# Patient Record
Sex: FEMALE | Race: OTHER | HISPANIC OR LATINO
[De-identification: names, ages, dates, MRNs, and addresses within clinical notes are randomized per-mention and may not be internally consistent; named-entity substitution may affect disease eponyms.]

---

## 2017-05-25 ENCOUNTER — APPOINTMENT (OUTPATIENT)
Dept: ORTHOPEDIC SURGERY | Facility: CLINIC | Age: 72
End: 2017-05-25

## 2017-05-25 VITALS
BODY MASS INDEX: 21.77 KG/M2 | HEIGHT: 59 IN | DIASTOLIC BLOOD PRESSURE: 88 MMHG | HEART RATE: 76 BPM | SYSTOLIC BLOOD PRESSURE: 219 MMHG | WEIGHT: 108 LBS

## 2017-05-25 DIAGNOSIS — Z78.9 OTHER SPECIFIED HEALTH STATUS: ICD-10-CM

## 2017-05-25 DIAGNOSIS — M51.26 OTHER INTERVERTEBRAL DISC DISPLACEMENT, LUMBAR REGION: ICD-10-CM

## 2017-05-25 DIAGNOSIS — E78.00 PURE HYPERCHOLESTEROLEMIA, UNSPECIFIED: ICD-10-CM

## 2017-05-25 DIAGNOSIS — I25.2 OLD MYOCARDIAL INFARCTION: ICD-10-CM

## 2017-05-25 RX ORDER — LISINOPRIL 40 MG/1
40 TABLET ORAL
Refills: 0 | Status: ACTIVE | COMMUNITY

## 2017-06-01 ENCOUNTER — APPOINTMENT (OUTPATIENT)
Dept: MRI IMAGING | Facility: CLINIC | Age: 72
End: 2017-06-01

## 2017-06-09 ENCOUNTER — INPATIENT (INPATIENT)
Facility: HOSPITAL | Age: 72
LOS: 3 days | Discharge: HOME | End: 2017-06-13
Attending: INTERNAL MEDICINE | Admitting: INTERNAL MEDICINE
Payer: MEDICARE

## 2017-06-09 VITALS
TEMPERATURE: 98 F | SYSTOLIC BLOOD PRESSURE: 225 MMHG | HEART RATE: 83 BPM | HEIGHT: 59 IN | RESPIRATION RATE: 17 BRPM | DIASTOLIC BLOOD PRESSURE: 84 MMHG | OXYGEN SATURATION: 100 % | WEIGHT: 111.99 LBS

## 2017-06-09 DIAGNOSIS — Z98.51 TUBAL LIGATION STATUS: Chronic | ICD-10-CM

## 2017-06-09 DIAGNOSIS — Z95.5 PRESENCE OF CORONARY ANGIOPLASTY IMPLANT AND GRAFT: Chronic | ICD-10-CM

## 2017-06-09 DIAGNOSIS — Z98.89 OTHER SPECIFIED POSTPROCEDURAL STATES: Chronic | ICD-10-CM

## 2017-06-09 DIAGNOSIS — R07.9 CHEST PAIN, UNSPECIFIED: ICD-10-CM

## 2017-06-09 LAB
ALBUMIN SERPL ELPH-MCNC: 4.1 G/DL — SIGNIFICANT CHANGE UP (ref 3.3–5)
ALP SERPL-CCNC: 81 U/L — SIGNIFICANT CHANGE UP (ref 40–120)
ALT FLD-CCNC: 11 U/L — SIGNIFICANT CHANGE UP (ref 4–33)
APTT BLD: 32.1 SEC — SIGNIFICANT CHANGE UP (ref 27.5–37.4)
AST SERPL-CCNC: 15 U/L — SIGNIFICANT CHANGE UP (ref 4–32)
BASOPHILS # BLD AUTO: 0.02 K/UL — SIGNIFICANT CHANGE UP (ref 0–0.2)
BASOPHILS NFR BLD AUTO: 0.3 % — SIGNIFICANT CHANGE UP (ref 0–2)
BILIRUB SERPL-MCNC: 0.4 MG/DL — SIGNIFICANT CHANGE UP (ref 0.2–1.2)
BUN SERPL-MCNC: 15 MG/DL — SIGNIFICANT CHANGE UP (ref 7–23)
CALCIUM SERPL-MCNC: 9.7 MG/DL — SIGNIFICANT CHANGE UP (ref 8.4–10.5)
CHLORIDE SERPL-SCNC: 103 MMOL/L — SIGNIFICANT CHANGE UP (ref 98–107)
CK MB BLD-MCNC: 1 NG/ML — SIGNIFICANT CHANGE UP (ref 1–4.7)
CK MB BLD-MCNC: SIGNIFICANT CHANGE UP (ref 0–2.5)
CK SERPL-CCNC: 35 U/L — SIGNIFICANT CHANGE UP (ref 25–170)
CO2 SERPL-SCNC: 28 MMOL/L — SIGNIFICANT CHANGE UP (ref 22–31)
CREAT SERPL-MCNC: 0.96 MG/DL — SIGNIFICANT CHANGE UP (ref 0.5–1.3)
EOSINOPHIL # BLD AUTO: 0.14 K/UL — SIGNIFICANT CHANGE UP (ref 0–0.5)
EOSINOPHIL NFR BLD AUTO: 2 % — SIGNIFICANT CHANGE UP (ref 0–6)
GLUCOSE SERPL-MCNC: 204 MG/DL — HIGH (ref 70–99)
HCT VFR BLD CALC: 37.2 % — SIGNIFICANT CHANGE UP (ref 34.5–45)
HGB BLD-MCNC: 11.9 G/DL — SIGNIFICANT CHANGE UP (ref 11.5–15.5)
IMM GRANULOCYTES NFR BLD AUTO: 0.1 % — SIGNIFICANT CHANGE UP (ref 0–1.5)
INR BLD: 0.94 — SIGNIFICANT CHANGE UP (ref 0.88–1.17)
LYMPHOCYTES # BLD AUTO: 1.94 K/UL — SIGNIFICANT CHANGE UP (ref 1–3.3)
LYMPHOCYTES # BLD AUTO: 28.3 % — SIGNIFICANT CHANGE UP (ref 13–44)
MCHC RBC-ENTMCNC: 27.3 PG — SIGNIFICANT CHANGE UP (ref 27–34)
MCHC RBC-ENTMCNC: 32 % — SIGNIFICANT CHANGE UP (ref 32–36)
MCV RBC AUTO: 85.3 FL — SIGNIFICANT CHANGE UP (ref 80–100)
MONOCYTES # BLD AUTO: 0.41 K/UL — SIGNIFICANT CHANGE UP (ref 0–0.9)
MONOCYTES NFR BLD AUTO: 6 % — SIGNIFICANT CHANGE UP (ref 2–14)
NEUTROPHILS # BLD AUTO: 4.34 K/UL — SIGNIFICANT CHANGE UP (ref 1.8–7.4)
NEUTROPHILS NFR BLD AUTO: 63.3 % — SIGNIFICANT CHANGE UP (ref 43–77)
PLATELET # BLD AUTO: 254 K/UL — SIGNIFICANT CHANGE UP (ref 150–400)
PMV BLD: 9.4 FL — SIGNIFICANT CHANGE UP (ref 7–13)
POTASSIUM SERPL-MCNC: 4.3 MMOL/L — SIGNIFICANT CHANGE UP (ref 3.5–5.3)
POTASSIUM SERPL-SCNC: 4.3 MMOL/L — SIGNIFICANT CHANGE UP (ref 3.5–5.3)
PROT SERPL-MCNC: 7 G/DL — SIGNIFICANT CHANGE UP (ref 6–8.3)
PROTHROM AB SERPL-ACNC: 10.5 SEC — SIGNIFICANT CHANGE UP (ref 9.8–13.1)
RBC # BLD: 4.36 M/UL — SIGNIFICANT CHANGE UP (ref 3.8–5.2)
RBC # FLD: 13.4 % — SIGNIFICANT CHANGE UP (ref 10.3–14.5)
SODIUM SERPL-SCNC: 143 MMOL/L — SIGNIFICANT CHANGE UP (ref 135–145)
TROPONIN T SERPL-MCNC: < 0.06 NG/ML — SIGNIFICANT CHANGE UP (ref 0–0.06)
WBC # BLD: 6.86 K/UL — SIGNIFICANT CHANGE UP (ref 3.8–10.5)
WBC # FLD AUTO: 6.86 K/UL — SIGNIFICANT CHANGE UP (ref 3.8–10.5)

## 2017-06-09 PROCEDURE — 71010: CPT | Mod: 26

## 2017-06-09 RX ORDER — ASPIRIN/CALCIUM CARB/MAGNESIUM 324 MG
162 TABLET ORAL ONCE
Qty: 0 | Refills: 0 | Status: COMPLETED | OUTPATIENT
Start: 2017-06-09 | End: 2017-06-09

## 2017-06-09 RX ORDER — CARVEDILOL PHOSPHATE 80 MG/1
25 CAPSULE, EXTENDED RELEASE ORAL ONCE
Qty: 0 | Refills: 0 | Status: COMPLETED | OUTPATIENT
Start: 2017-06-09 | End: 2017-06-09

## 2017-06-09 RX ADMIN — Medication 162 MILLIGRAM(S): at 23:15

## 2017-06-09 RX ADMIN — CARVEDILOL PHOSPHATE 25 MILLIGRAM(S): 80 CAPSULE, EXTENDED RELEASE ORAL at 23:15

## 2017-06-09 NOTE — ED ADULT NURSE NOTE - CHIEF COMPLAINT QUOTE
Pt arrives to ED c/o chest pain described as tightness starting 20:00 on 6/8/17.    Pt last felt this way in 2012 when she had an MI.  EKG in triage.  Pt is mother of [employer] employee.  Pt hypertensive in triage.

## 2017-06-09 NOTE — ED ADULT NURSE NOTE - OBJECTIVE STATEMENT
pt received to Tr C pt comes to ED for CP. pt has past medical hx of MI in the past. pt BP is elevated otherwise VSS. 20 g placed in L AC labs were drawn and sent EDMD at bedside for eval pt is NSR on monitor. resp even and unlabored. awaiting further orders Will continue to monitor the pt

## 2017-06-09 NOTE — ED PROVIDER NOTE - PMH
Borderline diabetes mellitus  Controlled with diet  CAD (coronary artery disease)    Dyslipidemia    H/O heart artery stent    History of MI (myocardial infarction)    HTN (hypertension), benign    Legally blind in left eye, as defined in [place]  20/400 left eye  Migraines  Developed at 10 years of age  Last episode 2 years ago  Vertigo

## 2017-06-09 NOTE — ED PROVIDER NOTE - ATTENDING CONTRIBUTION TO CARE
71F p/w chest pain intermittent L parasternal, rad to neck and L arm, x 1 day, a/w nausea and diaphoresis.  No leg swelling or pain.  Similar to previous episode which was ACS.  VS:  unremarkable except hypertensive    GEN - NAD; well appearing; A+O x3   HEAD - NC/AT     ENT - PEERL, EOMI, mucous membranes  moist , no discharge      NECK: Neck supple, non-tender without lymphadenopathy, no masses, no JVD  PULM - CTA b/l,  symmetric breath sounds  COR -  normal heart sounds    ABD - , ND, NT, soft, no guarding, no rebound, no masses    BACK - no CVA tenderness, nontender spine     EXTREMS - no edema, no deformity, warm and well perfused    SKIN - no rash or bruising      NEUROLOGIC - alert, CN 2-12 intact, sensation nl, motor 5/5 RUE/LUE/RLE/LLE.      IMP:  71F p/w chest pain with radiation and associated sx concerning for ACS especially in setting of previous ACS and stents with similar presentation.  Rx ASA, check labs incl CE, rx add'l dose of home coreg given very hypertensive.  Admit.

## 2017-06-09 NOTE — ED PROVIDER NOTE - OBJECTIVE STATEMENT
70 yo woman w/ h/o htn, hld, cad (s/p 3 stents 2012) p/w chest pain. Complaining of intermittent pain in mid chest, rad to neck/L arm, starting yesterday, w/o clear aggravating/alleviating factors, similar to when she had an mi in the past. Has associated nausea and diaphoresis. Did not take aspirin pta. Denies fever, cough, le pain/swelling, h/o thrombosis, travel/immobilization.  Card: Abundio

## 2017-06-09 NOTE — ED PROVIDER NOTE - PROGRESS NOTE DETAILS
Ayan Nur MD PGY3: Labs, imaging reviewed. Received aspirin in ED. Case d/w PARK Cedillo (on call for [facility]), accepted to service. Telemetry PA signout @ [phone].

## 2017-06-09 NOTE — ED PROVIDER NOTE - PSH
History of coronary artery stent placement  2 years ago  Same admission as past myocardial infarction  History of tonsillectomy    History of tubal ligation  35 years ago, no complications as per patient.  S/P LASIK surgery  Right eye, no complications as per patient.

## 2017-06-09 NOTE — ED PROVIDER NOTE - MEDICAL DECISION MAKING DETAILS
70 yo woman w/ chest pain. Concerning for acs given history. Will check labs, cxr, ekg. Will require admission.

## 2017-06-09 NOTE — ED ADULT TRIAGE NOTE - CHIEF COMPLAINT QUOTE
Pt arrives to ED c/o chest pain described as tightness starting 20:00 on 6/8/17.    Pt last felt this way in 2012 when she had an MI.  EKG in triage.  Pt is mother of [employer] employee.  Pt hypertensive in triage.

## 2017-06-10 DIAGNOSIS — I10 ESSENTIAL (PRIMARY) HYPERTENSION: ICD-10-CM

## 2017-06-10 DIAGNOSIS — R73.03 PREDIABETES: ICD-10-CM

## 2017-06-10 DIAGNOSIS — I25.10 ATHEROSCLEROTIC HEART DISEASE OF NATIVE CORONARY ARTERY WITHOUT ANGINA PECTORIS: ICD-10-CM

## 2017-06-10 DIAGNOSIS — Z95.810 PRESENCE OF AUTOMATIC (IMPLANTABLE) CARDIAC DEFIBRILLATOR: Chronic | ICD-10-CM

## 2017-06-10 DIAGNOSIS — Z29.9 ENCOUNTER FOR PROPHYLACTIC MEASURES, UNSPECIFIED: ICD-10-CM

## 2017-06-10 DIAGNOSIS — E78.5 HYPERLIPIDEMIA, UNSPECIFIED: ICD-10-CM

## 2017-06-10 LAB
BUN SERPL-MCNC: 13 MG/DL — SIGNIFICANT CHANGE UP (ref 7–23)
CALCIUM SERPL-MCNC: 9.3 MG/DL — SIGNIFICANT CHANGE UP (ref 8.4–10.5)
CHLORIDE SERPL-SCNC: 103 MMOL/L — SIGNIFICANT CHANGE UP (ref 98–107)
CHOLEST SERPL-MCNC: 226 MG/DL — HIGH (ref 120–199)
CK SERPL-CCNC: 29 U/L — SIGNIFICANT CHANGE UP (ref 25–170)
CO2 SERPL-SCNC: 27 MMOL/L — SIGNIFICANT CHANGE UP (ref 22–31)
CREAT SERPL-MCNC: 0.83 MG/DL — SIGNIFICANT CHANGE UP (ref 0.5–1.3)
GLUCOSE SERPL-MCNC: 121 MG/DL — HIGH (ref 70–99)
HBA1C BLD-MCNC: 6.3 % — HIGH (ref 4–5.6)
HCT VFR BLD CALC: 36.3 % — SIGNIFICANT CHANGE UP (ref 34.5–45)
HDLC SERPL-MCNC: 36 MG/DL — LOW (ref 45–65)
HGB BLD-MCNC: 11.7 G/DL — SIGNIFICANT CHANGE UP (ref 11.5–15.5)
LIPID PNL WITH DIRECT LDL SERPL: 175 MG/DL — SIGNIFICANT CHANGE UP
MAGNESIUM SERPL-MCNC: 1.8 MG/DL — SIGNIFICANT CHANGE UP (ref 1.6–2.6)
MCHC RBC-ENTMCNC: 26.8 PG — LOW (ref 27–34)
MCHC RBC-ENTMCNC: 32.2 % — SIGNIFICANT CHANGE UP (ref 32–36)
MCV RBC AUTO: 83.3 FL — SIGNIFICANT CHANGE UP (ref 80–100)
PHOSPHATE SERPL-MCNC: 3 MG/DL — SIGNIFICANT CHANGE UP (ref 2.5–4.5)
PLATELET # BLD AUTO: 235 K/UL — SIGNIFICANT CHANGE UP (ref 150–400)
PMV BLD: 9.1 FL — SIGNIFICANT CHANGE UP (ref 7–13)
POTASSIUM SERPL-MCNC: 4.2 MMOL/L — SIGNIFICANT CHANGE UP (ref 3.5–5.3)
POTASSIUM SERPL-SCNC: 4.2 MMOL/L — SIGNIFICANT CHANGE UP (ref 3.5–5.3)
RBC # BLD: 4.36 M/UL — SIGNIFICANT CHANGE UP (ref 3.8–5.2)
RBC # FLD: 13.4 % — SIGNIFICANT CHANGE UP (ref 10.3–14.5)
SODIUM SERPL-SCNC: 143 MMOL/L — SIGNIFICANT CHANGE UP (ref 135–145)
TRIGL SERPL-MCNC: 169 MG/DL — HIGH (ref 10–149)
TROPONIN T SERPL-MCNC: < 0.06 NG/ML — SIGNIFICANT CHANGE UP (ref 0–0.06)
TSH SERPL-MCNC: 0.91 UIU/ML — SIGNIFICANT CHANGE UP (ref 0.27–4.2)
WBC # BLD: 6.15 K/UL — SIGNIFICANT CHANGE UP (ref 3.8–10.5)
WBC # FLD AUTO: 6.15 K/UL — SIGNIFICANT CHANGE UP (ref 3.8–10.5)

## 2017-06-10 RX ORDER — INSULIN LISPRO 100/ML
VIAL (ML) SUBCUTANEOUS
Qty: 0 | Refills: 0 | Status: DISCONTINUED | OUTPATIENT
Start: 2017-06-10 | End: 2017-06-13

## 2017-06-10 RX ORDER — SODIUM CHLORIDE 9 MG/ML
1000 INJECTION, SOLUTION INTRAVENOUS
Qty: 0 | Refills: 0 | Status: DISCONTINUED | OUTPATIENT
Start: 2017-06-10 | End: 2017-06-13

## 2017-06-10 RX ORDER — GLUCAGON INJECTION, SOLUTION 0.5 MG/.1ML
1 INJECTION, SOLUTION SUBCUTANEOUS ONCE
Qty: 0 | Refills: 0 | Status: DISCONTINUED | OUTPATIENT
Start: 2017-06-10 | End: 2017-06-13

## 2017-06-10 RX ORDER — LISINOPRIL 2.5 MG/1
40 TABLET ORAL DAILY
Qty: 0 | Refills: 0 | Status: DISCONTINUED | OUTPATIENT
Start: 2017-06-10 | End: 2017-06-13

## 2017-06-10 RX ORDER — DILTIAZEM HCL 120 MG
120 CAPSULE, EXT RELEASE 24 HR ORAL DAILY
Qty: 0 | Refills: 0 | Status: DISCONTINUED | OUTPATIENT
Start: 2017-06-10 | End: 2017-06-10

## 2017-06-10 RX ORDER — DEXTROSE 50 % IN WATER 50 %
1 SYRINGE (ML) INTRAVENOUS ONCE
Qty: 0 | Refills: 0 | Status: DISCONTINUED | OUTPATIENT
Start: 2017-06-10 | End: 2017-06-13

## 2017-06-10 RX ORDER — CARVEDILOL PHOSPHATE 80 MG/1
25 CAPSULE, EXTENDED RELEASE ORAL EVERY 12 HOURS
Qty: 0 | Refills: 0 | Status: DISCONTINUED | OUTPATIENT
Start: 2017-06-10 | End: 2017-06-13

## 2017-06-10 RX ORDER — GABAPENTIN 400 MG/1
100 CAPSULE ORAL THREE TIMES A DAY
Qty: 0 | Refills: 0 | Status: DISCONTINUED | OUTPATIENT
Start: 2017-06-10 | End: 2017-06-13

## 2017-06-10 RX ORDER — DEXTROSE 50 % IN WATER 50 %
25 SYRINGE (ML) INTRAVENOUS ONCE
Qty: 0 | Refills: 0 | Status: DISCONTINUED | OUTPATIENT
Start: 2017-06-10 | End: 2017-06-13

## 2017-06-10 RX ORDER — DEXTROSE 50 % IN WATER 50 %
12.5 SYRINGE (ML) INTRAVENOUS ONCE
Qty: 0 | Refills: 0 | Status: DISCONTINUED | OUTPATIENT
Start: 2017-06-10 | End: 2017-06-13

## 2017-06-10 RX ORDER — HEPARIN SODIUM 5000 [USP'U]/ML
5000 INJECTION INTRAVENOUS; SUBCUTANEOUS EVERY 12 HOURS
Qty: 0 | Refills: 0 | Status: DISCONTINUED | OUTPATIENT
Start: 2017-06-10 | End: 2017-06-13

## 2017-06-10 RX ORDER — INSULIN LISPRO 100/ML
VIAL (ML) SUBCUTANEOUS AT BEDTIME
Qty: 0 | Refills: 0 | Status: DISCONTINUED | OUTPATIENT
Start: 2017-06-10 | End: 2017-06-13

## 2017-06-10 RX ORDER — ATORVASTATIN CALCIUM 80 MG/1
10 TABLET, FILM COATED ORAL AT BEDTIME
Qty: 0 | Refills: 0 | Status: DISCONTINUED | OUTPATIENT
Start: 2017-06-10 | End: 2017-06-13

## 2017-06-10 RX ADMIN — LISINOPRIL 40 MILLIGRAM(S): 2.5 TABLET ORAL at 06:20

## 2017-06-10 RX ADMIN — CARVEDILOL PHOSPHATE 25 MILLIGRAM(S): 80 CAPSULE, EXTENDED RELEASE ORAL at 06:20

## 2017-06-10 RX ADMIN — GABAPENTIN 100 MILLIGRAM(S): 400 CAPSULE ORAL at 06:20

## 2017-06-10 RX ADMIN — HEPARIN SODIUM 5000 UNIT(S): 5000 INJECTION INTRAVENOUS; SUBCUTANEOUS at 06:20

## 2017-06-10 RX ADMIN — HEPARIN SODIUM 5000 UNIT(S): 5000 INJECTION INTRAVENOUS; SUBCUTANEOUS at 18:09

## 2017-06-10 RX ADMIN — Medication 120 MILLIGRAM(S): at 06:20

## 2017-06-10 RX ADMIN — CARVEDILOL PHOSPHATE 25 MILLIGRAM(S): 80 CAPSULE, EXTENDED RELEASE ORAL at 18:09

## 2017-06-10 RX ADMIN — GABAPENTIN 100 MILLIGRAM(S): 400 CAPSULE ORAL at 21:20

## 2017-06-10 RX ADMIN — Medication: at 13:02

## 2017-06-10 RX ADMIN — ATORVASTATIN CALCIUM 10 MILLIGRAM(S): 80 TABLET, FILM COATED ORAL at 21:20

## 2017-06-10 RX ADMIN — GABAPENTIN 100 MILLIGRAM(S): 400 CAPSULE ORAL at 13:02

## 2017-06-10 NOTE — H&P ADULT - ATTENDING COMMENTS
CARDIOLOGY ATTENDING    Patient seen and examined. Agree with above. 71 year old woman with hypertension, hypercholesterolemia, CAD, who underwent PTCA with stent at [facility] in 2012 and subsequent nuclear stress test in the same hospital in January 2016 which revealed no evidence of ischemia, and who also has a [employer] loop recorder in place given recurrent syncopal episodes. Since the loop recorder has been implanted, the patient has not passed out and has had no documented arrhythmias. She is now admitted for chest pain. EKG unremarkabe and troponin negative x 2.     PMHX: Hypertension, hypercholesterolemia, and CAD with three stents implanted in 2012, recurrent syncope  PSHX: PCI 2012, ILR 2016  Allergies: The patient could not tolerate Crestor in the past because of muscle pain.  Medications: Plavix 75 mg daily, pravastatin 10 mg QHS, lisinopril 40 mg daily, amlodipine 2.5 mg daily, and chlorthalidone 25 mg daily.    Plan  -get NST  -medicine consult Dr Jolly for hyperglycemia  -restart all home meds

## 2017-06-10 NOTE — H&P ADULT - NSHPLABSRESULTS_GEN_ALL_CORE
11.9   6.86  )-----------( 254      ( 09 Jun 2017 21:20 )             37.2   06-09    143  |  103  |  15  ----------------------------<  204<H>  4.3   |  28  |  0.96    Ca    9.7      09 Jun 2017 21:20    TPro  7.0  /  Alb  4.1  /  TBili  0.4  /  DBili  x   /  AST  15  /  ALT  11  /  AlkPhos  81  06-09  CARDIAC MARKERS ( 10 Freddy 2017 04:00 )  x     / < 0.06 ng/mL / 29 u/L / x     / x      CARDIAC MARKERS ( 09 Jun 2017 21:20 )  x     / < 0.06 ng/mL / 35 u/L / 1.00 ng/mL / x        EKG shows NSR @ 85 bpm QTc 451 ms

## 2017-06-10 NOTE — H&P ADULT - PROBLEM SELECTOR PLAN 1
Admit to telemetry.   Trend CE. Consider ischemic evaluation. Consider ICD interrogation.   check cbc,tsh,lipid, hemoglobin a1c, bmp with mag and phos.   f/u MD note

## 2017-06-10 NOTE — H&P ADULT - NSHPREVIEWOFSYSTEMS_GEN_ALL_CORE
Constitutional: No fever, fatigue or weight loss. + diaphoresis   Skin: No rash.  Eyes: No recent vision problems or eye pain.  ENT: No congestion, ear pain, or sore throat.  Endocrine: No thyroid problems.  Cardiovascular: + chest pain. No palpation.  Respiratory: No cough, shortness of breath, congestion, or wheezing.  Gastrointestinal: + Nausea. No abdominal pain,  vomiting, or diarrhea.  Genitourinary: No dysuria.  Musculoskeletal: No joint swelling.  Neurologic: No headache.

## 2017-06-10 NOTE — H&P ADULT - NSHPPHYSICALEXAM_GEN_ALL_CORE
GENERAL APPEARANCE: Well developed, well nourished, alert and cooperative, and appears to be in no acute distress.  HEAD: normocephalic.  EYES: PERRL, EOMI.   EARS: External auditory canals clear, hearing grossly intact.  NECK: Neck supple, non-tender without lymphadenopathy, masses or thyromegaly.  CARDIAC: Normal S1 and S2. No S3, S4 or murmurs. Rhythm is regular.   LUNGS: Clear to auscultation and percussion without rales, rhonchi, wheezing or diminished breath sounds.  ABDOMEN: Positive bowel sounds. Soft, nondistended, nontender. No guarding or rebound. No masses.  EXTREMITIES: No significant deformity or joint abnormality. No edema. Peripheral pulses intact.   NEUROLOGICAL: Strength and sensation symmetric and intact throughout.   SKIN: Skin normal color, texture and turgor with no lesions or eruptions.  PSYCHIATRIC: The mental examination revealed the patient was oriented to person, place, and time. The patient was able to demonstrate good judgement and reason, without hallucinations, abnormal affect or abnormal behaviors during the examination. Patient is not suicidal.

## 2017-06-10 NOTE — H&P ADULT - ASSESSMENT
70 y/o F with h/o HTN, HLD, CAD s/p 3 stents, legally blind in the left eye, vertigo s/p ICD presents to the ED for chest pain. Admit to telemetry to r/o ACS.

## 2017-06-10 NOTE — PATIENT PROFILE ADULT. - VISION (WITH CORRECTIVE LENSES IF THE PATIENT USUALLY WEARS THEM):
Partially impaired: cannot see medication labels or newsprint, but can see obstacles in path, and the surrounding layout; can count fingers at arm's length/left eye legally blind

## 2017-06-10 NOTE — H&P ADULT - HISTORY OF PRESENT ILLNESS
70 y/o F with h/o HTN, HLD, CAD s/p 3 stents, legally blind in the left eye, vertigo s/p ICD presents to the ED for chest pain. Pt states he has chest pain intermittently in the midsternal region radiating to the left arm. Pt reports the chest pain started yesterday and nothing makes it worse or better. Pt states it is also associated with nausea and diaphoresis. Pt denies LOC, syncope, fever, chills, cough, N/V/D/C, numbness, tingling, recent travel/infection, dysuria, urinary/bowel incontinence or any other complaints at this time. 72 y/o F with h/o HTN, HLD, CAD s/p 3 stents, legally blind in the left eye, vertigo s/p ILR presents to the ED for chest pain. Pt states he has chest pain intermittently in the midsternal region radiating to the left arm. Pt reports the chest pain started yesterday and nothing makes it worse or better. Pt states it is also associated with nausea and diaphoresis. Pt denies LOC, syncope, fever, chills, cough, N/V/D/C, numbness, tingling, recent travel/infection, dysuria, urinary/bowel incontinence or any other complaints at this time.

## 2017-06-10 NOTE — H&P ADULT - PROBLEM SELECTOR PLAN 3
Routine blood pressure check.  Continue with current medications.   Low salt,low cholesterol, DASH diet

## 2017-06-10 NOTE — H&P ADULT - FAMILY HISTORY
Child  Still living? Yes, Estimated age: Age Unknown  Family history of MI (myocardial infarction), Age at diagnosis: Age Unknown  Family history of hypertension, Age at diagnosis: Age Unknown  Family history of hypercholesterolemia, Age at diagnosis: Age Unknown     Sibling  Still living? No  Family history of hypertension, Age at diagnosis: Age Unknown  Family history of hypercholesterolemia, Age at diagnosis: Age Unknown  Family history of brain tumor, Age at diagnosis: Age Unknown

## 2017-06-10 NOTE — H&P ADULT - PSH
History of coronary artery stent placement  2 years ago  Same admission as past myocardial infarction  History of tonsillectomy    History of tubal ligation  35 years ago, no complications as per patient.  S/P ICD (internal cardiac defibrillator) procedure    S/P LASIK surgery  Right eye, no complications as per patient.

## 2017-06-11 DIAGNOSIS — R55 SYNCOPE AND COLLAPSE: ICD-10-CM

## 2017-06-11 DIAGNOSIS — H54.8 LEGAL BLINDNESS, AS DEFINED IN USA: ICD-10-CM

## 2017-06-11 LAB
BUN SERPL-MCNC: 18 MG/DL — SIGNIFICANT CHANGE UP (ref 7–23)
CALCIUM SERPL-MCNC: 9 MG/DL — SIGNIFICANT CHANGE UP (ref 8.4–10.5)
CHLORIDE SERPL-SCNC: 101 MMOL/L — SIGNIFICANT CHANGE UP (ref 98–107)
CO2 SERPL-SCNC: 23 MMOL/L — SIGNIFICANT CHANGE UP (ref 22–31)
CREAT SERPL-MCNC: 0.86 MG/DL — SIGNIFICANT CHANGE UP (ref 0.5–1.3)
GLUCOSE SERPL-MCNC: 140 MG/DL — HIGH (ref 70–99)
HCT VFR BLD CALC: 38.7 % — SIGNIFICANT CHANGE UP (ref 34.5–45)
HGB BLD-MCNC: 12 G/DL — SIGNIFICANT CHANGE UP (ref 11.5–15.5)
MCHC RBC-ENTMCNC: 26.4 PG — LOW (ref 27–34)
MCHC RBC-ENTMCNC: 31 % — LOW (ref 32–36)
MCV RBC AUTO: 85.1 FL — SIGNIFICANT CHANGE UP (ref 80–100)
PLATELET # BLD AUTO: 214 K/UL — SIGNIFICANT CHANGE UP (ref 150–400)
PMV BLD: 9.1 FL — SIGNIFICANT CHANGE UP (ref 7–13)
POTASSIUM SERPL-MCNC: 3.8 MMOL/L — SIGNIFICANT CHANGE UP (ref 3.5–5.3)
POTASSIUM SERPL-SCNC: 3.8 MMOL/L — SIGNIFICANT CHANGE UP (ref 3.5–5.3)
RBC # BLD: 4.55 M/UL — SIGNIFICANT CHANGE UP (ref 3.8–5.2)
RBC # FLD: 13.5 % — SIGNIFICANT CHANGE UP (ref 10.3–14.5)
SODIUM SERPL-SCNC: 139 MMOL/L — SIGNIFICANT CHANGE UP (ref 135–145)
WBC # BLD: 6.85 K/UL — SIGNIFICANT CHANGE UP (ref 3.8–10.5)
WBC # FLD AUTO: 6.85 K/UL — SIGNIFICANT CHANGE UP (ref 3.8–10.5)

## 2017-06-11 PROCEDURE — 93010 ELECTROCARDIOGRAM REPORT: CPT

## 2017-06-11 PROCEDURE — 70450 CT HEAD/BRAIN W/O DYE: CPT | Mod: 26

## 2017-06-11 RX ORDER — SENNA PLUS 8.6 MG/1
2 TABLET ORAL AT BEDTIME
Qty: 0 | Refills: 0 | Status: DISCONTINUED | OUTPATIENT
Start: 2017-06-11 | End: 2017-06-13

## 2017-06-11 RX ORDER — DOCUSATE SODIUM 100 MG
100 CAPSULE ORAL
Qty: 0 | Refills: 0 | Status: DISCONTINUED | OUTPATIENT
Start: 2017-06-11 | End: 2017-06-13

## 2017-06-11 RX ADMIN — GABAPENTIN 100 MILLIGRAM(S): 400 CAPSULE ORAL at 05:38

## 2017-06-11 RX ADMIN — Medication 100 MILLIGRAM(S): at 17:56

## 2017-06-11 RX ADMIN — SENNA PLUS 2 TABLET(S): 8.6 TABLET ORAL at 22:17

## 2017-06-11 RX ADMIN — HEPARIN SODIUM 5000 UNIT(S): 5000 INJECTION INTRAVENOUS; SUBCUTANEOUS at 17:57

## 2017-06-11 RX ADMIN — HEPARIN SODIUM 5000 UNIT(S): 5000 INJECTION INTRAVENOUS; SUBCUTANEOUS at 05:37

## 2017-06-11 RX ADMIN — CARVEDILOL PHOSPHATE 25 MILLIGRAM(S): 80 CAPSULE, EXTENDED RELEASE ORAL at 17:56

## 2017-06-11 RX ADMIN — CARVEDILOL PHOSPHATE 25 MILLIGRAM(S): 80 CAPSULE, EXTENDED RELEASE ORAL at 05:37

## 2017-06-11 RX ADMIN — GABAPENTIN 100 MILLIGRAM(S): 400 CAPSULE ORAL at 12:53

## 2017-06-11 RX ADMIN — GABAPENTIN 100 MILLIGRAM(S): 400 CAPSULE ORAL at 22:17

## 2017-06-11 RX ADMIN — LISINOPRIL 40 MILLIGRAM(S): 2.5 TABLET ORAL at 05:38

## 2017-06-11 NOTE — CHART NOTE - NSCHARTNOTEFT_GEN_A_CORE
Called by RN pt fell and hit her head. Pt in bed, she states she was on the toilet and while bearing down she suddenly felt dizzy and fell off the toilet and hit her head. She does not have any pain, moving all extremities, only complains of nausea. Pt seen and examined. VSS /70, HR 58-60, no events on tele, O2 sat 98% on RA, .  Pt has small area of redness/petechiae noted on forehead, no focal Neuro deficits noted on exam. Will obtain labs, EKG, stat CT head, and will continue to monitor closely. MD notified.

## 2017-06-11 NOTE — PROVIDER CONTACT NOTE (FALL NOTIFICATION) - ASSESSMENT
Patient alert and responsive, diaphoretic, and c/o dizziness Patient alert and responsive, diaphoretic, and c/o dizziness.   Late entry: See flowsheet for vital signs.

## 2017-06-11 NOTE — PROVIDER CONTACT NOTE (FALL NOTIFICATION) - BACKGROUND
Admitting diagnosis of chest pain. H/o borderline diabetes, migraines, legally blind in Left eye, vertigo, MI, stent placement, CAD, HTN, dyslipidemia

## 2017-06-11 NOTE — PROVIDER CONTACT NOTE (FALL NOTIFICATION) - SITUATION
PA made aware that patient fell while in bathroom and is currently in bed, with abrasion to forehead. Patient is alert and diaphoretic. PA made aware that patient fell while in bathroom and is currently in bed, with abrasion to forehead. Patient is alert and diaphoretic.   Late entry: VSS. Patient states "I fell in the bathroom." PA made aware that unwitnessed fall occurred. Patient sustained abrasion to forehead. Patient is alert and diaphoretic. Late entry: patient states "I fell in the bathroom and hit my head"

## 2017-06-11 NOTE — CONSULT NOTE ADULT - SUBJECTIVE AND OBJECTIVE BOX
Patient is a 71y old  Female who presents with a chief complaint of chest pain and passing out.       HPI:  70 y/o F with h/o HTN, HLD, CAD s/p 3 stents, legally blind in the left eye, vertigo s/p ILR presents to the ED for chest pain. Pt states he has chest pain intermittently in the midsternal region radiating to the left arm. Pt reports the chest pain started yesterday and nothing makes it worse or better. Pt states it is also associated with nausea and diaphoresis. Pt denies LOC,  fever, chills, cough, N/V/D/C, numbness, tingling, recent travel/infection, dysuria, urinary/bowel incontinence or any other complaints at this time. Had episode of syncope today.       PAST MEDICAL & SURGICAL HISTORY:  Legally blind in left eye, as defined in [place]: 20/400 left eye  Borderline diabetes mellitus: Controlled with diet  Migraines: Developed at 10 years of age  Last episode 2 years ago  Vertigo  H/O heart artery stent  History of MI (myocardial infarction)  CAD (coronary artery disease)  HTN (hypertension), benign  Dyslipidemia  S/P ICD (internal cardiac defibrillator) procedure  History of coronary artery stent placement: 2 years ago  Same admission as past myocardial infarction  S/P LASIK surgery: Right eye, no complications as per patient.  History of tubal ligation: 35 years ago, no complications as per patient.  History of tonsillectomy  No Past Surgical History      Social History:    FAMILY HISTORY:  Family history of brain tumor (Sibling)  Family history of hypercholesterolemia (Child, Sibling)  Family history of hypertension (Child, Sibling): Daughters, Sisters  Family history of MI (myocardial infarction) (Child): Father ( at 75)  Mother ( at 87)  Son (  at 35)      Allergies    iodine (Anaphylaxis)  No Known Drug Allergies    Intolerances        REVIEW OF SYSTEMS:    CONSTITUTIONAL: No fever, weight loss, or fatigue  EYES: legally blind  RESPIRATORY: No cough, wheezing, chills or hemoptysis; No shortness of breath  CARDIOVASCULAR: No chest pain, palpitations, dizziness, or leg swelling  GASTROINTESTINAL: No abdominal or epigastric pain. No nausea, vomiting, or hematemesis; No diarrhea or constipation. No melena or hematochezia.  GENITOURINARY: No dysuria, frequency, hematuria, or incontinence  NEUROLOGICAL: No headaches, memory loss, loss of strength, numbness, or tremors,syncope   SKIN: No itching, burning, rashes, or lesions   ENDOCRINE: No heat or cold intolerance; No hair loss  MUSCULOSKELETAL: No joint pain or swelling; No muscle, back, or extremity pain  PSYCHIATRIC: No depression, anxiety, mood swings, or difficulty sleeping      MEDICATIONS  (STANDING):  heparin  Injectable 5000Unit(s) SubCutaneous every 12 hours  lisinopril 40milliGRAM(s) Oral daily  carvedilol 25milliGRAM(s) Oral every 12 hours  atorvastatin 10milliGRAM(s) Oral at bedtime  gabapentin 100milliGRAM(s) Oral three times a day  insulin lispro (HumaLOG) corrective regimen sliding scale  SubCutaneous three times a day before meals  insulin lispro (HumaLOG) corrective regimen sliding scale  SubCutaneous at bedtime  dextrose 5%. 1000milliLiter(s) IV Continuous <Continuous>  dextrose 50% Injectable 12.5Gram(s) IV Push once  dextrose 50% Injectable 25Gram(s) IV Push once  dextrose 50% Injectable 25Gram(s) IV Push once    MEDICATIONS  (PRN):  dextrose Gel 1Dose(s) Oral once PRN Blood Glucose LESS THAN 70 milliGRAM(s)/deciliter  glucagon  Injectable 1milliGRAM(s) IntraMuscular once PRN Glucose LESS THAN 70 milligrams/deciliter      Vital Signs Last 24 Hrs  T(C): 37, Max: 37.4 (06-10 @ 21:13)  T(F): 98.6, Max: 99.4 (06-10 @ 21:13)  HR: 66 (64 - 78)  BP: 112/63 (111/59 - 156/67)  BP(mean): --  RR: 16 (16 - 16)  SpO2: 99% (97% - 100%)    PHYSICAL EXAM:    GENERAL: NAD, well-groomed, well-developed  HEAD:  Atraumatic, Normocephalicclear  ENMT: No tonsillar erythema, exudates, or enlargement; Moist mucous membranes, Good dentition, No lesions  NECK: Supple, No JVD, Normal thyroid  NERVOUS SYSTEM:  Alert & Oriented X3, Good concentration; Motor Strength 5/5 B/L upper and lower extremities; DTRs 2+ intact and symmetric  CHEST/LUNG: Clear to percussion bilaterally; No rales, rhonchi, wheezing, or rubs  HEART: Regular rate and rhythm; No murmurs, rubs, or gallops  ABDOMEN: Soft, Nontender, Nondistended; Bowel sounds present  EXTREMITIES:  2+ Peripheral Pulses, No clubbing, cyanosis, or edema  LYMPH: No lymphadenopathy noted  SKIN: No rashes or lesions    LABS:                        12.0   6.85  )-----------( 214      ( 2017 05:55 )             38.7     06-11    139  |  101  |  18  ----------------------------<  140<H>  3.8   |  23  |  0.86    Ca    9.0      2017 05:55  Phos  3.0     06-10  Mg     1.8     06-10    TPro  7.0  /  Alb  4.1  /  TBili  0.4  /  DBili  x   /  AST  15  /  ALT  11  /  AlkPhos  81  06-09    PT/INR - ( 2017 21:20 )   PT: 10.5 SEC;   INR: 0.94          PTT - ( 2017 21:20 )  PTT:32.1 SEC        RADIOLOGY & ADDITIONAL STUDIES:

## 2017-06-11 NOTE — PROGRESS NOTE ADULT - SUBJECTIVE AND OBJECTIVE BOX
Subjective: pt seen and examined , no complaints  	  MEDICATIONS:  MEDICATIONS  (STANDING):  heparin  Injectable 5000Unit(s) SubCutaneous every 12 hours  lisinopril 40milliGRAM(s) Oral daily  carvedilol 25milliGRAM(s) Oral every 12 hours  atorvastatin 10milliGRAM(s) Oral at bedtime  gabapentin 100milliGRAM(s) Oral three times a day  insulin lispro (HumaLOG) corrective regimen sliding scale  SubCutaneous three times a day before meals  insulin lispro (HumaLOG) corrective regimen sliding scale  SubCutaneous at bedtime  dextrose 5%. 1000milliLiter(s) IV Continuous <Continuous>  dextrose 50% Injectable 12.5Gram(s) IV Push once  dextrose 50% Injectable 25Gram(s) IV Push once  dextrose 50% Injectable 25Gram(s) IV Push once      PHYSICAL EXAM:  T(C): 37.1, Max: 37.4 (06-10 @ 21:13)  HR: 78 (73 - 89)  BP: 145/69 (145/69 - 182/78)  RR: 16 (16 - 18)  SpO2: 97% (97% - 100%)  Wt(kg): --  I&O's Summary        Appearance: Normal	  HEENT:   Normal oral mucosa, PERRL, EOMI	  Lymphatic: No lymphadenopathy , no edema  Cardiovascular: Normal S1 S2, No JVD, No murmurs , Peripheral pulses palpable 2+ bilaterally  Respiratory: Lungs clear to auscultation, normal effort 	  Gastrointestinal:  Soft, Non-tender, + BS	  Skin: No rashes, No ecchymoses, No cyanosis, warm to touch  Musculoskeletal: Normal range of motion, normal strength  Psychiatry:  Mood & affect appropriate    TELEMETRY: 	  NSR  ECG:  	  RADIOLOGY:   DIAGNOSTIC TESTING:  [x ] Echocardiogram:   CONCLUSIONS:  1. Increased relative wall thickness with normal left  ventricular mass index, consistent with concentric left  ventricular remodeling.  2. Normal left ventricular systolic function. No segmental  wall motion abnormalities.  3. Normal right ventricular size and function.  ------------------------------------------------------------------------  Confirmed on  1/8/2016 - 13:28:14 by Harinder Hammer,  [ ]  Catheterization:  [ ] Stress Test:       IMPRESSIONS:Normal Study  * Myocardial Perfusion SPECT results are normal at 91 % of  MPHR.  * Normal myocardial perfusion scan,with no evidence of  infarction or inducible ischemia.  * Post-stress gated wall motionanalysis was performed  (LVEF = 52 %;LVEDV = 64 ml. using ET contours), revealing  normal LV function.  * NOTE: given overall findings, the ECG tracings are  likely falsely positive  ------------------------------------------------------------------------  Confirmed on  1/11/2016 - 11:27:09 by Sascha Alexander,  OTHER: 	    LABS:	 	    CARDIAC MARKERS:  CARDIAC MARKERS ( 10 Freddy 2017 04:00 )  x     / < 0.06 ng/mL / 29 u/L / x     / x      CARDIAC MARKERS ( 09 Jun 2017 21:20 )  x     / < 0.06 ng/mL / 35 u/L / 1.00 ng/mL / x                                11.7   6.15  )-----------( 235      ( 10 Freddy 2017 07:45 )             36.3     06-10    143  |  103  |  13  ----------------------------<  121<H>  4.2   |  27  |  0.83    Ca    9.3      10 Freddy 2017 07:45  Phos  3.0     06-10  Mg     1.8     06-10    TPro  7.0  /  Alb  4.1  /  TBili  0.4  /  DBili  x   /  AST  15  /  ALT  11  /  AlkPhos  81  06-09    proBNP:   Lipid Profile:   HgA1c: Hemoglobin A1C, Whole Blood: 6.3 % (06-10 @ 07:45)    TSH: Thyroid Stimulating Hormone, Serum: 0.91 uIU/mL (06-10 @ 07:45)      ASSESSMENT/PLAN: 	71y Female  PmHx of HTN, HLD, Borderline DM, MI, CAD s/p 3 stents, presents with chest pain. Admit to telemetry to r/o ACS.      medicine consult apprec ( Dr Jolly)  cardiac enzymes - x 3   cont coreg, statin, ACE  Glycemic control   repeat NST,   cont tele   cont GI / DVT prophylaxis.  cont keep K>4, mag >2.0   no s/s chf  D/W Dr Perez

## 2017-06-12 ENCOUNTER — TRANSCRIPTION ENCOUNTER (OUTPATIENT)
Age: 72
End: 2017-06-12

## 2017-06-12 LAB
BASOPHILS # BLD AUTO: 0.01 K/UL — SIGNIFICANT CHANGE UP (ref 0–0.2)
BASOPHILS NFR BLD AUTO: 0.2 % — SIGNIFICANT CHANGE UP (ref 0–2)
BUN SERPL-MCNC: 15 MG/DL — SIGNIFICANT CHANGE UP (ref 7–23)
CALCIUM SERPL-MCNC: 9.2 MG/DL — SIGNIFICANT CHANGE UP (ref 8.4–10.5)
CHLORIDE SERPL-SCNC: 105 MMOL/L — SIGNIFICANT CHANGE UP (ref 98–107)
CO2 SERPL-SCNC: 24 MMOL/L — SIGNIFICANT CHANGE UP (ref 22–31)
CREAT SERPL-MCNC: 0.86 MG/DL — SIGNIFICANT CHANGE UP (ref 0.5–1.3)
EOSINOPHIL # BLD AUTO: 0.2 K/UL — SIGNIFICANT CHANGE UP (ref 0–0.5)
EOSINOPHIL NFR BLD AUTO: 3.2 % — SIGNIFICANT CHANGE UP (ref 0–6)
GLUCOSE SERPL-MCNC: 112 MG/DL — HIGH (ref 70–99)
HCT VFR BLD CALC: 36.8 % — SIGNIFICANT CHANGE UP (ref 34.5–45)
HGB BLD-MCNC: 11.4 G/DL — LOW (ref 11.5–15.5)
IMM GRANULOCYTES NFR BLD AUTO: 0.2 % — SIGNIFICANT CHANGE UP (ref 0–1.5)
LYMPHOCYTES # BLD AUTO: 2.93 K/UL — SIGNIFICANT CHANGE UP (ref 1–3.3)
LYMPHOCYTES # BLD AUTO: 46.3 % — HIGH (ref 13–44)
MAGNESIUM SERPL-MCNC: 1.9 MG/DL — SIGNIFICANT CHANGE UP (ref 1.6–2.6)
MCHC RBC-ENTMCNC: 26.3 PG — LOW (ref 27–34)
MCHC RBC-ENTMCNC: 31 % — LOW (ref 32–36)
MCV RBC AUTO: 84.8 FL — SIGNIFICANT CHANGE UP (ref 80–100)
MONOCYTES # BLD AUTO: 0.52 K/UL — SIGNIFICANT CHANGE UP (ref 0–0.9)
MONOCYTES NFR BLD AUTO: 8.2 % — SIGNIFICANT CHANGE UP (ref 2–14)
NEUTROPHILS # BLD AUTO: 2.66 K/UL — SIGNIFICANT CHANGE UP (ref 1.8–7.4)
NEUTROPHILS NFR BLD AUTO: 41.9 % — LOW (ref 43–77)
PLATELET # BLD AUTO: 210 K/UL — SIGNIFICANT CHANGE UP (ref 150–400)
PMV BLD: 9.1 FL — SIGNIFICANT CHANGE UP (ref 7–13)
POTASSIUM SERPL-MCNC: 4 MMOL/L — SIGNIFICANT CHANGE UP (ref 3.5–5.3)
POTASSIUM SERPL-SCNC: 4 MMOL/L — SIGNIFICANT CHANGE UP (ref 3.5–5.3)
RBC # BLD: 4.34 M/UL — SIGNIFICANT CHANGE UP (ref 3.8–5.2)
RBC # FLD: 13.5 % — SIGNIFICANT CHANGE UP (ref 10.3–14.5)
SODIUM SERPL-SCNC: 143 MMOL/L — SIGNIFICANT CHANGE UP (ref 135–145)
WBC # BLD: 6.33 K/UL — SIGNIFICANT CHANGE UP (ref 3.8–10.5)
WBC # FLD AUTO: 6.33 K/UL — SIGNIFICANT CHANGE UP (ref 3.8–10.5)

## 2017-06-12 PROCEDURE — 78452 HT MUSCLE IMAGE SPECT MULT: CPT | Mod: 26

## 2017-06-12 PROCEDURE — 93018 CV STRESS TEST I&R ONLY: CPT | Mod: GC

## 2017-06-12 PROCEDURE — 93016 CV STRESS TEST SUPVJ ONLY: CPT | Mod: GC

## 2017-06-12 PROCEDURE — 93291 INTERROG DEV EVAL SCRMS IP: CPT | Mod: 26

## 2017-06-12 RX ORDER — MAGNESIUM SULFATE 500 MG/ML
1 VIAL (ML) INJECTION ONCE
Qty: 0 | Refills: 0 | Status: COMPLETED | OUTPATIENT
Start: 2017-06-12 | End: 2017-06-12

## 2017-06-12 RX ADMIN — LISINOPRIL 40 MILLIGRAM(S): 2.5 TABLET ORAL at 05:46

## 2017-06-12 RX ADMIN — GABAPENTIN 100 MILLIGRAM(S): 400 CAPSULE ORAL at 13:53

## 2017-06-12 RX ADMIN — Medication 100 MILLIGRAM(S): at 05:46

## 2017-06-12 RX ADMIN — HEPARIN SODIUM 5000 UNIT(S): 5000 INJECTION INTRAVENOUS; SUBCUTANEOUS at 05:45

## 2017-06-12 RX ADMIN — CARVEDILOL PHOSPHATE 25 MILLIGRAM(S): 80 CAPSULE, EXTENDED RELEASE ORAL at 17:48

## 2017-06-12 RX ADMIN — GABAPENTIN 100 MILLIGRAM(S): 400 CAPSULE ORAL at 22:45

## 2017-06-12 RX ADMIN — Medication 100 GRAM(S): at 07:59

## 2017-06-12 RX ADMIN — CARVEDILOL PHOSPHATE 25 MILLIGRAM(S): 80 CAPSULE, EXTENDED RELEASE ORAL at 05:46

## 2017-06-12 RX ADMIN — HEPARIN SODIUM 5000 UNIT(S): 5000 INJECTION INTRAVENOUS; SUBCUTANEOUS at 17:47

## 2017-06-12 RX ADMIN — GABAPENTIN 100 MILLIGRAM(S): 400 CAPSULE ORAL at 05:45

## 2017-06-12 NOTE — DISCHARGE NOTE ADULT - MEDICATION SUMMARY - MEDICATIONS TO STOP TAKING
I will STOP taking the medications listed below when I get home from the hospital:    dilTIAZem 120 mg/24 hours oral tablet, extended release  -- 1 tab(s) by mouth once a day I will STOP taking the medications listed below when I get home from the hospital:  None

## 2017-06-12 NOTE — DISCHARGE NOTE ADULT - CARE PLAN
Principal Discharge DX:	Chest pain  Goal:	resolved.  NST is negative  Instructions for follow-up, activity and diet:	Follow up with Dr. Del Castillo as scheduled  Secondary Diagnosis:	Dyslipidemia  Instructions for follow-up, activity and diet:	low chol diet  continue current meds  Secondary Diagnosis:	HTN (hypertension), benign  Instructions for follow-up, activity and diet:	low salt diet  continue current meds  Secondary Diagnosis:	Borderline diabetes mellitus  Instructions for follow-up, activity and diet:	low carb diet  Secondary Diagnosis:	CAD (coronary artery disease)  Instructions for follow-up, activity and diet:	continue current meds  Secondary Diagnosis:	Vasovagal syncope  Instructions for follow-up, activity and diet:	fu with Neuro Dr. Goldberg as outpt for Video EEg and MRI brain to r/o seizures

## 2017-06-12 NOTE — DISCHARGE NOTE ADULT - ADDITIONAL INSTRUCTIONS
floridalma with Neuro Dr. Goldberg as outpt for Video EEg and MRI brain to r/o seizures  - f/u with Dr. Del Castillo upon dc Monday 6/26 at 245PM

## 2017-06-12 NOTE — DISCHARGE NOTE ADULT - CARE PROVIDER_API CALL
Dev Weaver (DO), Neurology  [address] [place] [address]  Phone: [phone]  Fax: [phone]    Adilia Del Castillo), Cardiovascular Disease; Internal Medicine  [address]  [place] [address]  Phone: [phone]  Fax: [phone]

## 2017-06-12 NOTE — DISCHARGE NOTE ADULT - MEDICATION SUMMARY - MEDICATIONS TO TAKE
I will START or STAY ON the medications listed below when I get home from the hospital:    lisinopril 40 mg oral tablet  -- 1 tab(s) by mouth once a day  -- Indication: For HTN (hypertension), benign    gabapentin 100 mg oral tablet  -- 1 tab(s) by mouth 3 times a day  -- Indication: For Neuropathy    pravastatin 20 mg oral tablet  -- 1 tab(s) by mouth once a day  -- Indication: For Dyslipidemia    carvedilol 25 mg oral tablet  -- 1 tab(s) by mouth every 12 hours  -- Indication: For HTN (hypertension), benign

## 2017-06-12 NOTE — CHART NOTE - NSCHARTNOTEFT_GEN_A_CORE
ELECTROPHYSIOLOGY  Device Interrogation Performed       6/12/17                           Date/Time:9:47 am         :   [id number]                      Model:        Reveal                             Mode:                             Rate:               Atrial Lead:  P wave amplitude:                          mv          Impedence:                   Ohms      Threshold:                V@             ms          Ventricular Lead(s):  RV Lead: R wave amplitude:                          mv          Impedence:                   Ohms      Threshold:                V@             ms   LV Lead:  R wave amplitude:                          mv          Impedence:                   Ohms      Threshold:                V@             ms         Battery Status:                     Good                SHERON                     EOL          Underlying Rhythm:     Sinus rhythm 80's       Events/Observation:    No atrial or ventricular arrhythmias detected.      Impression/Plan:  Normal ILR function.

## 2017-06-12 NOTE — PROGRESS NOTE ADULT - ATTENDING COMMENTS
Agree with above.  F/u NST.  Further workup pending above.
CARDIOLOGY ATTENDING    Patient seen and examined. Agree with above. 71 year old woman with hypertension, hypercholesterolemia, CAD, who underwent PTCA with stent at [facility] in 2012 and subsequent nuclear stress test in the same hospital in January 2016 which revealed no evidence of ischemia, and who also has a Medtronic loop recorder in place given recurrent syncopal episodes. Since the loop recorder has been implanted, the patient has not passed out and has had no documented arrhythmias. She is now admitted for chest pain. EKG unremarkabe and troponin negative x 2.   She fell this morning. No events on tele, head CT normal.    Plan  -get NST  -medicine consult Dr Jolly for hyperglycemia  -restart all home meds.   -d/c home if NST normal

## 2017-06-12 NOTE — DISCHARGE NOTE ADULT - PATIENT PORTAL LINK FT
“You can access the [employer] Patient Portal, offered by [facility], by registering with the following website: [email or web address]”

## 2017-06-12 NOTE — DISCHARGE NOTE ADULT - SECONDARY DIAGNOSIS.
Dyslipidemia HTN (hypertension), benign Borderline diabetes mellitus CAD (coronary artery disease) Vasovagal syncope

## 2017-06-12 NOTE — DISCHARGE NOTE ADULT - HOSPITAL COURSE
70 y/o Female, with a PmHx of HTN, HLD, Borderline DM, MI, CAD s/p 3 stents, presents with chest pain. Admit to telemetry to r/o ACS.    EKG: NSR @ 85 bpm QTc 451 ms   CE x 2 neg  CXR - clear lungs       6/11 - pt fell hit head likely vaso vagal while having a BM, CT Head: neg  6/10 Cards: -get NST, -medicine consult Dr Jolly for hyperglycemia, -restart all home meds.   06/11 Dr. Jolly Medicine Echo and NST pending. Will check Orthostasis. Borderline diabetes mellitus.  Recommendation: Sugars in good range so no meds needed on Dc  6/12- Nuclear stress test - normal 72 y/o Female, with a PmHx of HTN, HLD, Borderline DM, MI, CAD s/p 3 stents, presents with chest pain. Admit to telemetry to r/o ACS.    EKG: NSR @ 85 bpm QTc 451 ms   CE x 2 neg  CXR - clear lungs       6/11 - pt fell hit head likely vaso vagal while having a BM, CT Head: neg  6/10 Cards: -get NST, -medicine consult Dr Jolly for hyperglycemia, -restart all home meds.   06/11 Dr. Jolly Medicine Echo and NST pending. Will check Orthostasis. Borderline diabetes mellitus.  Recommendation: Sugars in good range so no meds needed on Dc  6/12- Nuclear stress test - normal, orthostatics negative  6/13 Neuro cs Dr. Weaver: pt with multiple syncopal episodes over past two years, no focal neuro defecits.Plan1. If to be dc'd, may fu with me as outpt for Video EEg and MRI brain to r/o seizures, albeit less likely given her description of events  6/13 As per EP loop interrogation showd no events  6/13 Renal Art US: 1.  No evidence of hemodynamically significant stenoses in  the proximal right renal artery.  2.  The right renal vein appears patent, without evidence  of thrombosis.  3. The left kidney not well visualized.

## 2017-06-12 NOTE — PROGRESS NOTE ADULT - SUBJECTIVE AND OBJECTIVE BOX
Subjective: Patient with no anginal chest pain or shortness of breath  No palpitations  No dizziness or lightheadedness or syncope  	  ACTIVE MEDICATIONS:  MEDICATIONS  (STANDING):  heparin  Injectable 5000Unit(s) SubCutaneous every 12 hours  lisinopril 40milliGRAM(s) Oral daily  carvedilol 25milliGRAM(s) Oral every 12 hours  atorvastatin 10milliGRAM(s) Oral at bedtime  gabapentin 100milliGRAM(s) Oral three times a day  insulin lispro (HumaLOG) corrective regimen sliding scale  SubCutaneous three times a day before meals  insulin lispro (HumaLOG) corrective regimen sliding scale  SubCutaneous at bedtime  dextrose 5%. 1000milliLiter(s) IV Continuous <Continuous>  dextrose 50% Injectable 12.5Gram(s) IV Push once  dextrose 50% Injectable 25Gram(s) IV Push once  dextrose 50% Injectable 25Gram(s) IV Push once  docusate sodium 100milliGRAM(s) Oral two times a day  senna 2Tablet(s) Oral at bedtime    MEDICATIONS  (PRN):  dextrose Gel 1Dose(s) Oral once PRN Blood Glucose LESS THAN 70 milliGRAM(s)/deciliter  glucagon  Injectable 1milliGRAM(s) IntraMuscular once PRN Glucose LESS THAN 70 milligrams/deciliter      LABS:                        11.4   6.33  )-----------( 210      ( 12 Jun 2017 05:30 )             36.8     Hemoglobin: 11.4 g/dL (06-12 @ 05:30)  Hemoglobin: 12.0 g/dL (06-11 @ 05:55)  Hemoglobin: 11.7 g/dL (06-10 @ 07:45)  Hemoglobin: 11.9 g/dL (06-09 @ 21:20)    06-12    143  |  105  |  15  ----------------------------<  112<H>  4.0   |  24  |  0.86    Ca    9.2      12 Jun 2017 05:30  Mg     1.9     06-12      Creatinine Trend: 0.86<--, 0.86<--, 0.83<--, 0.96<--     CARDIAC MARKERS ( 10 Freddy 2017 04:00 )  x     / < 0.06 ng/mL / 29 u/L / x     / x      CARDIAC MARKERS ( 09 Jun 2017 21:20 )  x     / < 0.06 ng/mL / 35 u/L / 1.00 ng/mL / x            PHYSICAL EXAM  ICU Vital Signs Last 24 Hrs  T(C): 36.9, Max: 37.3 (06-11 @ 22:15)  T(F): 98.5, Max: 99.1 (06-11 @ 22:15)  HR: 75 (70 - 84)  BP: 161/85 (110/60 - 164/78)  BP(mean): --  ABP: --  ABP(mean): --  RR: 16 (16 - 18)  SpO2: 99% (97% - 99%)    I&O's Summary        Appearance: Normal	  HEENT:   Normal oral mucosa, PERRL, EOMI	  Lymphatic: No lymphadenopathy , no edema  Cardiovascular: Normal S1 S2, No JVD, No murmurs , Peripheral pulses palpable 2+ bilaterally  Respiratory: Lungs clear to auscultation, normal effort 	  Gastrointestinal:  Soft, Non-tender, + BS	  Skin: No rashes, No ecchymoses, No cyanosis, warm to touch  Musculoskeletal: Normal range of motion, normal strength  Psychiatry:  Mood & affect appropriate    TELEMETRY: 	  NSR  ECG:  	  RADIOLOGY:   DIAGNOSTIC TESTING:  [x ] Echocardiogram:   CONCLUSIONS:  1. Increased relative wall thickness with normal left  ventricular mass index, consistent with concentric left  ventricular remodeling.  2. Normal left ventricular systolic function. No segmental  wall motion abnormalities.  3. Normal right ventricular size and function.  ------------------------------------------------------------------------  Confirmed on  1/8/2016 - 13:28:14 by Harinder Hammer,  [ ]  Catheterization:  [ ] Stress Test:       IMPRESSIONS:Normal Study  * Myocardial Perfusion SPECT results are normal at 91 % of  MPHR.  * Normal myocardial perfusion scan,with no evidence of  infarction or inducible ischemia.  * Post-stress gated wall motionanalysis was performed  (LVEF = 52 %;LVEDV = 64 ml. using ET contours), revealing  normal LV function.  * NOTE: given overall findings, the ECG tracings are  likely falsely positive  ------------------------------------------------------------------------  Confirmed on  1/11/2016 - 11:27:09 by Sascha Alexander,  OTHER: 	    LABS:	 	    CARDIAC MARKERS:  CARDIAC MARKERS ( 10 Freddy 2017 04:00 )  x     / < 0.06 ng/mL / 29 u/L / x     / x      CARDIAC MARKERS ( 09 Jun 2017 21:20 )  x     / < 0.06 ng/mL / 35 u/L / 1.00 ng/mL / x                                11.7   6.15  )-----------( 235      ( 10 Freddy 2017 07:45 )             36.3     06-10    143  |  103  |  13  ----------------------------<  121<H>  4.2   |  27  |  0.83    Ca    9.3      10 Freddy 2017 07:45  Phos  3.0     06-10  Mg     1.8     06-10    TPro  7.0  /  Alb  4.1  /  TBili  0.4  /  DBili  x   /  AST  15  /  ALT  11  /  AlkPhos  81  06-09    proBNP:   Lipid Profile:   HgA1c: Hemoglobin A1C, Whole Blood: 6.3 % (06-10 @ 07:45)    TSH: Thyroid Stimulating Hormone, Serum: 0.91 uIU/mL (06-10 @ 07:45)      ASSESSMENT/PLAN: 	71 year old woman with hypertension, hypercholesterolemia, CAD, who underwent PTCA with stent at [facility] in 2012 with patent stents on repeat cath 2013 with historically preserved LV function on TTE in 2016 and subsequent nuclear stress test in the same hospital in January 2016 which revealed no evidence of ischemia, and who also has a Medtronic loop recorder in place given recurrent syncopal episodes with no reported events now admitted for chest pain with unremarkable EKG, ruled out for ACS, with no tele events with an unwitnessed fall yesterday evening after urinating while in the hospital with negative CT head and normal TSH:     - ILR interrogation called by me (pending)  - follow up NST  - check orthostatics   -  medicine consult appreciated (hyperglycemia)  - cont coreg, statin, ACE  - The patient is not in decompensated CHF  - f/u with Dr. Del Castillo upon dc Monday 6/26 at 245PM      Tayla Jay [facility]  O:  [phone]  P: [phone] Subjective: Patient with no anginal chest pain or shortness of breath  No palpitations  No dizziness or lightheadedness or syncope  	  ACTIVE MEDICATIONS:  MEDICATIONS  (STANDING):  heparin  Injectable 5000Unit(s) SubCutaneous every 12 hours  lisinopril 40milliGRAM(s) Oral daily  carvedilol 25milliGRAM(s) Oral every 12 hours  atorvastatin 10milliGRAM(s) Oral at bedtime  gabapentin 100milliGRAM(s) Oral three times a day  insulin lispro (HumaLOG) corrective regimen sliding scale  SubCutaneous three times a day before meals  insulin lispro (HumaLOG) corrective regimen sliding scale  SubCutaneous at bedtime  dextrose 5%. 1000milliLiter(s) IV Continuous <Continuous>  dextrose 50% Injectable 12.5Gram(s) IV Push once  dextrose 50% Injectable 25Gram(s) IV Push once  dextrose 50% Injectable 25Gram(s) IV Push once  docusate sodium 100milliGRAM(s) Oral two times a day  senna 2Tablet(s) Oral at bedtime    MEDICATIONS  (PRN):  dextrose Gel 1Dose(s) Oral once PRN Blood Glucose LESS THAN 70 milliGRAM(s)/deciliter  glucagon  Injectable 1milliGRAM(s) IntraMuscular once PRN Glucose LESS THAN 70 milligrams/deciliter      LABS:                        11.4   6.33  )-----------( 210      ( 12 Jun 2017 05:30 )             36.8     Hemoglobin: 11.4 g/dL (06-12 @ 05:30)  Hemoglobin: 12.0 g/dL (06-11 @ 05:55)  Hemoglobin: 11.7 g/dL (06-10 @ 07:45)  Hemoglobin: 11.9 g/dL (06-09 @ 21:20)    06-12    143  |  105  |  15  ----------------------------<  112<H>  4.0   |  24  |  0.86    Ca    9.2      12 Jun 2017 05:30  Mg     1.9     06-12      Creatinine Trend: 0.86<--, 0.86<--, 0.83<--, 0.96<--     CARDIAC MARKERS ( 10 Freddy 2017 04:00 )  x     / < 0.06 ng/mL / 29 u/L / x     / x      CARDIAC MARKERS ( 09 Jun 2017 21:20 )  x     / < 0.06 ng/mL / 35 u/L / 1.00 ng/mL / x            PHYSICAL EXAM  ICU Vital Signs Last 24 Hrs  T(C): 36.9, Max: 37.3 (06-11 @ 22:15)  T(F): 98.5, Max: 99.1 (06-11 @ 22:15)  HR: 75 (70 - 84)  BP: 161/85 (110/60 - 164/78)  BP(mean): --  ABP: --  ABP(mean): --  RR: 16 (16 - 18)  SpO2: 99% (97% - 99%)    I&O's Summary        Appearance: Normal	  HEENT:   Normal oral mucosa, PERRL, EOMI	  Lymphatic: No lymphadenopathy , no edema  Cardiovascular: Normal S1 S2, No JVD, No murmurs , Peripheral pulses palpable 2+ bilaterally  Respiratory: Lungs clear to auscultation, normal effort 	  Gastrointestinal:  Soft, Non-tender, + BS	  Skin: No rashes, No ecchymoses, No cyanosis, warm to touch  Musculoskeletal: Normal range of motion, normal strength  Psychiatry:  Mood & affect appropriate    TELEMETRY: 	  NSR  ECG:  	  RADIOLOGY:   DIAGNOSTIC TESTING:  [x ] Echocardiogram:   CONCLUSIONS:  1. Increased relative wall thickness with normal left  ventricular mass index, consistent with concentric left  ventricular remodeling.  2. Normal left ventricular systolic function. No segmental  wall motion abnormalities.  3. Normal right ventricular size and function.  ------------------------------------------------------------------------  Confirmed on  1/8/2016 - 13:28:14 by Harinder Hammer,  [ ]  Catheterization:  [ ] Stress Test:       IMPRESSIONS:Normal Study  * Myocardial Perfusion SPECT results are normal at 91 % of  MPHR.  * Normal myocardial perfusion scan,with no evidence of  infarction or inducible ischemia.  * Post-stress gated wall motionanalysis was performed  (LVEF = 52 %;LVEDV = 64 ml. using ET contours), revealing  normal LV function.  * NOTE: given overall findings, the ECG tracings are  likely falsely positive  ------------------------------------------------------------------------  Confirmed on  1/11/2016 - 11:27:09 by Sascha Alexander,  OTHER: 	    LABS:	 	    CARDIAC MARKERS:  CARDIAC MARKERS ( 10 Freddy 2017 04:00 )  x     / < 0.06 ng/mL / 29 u/L / x     / x      CARDIAC MARKERS ( 09 Jun 2017 21:20 )  x     / < 0.06 ng/mL / 35 u/L / 1.00 ng/mL / x                                11.7   6.15  )-----------( 235      ( 10 Freddy 2017 07:45 )             36.3     06-10    143  |  103  |  13  ----------------------------<  121<H>  4.2   |  27  |  0.83    Ca    9.3      10 Freddy 2017 07:45  Phos  3.0     06-10  Mg     1.8     06-10    TPro  7.0  /  Alb  4.1  /  TBili  0.4  /  DBili  x   /  AST  15  /  ALT  11  /  AlkPhos  81  06-09    proBNP:   Lipid Profile:   HgA1c: Hemoglobin A1C, Whole Blood: 6.3 % (06-10 @ 07:45)    TSH: Thyroid Stimulating Hormone, Serum: 0.91 uIU/mL (06-10 @ 07:45)      ASSESSMENT/PLAN: 	71 year old woman with hypertension, hypercholesterolemia, CAD, who underwent PTCA with stent at [facility] in 2012 with patent stents on repeat cath 2013 with historically preserved LV function on TTE in 2016 and subsequent nuclear stress test in the same hospital in January 2016 which revealed no evidence of ischemia, and who also has a Medtronic loop recorder in place given recurrent syncopal episodes with no reported events now admitted for chest pain with unremarkable EKG, ruled out for ACS, with no tele events with an unwitnessed fall yesterday evening after urinating while in the hospital with negative CT head and normal TSH:     - ILR interrogation called by me (pending)  - follow up NST  - check orthostatics   -  medicine consult appreciated (hyperglycemia)  - cont coreg, statin, ACE  - The patient is not in decompensated CHF  - f/u with Dr. Del Castillo upon dc Monday 6/26 at 245PM    addendum   - patient's nuc stress is negative but given recurrent syncope will call neuro (Dr Dowd)  - orthostatics negative  - renal artery sono duplex to r/o RIK Jay [facility]  O:  [phone]  P: [phone]

## 2017-06-12 NOTE — PROGRESS NOTE ADULT - SUBJECTIVE AND OBJECTIVE BOX
INTERVAL HPI/OVERNIGHT EVENTS: Feel fine. daughter who works here in ED in room.   Vital Signs Last 24 Hrs  T(C): 36.7, Max: 37.3 (06-11 @ 22:15)  T(F): 98.1, Max: 99.1 (06-11 @ 22:15)  HR: 85 (75 - 85)  BP: 152/84 (152/84 - 171/86)  BP(mean): --  RR: 17 (16 - 17)  SpO2: 98% (97% - 99%)  I&O's Summary    MEDICATIONS  (STANDING):  heparin  Injectable 5000Unit(s) SubCutaneous every 12 hours  lisinopril 40milliGRAM(s) Oral daily  carvedilol 25milliGRAM(s) Oral every 12 hours  atorvastatin 10milliGRAM(s) Oral at bedtime  gabapentin 100milliGRAM(s) Oral three times a day  insulin lispro (HumaLOG) corrective regimen sliding scale  SubCutaneous three times a day before meals  insulin lispro (HumaLOG) corrective regimen sliding scale  SubCutaneous at bedtime  dextrose 5%. 1000milliLiter(s) IV Continuous <Continuous>  dextrose 50% Injectable 12.5Gram(s) IV Push once  dextrose 50% Injectable 25Gram(s) IV Push once  dextrose 50% Injectable 25Gram(s) IV Push once  docusate sodium 100milliGRAM(s) Oral two times a day  senna 2Tablet(s) Oral at bedtime    MEDICATIONS  (PRN):  dextrose Gel 1Dose(s) Oral once PRN Blood Glucose LESS THAN 70 milliGRAM(s)/deciliter  glucagon  Injectable 1milliGRAM(s) IntraMuscular once PRN Glucose LESS THAN 70 milligrams/deciliter    LABS:                        11.4   6.33  )-----------( 210      ( 12 Jun 2017 05:30 )             36.8     06-12    143  |  105  |  15  ----------------------------<  112<H>  4.0   |  24  |  0.86    Ca    9.2      12 Jun 2017 05:30  Mg     1.9     06-12          CAPILLARY BLOOD GLUCOSE  135 (12 Jun 2017 11:23)  156 (11 Jun 2017 22:22)  170 (11 Jun 2017 16:52)          REVIEW OF SYSTEMS:  CONSTITUTIONAL: No fever, weight loss, or fatigue  EYES: No eye pain, visual disturbances, or discharge  ENMT:  No difficulty hearing, tinnitus, vertigo; No sinus or throat pain  NECK: No pain or stiffness  BREASTS: No pain, masses, or nipple discharge  RESPIRATORY: No cough, wheezing, chills or hemoptysis; No shortness of breath  CARDIOVASCULAR: No chest pain, palpitations, dizziness, or leg swelling  GASTROINTESTINAL: No abdominal or epigastric pain. No nausea, vomiting, or hematemesis; No diarrhea or constipation. No melena or hematochezia.  GENITOURINARY: No dysuria, frequency, hematuria, or incontinence  NEUROLOGICAL: No headaches, memory loss, loss of strength, numbness, or tremors  SKIN: No itching, burning, rashes, or lesions   LYMPH NODES: No enlarged glands  ENDOCRINE: No heat or cold intolerance; No hair loss  MUSCULOSKELETAL: No joint pain or swelling; No muscle, back, or extremity pain  PSYCHIATRIC: No depression, anxiety, mood swings, or difficulty sleeping  HEME/LYMPH: No easy bruising, or bleeding gums  ALLERY AND IMMUNOLOGIC: No hives or eczema    RADIOLOGY & ADDITIONAL TESTS:    Imaging Personally Reviewed:  [ ] YES  [ ] NO    Consultant(s) Notes Reviewed:  [x ] YES  [ ] NO    PHYSICAL EXAM:  GENERAL: NAD, well-groomed, well-developed  HEAD:  Atraumatic, Normocephalic  EYES: EOMI, PERRLA, conjunctiva and sclera clear  ENMT: No tonsillar erythema, exudates, or enlargement; Moist mucous membranes, Good dentition, No lesions  NECK: Supple, No JVD, Normal thyroid  NERVOUS SYSTEM:  Alert & Oriented X3, Good concentration; Motor Strength 5/5 B/L upper and lower extremities; DTRs 2+ intact and symmetric  CHEST/LUNG: Clear to percussion bilaterally; No rales, rhonchi, wheezing, or rubs  HEART: Regular rate and rhythm; No murmurs, rubs, or gallops  ABDOMEN: Soft, Nontender, Nondistended; Bowel sounds present  EXTREMITIES:  2+ Peripheral Pulses, No clubbing, cyanosis, or edema  LYMPH: No lymphadenopathy noted  SKIN: No rashes or lesions    Care Discussed with Consultants/Other Providers [ x] YES  [ ] NO

## 2017-06-12 NOTE — DISCHARGE NOTE ADULT - PLAN OF CARE
resolved.  NST is negative Follow up with Dr. Del Castillo as scheduled low chol diet  continue current meds low salt diet  continue current meds low carb diet continue current meds fu with Neuro Dr. Goldberg as outpt for Video EEg and MRI brain to r/o seizures

## 2017-06-13 VITALS — DIASTOLIC BLOOD PRESSURE: 78 MMHG | SYSTOLIC BLOOD PRESSURE: 152 MMHG | HEART RATE: 80 BPM

## 2017-06-13 LAB
BUN SERPL-MCNC: 14 MG/DL — SIGNIFICANT CHANGE UP (ref 7–23)
CALCIUM SERPL-MCNC: 9.3 MG/DL — SIGNIFICANT CHANGE UP (ref 8.4–10.5)
CHLORIDE SERPL-SCNC: 106 MMOL/L — SIGNIFICANT CHANGE UP (ref 98–107)
CO2 SERPL-SCNC: 23 MMOL/L — SIGNIFICANT CHANGE UP (ref 22–31)
CREAT SERPL-MCNC: 0.85 MG/DL — SIGNIFICANT CHANGE UP (ref 0.5–1.3)
GLUCOSE SERPL-MCNC: 113 MG/DL — HIGH (ref 70–99)
HCT VFR BLD CALC: 36.9 % — SIGNIFICANT CHANGE UP (ref 34.5–45)
HGB BLD-MCNC: 11.8 G/DL — SIGNIFICANT CHANGE UP (ref 11.5–15.5)
MCHC RBC-ENTMCNC: 27.1 PG — SIGNIFICANT CHANGE UP (ref 27–34)
MCHC RBC-ENTMCNC: 32 % — SIGNIFICANT CHANGE UP (ref 32–36)
MCV RBC AUTO: 84.8 FL — SIGNIFICANT CHANGE UP (ref 80–100)
PLATELET # BLD AUTO: 218 K/UL — SIGNIFICANT CHANGE UP (ref 150–400)
PMV BLD: 9.7 FL — SIGNIFICANT CHANGE UP (ref 7–13)
POTASSIUM SERPL-MCNC: 4.2 MMOL/L — SIGNIFICANT CHANGE UP (ref 3.5–5.3)
POTASSIUM SERPL-SCNC: 4.2 MMOL/L — SIGNIFICANT CHANGE UP (ref 3.5–5.3)
RBC # BLD: 4.35 M/UL — SIGNIFICANT CHANGE UP (ref 3.8–5.2)
RBC # FLD: 13.5 % — SIGNIFICANT CHANGE UP (ref 10.3–14.5)
SODIUM SERPL-SCNC: 144 MMOL/L — SIGNIFICANT CHANGE UP (ref 135–145)
WBC # BLD: 6.69 K/UL — SIGNIFICANT CHANGE UP (ref 3.8–10.5)
WBC # FLD AUTO: 6.69 K/UL — SIGNIFICANT CHANGE UP (ref 3.8–10.5)

## 2017-06-13 PROCEDURE — 93975 VASCULAR STUDY: CPT | Mod: 26

## 2017-06-13 RX ORDER — DILTIAZEM HCL 120 MG
1 CAPSULE, EXT RELEASE 24 HR ORAL
Qty: 0 | Refills: 0 | COMMUNITY

## 2017-06-13 RX ADMIN — CARVEDILOL PHOSPHATE 25 MILLIGRAM(S): 80 CAPSULE, EXTENDED RELEASE ORAL at 05:58

## 2017-06-13 RX ADMIN — GABAPENTIN 100 MILLIGRAM(S): 400 CAPSULE ORAL at 05:59

## 2017-06-13 RX ADMIN — LISINOPRIL 40 MILLIGRAM(S): 2.5 TABLET ORAL at 05:59

## 2017-06-13 RX ADMIN — HEPARIN SODIUM 5000 UNIT(S): 5000 INJECTION INTRAVENOUS; SUBCUTANEOUS at 05:59

## 2017-06-13 RX ADMIN — Medication 100 MILLIGRAM(S): at 05:59

## 2017-06-13 NOTE — PROGRESS NOTE ADULT - SUBJECTIVE AND OBJECTIVE BOX
consult to be dictated, pt with multiple syncopal episodes over past two years, no focal neuro defecits.  Plan  1. If to be dc'd, may fu with me as outpt for Video EEg and MRI brain to r/o seizures, albeit less likely given her description of events

## 2017-06-13 NOTE — PROGRESS NOTE ADULT - SUBJECTIVE AND OBJECTIVE BOX
Subjective: No chest pain or sob.   	  MEDICATIONS:  MEDICATIONS  (STANDING):  heparin  Injectable 5000Unit(s) SubCutaneous every 12 hours  lisinopril 40milliGRAM(s) Oral daily  carvedilol 25milliGRAM(s) Oral every 12 hours  atorvastatin 10milliGRAM(s) Oral at bedtime  gabapentin 100milliGRAM(s) Oral three times a day  insulin lispro (HumaLOG) corrective regimen sliding scale  SubCutaneous three times a day before meals  insulin lispro (HumaLOG) corrective regimen sliding scale  SubCutaneous at bedtime  dextrose 5%. 1000milliLiter(s) IV Continuous <Continuous>  dextrose 50% Injectable 12.5Gram(s) IV Push once  dextrose 50% Injectable 25Gram(s) IV Push once  dextrose 50% Injectable 25Gram(s) IV Push once  docusate sodium 100milliGRAM(s) Oral two times a day  senna 2Tablet(s) Oral at bedtime      LABS:	 	    CARDIAC MARKERS:                                11.8   6.69  )-----------( 218      ( 13 Jun 2017 05:50 )             36.9     06-13    144  |  106  |  14  ----------------------------<  113<H>  4.2   |  23  |  0.85    Ca    9.3      13 Jun 2017 05:50  Mg     1.9     06-12          PHYSICAL EXAM:  T(C): 36.8, Max: 36.9 (06-12 @ 22:41)  HR: 70 (66 - 87)  BP: 159/74 (152/84 - 171/86)  RR: 17 (16 - 18)  SpO2: 99% (98% - 100%)  Wt(kg): --  I&O's Summary        Appearance: Normal	  HEENT:   Normal oral mucosa, PERRL, EOMI	  Lymphatic: No lymphadenopathy , no edema  Cardiovascular: Normal S1 S2, No JVD, No murmurs , Peripheral pulses palpable 2+ bilaterally  Respiratory: Lungs clear to auscultation, normal effort 	  Gastrointestinal:  Soft, Non-tender, + BS	  Skin: No rashes, No ecchymoses, No cyanosis, warm to touch  Musculoskeletal: Normal range of motion, normal strength  Psychiatry:  Mood & affect appropriate    TELEMETRY: SR	          ASSESSMENT/PLAN: 	70 yo F with HTN, HLD, CAD s/p prior PCI admitted with chest pain.   -NST with no ischemia  -Appreciate Neuro eval - further neuro workup can be done as outpatient  -Check ILR interrogation  -DC home if above negative

## 2017-07-24 ENCOUNTER — APPOINTMENT (OUTPATIENT)
Dept: VASCULAR SURGERY | Facility: CLINIC | Age: 72
End: 2017-07-24

## 2017-09-22 ENCOUNTER — APPOINTMENT (OUTPATIENT)
Dept: ORTHOPEDIC SURGERY | Facility: CLINIC | Age: 72
End: 2017-09-22
Payer: MEDICARE

## 2017-09-22 VITALS
BODY MASS INDEX: 21.77 KG/M2 | DIASTOLIC BLOOD PRESSURE: 81 MMHG | SYSTOLIC BLOOD PRESSURE: 167 MMHG | WEIGHT: 108 LBS | HEART RATE: 69 BPM | HEIGHT: 59 IN

## 2017-09-22 DIAGNOSIS — Z87.898 PERSONAL HISTORY OF OTHER SPECIFIED CONDITIONS: ICD-10-CM

## 2017-09-22 DIAGNOSIS — M54.5 LOW BACK PAIN: ICD-10-CM

## 2017-09-22 PROCEDURE — 99214 OFFICE O/P EST MOD 30 MIN: CPT

## 2017-09-22 RX ORDER — MECLIZINE HYDROCHLORIDE 25 MG/1
TABLET ORAL
Refills: 0 | Status: ACTIVE | COMMUNITY

## 2017-09-22 RX ORDER — AMLODIPINE BESYLATE 5 MG/1
5 TABLET ORAL
Refills: 0 | Status: ACTIVE | COMMUNITY

## 2017-09-22 RX ORDER — METOPROLOL TARTRATE 50 MG/1
50 TABLET, FILM COATED ORAL
Refills: 0 | Status: ACTIVE | COMMUNITY

## 2017-09-22 RX ORDER — PRAVASTATIN SODIUM 20 MG/1
20 TABLET ORAL
Refills: 0 | Status: ACTIVE | COMMUNITY

## 2017-09-22 RX ORDER — METHYLPREDNISOLONE 4 MG/1
4 TABLET ORAL
Qty: 1 | Refills: 1 | Status: ACTIVE | COMMUNITY
Start: 2017-09-22 | End: 1900-01-01

## 2017-09-30 ENCOUNTER — MOBILE ON CALL (OUTPATIENT)
Age: 72
End: 2017-09-30

## 2017-12-04 ENCOUNTER — APPOINTMENT (OUTPATIENT)
Dept: ORTHOPEDIC SURGERY | Facility: CLINIC | Age: 72
End: 2017-12-04
Payer: MEDICARE

## 2017-12-04 VITALS — DIASTOLIC BLOOD PRESSURE: 78 MMHG | HEART RATE: 70 BPM | SYSTOLIC BLOOD PRESSURE: 140 MMHG

## 2017-12-04 DIAGNOSIS — M54.16 RADICULOPATHY, LUMBAR REGION: ICD-10-CM

## 2017-12-04 PROCEDURE — 99214 OFFICE O/P EST MOD 30 MIN: CPT

## 2017-12-11 ENCOUNTER — FORM ENCOUNTER (OUTPATIENT)
Age: 72
End: 2017-12-11

## 2017-12-12 ENCOUNTER — OUTPATIENT (OUTPATIENT)
Dept: OUTPATIENT SERVICES | Facility: HOSPITAL | Age: 72
LOS: 1 days | End: 2017-12-12
Payer: MEDICARE

## 2017-12-12 ENCOUNTER — APPOINTMENT (OUTPATIENT)
Dept: CT IMAGING | Facility: IMAGING CENTER | Age: 72
End: 2017-12-12
Payer: MEDICARE

## 2017-12-12 DIAGNOSIS — Z98.89 OTHER SPECIFIED POSTPROCEDURAL STATES: Chronic | ICD-10-CM

## 2017-12-12 DIAGNOSIS — Z98.51 TUBAL LIGATION STATUS: Chronic | ICD-10-CM

## 2017-12-12 DIAGNOSIS — Z00.8 ENCOUNTER FOR OTHER GENERAL EXAMINATION: ICD-10-CM

## 2017-12-12 DIAGNOSIS — Z95.5 PRESENCE OF CORONARY ANGIOPLASTY IMPLANT AND GRAFT: Chronic | ICD-10-CM

## 2017-12-12 DIAGNOSIS — Z95.810 PRESENCE OF AUTOMATIC (IMPLANTABLE) CARDIAC DEFIBRILLATOR: Chronic | ICD-10-CM

## 2017-12-12 PROCEDURE — 72131 CT LUMBAR SPINE W/O DYE: CPT

## 2017-12-12 PROCEDURE — 72131 CT LUMBAR SPINE W/O DYE: CPT | Mod: 26

## 2018-01-22 ENCOUNTER — APPOINTMENT (OUTPATIENT)
Dept: ORTHOPEDIC SURGERY | Facility: CLINIC | Age: 73
End: 2018-01-22
Payer: MEDICARE

## 2018-01-22 VITALS — DIASTOLIC BLOOD PRESSURE: 78 MMHG | HEIGHT: 59 IN | SYSTOLIC BLOOD PRESSURE: 161 MMHG | HEART RATE: 78 BPM

## 2018-01-22 DIAGNOSIS — Z60.2 PROBLEMS RELATED TO LIVING ALONE: ICD-10-CM

## 2018-01-22 DIAGNOSIS — M48.07 SPINAL STENOSIS, LUMBOSACRAL REGION: ICD-10-CM

## 2018-01-22 DIAGNOSIS — Z78.9 OTHER SPECIFIED HEALTH STATUS: ICD-10-CM

## 2018-01-22 PROCEDURE — 99214 OFFICE O/P EST MOD 30 MIN: CPT

## 2018-01-22 SDOH — SOCIAL STABILITY - SOCIAL INSECURITY: PROBLEMS RELATED TO LIVING ALONE: Z60.2

## 2018-01-24 ENCOUNTER — OUTPATIENT (OUTPATIENT)
Dept: OUTPATIENT SERVICES | Facility: HOSPITAL | Age: 73
LOS: 1 days | End: 2018-01-24
Payer: MEDICARE

## 2018-01-24 ENCOUNTER — APPOINTMENT (OUTPATIENT)
Dept: RADIOLOGY | Facility: CLINIC | Age: 73
End: 2018-01-24

## 2018-01-24 DIAGNOSIS — Z98.89 OTHER SPECIFIED POSTPROCEDURAL STATES: Chronic | ICD-10-CM

## 2018-01-24 DIAGNOSIS — Z98.51 TUBAL LIGATION STATUS: Chronic | ICD-10-CM

## 2018-01-24 DIAGNOSIS — Z95.5 PRESENCE OF CORONARY ANGIOPLASTY IMPLANT AND GRAFT: Chronic | ICD-10-CM

## 2018-01-24 DIAGNOSIS — Z00.8 ENCOUNTER FOR OTHER GENERAL EXAMINATION: ICD-10-CM

## 2018-01-24 DIAGNOSIS — Z95.810 PRESENCE OF AUTOMATIC (IMPLANTABLE) CARDIAC DEFIBRILLATOR: Chronic | ICD-10-CM

## 2018-01-24 PROCEDURE — 62323 NJX INTERLAMINAR LMBR/SAC: CPT

## 2018-03-11 NOTE — PATIENT PROFILE ADULT. - NS SC CAGE ALCOHOL EYE OPENER
Patient complains of back pain, blurred vision that started today , dizziness x 1 week, and  Headache on and off for a few days. no

## 2018-12-12 ENCOUNTER — EMERGENCY (EMERGENCY)
Facility: HOSPITAL | Age: 73
LOS: 0 days | Discharge: HOME | End: 2018-12-12
Attending: EMERGENCY MEDICINE
Payer: MEDICARE

## 2018-12-12 VITALS
OXYGEN SATURATION: 100 % | RESPIRATION RATE: 16 BRPM | TEMPERATURE: 98 F | HEART RATE: 69 BPM | DIASTOLIC BLOOD PRESSURE: 70 MMHG | SYSTOLIC BLOOD PRESSURE: 164 MMHG

## 2018-12-12 VITALS
SYSTOLIC BLOOD PRESSURE: 162 MMHG | OXYGEN SATURATION: 100 % | TEMPERATURE: 98 F | HEART RATE: 66 BPM | RESPIRATION RATE: 19 BRPM | WEIGHT: 100.09 LBS | DIASTOLIC BLOOD PRESSURE: 77 MMHG

## 2018-12-12 DIAGNOSIS — Z98.51 TUBAL LIGATION STATUS: Chronic | ICD-10-CM

## 2018-12-12 DIAGNOSIS — Z95.5 PRESENCE OF CORONARY ANGIOPLASTY IMPLANT AND GRAFT: Chronic | ICD-10-CM

## 2018-12-12 DIAGNOSIS — Z98.89 OTHER SPECIFIED POSTPROCEDURAL STATES: Chronic | ICD-10-CM

## 2018-12-12 DIAGNOSIS — Z95.810 PRESENCE OF AUTOMATIC (IMPLANTABLE) CARDIAC DEFIBRILLATOR: Chronic | ICD-10-CM

## 2018-12-12 LAB
ALBUMIN SERPL ELPH-MCNC: 3.6 G/DL — SIGNIFICANT CHANGE UP (ref 3.3–5)
ALP SERPL-CCNC: 80 U/L — SIGNIFICANT CHANGE UP (ref 40–120)
ALT FLD-CCNC: 23 U/L — SIGNIFICANT CHANGE UP (ref 12–78)
ANION GAP SERPL CALC-SCNC: 8 MMOL/L — SIGNIFICANT CHANGE UP (ref 5–17)
APTT BLD: 29.1 SEC — SIGNIFICANT CHANGE UP (ref 28.5–37)
AST SERPL-CCNC: 27 U/L — SIGNIFICANT CHANGE UP (ref 15–37)
BILIRUB SERPL-MCNC: 0.7 MG/DL — SIGNIFICANT CHANGE UP (ref 0.2–1.2)
BUN SERPL-MCNC: 14 MG/DL — SIGNIFICANT CHANGE UP (ref 7–23)
CALCIUM SERPL-MCNC: 8.8 MG/DL — SIGNIFICANT CHANGE UP (ref 8.5–10.1)
CHLORIDE SERPL-SCNC: 106 MMOL/L — SIGNIFICANT CHANGE UP (ref 96–108)
CK MB CFR SERPL CALC: <1 NG/ML — SIGNIFICANT CHANGE UP (ref 0.5–3.6)
CO2 SERPL-SCNC: 29 MMOL/L — SIGNIFICANT CHANGE UP (ref 22–31)
CREAT SERPL-MCNC: 0.87 MG/DL — SIGNIFICANT CHANGE UP (ref 0.5–1.3)
GLUCOSE BLDC GLUCOMTR-MCNC: 137 MG/DL — HIGH (ref 70–99)
GLUCOSE SERPL-MCNC: 133 MG/DL — HIGH (ref 70–99)
HCT VFR BLD CALC: 38.6 % — SIGNIFICANT CHANGE UP (ref 34.5–45)
HGB BLD-MCNC: 12.3 G/DL — SIGNIFICANT CHANGE UP (ref 11.5–15.5)
INR BLD: 1.02 RATIO — SIGNIFICANT CHANGE UP (ref 0.88–1.16)
MCHC RBC-ENTMCNC: 26.7 PG — LOW (ref 27–34)
MCHC RBC-ENTMCNC: 31.9 GM/DL — LOW (ref 32–36)
MCV RBC AUTO: 83.7 FL — SIGNIFICANT CHANGE UP (ref 80–100)
NRBC # BLD: 0 /100 WBCS — SIGNIFICANT CHANGE UP (ref 0–0)
PLATELET # BLD AUTO: 291 K/UL — SIGNIFICANT CHANGE UP (ref 150–400)
POTASSIUM SERPL-MCNC: 4.7 MMOL/L — SIGNIFICANT CHANGE UP (ref 3.5–5.3)
POTASSIUM SERPL-SCNC: 4.7 MMOL/L — SIGNIFICANT CHANGE UP (ref 3.5–5.3)
PROT SERPL-MCNC: 7.2 GM/DL — SIGNIFICANT CHANGE UP (ref 6–8.3)
PROTHROM AB SERPL-ACNC: 11.4 SEC — SIGNIFICANT CHANGE UP (ref 10–12.9)
RBC # BLD: 4.61 M/UL — SIGNIFICANT CHANGE UP (ref 3.8–5.2)
RBC # FLD: 13.2 % — SIGNIFICANT CHANGE UP (ref 10.3–14.5)
SODIUM SERPL-SCNC: 143 MMOL/L — SIGNIFICANT CHANGE UP (ref 135–145)
TROPONIN I SERPL-MCNC: <.015 NG/ML — SIGNIFICANT CHANGE UP (ref 0.01–0.04)
WBC # BLD: 12.67 K/UL — HIGH (ref 3.8–10.5)
WBC # FLD AUTO: 12.67 K/UL — HIGH (ref 3.8–10.5)

## 2018-12-12 PROCEDURE — 76377 3D RENDER W/INTRP POSTPROCES: CPT | Mod: 26

## 2018-12-12 PROCEDURE — 72125 CT NECK SPINE W/O DYE: CPT | Mod: 26

## 2018-12-12 PROCEDURE — 70450 CT HEAD/BRAIN W/O DYE: CPT | Mod: 26

## 2018-12-12 PROCEDURE — 93010 ELECTROCARDIOGRAM REPORT: CPT

## 2018-12-12 PROCEDURE — 99284 EMERGENCY DEPT VISIT MOD MDM: CPT

## 2018-12-12 RX ORDER — MECLIZINE HCL 12.5 MG
25 TABLET ORAL ONCE
Qty: 0 | Refills: 0 | Status: COMPLETED | OUTPATIENT
Start: 2018-12-12 | End: 2018-12-12

## 2018-12-12 RX ORDER — FAMOTIDINE 10 MG/ML
20 INJECTION INTRAVENOUS ONCE
Qty: 0 | Refills: 0 | Status: COMPLETED | OUTPATIENT
Start: 2018-12-12 | End: 2018-12-12

## 2018-12-12 RX ORDER — METOCLOPRAMIDE HCL 10 MG
10 TABLET ORAL ONCE
Qty: 0 | Refills: 0 | Status: COMPLETED | OUTPATIENT
Start: 2018-12-12 | End: 2018-12-12

## 2018-12-12 RX ADMIN — Medication 25 MILLIGRAM(S): at 13:57

## 2018-12-12 RX ADMIN — Medication 10 MILLIGRAM(S): at 13:57

## 2018-12-12 RX ADMIN — FAMOTIDINE 20 MILLIGRAM(S): 10 INJECTION INTRAVENOUS at 13:57

## 2018-12-12 NOTE — ED PROVIDER NOTE - OBJECTIVE STATEMENT
74 yo F with syncopal event at 11 AM.  Pt. has no complaints currently.  As per family and patient, they says this happened multiple times.  She's been evaluated by neuro and cardiology in the past, nothing significantly wrong with her.  Pt. collapsed to the floor, not sure if there was head trauma.  Pt. was down very briefly before starting to wake up and respond.  Pt. was cold and sweaty after, given candies to raise her blood sugar immediately.  Pt. complaints of vertigo at this time.  Otherwise, she feels well.   ROS: negative for fever, cough, headache, chest pain, shortness of breath, abd pain, nausea, vomiting, diarrhea, rash, paresthesia, and weakness--all other systems reviewed are negative.   PMH: HTN, HLD, frequent syncope; Meds: ASA, amlodipine, lisinopril, pravastatin, metoprolol; SH: Denies smoking/drinking/drug use

## 2018-12-12 NOTE — ED ADULT NURSE NOTE - NSIMPLEMENTINTERV_GEN_ALL_ED
Implemented All Fall Risk Interventions:  [place] to call system. Call bell, personal items and telephone within reach. Instruct patient to call for assistance. Room bathroom lighting operational. Non-slip footwear when patient is off stretcher. Physically safe environment: no spills, clutter or unnecessary equipment. Stretcher in lowest position, wheels locked, appropriate side rails in place. Provide visual cue, wrist band, yellow gown, etc. Monitor gait and stability. Monitor for mental status changes and reorient to person, place, and time. Review medications for side effects contributing to fall risk. Reinforce activity limits and safety measures with patient and family.

## 2018-12-12 NOTE — ED PROVIDER NOTE - PROGRESS NOTE DETAILS
Huey notified    Patient will come to [facility] office to : prescription.   Patient was advised of location and hours: Yes.   Patient was advised to bring photo identification: Yes.   Patient elects another party to  item: yes, name: Huey, Son.   Results reported to patient--grossly benign, labs and CT WNL, no acute pathology or injury  Pt. reports feeling better after meds, no events on monitor during stay  pt. agrees to f/u with primary care outpt. as well as private cardio  pt. understands to return to ED if symptoms worsen; will d/c

## 2018-12-12 NOTE — ED PROVIDER NOTE - PHYSICAL EXAMINATION
Vitals: HTN at 162/77, otherwise WNL  Gen: AAOx3, NAD, sitting comfortably in stretcher, calm, cooperative, non-toxic, responsive to questions   Head: ncat, perrla, eomi b/l  Neck: supple, no lymphadenopathy, no midline deviation  Heart: rrr, no m/r/g  Lungs: CTA b/l, no rales/ronchi/wheezes  Abd: soft, nontender, non-distended, no rebound or guarding  Ext: no clubbing/cyanosis/edema  Neuro: sensation and muscle strength intact b/l, steady gait, CN2-12 intact b/l, no focal weakness

## 2018-12-12 NOTE — ED ADULT NURSE NOTE - NS ED NURSE DC INFO COMPLEXITY
Verbalized Understanding/Returned Demonstration/Patient asked questions/Complex: Multiple Rx/Tx. Pt has difficulty understanding. Requires additional help

## 2018-12-12 NOTE — ED ADULT TRIAGE NOTE - CHIEF COMPLAINT QUOTE
patient BIBA , patient had an episode of syncope today , unknown if hit head , patient c/o of dizziness and nausea , denied chest pain denied headache denied difficulty breathing , patent c/o of L leg pain

## 2018-12-12 NOTE — ED ADULT NURSE NOTE - OBJECTIVE STATEMENT
PT is a 73YOF who is here for a possible syncope episode. Pt states she was in [employer], shopping, when her sciatica started to act up. Pt states that she was walking when she collapsed and she believes she "passed out". Pt did not

## 2018-12-12 NOTE — ED PROVIDER NOTE - MEDICAL DECISION MAKING DETAILS
74 yo F with syncopal event, + hx prior events, r/o head trauma, electrolyte abnormalities, ACS  -basic labs, coags, trop, ckmb, ct brain/cervical, ekg, monitor in ER, pepcid/reglan/meclizine now for symptoms  -f/u results, reeval

## 2018-12-13 DIAGNOSIS — I10 ESSENTIAL (PRIMARY) HYPERTENSION: ICD-10-CM

## 2018-12-13 DIAGNOSIS — R55 SYNCOPE AND COLLAPSE: ICD-10-CM

## 2019-09-15 ENCOUNTER — INPATIENT (INPATIENT)
Facility: HOSPITAL | Age: 74
LOS: 2 days | Discharge: HOME | End: 2019-09-18
Attending: HOSPITALIST | Admitting: HOSPITALIST
Payer: MEDICARE

## 2019-09-15 VITALS
OXYGEN SATURATION: 99 % | HEART RATE: 51 BPM | TEMPERATURE: 97 F | SYSTOLIC BLOOD PRESSURE: 87 MMHG | RESPIRATION RATE: 18 BRPM | DIASTOLIC BLOOD PRESSURE: 47 MMHG

## 2019-09-15 DIAGNOSIS — Z95.5 PRESENCE OF CORONARY ANGIOPLASTY IMPLANT AND GRAFT: Chronic | ICD-10-CM

## 2019-09-15 DIAGNOSIS — Z98.51 TUBAL LIGATION STATUS: Chronic | ICD-10-CM

## 2019-09-15 DIAGNOSIS — Z98.89 OTHER SPECIFIED POSTPROCEDURAL STATES: Chronic | ICD-10-CM

## 2019-09-15 DIAGNOSIS — Z95.810 PRESENCE OF AUTOMATIC (IMPLANTABLE) CARDIAC DEFIBRILLATOR: Chronic | ICD-10-CM

## 2019-09-15 NOTE — ED ADULT TRIAGE NOTE - CHIEF COMPLAINT QUOTE
Pt presents from home via EMS with c/o dizziness, nausea and vomiting since ~2300. Unable to obtain oral temperature in triage. Pt presents from home via EMS with c/o dizziness, nausea and vomiting since ~2300.

## 2019-09-16 DIAGNOSIS — R55 SYNCOPE AND COLLAPSE: ICD-10-CM

## 2019-09-16 DIAGNOSIS — Z29.9 ENCOUNTER FOR PROPHYLACTIC MEASURES, UNSPECIFIED: ICD-10-CM

## 2019-09-16 DIAGNOSIS — I10 ESSENTIAL (PRIMARY) HYPERTENSION: ICD-10-CM

## 2019-09-16 DIAGNOSIS — E78.5 HYPERLIPIDEMIA, UNSPECIFIED: ICD-10-CM

## 2019-09-16 DIAGNOSIS — R73.03 PREDIABETES: ICD-10-CM

## 2019-09-16 DIAGNOSIS — N17.9 ACUTE KIDNEY FAILURE, UNSPECIFIED: ICD-10-CM

## 2019-09-16 LAB
ALBUMIN SERPL ELPH-MCNC: 4.2 G/DL — SIGNIFICANT CHANGE UP (ref 3.3–5)
ALP SERPL-CCNC: 83 U/L — SIGNIFICANT CHANGE UP (ref 40–120)
ALT FLD-CCNC: 11 U/L — SIGNIFICANT CHANGE UP (ref 4–33)
ANION GAP SERPL CALC-SCNC: 14 MMO/L — SIGNIFICANT CHANGE UP (ref 7–14)
APTT BLD: 26.7 SEC — LOW (ref 27.5–36.3)
AST SERPL-CCNC: 16 U/L — SIGNIFICANT CHANGE UP (ref 4–32)
BASE EXCESS BLDV CALC-SCNC: 4 MMOL/L — SIGNIFICANT CHANGE UP
BASOPHILS # BLD AUTO: 0.04 K/UL — SIGNIFICANT CHANGE UP (ref 0–0.2)
BASOPHILS NFR BLD AUTO: 0.3 % — SIGNIFICANT CHANGE UP (ref 0–2)
BILIRUB SERPL-MCNC: 0.6 MG/DL — SIGNIFICANT CHANGE UP (ref 0.2–1.2)
BLOOD GAS VENOUS - CREATININE: 1.36 MG/DL — HIGH (ref 0.5–1.3)
BLOOD GAS VENOUS - FIO2: 21 — SIGNIFICANT CHANGE UP
BUN SERPL-MCNC: 23 MG/DL — SIGNIFICANT CHANGE UP (ref 7–23)
CALCIUM SERPL-MCNC: 9.8 MG/DL — SIGNIFICANT CHANGE UP (ref 8.4–10.5)
CHLORIDE BLDV-SCNC: 102 MMOL/L — SIGNIFICANT CHANGE UP (ref 96–108)
CHLORIDE SERPL-SCNC: 99 MMOL/L — SIGNIFICANT CHANGE UP (ref 98–107)
CO2 SERPL-SCNC: 24 MMOL/L — SIGNIFICANT CHANGE UP (ref 22–31)
CREAT SERPL-MCNC: 1.44 MG/DL — HIGH (ref 0.5–1.3)
EOSINOPHIL # BLD AUTO: 0.09 K/UL — SIGNIFICANT CHANGE UP (ref 0–0.5)
EOSINOPHIL NFR BLD AUTO: 0.8 % — SIGNIFICANT CHANGE UP (ref 0–6)
GAS PNL BLDV: 135 MMOL/L — LOW (ref 136–146)
GLUCOSE BLDV-MCNC: 217 MG/DL — HIGH (ref 70–99)
GLUCOSE SERPL-MCNC: 226 MG/DL — HIGH (ref 70–99)
HCO3 BLDV-SCNC: 26 MMOL/L — SIGNIFICANT CHANGE UP (ref 20–27)
HCT VFR BLD CALC: 41 % — SIGNIFICANT CHANGE UP (ref 34.5–45)
HCT VFR BLDV CALC: 38.8 % — SIGNIFICANT CHANGE UP (ref 34.5–45)
HGB BLD-MCNC: 12.7 G/DL — SIGNIFICANT CHANGE UP (ref 11.5–15.5)
HGB BLDV-MCNC: 12.6 G/DL — SIGNIFICANT CHANGE UP (ref 11.5–15.5)
IMM GRANULOCYTES NFR BLD AUTO: 0.4 % — SIGNIFICANT CHANGE UP (ref 0–1.5)
INR BLD: 0.93 — SIGNIFICANT CHANGE UP (ref 0.88–1.17)
LACTATE BLDV-MCNC: 3 MMOL/L — HIGH (ref 0.5–2)
LIDOCAIN IGE QN: 25.3 U/L — SIGNIFICANT CHANGE UP (ref 7–60)
LYMPHOCYTES # BLD AUTO: 3.73 K/UL — HIGH (ref 1–3.3)
LYMPHOCYTES # BLD AUTO: 32 % — SIGNIFICANT CHANGE UP (ref 13–44)
MCHC RBC-ENTMCNC: 26.4 PG — LOW (ref 27–34)
MCHC RBC-ENTMCNC: 31 % — LOW (ref 32–36)
MCV RBC AUTO: 85.2 FL — SIGNIFICANT CHANGE UP (ref 80–100)
MONOCYTES # BLD AUTO: 0.66 K/UL — SIGNIFICANT CHANGE UP (ref 0–0.9)
MONOCYTES NFR BLD AUTO: 5.7 % — SIGNIFICANT CHANGE UP (ref 2–14)
NEUTROPHILS # BLD AUTO: 7.1 K/UL — SIGNIFICANT CHANGE UP (ref 1.8–7.4)
NEUTROPHILS NFR BLD AUTO: 60.8 % — SIGNIFICANT CHANGE UP (ref 43–77)
NRBC # FLD: 0 K/UL — SIGNIFICANT CHANGE UP (ref 0–0)
PCO2 BLDV: 57 MMHG — HIGH (ref 41–51)
PH BLDV: 7.34 PH — SIGNIFICANT CHANGE UP (ref 7.32–7.43)
PLATELET # BLD AUTO: 237 K/UL — SIGNIFICANT CHANGE UP (ref 150–400)
PMV BLD: 9.6 FL — SIGNIFICANT CHANGE UP (ref 7–13)
PO2 BLDV: < 24 MMHG — LOW (ref 35–40)
POTASSIUM BLDV-SCNC: 3.8 MMOL/L — SIGNIFICANT CHANGE UP (ref 3.4–4.5)
POTASSIUM SERPL-MCNC: 4.5 MMOL/L — SIGNIFICANT CHANGE UP (ref 3.5–5.3)
POTASSIUM SERPL-SCNC: 4.5 MMOL/L — SIGNIFICANT CHANGE UP (ref 3.5–5.3)
PROT SERPL-MCNC: 6.9 G/DL — SIGNIFICANT CHANGE UP (ref 6–8.3)
PROTHROM AB SERPL-ACNC: 10.6 SEC — SIGNIFICANT CHANGE UP (ref 9.8–13.1)
RBC # BLD: 4.81 M/UL — SIGNIFICANT CHANGE UP (ref 3.8–5.2)
RBC # FLD: 12.5 % — SIGNIFICANT CHANGE UP (ref 10.3–14.5)
SAO2 % BLDV: 27.5 % — LOW (ref 60–85)
SODIUM SERPL-SCNC: 137 MMOL/L — SIGNIFICANT CHANGE UP (ref 135–145)
TROPONIN T, HIGH SENSITIVITY: 7 NG/L — SIGNIFICANT CHANGE UP (ref ?–14)
TROPONIN T, HIGH SENSITIVITY: 9 NG/L — SIGNIFICANT CHANGE UP (ref ?–14)
WBC # BLD: 11.67 K/UL — HIGH (ref 3.8–10.5)
WBC # FLD AUTO: 11.67 K/UL — HIGH (ref 3.8–10.5)

## 2019-09-16 PROCEDURE — 72125 CT NECK SPINE W/O DYE: CPT | Mod: 26

## 2019-09-16 PROCEDURE — 70450 CT HEAD/BRAIN W/O DYE: CPT | Mod: 26

## 2019-09-16 PROCEDURE — 93291 INTERROG DEV EVAL SCRMS IP: CPT | Mod: 26

## 2019-09-16 PROCEDURE — 71045 X-RAY EXAM CHEST 1 VIEW: CPT | Mod: 26

## 2019-09-16 PROCEDURE — 99223 1ST HOSP IP/OBS HIGH 75: CPT

## 2019-09-16 RX ORDER — ENOXAPARIN SODIUM 100 MG/ML
30 INJECTION SUBCUTANEOUS DAILY
Refills: 0 | Status: DISCONTINUED | OUTPATIENT
Start: 2019-09-16 | End: 2019-09-18

## 2019-09-16 RX ORDER — INFLUENZA VIRUS VACCINE 15; 15; 15; 15 UG/.5ML; UG/.5ML; UG/.5ML; UG/.5ML
0.5 SUSPENSION INTRAMUSCULAR ONCE
Refills: 0 | Status: DISCONTINUED | OUTPATIENT
Start: 2019-09-16 | End: 2019-09-18

## 2019-09-16 RX ORDER — TRIAMTERENE/HYDROCHLOROTHIAZID 75 MG-50MG
1 TABLET ORAL DAILY
Refills: 0 | Status: DISCONTINUED | OUTPATIENT
Start: 2019-09-16 | End: 2019-09-18

## 2019-09-16 RX ORDER — ENOXAPARIN SODIUM 100 MG/ML
40 INJECTION SUBCUTANEOUS DAILY
Refills: 0 | Status: DISCONTINUED | OUTPATIENT
Start: 2019-09-16 | End: 2019-09-16

## 2019-09-16 RX ORDER — ASPIRIN/CALCIUM CARB/MAGNESIUM 324 MG
243 TABLET ORAL ONCE
Refills: 0 | Status: COMPLETED | OUTPATIENT
Start: 2019-09-16 | End: 2019-09-16

## 2019-09-16 RX ORDER — LABETALOL HCL 100 MG
100 TABLET ORAL
Refills: 0 | Status: DISCONTINUED | OUTPATIENT
Start: 2019-09-16 | End: 2019-09-17

## 2019-09-16 RX ORDER — ONDANSETRON 8 MG/1
4 TABLET, FILM COATED ORAL ONCE
Refills: 0 | Status: COMPLETED | OUTPATIENT
Start: 2019-09-16 | End: 2019-09-16

## 2019-09-16 RX ORDER — LABETALOL HCL 100 MG
200 TABLET ORAL
Refills: 0 | Status: DISCONTINUED | OUTPATIENT
Start: 2019-09-16 | End: 2019-09-16

## 2019-09-16 RX ORDER — GABAPENTIN 400 MG/1
1 CAPSULE ORAL
Qty: 0 | Refills: 0 | DISCHARGE

## 2019-09-16 RX ORDER — ATORVASTATIN CALCIUM 80 MG/1
40 TABLET, FILM COATED ORAL AT BEDTIME
Refills: 0 | Status: DISCONTINUED | OUTPATIENT
Start: 2019-09-16 | End: 2019-09-18

## 2019-09-16 RX ORDER — SODIUM CHLORIDE 9 MG/ML
1000 INJECTION INTRAMUSCULAR; INTRAVENOUS; SUBCUTANEOUS ONCE
Refills: 0 | Status: COMPLETED | OUTPATIENT
Start: 2019-09-16 | End: 2019-09-16

## 2019-09-16 RX ADMIN — SODIUM CHLORIDE 1000 MILLILITER(S): 9 INJECTION INTRAMUSCULAR; INTRAVENOUS; SUBCUTANEOUS at 01:35

## 2019-09-16 RX ADMIN — SODIUM CHLORIDE 1000 MILLILITER(S): 9 INJECTION INTRAMUSCULAR; INTRAVENOUS; SUBCUTANEOUS at 02:36

## 2019-09-16 RX ADMIN — ONDANSETRON 4 MILLIGRAM(S): 8 TABLET, FILM COATED ORAL at 01:35

## 2019-09-16 RX ADMIN — Medication 100 MILLIGRAM(S): at 19:22

## 2019-09-16 RX ADMIN — Medication 1 TABLET(S): at 21:54

## 2019-09-16 RX ADMIN — ENOXAPARIN SODIUM 30 MILLIGRAM(S): 100 INJECTION SUBCUTANEOUS at 19:21

## 2019-09-16 RX ADMIN — ONDANSETRON 4 MILLIGRAM(S): 8 TABLET, FILM COATED ORAL at 02:36

## 2019-09-16 RX ADMIN — Medication 243 MILLIGRAM(S): at 02:22

## 2019-09-16 NOTE — H&P ADULT - PROBLEM SELECTOR PLAN 5
Lovenox 40mg SQ Q24h IMPROVE VTE Individual Risk Assessment          RISK                                                          Points  [  ] Previous VTE                                               3  [  ] Thrombophilia                                            2  [  ] Lower limb paresis/paralysis                    2    [  ] Active Cancer (in last 6 months)              2   [x  ] Immobilization > 24 hrs                             1  [  ] ICU/CCU stay > 24 hours                            1  [ x ] Age > 60                                                        1                                            Total Score ____2_____ Lipid panel

## 2019-09-16 NOTE — PROCEDURE NOTE - ADDITIONAL PROCEDURE DETAILS
Device: [employer] Reveal Linq [id number]  Implanting: Dr. Perez  Indication: Recurrent Syncope  Date of Implant: 06/2016  Parameter Changes: N/A  Last remote monitoring session ([employer]): 2017  Events/Observation:  "No events detected since 2017" per device.    Impression/Plan: Device has met EOS on June 24 2019.  Therefore, recordings of any arrhythmias after this date are unreliable per [employer].  Discussed findings with Dr. Montaño, Cardiology.

## 2019-09-16 NOTE — H&P ADULT - NEGATIVE GENERAL GENITOURINARY SYMPTOMS
no urine discoloration/normal urinary frequency/no nocturia/no dysuria/no urinary hesitancy/no flank pain L/no hematuria/no renal colic/no flank pain R/no bladder infections/no incontinence

## 2019-09-16 NOTE — ED PROVIDER NOTE - ATTENDING CONTRIBUTION TO CARE
History and physical above obtained and documented (or dictated) by me (attending physician).  Resident involved in case for assistance with disposition and may document involvement as necessary via progress note(s).   -Dr. Lindsey

## 2019-09-16 NOTE — CONSULT NOTE ADULT - SUBJECTIVE AND OBJECTIVE BOX
HISTORY OF PRESENT ILLNESS: HPI:    Pt is a 72 y/o female with PMH of HTN, HLD, CAD, MI in  with 3 stents (last in ), multiple syncopal episodes, loop recorder implanted 3 years ago, vertigo, borderline DM (no medications) who presents to [facility] ED complaining of syncope last night. Pt reports recently seeing new PCP (Dr Dotson) since her BP has been uncontrolled (200s/100s) who prescribed her new BP medication. Yesterday she took two BP medications and aspirin in the morning and the new prescribed BP medication at 10PM.  10-15 minutes after taking the pill her head became severely itchy with pins and needles and while ambulating to the bathroom she felt nauseous, dizzy, diaphoretic and then while sitting on toilet syncopized onto the floor with 1 minute of LOC. Pt son heard a thump and found her 1 minute later when she was waking back up. Denies AMS upon awakening, incontinence, tongue biting, slurred speech, arm weakness, facial droop. Upon awakening pt vomited dark, non-bloody fluid twice. Pt reports feeling feverish two days ago with chills and rhinorrhea for 2 weeks, but reports feeling well now. 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 Pt denies weakness, abnormal changes in weight, headaches, sinus pressure, ear pain, throat pain, SOB, dyspnea, difficulty breathing, cough, wheeze, chest pain, palpitations, orthopnea, PND, abdominal pain, diarrhea, dysuria, tremors, peripheral edema, calf tenderness, rashes. (16 Sep 2019 07:19)      PAST MEDICAL & SURGICAL HISTORY:  Sciatica  Legally blind in left eye, as defined in [place]: 20/400 left eye  Borderline diabetes mellitus: Controlled with diet  Migraines: Developed at 10 years of age  Last episode 2 years ago  Vertigo  H/O heart artery stent  History of MI (myocardial infarction)  CAD (coronary artery disease)  HTN (hypertension), benign  Dyslipidemia  S/P ICD (internal cardiac defibrillator) procedure  History of coronary artery stent placement: 3 stents (Last in )  Same admission as past myocardial infarction  S/P LASIK surgery: Right eye, no complications as per patient.  History of tubal ligation: 35 years ago, no complications as per patient.  History of tonsillectomy  No Past Surgical History      MEDICATIONS  (STANDING):  atorvastatin 40 milliGRAM(s) Oral at bedtime  enoxaparin Injectable 30 milliGRAM(s) SubCutaneous daily  labetalol 100 milliGRAM(s) Oral two times a day  triamterene 37.5 mG/hydrochlorothiazide 25 mG Tablet 1 Tablet(s) Oral daily      Allergies  iodine (Anaphylaxis)  No Known Drug Allergies      FAMILY HISTORY:  Family history of brain tumor (Sibling)  Family history of hypercholesterolemia (Child, Sibling)  Family history of hypertension (Child, Sibling): Daughters, Sisters  Family history of MI (myocardial infarction) (Child): Father ( at 75)  Mother ( at 87)  Son (  at 35)  Non-contributary for premature coronary disease or sudden cardiac death    SOCIAL HISTORY:    [ x] Non-smoker  [ ] Smoker  [ ] Alcohol      REVIEW OF SYSTEMS:  [ ]chest pain  [  ]shortness of breath  [  ]palpitations  [ x ]syncope  [ ]near syncope [ ]upper extremity weakness   [ ] lower extremity weakness  [  ]diplopia  [  ]altered mental status   [  ]fevers  [ ]chills [ ]nausea  [ ]vomitting  [  ]dysphagia    [ ]abdominal pain  [ ]melena  [ ]BRBPR    [  ]epistaxis  [  ]rash    [ ]lower extremity edema        [x ] All others negative	  [ ] Unable to obtain      LABS:	 	                        12.7   11.67 )-----------( 237      ( 16 Sep 2019 00:23 )             41.0     137  |  99  |  23  ----------------------------<  226<H>  4.5   |  24  |  1.44<H>    Ca    9.8      16 Sep 2019 00:23    TPro  6.9  /  Alb  4.2  /  TBili  0.6  /  DBili  x   /  AST  16  /  ALT  11  /  AlkPhos  83      Coags:  PT/INR - ( 16 Sep 2019 00:23 )   PT: 10.6 SEC;   INR: 0.93     PTT - ( 16 Sep 2019 00:23 )  PTT:26.7 SEC      PHYSICAL EXAM:  T(C): 36.7 (19 @ 12:00), Max: 37.2 (19 @ 08:07)  HR: 88 (19 @ 12:00) (51 - 94)  BP: 141/67 (19 @ 12:00) (87/47 - 158/56)  RR: 20 (19 @ 12:00) (16 - 20)  SpO2: 99% (19 @ 12:00) (99% - 100%)    Gen: Appears well in NAD  HEENT:  (-)icterus (-)pallor  CV: N S1 S2 1/6 VICTORIA (+)2 Pulses B/l  Resp:  Clear to ausculatation B/L, normal effort  GI: (+) BS Soft, NT, ND  Lymph:  (-)Edema, (-)obvious lymphadenopathy  Skin: Warm to touch, Normal turgor  Psych: Appropriate mood and affect    EKG: SR, narrow QRS, voltage criteria for LVH    ASSESSMENT/PLAN: 	73y Female with hypertension, hyperlipidemia, NSTEMI in , CAD status post PCI to the left circumflex and LAD at that time in  with patent stents on repeat cardiac catheterization in  with normal LV function, moderate diastolic dysfunction on transthoracic echo.  No valvular disease and EF of 63% in  with history of vertigo on Meclizine, s/p ILR in , no events on recent interrogations, admitted with recurrent syncope.    --ILR interrogated and was at EOS on 19  --will d/w attending regarding explant of ILR  --ACS ruled out, CTH negative  --check orthostatic vitals  --check carotid dopplers    will d/w Dr Perez

## 2019-09-16 NOTE — H&P ADULT - NSICDXPASTMEDICALHX_GEN_ALL_CORE_FT
PAST MEDICAL HISTORY:  Borderline diabetes mellitus Controlled with diet    CAD (coronary artery disease)     Dyslipidemia     H/O heart artery stent     History of MI (myocardial infarction)     HTN (hypertension), benign     Legally blind in left eye, as defined in [place] 20/400 left eye    Migraines Developed at 10 years of age  Last episode 2 years ago    Sciatica     Vertigo

## 2019-09-16 NOTE — H&P ADULT - MUSCULOSKELETAL
details… detailed exam no calf tenderness/normal/no joint erythema/no joint warmth/ROM intact/no joint swelling/normal strength

## 2019-09-16 NOTE — H&P ADULT - PROBLEM SELECTOR PLAN 2
Pt Hx of borderline diabetes (no medications) admitted with glucose of 226  Monitor FBS and electrolytes  HgbA1C, Diabetic diet elevate Cr from baseline.   possibly secondary to transient low BP  s/p IVF with improvement of hemodynamics  monitor Cr. avoid nephrotoxics  holding ACEi/ARB and diuretics.

## 2019-09-16 NOTE — ED PROVIDER NOTE - OBJECTIVE STATEMENT
Pertinent PMH/PSH/FHx/SHx and Review of Systems contained within:  74yo F w/ pmh of htn, hld, CAD s/p 3 cardiac stents, multiple syncopal episode, loop recorder implanted 3yrs ago, p Pertinent PMH/PSH/FHx/SHx and Review of Systems contained within:  74yo F w/ pmh of htn, hld, CAD s/p 3 cardiac stents, multiple syncopal episode, loop recorder implanted 3yrs ago, followed by Dr. Del Castillo of cardiology,  [facility] from home for syncope. Pt states she was previously on amlodipine/lisinopril/metoprolol, but is not longer on any other these meds and does not recall when they were dc'd. States she was started on labetalol by her PMD and just took her first dose ystdy, took 200mg tab at approx 10pm, developed "pins and needles" of b/l feet minutes later, followed by "itchiness" of her head. Shortly after, became nauseated, 2 episoded of nbnb emesis, went to bathroom to have a bowel movement, became lightheaded while sitting on toilet and syncopized, fell off toilet onto floor; son who was directly outside of room heard fall and rushed to pt's side and states pt was unconscious for less than a minute, no seizure like activity, no post-ictal confusion. No dark or bloody stools. Pt report feeling mild right trapezius pain after fall. Not on AC. Took 81mg asa ystdy morning. Denies cp, sob, palpitations at any moment. No fever/chills, No photophobia/eye pain/changes in vision, No ear pain/sore throat/dysphagia, No chest pain/palpitations, no SOB/cough/wheeze/stridor, No abdominal pain, no dysuria/frequency/discharge, No back pain, no rash.

## 2019-09-16 NOTE — H&P ADULT - NEGATIVE NEUROLOGICAL SYMPTOMS
no facial palsy/no hemiparesis/no paresthesias/no headache/no confusion/no weakness/no focal seizures/no loss of sensation/no difficulty walking/no transient paralysis/no generalized seizures/no tremors

## 2019-09-16 NOTE — H&P ADULT - NEGATIVE CARDIOVASCULAR SYMPTOMS
no orthopnea/no palpitations/no dyspnea on exertion/no peripheral edema/no claudication/no chest pain/no paroxysmal nocturnal dyspnea

## 2019-09-16 NOTE — H&P ADULT - PROBLEM SELECTOR PLAN 6
IMPROVE VTE Individual Risk Assessment          RISK                                                          Points  [  ] Previous VTE                                               3  [  ] Thrombophilia                                            2  [  ] Lower limb paresis/paralysis                    2    [  ] Active Cancer (in last 6 months)              2   [x  ] Immobilization > 24 hrs                             1  [  ] ICU/CCU stay > 24 hours                            1  [ x ] Age > 60                                                        1                                            Total Score ____2_____

## 2019-09-16 NOTE — H&P ADULT - NEGATIVE ENMT SYMPTOMS
no throat pain/no ear pain/no tinnitus/no hearing difficulty/no sinus symptoms/no nasal obstruction/no dysphagia/no nasal discharge/no nose bleeds/no nasal congestion/no post-nasal discharge/no gum bleeding/no dry mouth

## 2019-09-16 NOTE — H&P ADULT - PROBLEM SELECTOR PLAN 3
Continue home medications  Monitor BP and adjust mediations as necessary  DASH diet Pt Hx of borderline diabetes (no medications) admitted with glucose of 226  Monitor FBS and electrolytes  HgbA1C, Diabetic diet

## 2019-09-16 NOTE — H&P ADULT - NSHPLABSRESULTS_GEN_ALL_CORE
12.7   11.67 )-----------( 237      ( 16 Sep 2019 00:23 )             41.0   09-16    137  |  99  |  23  ----------------------------<  226<H>  4.5   |  24  |  1.44<H>    Ca    9.8      16 Sep 2019 00:23    TPro  6.9  /  Alb  4.2  /  TBili  0.6  /  DBili  x   /  AST  16  /  ALT  11  /  AlkPhos  83  09-16    Chest Xray (Portable): Clear lungs    EKG: NSR @ 70bpm. Possible ST depressions in II, III, AVF. LVH. Prolonged QT

## 2019-09-16 NOTE — H&P ADULT - GASTROINTESTINAL DETAILS
no guarding/no organomegaly/no distention/no rigidity/nontender/soft/no rebound tenderness/no masses palpable/bowel sounds normal/no bruit/normal

## 2019-09-16 NOTE — H&P ADULT - NEGATIVE OPHTHALMOLOGIC SYMPTOMS
no lacrimation R/no diplopia/no blurred vision L/no scleral injection L/no discharge L/no irritation L/no scleral injection R/no lacrimation L/no photophobia/no discharge R/no pain L/no pain R/no irritation R/no loss of vision R/no blurred vision R

## 2019-09-16 NOTE — ED ADULT NURSE REASSESSMENT NOTE - NS ED NURSE REASSESS COMMENT FT1
Report received from break coverage LAURIE Aponte. Pt. received A&Ox3, with 20 gauge IV in left ac, positive blood return, flushes without difficulty. On cardiac monitor. Pt. continues to c/o nausea, states had some relief from initial dose of zofran. MD Lindsey made aware. Medication given as per order. Will continue to monitor.

## 2019-09-16 NOTE — H&P ADULT - NEGATIVE MUSCULOSKELETAL SYMPTOMS
no leg pain R/no myalgia/no stiffness/no arm pain L/no arm pain R/no arthralgia/no arthritis/no joint swelling/no muscle cramps/no leg pain L/no muscle weakness/no neck pain

## 2019-09-16 NOTE — H&P ADULT - ASSESSMENT
Pt is a 74 y/o female with PMH of HTN, HLD, CAD, MI in 2012 with 3 stents (last in 2012), multiple syncopal episodes, loop recorder implanted 3 years ago, vertigo, borderline DM (no medications) who presents to [facility] ED requiring admission for workup of syncope.

## 2019-09-16 NOTE — H&P ADULT - ATTENDING COMMENTS
Patient was seen and examined personally by me. I have discussed the plan and reviewed the Resident's note and agree with the above physical exam findings including assessment and plan except as indicated below. Labs and imagining reviewed.

## 2019-09-16 NOTE — H&P ADULT - NEGATIVE PSYCHIATRIC SYMPTOMS
no depression/no anxiety/no suicidal ideation/no insomnia/no paranoia/no mood swings/no visual hallucinations/no memory loss/no agitation/no auditory hallucinations/no hyperactivity

## 2019-09-16 NOTE — ED ADULT NURSE NOTE - OBJECTIVE STATEMENT
Received pt in spot 18. AA0X3. Pt states around 11pm she began having severe head itching and Received pt in spot 18. AA0X3. Pt states around 11pm she began having severe head itching and then developed dizziness, weakness, epigastric pain and 2 episodes of vomiting. Pt also had syncopal episode while on toilet. Pt states at 10pm she took her first dose ever of labetalol and shortly after developed these symptoms. Pt arrives hypotensive to triage, normotensive in room 18. NSR on cardiac monitor. 20G placed to left AC, labs sent. Family at beside. Will continue to closely monitor.

## 2019-09-16 NOTE — H&P ADULT - NSICDXFAMILYHX_GEN_ALL_CORE_FT
FAMILY HISTORY:  Sibling  Still living? No  Family history of brain tumor, Age at diagnosis: Age Unknown  Family history of hypercholesterolemia, Age at diagnosis: Age Unknown  Family history of hypertension, Age at diagnosis: Age Unknown    Child  Still living? Yes, Estimated age: Age Unknown  Family history of hypercholesterolemia, Age at diagnosis: Age Unknown  Family history of hypertension, Age at diagnosis: Age Unknown  Family history of MI (myocardial infarction), Age at diagnosis: Age Unknown

## 2019-09-16 NOTE — ED ADULT NURSE REASSESSMENT NOTE - NS ED NURSE REASSESS COMMENT FT1
MD Gaona at bedside when pt became nauseous. Pt brought to seated position in bed when she became bradycardic to 40s and had syncopal episode lasting a few moments. NS bolus initiated. Repeat ekg performed. Will continue to closely monitor.

## 2019-09-16 NOTE — ED PROVIDER NOTE - PHYSICAL EXAMINATION
Gen: Alert, NAD  Head: NC, AT,  EOMI, normal lids/conjunctiva  ENT:  normal hearing, patent oropharynx without erythema/exudate  Neck: +supple, no tenderness/meningismus/JVD, +Trachea midline  Chest: no chest wall tenderness, equal chest rise  Pulm: Bilateral BS, normal resp effort, no wheeze/stridor/retractions  CV: RRR, no M/R/G, +dist pulses  Abd: +BS, soft, NT/ND  Rectal: deferred  Mskel: no edema/erythema/cyanosis  Skin: no rash  Neuro: AAOx3, no sensory/motor deficits, CN 2-12 intact

## 2019-09-16 NOTE — H&P ADULT - PROBLEM SELECTOR PLAN 1
Episode of fainting while sitting on toilet preceded by aura with LOC for 1 minute  Monitor implantable loop recorder  Tele monitor, CT head, TTE, possible stress test  Orthostatic vitals, serial EKGs, Troponins Admit to tele  Serial EKG and CE to r/o ACS.  Differential includes medication induced, cardiogenic, or vasovagal syncope  Started to have symptoms 10-15 minutes after medication then syncopized while patient was bearing down attempting to have BM.   Monitor implantable loop recorder  CT head and C-Spine ordered by ED  TTE  Check Orthostatic

## 2019-09-16 NOTE — H&P ADULT - NEGATIVE SKIN SYMPTOMS
no dryness/no pitted nails/no itching/no rash/no change in size/color of mole/no brittle nails/no tumor/no hair loss

## 2019-09-16 NOTE — H&P ADULT - NEGATIVE GASTROINTESTINAL SYMPTOMS
no diarrhea/no constipation/no change in bowel habits/no hematochezia/no melena/no flatulence/no abdominal pain

## 2019-09-16 NOTE — ED PROVIDER NOTE - CLINICAL SUMMARY MEDICAL DECISION MAKING FREE TEXT BOX
74yo F w/ pmh as above, here for syncopal episode. During my interview, pt c/o nausea, therefore sat her up in bed to facilitate vomiting, pt's heart rate decreased to 40bpm and pt syncopized. I immediately laid pt flat on stretcher and raised legs above her head, she regained consciousness in less than 10 seconds and HR back up to 70s. Repeated ECG, NSR rate of 70. Of note, 2 ecgs performed here and both w/ mild 0.5 mm ST depressions in leads II,III,aVF and V6. Pt had nuclear stress test performed on Jun of 2017 which showed similar changes. Gave fluids, zofran, aspirin. Suspect syncope 2/2 to transient bradycardia/hypotension from new med but given extensive cardiac hx, cannot r/o ACS or malignant dysrhythmia. Labs, cxr, tele admission.

## 2019-09-16 NOTE — ED ADULT NURSE NOTE - NSIMPLEMENTINTERV_GEN_ALL_ED
Implemented All Fall with Harm Risk Interventions:  [place] to call system. Call bell, personal items and telephone within reach. Instruct patient to call for assistance. Room bathroom lighting operational. Non-slip footwear when patient is off stretcher. Physically safe environment: no spills, clutter or unnecessary equipment. Stretcher in lowest position, wheels locked, appropriate side rails in place. Provide visual cue, wrist band, yellow gown, etc. Monitor gait and stability. Monitor for mental status changes and reorient to person, place, and time. Review medications for side effects contributing to fall risk. Reinforce activity limits and safety measures with patient and family. Provide visual clues: red socks.

## 2019-09-16 NOTE — H&P ADULT - LYMPHATIC
supraclavicular R/posterior cervical L/anterior cervical R/anterior cervical L/supraclavicular L/posterior cervical R

## 2019-09-16 NOTE — CONSULT NOTE ADULT - SUBJECTIVE AND OBJECTIVE BOX
Requesting Physician : Dr. Del Castillo    Reason for Consultation: Syncope, CAD    HISTORY OF PRESENT ILLNESS:  72 yo F with history of recurrent syncope s/p ILR implantation in 2016 by Dr. Perez, HTN, HLD, CAD s/p prior PCI (last stent in ) who is being seen for syncope.  The patient presented to the ED after one syncopal episode.  The patient reports taking a new blood pressure pill (labetolol) when all of a sudden she developed dizziness and diaphoresis.  She went to the bathroom and all of a sudden had an episode of syncope.  No chest pain or anginal symptoms.  No palpitations prior to the event.  The patient was admitted for further workup.  ILR interrogation demonstrated EOL (official report pending).  Cardiology consulted to further evaluate.           PAST MEDICAL & SURGICAL HISTORY:  Sciatica  Legally blind in left eye, as defined in [place]: 20/400 left eye  Borderline diabetes mellitus: Controlled with diet  Migraines: Developed at 10 years of age  Last episode 2 years ago  Vertigo  H/O heart artery stent  History of MI (myocardial infarction)  CAD (coronary artery disease)  HTN (hypertension), benign  Dyslipidemia  S/P ICD (internal cardiac defibrillator) procedure  History of coronary artery stent placement: 3 stents (Last in )  Same admission as past myocardial infarction  S/P LASIK surgery: Right eye, no complications as per patient.  History of tubal ligation: 35 years ago, no complications as per patient.  History of tonsillectomy  No Past Surgical History          MEDICATIONS:  MEDICATIONS  (STANDING):  atorvastatin 40 milliGRAM(s) Oral at bedtime  enoxaparin Injectable 30 milliGRAM(s) SubCutaneous daily  labetalol 100 milliGRAM(s) Oral two times a day  triamterene 37.5 mG/hydrochlorothiazide 25 mG Tablet 1 Tablet(s) Oral daily      Allergies    iodine (Anaphylaxis)  No Known Drug Allergies    Intolerances        FAMILY HISTORY:  Family history of brain tumor (Sibling)  Family history of hypercholesterolemia (Child, Sibling)  Family history of hypertension (Child, Sibling): Daughters, Sisters  Family history of MI (myocardial infarction) (Child): Father ( at 75)  Mother ( at 87)  Son (  at 35)    Non-contributary for premature coronary disease or sudden cardiac death    SOCIAL HISTORY:    [x ] Non-smoker  [ ] Smoker  [ ] Alcohol      REVIEW OF SYSTEMS:  [ ]chest pain  [  ]shortness of breath  [  ]palpitations  [ x ]syncope  [ ]near syncope [ ]upper extremity weakness   [ ] lower extremity weakness  [  ]diplopia  [  ]altered mental status   [  ]fevers  [ ]chills [ ]nausea  [ ]vomitting  [  ]dysphagia    [ ]abdominal pain  [ ]melena  [ ]BRBPR    [  ]epistaxis  [  ]rash    [ ]lower extremity edema        [x ] All others negative	  [ ] Unable to obtain    PHYSICAL EXAM:  T(C): 36.7 (19 @ 12:00), Max: 37.2 (19 @ 08:07)  HR: 88 (19 @ 12:00) (51 - 94)  BP: 141/67 (19 @ 12:00) (87/47 - 158/56)  RR: 20 (19 @ 12:00) (16 - 20)  SpO2: 99% (19 @ 12:00) (99% - 100%)  Wt(kg): --  I&O's Summary        HEENT:   Normal oral mucosa, PERRL, EOMI	  Lymphatic: No lymphadenopathy , no edema  Cardiovascular: Normal S1 S2, No JVD, No murmurs , Peripheral pulses palpable 2+ bilaterally  Respiratory: Lungs clear to auscultation, normal effort 	  Gastrointestinal:  Soft, Non-tender, + BS	  Skin: No rashes, No ecchymoses, No cyanosis, warm to touch  Musculoskeletal: Normal range of motion, normal strength  Psychiatry:  Mood & affect appropriate      TELEMETRY: 	    ECG: SR, narrow qrs  	  RADIOLOGY:  OTHER:     DIAGNOSTIC TESTING:  [ ] Echocardiogram: < from: Transthoracic Echocardiogram (16 @ 16:23) >  1. Increased relative wall thickness with normal left  ventricular mass index, consistent with concentric left  ventricular remodeling.  2. Normal left ventricular systolic function. No segmental  wall motion abnormalities.  3. Normal right ventricular size and function.    < end of copied text >    [ ]  Catheterization:  [ ] Stress Test: < from: Nuclear Stress Test-Pharmacologic (17 @ 10:18) >  IMPRESSIONS:Normal Study  * Myocardial Perfusion SPECT results are normal.  * Normal myocardial perfusion scan,with no evidence of  infarction or inducible ischemia.  * Post-stress gated wall motion analysis was performed  (LVEF = 65 %;LVEDV = 43 ml. using [place] [employer] contours),  revealing normal LV function. RV function appeared normal.  *** Compared with Nuclear/Stress test of 2016, no  significant changes noted.    < end of copied text >      	  	  LABS:	 	    CARDIAC MARKERS:                              12.7   11.67 )-----------( 237      ( 16 Sep 2019 00:23 )             41.0         137  |  99  |  23  ----------------------------<  226<H>  4.5   |  24  |  1.44<H>    Ca    9.8      16 Sep 2019 00:23    TPro  6.9  /  Alb  4.2  /  TBili  0.6  /  DBili  x   /  AST  16  /  ALT  11  /  AlkPhos  83      proBNP:   Lipid Profile:   HgA1c:   TSH:     ASSESSMENT/PLAN:  72 yo F with history of recurrent syncope s/p ILR implantation in  by Dr. Perez, HTN, HLD, CAD s/p prior PCI (last stent in ) who is being seen for syncope.      -pt. with no anginal symptoms currently and no clinical heart failure  -would monitor on tele to rule out an arrythmogenic cause of syncope  -ILR interrogation showed EOL - EP consult with Dr. Perez recommended  -Check TTE  -check orthostatics  -rule out MI with cardiac enzymes  -further cardiac workup pending above    Adebayo Montaño MD

## 2019-09-16 NOTE — ED PROVIDER NOTE - PROGRESS NOTE DETAILS
Reassessed pt at bedside, denies nausea or dizziness, resting comfortably in bed. Discussed lab results and plans for admission for further monitoring to telemetry. Patient agreeable.   Aixa Solomon D.O. (PGY-1) Spoke with admitting doctor, Dr. Cifuentes requests CT head noncon prior to admission to [facility] medicine. Will order imaging and f/u results.   Aixa Solomon D.O. (PGY-1) Spoke with patient's cardiologist Dr. Del Castillo' PA. Dr. Del Castillo will follow patient on floor.   Aixa Solomon D.O. (PGY-1)

## 2019-09-16 NOTE — H&P ADULT - HISTORY OF PRESENT ILLNESS
Pt is a 74 y/o female with PMH of HTN, HLD, CAD, MI in 2012 with 3 stents (last in 2012), multiple syncopal episodes, loop recorder implanted 3 years ago, vertigo, borderline DM (no medications) who presents to [facility] ED complaining of syncope last night. Pt reports recently seeing new PCP (Dr Dotson) since her BP has been uncontrolled (200s/100s) who prescribed her new BP medication. Yesterday she took two BP medications and aspirin in the morning and the new prescribed BP medication at 10PM. Shortly after, her head became severely itchy with pins and needles and while ambulating to the bathroom she felt nauseous, dizzy, diaphoretic and then while sitting on toilet syncopized onto the floor with 1 minute of LOC. Pt son heard a thump and found her 1 minute later when she was waking back up. Denies AMS upon awakening, incontinence, tongue biting, slurred speech, arm weakness, facial droop. Upon awakening pt vomited dark, non-bloody fluid twice. Pt reports feeling feverish two days ago with chills and rhinorrhea for 2 weeks, but reports feeling well now. 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 Pt denies weakness, abnormal changes in weight, headaches, sinus pressure, ear pain, throat pain, SOB, dyspnea, difficulty breathing, cough, wheeze, chest pain, palpitations, orthopnea, PND, abdominal pain, diarrhea, dysuria, tremors, peripheral edema, calf tenderness, rashes. Pt is a 74 y/o female with PMH of HTN, HLD, CAD, MI in 2012 with 3 stents (last in 2012), multiple syncopal episodes, loop recorder implanted 3 years ago, vertigo, borderline DM (no medications) who presents to [facility] ED complaining of syncope last night. Pt reports recently seeing new PCP (Dr Dotson) since her BP has been uncontrolled (200s/100s) who prescribed her new BP medication. Yesterday she took two BP medications and aspirin in the morning and the new prescribed BP medication at 10PM.  10-15 minutes after taking the pill her head became severely itchy with pins and needles and while ambulating to the bathroom she felt nauseous, dizzy, diaphoretic and then while sitting on toilet syncopized onto the floor with 1 minute of LOC. Pt son heard a thump and found her 1 minute later when she was waking back up. Denies AMS upon awakening, incontinence, tongue biting, slurred speech, arm weakness, facial droop. Upon awakening pt vomited dark, non-bloody fluid twice. Pt reports feeling feverish two days ago with chills and rhinorrhea for 2 weeks, but reports feeling well now. 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 Pt denies weakness, abnormal changes in weight, headaches, sinus pressure, ear pain, throat pain, SOB, dyspnea, difficulty breathing, cough, wheeze, chest pain, palpitations, orthopnea, PND, abdominal pain, diarrhea, dysuria, tremors, peripheral edema, calf tenderness, rashes.

## 2019-09-16 NOTE — H&P ADULT - RS GEN PE MLT RESP DETAILS PC
no subcutaneous emphysema/no rhonchi/no wheezes/respirations non-labored/no intercostal retractions/no rales/good air movement/breath sounds equal/clear to auscultation bilaterally/normal/airway patent/no chest wall tenderness

## 2019-09-16 NOTE — H&P ADULT - NSICDXPASTSURGICALHX_GEN_ALL_CORE_FT
PAST SURGICAL HISTORY:  History of coronary artery stent placement 3 stents (Last in 2012)  Same admission as past myocardial infarction    History of tonsillectomy     History of tubal ligation 35 years ago, no complications as per patient.    No Past Surgical History     S/P ICD (internal cardiac defibrillator) procedure     S/P LASIK surgery Right eye, no complications as per patient.

## 2019-09-16 NOTE — H&P ADULT - NEGATIVE GENERAL SYMPTOMS
no weight loss/no weight gain/no polyphagia/no malaise/no anorexia/no polyuria/no fatigue/no polydipsia

## 2019-09-16 NOTE — H&P ADULT - NEUROLOGICAL DETAILS
deep reflexes intact/cranial nerves intact/sensation intact/responds to verbal commands/no spontaneous movement/superficial reflexes intact/responds to pain/normal strength/alert and oriented x 3

## 2019-09-17 DIAGNOSIS — I25.10 ATHEROSCLEROTIC HEART DISEASE OF NATIVE CORONARY ARTERY WITHOUT ANGINA PECTORIS: ICD-10-CM

## 2019-09-17 LAB
ANION GAP SERPL CALC-SCNC: 12 MMO/L — SIGNIFICANT CHANGE UP (ref 7–14)
BUN SERPL-MCNC: 20 MG/DL — SIGNIFICANT CHANGE UP (ref 7–23)
CALCIUM SERPL-MCNC: 9.5 MG/DL — SIGNIFICANT CHANGE UP (ref 8.4–10.5)
CHLORIDE SERPL-SCNC: 102 MMOL/L — SIGNIFICANT CHANGE UP (ref 98–107)
CHOLEST SERPL-MCNC: 167 MG/DL — SIGNIFICANT CHANGE UP (ref 120–199)
CO2 SERPL-SCNC: 25 MMOL/L — SIGNIFICANT CHANGE UP (ref 22–31)
CREAT SERPL-MCNC: 1.37 MG/DL — HIGH (ref 0.5–1.3)
GLUCOSE SERPL-MCNC: 108 MG/DL — HIGH (ref 70–99)
HBA1C BLD-MCNC: 6.3 % — HIGH (ref 4–5.6)
HCT VFR BLD CALC: 36.9 % — SIGNIFICANT CHANGE UP (ref 34.5–45)
HCV AB S/CO SERPL IA: 0.09 S/CO — SIGNIFICANT CHANGE UP (ref 0–0.99)
HCV AB SERPL-IMP: SIGNIFICANT CHANGE UP
HDLC SERPL-MCNC: 30 MG/DL — LOW (ref 45–65)
HGB BLD-MCNC: 11.7 G/DL — SIGNIFICANT CHANGE UP (ref 11.5–15.5)
LIPID PNL WITH DIRECT LDL SERPL: 108 MG/DL — SIGNIFICANT CHANGE UP
MAGNESIUM SERPL-MCNC: 1.9 MG/DL — SIGNIFICANT CHANGE UP (ref 1.6–2.6)
MCHC RBC-ENTMCNC: 26.5 PG — LOW (ref 27–34)
MCHC RBC-ENTMCNC: 31.7 % — LOW (ref 32–36)
MCV RBC AUTO: 83.7 FL — SIGNIFICANT CHANGE UP (ref 80–100)
NRBC # FLD: 0 K/UL — SIGNIFICANT CHANGE UP (ref 0–0)
PLATELET # BLD AUTO: 240 K/UL — SIGNIFICANT CHANGE UP (ref 150–400)
PMV BLD: 9.5 FL — SIGNIFICANT CHANGE UP (ref 7–13)
POTASSIUM SERPL-MCNC: 3.8 MMOL/L — SIGNIFICANT CHANGE UP (ref 3.5–5.3)
POTASSIUM SERPL-SCNC: 3.8 MMOL/L — SIGNIFICANT CHANGE UP (ref 3.5–5.3)
RBC # BLD: 4.41 M/UL — SIGNIFICANT CHANGE UP (ref 3.8–5.2)
RBC # FLD: 12.8 % — SIGNIFICANT CHANGE UP (ref 10.3–14.5)
SODIUM SERPL-SCNC: 139 MMOL/L — SIGNIFICANT CHANGE UP (ref 135–145)
TRIGL SERPL-MCNC: 297 MG/DL — HIGH (ref 10–149)
WBC # BLD: 8.37 K/UL — SIGNIFICANT CHANGE UP (ref 3.8–10.5)
WBC # FLD AUTO: 8.37 K/UL — SIGNIFICANT CHANGE UP (ref 3.8–10.5)

## 2019-09-17 PROCEDURE — 93880 EXTRACRANIAL BILAT STUDY: CPT | Mod: 26

## 2019-09-17 PROCEDURE — 99233 SBSQ HOSP IP/OBS HIGH 50: CPT

## 2019-09-17 PROCEDURE — 93306 TTE W/DOPPLER COMPLETE: CPT | Mod: 26

## 2019-09-17 RX ADMIN — Medication 1 TABLET(S): at 05:36

## 2019-09-17 RX ADMIN — ENOXAPARIN SODIUM 30 MILLIGRAM(S): 100 INJECTION SUBCUTANEOUS at 17:08

## 2019-09-17 RX ADMIN — Medication 100 MILLIGRAM(S): at 05:36

## 2019-09-17 NOTE — PROGRESS NOTE ADULT - PROBLEM SELECTOR PLAN 4
Encourage PO intake.  D/C HCTZ.  Monitor renal fx but suspect patient has baseline elevation of Crt. Encourage PO intake.  Monitor renal fx but suspect patient has baseline elevation of Crt.

## 2019-09-17 NOTE — PROGRESS NOTE ADULT - PROBLEM SELECTOR PLAN 5
Triamterene only for now but patient does have labile BP. Resume losartan as lower dose if BP continues to be elevated. Triamterene/HCT only for now but patient does have labile BP. Resume losartan as lower dose if BP continues to be elevated.

## 2019-09-18 ENCOUNTER — TRANSCRIPTION ENCOUNTER (OUTPATIENT)
Age: 74
End: 2019-09-18

## 2019-09-18 VITALS
TEMPERATURE: 98 F | RESPIRATION RATE: 17 BRPM | HEART RATE: 93 BPM | OXYGEN SATURATION: 100 % | DIASTOLIC BLOOD PRESSURE: 72 MMHG | SYSTOLIC BLOOD PRESSURE: 154 MMHG

## 2019-09-18 LAB
ANION GAP SERPL CALC-SCNC: 15 MMO/L — HIGH (ref 7–14)
BUN SERPL-MCNC: 19 MG/DL — SIGNIFICANT CHANGE UP (ref 7–23)
CALCIUM SERPL-MCNC: 9.9 MG/DL — SIGNIFICANT CHANGE UP (ref 8.4–10.5)
CHLORIDE SERPL-SCNC: 101 MMOL/L — SIGNIFICANT CHANGE UP (ref 98–107)
CO2 SERPL-SCNC: 24 MMOL/L — SIGNIFICANT CHANGE UP (ref 22–31)
CREAT SERPL-MCNC: 1.26 MG/DL — SIGNIFICANT CHANGE UP (ref 0.5–1.3)
GLUCOSE SERPL-MCNC: 128 MG/DL — HIGH (ref 70–99)
HCT VFR BLD CALC: 38.6 % — SIGNIFICANT CHANGE UP (ref 34.5–45)
HGB BLD-MCNC: 12.2 G/DL — SIGNIFICANT CHANGE UP (ref 11.5–15.5)
MAGNESIUM SERPL-MCNC: 2 MG/DL — SIGNIFICANT CHANGE UP (ref 1.6–2.6)
MCHC RBC-ENTMCNC: 26.5 PG — LOW (ref 27–34)
MCHC RBC-ENTMCNC: 31.6 % — LOW (ref 32–36)
MCV RBC AUTO: 83.7 FL — SIGNIFICANT CHANGE UP (ref 80–100)
NRBC # FLD: 0 K/UL — SIGNIFICANT CHANGE UP (ref 0–0)
PHOSPHATE SERPL-MCNC: 3.6 MG/DL — SIGNIFICANT CHANGE UP (ref 2.5–4.5)
PLATELET # BLD AUTO: 235 K/UL — SIGNIFICANT CHANGE UP (ref 150–400)
PMV BLD: 9.2 FL — SIGNIFICANT CHANGE UP (ref 7–13)
POTASSIUM SERPL-MCNC: 3.8 MMOL/L — SIGNIFICANT CHANGE UP (ref 3.5–5.3)
POTASSIUM SERPL-SCNC: 3.8 MMOL/L — SIGNIFICANT CHANGE UP (ref 3.5–5.3)
RBC # BLD: 4.61 M/UL — SIGNIFICANT CHANGE UP (ref 3.8–5.2)
RBC # FLD: 12.9 % — SIGNIFICANT CHANGE UP (ref 10.3–14.5)
SODIUM SERPL-SCNC: 140 MMOL/L — SIGNIFICANT CHANGE UP (ref 135–145)
WBC # BLD: 8.24 K/UL — SIGNIFICANT CHANGE UP (ref 3.8–10.5)
WBC # FLD AUTO: 8.24 K/UL — SIGNIFICANT CHANGE UP (ref 3.8–10.5)

## 2019-09-18 PROCEDURE — 99239 HOSP IP/OBS DSCHRG MGMT >30: CPT

## 2019-09-18 RX ORDER — MELOXICAM 15 MG/1
1 TABLET ORAL
Qty: 0 | Refills: 0 | DISCHARGE

## 2019-09-18 RX ORDER — HYDRALAZINE HCL 50 MG
10 TABLET ORAL ONCE
Refills: 0 | Status: COMPLETED | OUTPATIENT
Start: 2019-09-18 | End: 2019-09-18

## 2019-09-18 RX ORDER — TRIAMTERENE/HYDROCHLOROTHIAZID 75 MG-50MG
1 TABLET ORAL DAILY
Refills: 0 | Status: DISCONTINUED | OUTPATIENT
Start: 2019-09-18 | End: 2019-09-18

## 2019-09-18 RX ORDER — ASPIRIN/CALCIUM CARB/MAGNESIUM 324 MG
81 TABLET ORAL DAILY
Refills: 0 | Status: DISCONTINUED | OUTPATIENT
Start: 2019-09-18 | End: 2019-09-18

## 2019-09-18 RX ORDER — LABETALOL HCL 100 MG
1 TABLET ORAL
Qty: 0 | Refills: 0 | DISCHARGE

## 2019-09-18 RX ADMIN — Medication 10 MILLIGRAM(S): at 15:55

## 2019-09-18 RX ADMIN — ENOXAPARIN SODIUM 30 MILLIGRAM(S): 100 INJECTION SUBCUTANEOUS at 11:24

## 2019-09-18 RX ADMIN — Medication 1 TABLET(S): at 06:53

## 2019-09-18 RX ADMIN — Medication 81 MILLIGRAM(S): at 15:55

## 2019-09-18 NOTE — CONSULT NOTE ADULT - ASSESSMENT
74F with asymptomatic moderate left sided carotid stenosis  - Outpatient follow up with Dr. Russell, no surgical intervention at this time  - Seen and examined with Dr. Russell    [id number]

## 2019-09-18 NOTE — CONSULT NOTE ADULT - SUBJECTIVE AND OBJECTIVE BOX
General Surgery Consult  Consulting surgical team: Vascular  Consulting attending: Drew    CC: 72 y/o female with PMH of HTN, HLD, CAD, MI in 2012 with 3 stents (last in 2012), multiple syncopal episodes, loop recorder implanted 3 years ago, vertigo, borderline DM (no medications). Vascular surgery consulted for moderate left carotid stenosis seen on duplex. Patient currently denies symptoms, numbness or tingling, or vision changes.     HPI:  Pt is a 72 y/o female with PMH of HTN, HLD, CAD, MI in 2012 with 3 stents (last in 2012), multiple syncopal episodes, loop recorder implanted 3 years ago, vertigo, borderline DM (no medications) who presents to [facility] ED complaining of syncope last night. Pt reports recently seeing new PCP (Dr Dotson) since her BP has been uncontrolled (200s/100s) who prescribed her new BP medication. Yesterday she took two BP medications and aspirin in the morning and the new prescribed BP medication at 10PM.  10-15 minutes after taking the pill her head became severely itchy with pins and needles and while ambulating to the bathroom she felt nauseous, dizzy, diaphoretic and then while sitting on toilet syncopized onto the floor with 1 minute of LOC. Pt son heard a thump and found her 1 minute later when she was waking back up. Denies AMS upon awakening, incontinence, tongue biting, slurred speech, arm weakness, facial droop. Upon awakening pt vomited dark, non-bloody fluid twice. Pt reports feeling feverish two days ago with chills and rhinorrhea for 2 weeks, but reports feeling well now. 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 Pt denies weakness, abnormal changes in weight, headaches, sinus pressure, ear pain, throat pain, SOB, dyspnea, difficulty breathing, cough, wheeze, chest pain, palpitations, orthopnea, PND, abdominal pain, diarrhea, dysuria, tremors, peripheral edema, calf tenderness, rashes. (16 Sep 2019 07:19)      PAST MEDICAL HISTORY:  Sciatica  Legally blind in left eye, as defined in [place]  Borderline diabetes mellitus  Migraines  Vertigo  H/O heart artery stent  History of MI (myocardial infarction)  CAD (coronary artery disease)  HTN (hypertension), benign  Dyslipidemia      PAST SURGICAL HISTORY:  S/P ICD (internal cardiac defibrillator) procedure  History of coronary artery stent placement  S/P LASIK surgery  History of tubal ligation  History of tonsillectomy  No Past Surgical History      MEDICATIONS:  aspirin  chewable 81 milliGRAM(s) Oral daily  atorvastatin 40 milliGRAM(s) Oral at bedtime  enoxaparin Injectable 30 milliGRAM(s) SubCutaneous daily  influenza   Vaccine 0.5 milliLiter(s) IntraMuscular once  triamterene 37.5 mG/hydrochlorothiazide 25 mG Capsule 1 Capsule(s) Oral daily      ALLERGIES:  fish (Unknown)  iodine (Anaphylaxis)  No Known Drug Allergies      VITALS & I/Os:  Vital Signs Last 24 Hrs  T(C): 36.8 (18 Sep 2019 14:27), Max: 36.8 (18 Sep 2019 14:27)  T(F): 98.3 (18 Sep 2019 14:27), Max: 98.3 (18 Sep 2019 14:27)  HR: 92 (18 Sep 2019 15:31) (76 - 101)  BP: 164/82 (18 Sep 2019 15:31) (135/70 - 173/95)  BP(mean): --  RR: 18 (18 Sep 2019 14:27) (17 - 18)  SpO2: 99% (18 Sep 2019 14:27) (96% - 100%)    I&O's Summary      PHYSICAL EXAM:  General: No acute distress  B/L carotids without bruits  Respiratory: Nonlabored  Cardiovascular: RRR  Abdominal: Soft, nondistended, nontender.   Extremities: Warm    LABS:                        12.2   8.24  )-----------( 235      ( 18 Sep 2019 06:10 )             38.6     09-18    140  |  101  |  19  ----------------------------<  128<H>  3.8   |  24  |  1.26    Ca    9.9      18 Sep 2019 06:10  Phos  3.6     09-18  Mg     2.0     09-18      Lactate:      < from: [facility] Duplex Carotid Arteries, Bilateral. (09.17.19 @ 11:41) >  Summary/Impressions:  1. Right Internal Carotid Artery:  There is mild  heterogenous plaque within the proximal vessel, consistent  with a 16-49% stenosis. No hemodynamically significant  stenosis.  2. Left Internal Carotid Artery:  There is moderate  heterogenous plaque within the proximal vessel, consistent  with a 50-79% stenosis.  ------------------------------------------------------------------------  Confirmed on  9/17/2019 - 6:42 PM by Ortiz Barksdale MD, [facility],  [facility], [place]  By signing this report, the attending physician certifies  that he or she has personally supervised and interpreted  the vascular study and has reviewed and or edited and  agrees with the written comments contained within the  report.    < end of copied text >              IMAGING:

## 2019-09-18 NOTE — PROGRESS NOTE ADULT - ATTENDING COMMENTS
Patient seen and examined.  Agree with above.   -Carotid us noted - continue with asa and statin  -Vascular surgery eval called with Dr. Mohamud  -Consider neuro eval - will defer to primary team    Adebayo Montaño MD
Dev Alvarado MD  [facility] Hospitalist  Pager# [phone]
Dev Alvarado MD  [facility] Hospitalist  Pager# [phone]

## 2019-09-18 NOTE — PHYSICAL THERAPY INITIAL EVALUATION ADULT - PERTINENT HX OF CURRENT PROBLEM, REHAB EVAL
Patient is a 74 year old female who was admitted to [facility] following syncope. PMH includes HTN, HLD, CAD, MI, multiple syncope episodes, loop recorder implanted 2016, vertigo

## 2019-09-18 NOTE — PROGRESS NOTE ADULT - SUBJECTIVE AND OBJECTIVE BOX
CC: F/U for syncope    SUBJECTIVE / OVERNIGHT EVENTS:  No new episode of syncope overnight since stopping labetolol.  No F/C, N/V, CP, SOB, Cough, lightheadedness, dizziness, abdominal pain, diarrhea, dysuria.    MEDICATIONS  (STANDING):  atorvastatin 40 milliGRAM(s) Oral at bedtime  enoxaparin Injectable 30 milliGRAM(s) SubCutaneous daily  influenza   Vaccine 0.5 milliLiter(s) IntraMuscular once  triamterene 37.5 mG/hydrochlorothiazide 25 mG Capsule 1 Capsule(s) Oral daily    MEDICATIONS  (PRN):      Vital Signs Last 24 Hrs  T(C): 36.6 (18 Sep 2019 09:39), Max: 36.7 (17 Sep 2019 22:49)  T(F): 97.9 (18 Sep 2019 09:39), Max: 98.1 (17 Sep 2019 22:49)  HR: 92 (18 Sep 2019 09:39) (76 - 92)  BP: 158/79 (18 Sep 2019 09:39) (135/70 - 158/79)  BP(mean): --  RR: 17 (18 Sep 2019 09:39) (17 - 17)  SpO2: 100% (18 Sep 2019 09:39) (96% - 100%)  CAPILLARY BLOOD GLUCOSE        I&O's Summary      PHYSICAL EXAM:  GENERAL: NAD, well-developed  HEAD:  Atraumatic, Normocephalic  EYES: EOMI, PERRLA, conjunctiva and sclera clear  NECK: Supple, No JVD  CHEST/LUNG: Clear to auscultation bilaterally; No wheeze  HEART: Regular rate and rhythm; No murmurs, rubs, or gallops  ABDOMEN: Soft, Nontender, Nondistended; Bowel sounds present  EXTREMITIES:  2+ Peripheral Pulses, No clubbing, cyanosis, or edema  PSYCH: Calm  NEUROLOGY: A/Ox3, non-focal  SKIN: No rashes or lesions    LABS:                        12.2   8.24  )-----------( 235      ( 18 Sep 2019 06:10 )             38.6     09-18    140  |  101  |  19  ----------------------------<  128<H>  3.8   |  24  |  1.26    Ca    9.9      18 Sep 2019 06:10  Phos  3.6     09-18  Mg     2.0     09-18                RADIOLOGY & ADDITIONAL TESTS:    Imaging Personally Reviewed:    Care Discussed with Consultants/Other Providers:
CC: F/U for syncope    SUBJECTIVE / OVERNIGHT EVENTS:  No new events overnight.  Feels hesitant to continue some of the BP meds because "every time doctor puts me on a new BP med, I pass out."  No F/C, N/V, CP, SOB, Cough, lightheadedness, dizziness, abdominal pain, diarrhea, dysuria.    MEDICATIONS  (STANDING):  atorvastatin 40 milliGRAM(s) Oral at bedtime  enoxaparin Injectable 30 milliGRAM(s) SubCutaneous daily  influenza   Vaccine 0.5 milliLiter(s) IntraMuscular once  triamterene 37.5 mG/hydrochlorothiazide 25 mG Tablet 1 Tablet(s) Oral daily    MEDICATIONS  (PRN):      Vital Signs Last 24 Hrs  T(C): 36.5 (17 Sep 2019 11:46), Max: 36.7 (16 Sep 2019 21:33)  T(F): 97.7 (17 Sep 2019 11:46), Max: 98.1 (16 Sep 2019 21:33)  HR: 86 (17 Sep 2019 11:46) (79 - 86)  BP: 138/79 (17 Sep 2019 11:46) (117/57 - 179/79)  BP(mean): --  RR: 17 (17 Sep 2019 11:46) (17 - 18)  SpO2: 99% (17 Sep 2019 11:46) (97% - 100%)  CAPILLARY BLOOD GLUCOSE        I&O's Summary      PHYSICAL EXAM:  GENERAL: NAD, well-developed  HEAD:  Atraumatic, Normocephalic  EYES: EOMI, PERRLA, conjunctiva and sclera clear  NECK: Supple, No JVD  CHEST/LUNG: Clear to auscultation bilaterally; No wheeze  HEART: Regular rate and rhythm; No murmurs, rubs, or gallops  ABDOMEN: Soft, Nontender, Nondistended; Bowel sounds present  EXTREMITIES:  2+ Peripheral Pulses, No clubbing, cyanosis, or edema  PSYCH: Calm  NEUROLOGY: A/Ox3, non-focal  SKIN: No rashes or lesions    LABS:                        11.7   8.37  )-----------( 240      ( 17 Sep 2019 07:18 )             36.9     09-17    139  |  102  |  20  ----------------------------<  108<H>  3.8   |  25  |  1.37<H>    Ca    9.5      17 Sep 2019 07:18  Mg     1.9     09-17    TPro  6.9  /  Alb  4.2  /  TBili  0.6  /  DBili  x   /  AST  16  /  ALT  11  /  AlkPhos  83  09-16    PT/INR - ( 16 Sep 2019 00:23 )   PT: 10.6 SEC;   INR: 0.93          PTT - ( 16 Sep 2019 00:23 )  PTT:26.7 SEC          RADIOLOGY & ADDITIONAL TESTS:  TTE and carotid doppler pending.    Imaging Personally Reviewed:    Care Discussed with Consultants/Other Providers:
EP ATTENDING    tele: NSR with periods of sinus bradycardia, no significant events    no palpitations, no syncope, no angina, ROS otherwise -    atorvastatin 40 milliGRAM(s) Oral at bedtime  enoxaparin Injectable 30 milliGRAM(s) SubCutaneous daily  influenza   Vaccine 0.5 milliLiter(s) IntraMuscular once  labetalol 100 milliGRAM(s) Oral two times a day  triamterene 37.5 mG/hydrochlorothiazide 25 mG Tablet 1 Tablet(s) Oral daily                            11.7   8.37  )-----------( 240      ( 17 Sep 2019 07:18 )             36.9       09-17    139  |  102  |  20  ----------------------------<  108<H>  3.8   |  25  |  1.37<H>    Ca    9.5      17 Sep 2019 07:18  Mg     1.9     09-17    TPro  6.9  /  Alb  4.2  /  TBili  0.6  /  DBili  x   /  AST  16  /  ALT  11  /  AlkPhos  83  09-16      T(C): 36.7 (09-17-19 @ 08:30), Max: 36.7 (09-16-19 @ 12:00)  HR: 81 (09-17-19 @ 08:30) (79 - 88)  BP: 141/76 (09-17-19 @ 08:30) (117/57 - 179/79)  RR: 18 (09-17-19 @ 08:30) (17 - 20)  SpO2: 100% (09-17-19 @ 08:30) (97% - 100%)  Wt(kg): --    A&O x 3  neurologically intact  no JVD  RRR, no murmurs  CTAB  soft nt/nd  no c/c/e    ekg: unremarkable, QRS narrow  carotid dopplers pending  echo pending      A/P) 73 y/o female PMH HTN, hyperlipidemia, CAD s/p PCI (2012) and recurrent unexplained syncope for which I implanted an ILR in Jan 2016. Remote monitoring for over 3 years has been unremarkable, and she is now again admitted with syncope. ILR now at EOS.    -given the above there is no obvious indication for a pacemaker, nor ICD, nor EP study  -f/u carotid dopplers and echo  -future options including loop recorder explantation, followed by either reimplantation or simply clinical observation  -check orthostatics  -consider holding labetolol given periods of sinus bradycardia      Faby Perez M.D, [facility]  Cardiac Electrophysiology  [phone]
EP ATTENDING    tele: NSR, no significant events    no palpitations, no syncope, no angina, ROS otherwise -    atorvastatin 40 milliGRAM(s) Oral at bedtime  enoxaparin Injectable 30 milliGRAM(s) SubCutaneous daily  influenza   Vaccine 0.5 milliLiter(s) IntraMuscular once  triamterene 37.5 mG/hydrochlorothiazide 25 mG Capsule 1 Capsule(s) Oral daily                            12.2   8.24  )-----------( 235      ( 18 Sep 2019 06:10 )             38.6       09-18    140  |  101  |  19  ----------------------------<  128<H>  3.8   |  24  |  1.26    Ca    9.9      18 Sep 2019 06:10  Phos  3.6     09-18  Mg     2.0     09-18              T(C): 36.6 (09-18-19 @ 06:49), Max: 36.7 (09-17-19 @ 22:49)  HR: 76 (09-18-19 @ 06:49) (76 - 89)  BP: 148/70 (09-18-19 @ 06:49) (135/70 - 148/70)  RR: 17 (09-18-19 @ 06:49) (17 - 17)  SpO2: 100% (09-18-19 @ 06:49) (96% - 100%)  Wt(kg): --    I&O's Summary      A&O x 3  neurologically intact  no JVD  RRR, no murmurs  CTAB  soft nt/nd  no c/c/e    ekg: unremarkable, QRS narrow  echo unremarkable      A/P) 75 y/o female PMH HTN, hyperlipidemia, CAD s/p PCI (2012) and recurrent unexplained syncope for which I implanted an ILR in Jan 2016. Remote monitoring for over 3 years has been unremarkable, and she is now again admitted with syncope. ILR now at EOS. Repeat echo/tele/ekgs unremarkable.    -given the above there is no obvious indication for a pacemaker, nor ICD, nor EP study  -consider neuro consult (will defer to medicine)  -consider vascular consult given abnormal carotid dopplers (will defer to medicine)  -consider ASA 81mg daily given carotid artery disease (will defer to cardiology)  -no further inpatient EP workup needed  -patient elects to leave ILR in place  -f/u with Abundio after discharge  -d/w patient and her daughter in detail      Faby Perez M.D, [facility]  Cardiac Electrophysiology  [phone]
Subjective: No chest pain or sob; ROS otherwise negative   	  MEDICATIONS  (STANDING):  aspirin  chewable 81 milliGRAM(s) Oral daily  atorvastatin 40 milliGRAM(s) Oral at bedtime  enoxaparin Injectable 30 milliGRAM(s) SubCutaneous daily  influenza   Vaccine 0.5 milliLiter(s) IntraMuscular once  triamterene 37.5 mG/hydrochlorothiazide 25 mG Capsule 1 Capsule(s) Oral daily    LABS:                        12.2   8.24  )-----------( 235      ( 18 Sep 2019 06:10 )             38.6     140  |  101  |  19  ----------------------------<  128<H>  3.8   |  24  |  1.26    Ca    9.9      18 Sep 2019 06:10  Phos  3.6     09-18  Mg     2.0     09-18    Creatinine Trend: 1.26<--, 1.37<--, 1.44<--     PHYSICAL EXAM  Vital Signs Last 24 Hrs  T(C): 36.8 (18 Sep 2019 14:27), Max: 36.8 (18 Sep 2019 14:27)  T(F): 98.3 (18 Sep 2019 14:27), Max: 98.3 (18 Sep 2019 14:27)  HR: 101 (18 Sep 2019 14:27) (76 - 101)  BP: 173/95 (18 Sep 2019 14:27) (135/70 - 173/95)  RR: 18 (18 Sep 2019 14:27) (17 - 18)  SpO2: 99% (18 Sep 2019 14:27) (96% - 100%)      Appearance: Normal	  HEENT:   Normal oral mucosa, PERRL, EOMI	  Lymphatic: No lymphadenopathy , no edema  Cardiovascular: Normal S1 S2, No JVD, No murmurs , Peripheral pulses palpable 2+ bilaterally  Respiratory: Lungs clear to auscultation, normal effort 	  Gastrointestinal:  Soft, Non-tender, + BS	  Skin: No rashes, No ecchymoses, No cyanosis, warm to touch  Musculoskeletal: Normal range of motion, normal strength  Psychiatry:  Mood & affect appropriate    TELEMETRY: SR	      < from: Transthoracic Echocardiogram (09.17.19 @ 13:44) >  CONCLUSIONS:  1. Mitral annular calcification, otherwise normal mitral  valve. Minimal mitral regurgitation.  2. Normal left ventricular internal dimensions and wall  thicknesses.  3. Normal left ventricular systolic function. Nosegmental  wall motion abnormalities.  4. Normal right ventricular size and function.  *** Compared with echocardiogram of 1/7/2016, no  significant changes noted.  ------------------------------------------------------------------------  Confirmed on  9/17/2019 - 14:56:50 by Raoul Vega M.D.    < end of copied text >    < from: VA Duplex Carotid Arteries, Bilateral. (09.17.19 @ 11:41) >  Summary/Impressions:  1. Right Internal Carotid Artery:  There is mild  heterogenous plaque within the proximal vessel, consistent  with a 16-49% stenosis. No hemodynamically significant  stenosis.  2. Left Internal Carotid Artery:  There is moderate  heterogenous plaque within the proximal vessel, consistent  with a 50-79% stenosis.  ------------------------------------------------------------------------  Confirmed on  9/17/2019 - 6:42 PM by Ortiz Barksdale MD, FAC,  FASE, RPVI    < end of copied text >      ASSESSMENT/PLAN: 75 yo F with history of recurrent syncope s/p ILR implantation in 2016 by Dr. Perez, HTN, HLD, CAD s/p prior PCI (last stent in 2012) who is being seen for syncope.    -Appreciate EP eval  -Monitor tele  -check orthostatics  -continue with asa for history of PCI  -TTE noted  -CD noted, await vascular surgery consult
Subjective: No chest pain or sob; ROS otherwise negative   	  atorvastatin 40 milliGRAM(s) Oral at bedtime  enoxaparin Injectable 30 milliGRAM(s) SubCutaneous daily  influenza   Vaccine 0.5 milliLiter(s) IntraMuscular once  labetalol 100 milliGRAM(s) Oral two times a day  triamterene 37.5 mG/hydrochlorothiazide 25 mG Tablet 1 Tablet(s) Oral daily                            11.7   8.37  )-----------( 240      ( 17 Sep 2019 07:18 )             36.9       09-17    139  |  102  |  20  ----------------------------<  108<H>  3.8   |  25  |  1.37<H>    Ca    9.5      17 Sep 2019 07:18  Mg     1.9     09-17    TPro  6.9  /  Alb  4.2  /  TBili  0.6  /  DBili  x   /  AST  16  /  ALT  11  /  AlkPhos  83  09-16            T(C): 36.5 (09-17-19 @ 11:46), Max: 36.7 (09-16-19 @ 21:33)  HR: 86 (09-17-19 @ 11:46) (79 - 86)  BP: 138/79 (09-17-19 @ 11:46) (117/57 - 179/79)  RR: 17 (09-17-19 @ 11:46) (17 - 18)  SpO2: 99% (09-17-19 @ 11:46) (97% - 100%)  Wt(kg): --    I&O's Summary      Appearance: Normal	  HEENT:   Normal oral mucosa, PERRL, EOMI	  Lymphatic: No lymphadenopathy , no edema  Cardiovascular: Normal S1 S2, No JVD, No murmurs , Peripheral pulses palpable 2+ bilaterally  Respiratory: Lungs clear to auscultation, normal effort 	  Gastrointestinal:  Soft, Non-tender, + BS	  Skin: No rashes, No ecchymoses, No cyanosis, warm to touch  Musculoskeletal: Normal range of motion, normal strength  Psychiatry:  Mood & affect appropriate    TELEMETRY: SR	    ECG:  	  RADIOLOGY:   DIAGNOSTIC TESTING:  [ ] Echocardiogram:   [ ]  Catheterization:  [ ] Stress Test:    OTHER: 	      ASSESSMENT/PLAN: 73 yo F with history of recurrent syncope s/p ILR implantation in 2016 by Dr. Perez, HTN, HLD, CAD s/p prior PCI (last stent in 2012) who is being seen for syncope.    -Appreciate EP eval  -Monitor tele  -check orthostatics  -continue with asa for history of PCI  -check TTE  -would consider holding labetolol and changing to another antihypertensive    Adebayo Montaño MD

## 2019-09-18 NOTE — PROGRESS NOTE ADULT - PROBLEM SELECTOR PROBLEM 2
Coronary artery disease, angina presence unspecified, unspecified vessel or lesion type, unspecified whether native or transplanted heart
Coronary artery disease, angina presence unspecified, unspecified vessel or lesion type, unspecified whether native or transplanted heart

## 2019-09-18 NOTE — PROGRESS NOTE ADULT - PROBLEM SELECTOR PLAN 1
ACS ruled out.    Suspect BP med as source of syncope.  D/C labetolol.  Cardiology and EP eval appreciated.  EP recommended to remove ILD as it's at EOL - patient elected to keep the device.  L ICA stenosis noted on carotid doppler - Vascular surgery eval. If no intervention, can be followed up as outpatient.
f/u TTE and carotid doppler  ACS ruled out.    Suspect BP med as source of syncope.  D/C labetolol.  Cardiology and EP eval appreciated.  EP recommended to remove ILD as it's at EOL - patient still mulling on the decision.

## 2019-09-18 NOTE — PHYSICAL THERAPY INITIAL EVALUATION ADULT - PRECAUTIONS/LIMITATIONS, REHAB EVAL
cardiac precautions/fall precautions Body Location Override (Optional - Billing Will Still Be Based On Selected Body Map Location If Applicable): left inner cheek

## 2019-09-18 NOTE — PHYSICAL THERAPY INITIAL EVALUATION ADULT - ADDITIONAL COMMENTS
Patient lives with her son and daughter in a house with 7 steps to enter and 1 flight of steps inside. Prior to admission, patient ambulated independently without assistive device.

## 2019-09-18 NOTE — PHYSICAL THERAPY INITIAL EVALUATION ADULT - GENERAL OBSERVATIONS, REHAB EVAL
Patient found supine in bed in No apparent distress with daughter at bedside and RN Lana taking morning vitals; VSS and pt agreeable to PT evaluation

## 2019-09-18 NOTE — CONSULT NOTE ADULT - ATTENDING COMMENTS
Full consult note as above; discussed with surgery resident and vascular fellow.   She developed an episode of syncope and was found to have a moderate severity left internal carotid artery stenosis. She is neurologically intact. There was a left carotid bruit but there were normal carotid pulses. The moderate severity carotid stenosis is not the cause of her syncope and does not require any intervention. She should get another carotid Doppler study in 3-6 months.

## 2019-09-18 NOTE — DISCHARGE NOTE PROVIDER - HOSPITAL COURSE
Pt is a 74 y/o female with PMH of HTN, HLD, CAD, MI in 2012 with 3 stents (last in 2012), multiple syncopal episodes, loop recorder implanted 3 years ago, vertigo, borderline DM (no medications) who presents to [facility] ED complaining of syncope last night. Pt reports recently seeing new PCP (Dr Dotson) since her BP has been uncontrolled (200s/100s) who prescribed her new BP medication. Yesterday she took two BP medications and aspirin in the morning and the new prescribed BP medication at 10PM.  10-15 minutes after taking the pill her head became severely itchy with pins and needles and while ambulating to the bathroom she felt nauseous, dizzy, diaphoretic and then while sitting on toilet syncopized onto the floor with 1 minute of LOC. Pt son heard a thump and found her 1 minute later when she was waking back up. Denies AMS upon awakening, incontinence, tongue biting, slurred speech, arm weakness, facial droop. Upon awakening pt vomited dark, non-bloody fluid twice. Pt reports feeling feverish two days ago with chills and rhinorrhea for 2 weeks, but reports feeling well now. 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 Pt denies weakness, abnormal changes in weight, headaches, sinus pressure, ear pain, throat pain, SOB, dyspnea, difficulty breathing, cough, wheeze, chest pain, palpitations, orthopnea, PND, abdominal pain, diarrhea, dysuria, tremors, peripheral edema, calf tenderness, rashes.    . Syncope - EKG NSR @ 70 c LVH and repolarization defects    Trop 7 > 9    CXR Clear    Echocardiogram: No sig changes.  EF 66%    CT Head No acute intracranial hemorrhage, mass effect, or CT evidence of acute intracranial pathology.    CT C-spine without fracture    Orthostatics negative    Cardiology cs: Holding Labetalol 2/2 episodes of sinus alexa-consider another antihypertensive, check Echo    ILR interrogated without events since 2017 but is EOL    EP to consider explant of ILR as an outpatient    Carotid duplex: Summary/Impressions:    Moderate heterogenous plaque noted within the proximal    right and left internal carotid arteries, with B-mode and    spectral analyses consistent with diameter reductions of    50-79% in both proximal vessels.            2. Hypertension - Initially hypotensive when presented to hospital but baseline SBP ~ 200    9/18 stable for d/c home today and f/u with vascular surgery in 3-6 months for repeat carotid dopplers.

## 2019-09-18 NOTE — PROGRESS NOTE ADULT - ASSESSMENT
73F HTN, CAD/MI - PCI 2012, recurrent syncope s/p ILD, DM type 2 p/w syncope likely due to recent change to BP meds with ILD at end-of-life with ALVA.
73F HTN, CAD/MI - PCI 2012, recurrent syncope s/p ILD, DM type 2 p/w syncope likely due to recent change to BP meds with ILD at end-of-life with ALVA.

## 2019-09-18 NOTE — DISCHARGE NOTE PROVIDER - NSDCCPCAREPLAN_GEN_ALL_CORE_FT
PRINCIPAL DISCHARGE DIAGNOSIS  Diagnosis: Bradycardia  Assessment and Plan of Treatment: continue present medications  no beta blockers needed at this time   f/u with cardiology in 2 weeks      SECONDARY DISCHARGE DIAGNOSES  Diagnosis: Carotid artery stenosis  Assessment and Plan of Treatment: f/u with vascular surgery in 3-6 months to repeat the dopplers for further assasment    Diagnosis: Coronary artery disease, angina presence unspecified, unspecified vessel or lesion type, unspecified whether native or transplanted heart  Assessment and Plan of Treatment: Coronary artery disease, angina presence unspecified, unspecified vessel or lesion type, unspecified whether native or transplanted heart    Diagnosis: Dyslipidemia  Assessment and Plan of Treatment: Low fat diet, exercise daily and continue current medications. Follow up with primary care physician and cardiologist for management.    Diagnosis: HTN (hypertension), benign  Assessment and Plan of Treatment: Low sodium and fat diet, continue anti-hypertensive medications, and follow up with primary care physician.    Diagnosis: Bradycardia  Assessment and Plan of Treatment:

## 2019-09-18 NOTE — PHYSICAL THERAPY INITIAL EVALUATION ADULT - CRITERIA FOR SKILLED THERAPEUTIC INTERVENTIONS
predicted duration of therapy intervention/risk reduction/prevention/impairments found/functional limitations in following categories/anticipated discharge recommendation/therapy frequency/rehab potential

## 2019-09-18 NOTE — DISCHARGE NOTE NURSING/CASE MANAGEMENT/SOCIAL WORK - PATIENT PORTAL LINK FT
You can access the FollowMyHealth Patient Portal offered by [facility] by registering at the following website: [email or web address]. By joining [facility]’s FollowMyHealth portal, you will also be able to view your health information using other applications (apps) compatible with our system.

## 2019-09-18 NOTE — DISCHARGE NOTE PROVIDER - CARE PROVIDER_API CALL
Kelechi Russell)  Surgery; Vascular Surgery  [address] [facility] [address], [address]  [place] [address]  Phone: [phone]  Fax: [phone]  Follow Up Time:     Elmer Hernandez)  Cardiovascular Disease  [address], [address]  [place] [address]  Phone: [phone]  Fax: [phone]  Follow Up Time: 2 weeks

## 2019-10-02 PROBLEM — Z60.2 PERSON LIVING ALONE: Status: ACTIVE | Noted: 2017-05-25

## 2019-10-29 NOTE — CONSULT NOTE ADULT - PROBLEM SELECTOR PROBLEM 2
R/O ACS (acute coronary syndrome)
normal sinus rhythm
L ankle: mild generalized swelling, FROM, NVI, sensate intact, mild ecchymosis, generalized ttp to lateral and medial malleoli. FROM. able to ambulate.

## 2020-06-09 ENCOUNTER — EMERGENCY (EMERGENCY)
Facility: HOSPITAL | Age: 75
LOS: 1 days | Discharge: HOME | End: 2020-06-09
Attending: EMERGENCY MEDICINE | Admitting: EMERGENCY MEDICINE
Payer: MEDICARE

## 2020-06-09 VITALS
DIASTOLIC BLOOD PRESSURE: 79 MMHG | TEMPERATURE: 98 F | OXYGEN SATURATION: 100 % | SYSTOLIC BLOOD PRESSURE: 179 MMHG | HEART RATE: 85 BPM | RESPIRATION RATE: 18 BRPM

## 2020-06-09 DIAGNOSIS — Z95.810 PRESENCE OF AUTOMATIC (IMPLANTABLE) CARDIAC DEFIBRILLATOR: Chronic | ICD-10-CM

## 2020-06-09 DIAGNOSIS — Z95.5 PRESENCE OF CORONARY ANGIOPLASTY IMPLANT AND GRAFT: Chronic | ICD-10-CM

## 2020-06-09 DIAGNOSIS — Z98.51 TUBAL LIGATION STATUS: Chronic | ICD-10-CM

## 2020-06-09 DIAGNOSIS — Z98.89 OTHER SPECIFIED POSTPROCEDURAL STATES: Chronic | ICD-10-CM

## 2020-06-09 PROBLEM — M54.30 SCIATICA, UNSPECIFIED SIDE: Chronic | Status: ACTIVE | Noted: 2019-09-16

## 2020-06-09 LAB
ALBUMIN SERPL ELPH-MCNC: 4 G/DL — SIGNIFICANT CHANGE UP (ref 3.3–5)
ALP SERPL-CCNC: 66 U/L — SIGNIFICANT CHANGE UP (ref 40–120)
ALT FLD-CCNC: 10 U/L — SIGNIFICANT CHANGE UP (ref 4–33)
ANION GAP SERPL CALC-SCNC: 14 MMO/L — SIGNIFICANT CHANGE UP (ref 7–14)
APPEARANCE UR: CLEAR — SIGNIFICANT CHANGE UP
AST SERPL-CCNC: 14 U/L — SIGNIFICANT CHANGE UP (ref 4–32)
BASOPHILS # BLD AUTO: 0.04 K/UL — SIGNIFICANT CHANGE UP (ref 0–0.2)
BASOPHILS NFR BLD AUTO: 0.5 % — SIGNIFICANT CHANGE UP (ref 0–2)
BILIRUB SERPL-MCNC: 0.3 MG/DL — SIGNIFICANT CHANGE UP (ref 0.2–1.2)
BILIRUB UR-MCNC: NEGATIVE — SIGNIFICANT CHANGE UP
BLOOD UR QL VISUAL: NEGATIVE — SIGNIFICANT CHANGE UP
BUN SERPL-MCNC: 24 MG/DL — HIGH (ref 7–23)
CALCIUM SERPL-MCNC: 9.4 MG/DL — SIGNIFICANT CHANGE UP (ref 8.4–10.5)
CHLORIDE SERPL-SCNC: 98 MMOL/L — SIGNIFICANT CHANGE UP (ref 98–107)
CO2 SERPL-SCNC: 24 MMOL/L — SIGNIFICANT CHANGE UP (ref 22–31)
COLOR SPEC: SIGNIFICANT CHANGE UP
CREAT SERPL-MCNC: 1.36 MG/DL — HIGH (ref 0.5–1.3)
EOSINOPHIL # BLD AUTO: 0.09 K/UL — SIGNIFICANT CHANGE UP (ref 0–0.5)
EOSINOPHIL NFR BLD AUTO: 1.1 % — SIGNIFICANT CHANGE UP (ref 0–6)
GLUCOSE SERPL-MCNC: 151 MG/DL — HIGH (ref 70–99)
GLUCOSE UR-MCNC: NEGATIVE — SIGNIFICANT CHANGE UP
HCT VFR BLD CALC: 32.3 % — LOW (ref 34.5–45)
HGB BLD-MCNC: 10.8 G/DL — LOW (ref 11.5–15.5)
IMM GRANULOCYTES NFR BLD AUTO: 1.5 % — SIGNIFICANT CHANGE UP (ref 0–1.5)
KETONES UR-MCNC: NEGATIVE — SIGNIFICANT CHANGE UP
LEUKOCYTE ESTERASE UR-ACNC: NEGATIVE — SIGNIFICANT CHANGE UP
LYMPHOCYTES # BLD AUTO: 3.59 K/UL — HIGH (ref 1–3.3)
LYMPHOCYTES # BLD AUTO: 43.5 % — SIGNIFICANT CHANGE UP (ref 13–44)
MCHC RBC-ENTMCNC: 28.1 PG — SIGNIFICANT CHANGE UP (ref 27–34)
MCHC RBC-ENTMCNC: 33.4 % — SIGNIFICANT CHANGE UP (ref 32–36)
MCV RBC AUTO: 83.9 FL — SIGNIFICANT CHANGE UP (ref 80–100)
MONOCYTES # BLD AUTO: 0.5 K/UL — SIGNIFICANT CHANGE UP (ref 0–0.9)
MONOCYTES NFR BLD AUTO: 6.1 % — SIGNIFICANT CHANGE UP (ref 2–14)
NEUTROPHILS # BLD AUTO: 3.91 K/UL — SIGNIFICANT CHANGE UP (ref 1.8–7.4)
NEUTROPHILS NFR BLD AUTO: 47.3 % — SIGNIFICANT CHANGE UP (ref 43–77)
NITRITE UR-MCNC: NEGATIVE — SIGNIFICANT CHANGE UP
NRBC # FLD: 0 K/UL — SIGNIFICANT CHANGE UP (ref 0–0)
PH UR: 6.5 — SIGNIFICANT CHANGE UP (ref 5–8)
PLATELET # BLD AUTO: 298 K/UL — SIGNIFICANT CHANGE UP (ref 150–400)
PMV BLD: 9.1 FL — SIGNIFICANT CHANGE UP (ref 7–13)
POTASSIUM SERPL-MCNC: 3.6 MMOL/L — SIGNIFICANT CHANGE UP (ref 3.5–5.3)
POTASSIUM SERPL-SCNC: 3.6 MMOL/L — SIGNIFICANT CHANGE UP (ref 3.5–5.3)
PROT SERPL-MCNC: 6.8 G/DL — SIGNIFICANT CHANGE UP (ref 6–8.3)
PROT UR-MCNC: 10 — SIGNIFICANT CHANGE UP
RBC # BLD: 3.85 M/UL — SIGNIFICANT CHANGE UP (ref 3.8–5.2)
RBC # FLD: 13 % — SIGNIFICANT CHANGE UP (ref 10.3–14.5)
SODIUM SERPL-SCNC: 136 MMOL/L — SIGNIFICANT CHANGE UP (ref 135–145)
SP GR SPEC: 1.02 — SIGNIFICANT CHANGE UP (ref 1–1.04)
TROPONIN T, HIGH SENSITIVITY: < 6 NG/L — SIGNIFICANT CHANGE UP (ref ?–14)
UROBILINOGEN FLD QL: NORMAL — SIGNIFICANT CHANGE UP
WBC # BLD: 8.25 K/UL — SIGNIFICANT CHANGE UP (ref 3.8–10.5)
WBC # FLD AUTO: 8.25 K/UL — SIGNIFICANT CHANGE UP (ref 3.8–10.5)

## 2020-06-09 PROCEDURE — 99220: CPT

## 2020-06-09 RX ORDER — METOPROLOL TARTRATE 50 MG
25 TABLET ORAL DAILY
Refills: 0 | Status: DISCONTINUED | OUTPATIENT
Start: 2020-06-09 | End: 2020-06-13

## 2020-06-09 RX ORDER — LISINOPRIL 2.5 MG/1
40 TABLET ORAL DAILY
Refills: 0 | Status: DISCONTINUED | OUTPATIENT
Start: 2020-06-09 | End: 2020-06-13

## 2020-06-09 RX ORDER — ASPIRIN/CALCIUM CARB/MAGNESIUM 324 MG
81 TABLET ORAL DAILY
Refills: 0 | Status: DISCONTINUED | OUTPATIENT
Start: 2020-06-09 | End: 2020-06-13

## 2020-06-09 RX ORDER — SODIUM CHLORIDE 9 MG/ML
500 INJECTION INTRAMUSCULAR; INTRAVENOUS; SUBCUTANEOUS ONCE
Refills: 0 | Status: COMPLETED | OUTPATIENT
Start: 2020-06-09 | End: 2020-06-09

## 2020-06-09 RX ORDER — METOCLOPRAMIDE HCL 10 MG
10 TABLET ORAL ONCE
Refills: 0 | Status: COMPLETED | OUTPATIENT
Start: 2020-06-09 | End: 2020-06-09

## 2020-06-09 RX ADMIN — Medication 25 MILLIGRAM(S): at 18:29

## 2020-06-09 RX ADMIN — LISINOPRIL 40 MILLIGRAM(S): 2.5 TABLET ORAL at 18:37

## 2020-06-09 RX ADMIN — Medication 104 MILLIGRAM(S): at 14:28

## 2020-06-09 RX ADMIN — SODIUM CHLORIDE 500 MILLILITER(S): 9 INJECTION INTRAMUSCULAR; INTRAVENOUS; SUBCUTANEOUS at 14:28

## 2020-06-09 RX ADMIN — SODIUM CHLORIDE 500 MILLILITER(S): 9 INJECTION INTRAMUSCULAR; INTRAVENOUS; SUBCUTANEOUS at 18:37

## 2020-06-09 NOTE — ED CDU PROVIDER INITIAL DAY NOTE - MEDICAL DECISION MAKING DETAILS
73 y/o female with PMHx of HTN, CAD, HLD, Migraines, and Vertigo who presented to the ED for dizziness today.   Concern for vertigo/Dehydration/ACS  Sent to OBS for tele monitoring and echo

## 2020-06-09 NOTE — ED PROVIDER NOTE - PMH
Borderline diabetes mellitus  Controlled with diet  CAD (coronary artery disease)    Dyslipidemia    H/O heart artery stent    History of MI (myocardial infarction)    HTN (hypertension), benign    Legally blind in left eye, as defined in [place]  20/400 left eye  Migraines  Developed at 10 years of age  Last episode 2 years ago  Sciatica    Vertigo

## 2020-06-09 NOTE — ED ADULT NURSE NOTE - CHIEF COMPLAINT QUOTE
Pt c/o dizziness and weakness since this am.  Vomited enroute.  C/O nausea.   by EMS.  Hx vertigo cardiac stents MI 2012

## 2020-06-09 NOTE — ED ADULT TRIAGE NOTE - CHIEF COMPLAINT QUOTE
Pt c/o dizziness and weakness since this am.  Vomited enroute.  C/O nausea.   by EMS Pt c/o dizziness and weakness since this am.  Vomited enroute.  C/O nausea.   by EMS.  Hx vertigo cardiac stents MI 2012

## 2020-06-09 NOTE — ED PROVIDER NOTE - ATTENDING CONTRIBUTION TO CARE
Pt was seen and evaluated by me. Pt is 75 y/o female with PMHx of HTN, CAD, HLD, Migraines, and Vertigo who presented to the ED for dizziness today. Pt states today having dizziness typical of previous vertigo symptoms, better when closing eyes, worse with movement. Pt admits to nausea that resolved after 1 episode of vomiting. Pt denies any headache, fever, chills, nausea, vomiting, SOB, chest pain, or abd pain. Pt states taking Meclizine 1 hour PTA with improvement of symptoms. Lungs CTA b/l. RRR. Abd soft, non-tender. No focal deficits. No nystagmus. TMI b/l.  Concern for vertigo/Dehydration/ACS  Labs, EKG, Reglan

## 2020-06-09 NOTE — ED PROVIDER NOTE - CLINICAL SUMMARY MEDICAL DECISION MAKING FREE TEXT BOX
73 y/o female with PMHx of HTN, CAD, HLD, Migraines, and Vertigo who presented to the ED for dizziness today.   Concern for vertigo/Dehydration/ACS  Labs, EKG, Reglan

## 2020-06-09 NOTE — ED ADULT NURSE NOTE - OBJECTIVE STATEMENT
c/o dizziness and nausea that began this afternoon, took two Meclizine at home states some reliefs, pt state this feels different then in past when she had a cardiac event, nsr on monitor abdomen soft and non tender, denies v/d fever cough chills at this time

## 2020-06-09 NOTE — ED CDU PROVIDER INITIAL DAY NOTE - PROGRESS NOTE DETAILS
PARK Ball: pt resting comfortably in bed NAD, pt states she feels better denies any complaints.  Pt states dizziness resolved.  Pt scheduled for a echo in the AM.  Will continue to monitor.

## 2020-06-09 NOTE — ED CDU PROVIDER INITIAL DAY NOTE - PSH
History of coronary artery stent placement  3 stents (Last in 2012)  Same admission as past myocardial infarction  History of tonsillectomy    History of tubal ligation  35 years ago, no complications as per patient.  S/P ICD (internal cardiac defibrillator) procedure    S/P LASIK surgery  Right eye, no complications as per patient.

## 2020-06-09 NOTE — ED CDU PROVIDER INITIAL DAY NOTE - ATTENDING CONTRIBUTION TO CARE
Pt was seen and evaluated by me. Pt is 73 y/o female with PMHx of HTN, CAD, HLD, Migraines, and Vertigo who presented to the ED for dizziness today. Pt states today having dizziness typical of previous vertigo symptoms, better when closing eyes, worse with movement. Pt admits to nausea that resolved after 1 episode of vomiting. Pt denies any headache, fever, chills, nausea, vomiting, SOB, chest pain, or abd pain. Pt states taking Meclizine 1 hour PTA with improvement of symptoms. Lungs CTA b/l. RRR. Abd soft, non-tender. No focal deficits. No nystagmus. TMI b/l. Sent to OBS for tele and echo  Concern for vertigo/Dehydration/ACS  Tele/Echo

## 2020-06-09 NOTE — ED PROVIDER NOTE - OBJECTIVE STATEMENT
75 y/o female with PMHx of HTN, CAD, HLD, Migraines, and Vertigo who presented to the ED for dizziness today. Pt states today having dizziness typical of previous vertigo symptoms, better when closing eyes, worse with movement. Pt admits to nausea that resolved after 1 episode of vomiting. Pt denies any headache, fever, chills, nausea, vomiting, SOB, chest pain, or abd pain. Pt states taking Meclizine 1 hour PTA with improvement of symptoms.

## 2020-06-09 NOTE — ED PROVIDER NOTE - CARE PLAN
Principal Discharge DX:	PVC (premature ventricular contraction)  Secondary Diagnosis:	Lightheadedness

## 2020-06-09 NOTE — ED PROVIDER NOTE - PROGRESS NOTE DETAILS
Jocelin VAZQUEZ: ekg with pvcs concern for chf/as/valvular disease trop neg will observe for echo Dr. Feng: Pt states feeling better. Dizziness has resolved. Discussed placement in OBS for tele monitoring and echo which pt agreed to.

## 2020-06-10 VITALS
DIASTOLIC BLOOD PRESSURE: 76 MMHG | HEART RATE: 92 BPM | SYSTOLIC BLOOD PRESSURE: 151 MMHG | RESPIRATION RATE: 16 BRPM | TEMPERATURE: 98 F | OXYGEN SATURATION: 100 %

## 2020-06-10 LAB
NT-PROBNP SERPL-SCNC: 122 PG/ML — SIGNIFICANT CHANGE UP
SARS-COV-2 RNA SPEC QL NAA+PROBE: SIGNIFICANT CHANGE UP

## 2020-06-10 PROCEDURE — 93880 EXTRACRANIAL BILAT STUDY: CPT | Mod: 26

## 2020-06-10 PROCEDURE — 93306 TTE W/DOPPLER COMPLETE: CPT | Mod: 26

## 2020-06-10 PROCEDURE — 99217: CPT

## 2020-06-10 RX ADMIN — Medication 25 MILLIGRAM(S): at 07:17

## 2020-06-10 RX ADMIN — LISINOPRIL 40 MILLIGRAM(S): 2.5 TABLET ORAL at 07:17

## 2020-06-10 RX ADMIN — Medication 81 MILLIGRAM(S): at 12:16

## 2020-06-10 NOTE — CONSULT NOTE ADULT - ATTENDING COMMENTS
Patient seen and examined.  Agree with above.   Follow up TTE and carotid dopplers  Further workup pending above    Adebayo Montaño MD

## 2020-06-10 NOTE — ED CDU PROVIDER SUBSEQUENT DAY NOTE - HISTORY
Refer to initial CDU note- Pt is 73 y/o female with PMHx of HTN, CAD, HLD, Migraines, and Vertigo who presented to the ED for dizziness. Had one episode of n/v. Pt took Meclizine 1 hour PTA with improvement of symptoms. In ED trop neg x 1. ECG +PVC's. Other labs wnl. Pt transferred to CDU for tele and echo.     In interim- Patient resting comfortably. No complaints. No acute events overnight. Will continue with plan for echo in AM.

## 2020-06-10 NOTE — ED CDU PROVIDER DISPOSITION NOTE - ATTENDING CONTRIBUTION TO CARE
Pt to f/u with her cardiologist, NAD, well appearing. 75 Y/O F PMH HTN CAD HLD and vertigo had an episode of DIzz that was different from her usual dizz. Pt sent to CDU for echo, telemetry and cardiology eval. Carotid duplex is similar to prior and symptoms are resolved. Pt would like to go home. Will D/C with outpatient cardiology.    MARILYN Waite: I have personally performed a face to face bedside history and physical examination of this patient. I have discussed the history, examination, review of systems, assessment and plan of management with the resident. I have reviewed the electronic medical record and amended it to reflect my history, review of systems, physical exam, assessment and plan.

## 2020-06-10 NOTE — ED ADULT NURSE REASSESSMENT NOTE - NS ED NURSE REASSESS COMMENT FT1
Break Coverage RN - Report received from primary RN Christie. Pt. received awake & alert, VS as noted. Pt. offers no complaints at present. Resting comfortably. Will continue to monitor.
Meal tray provided, patient currently in NAD.
Pt AOx4, pt currently denying dizziness, chest pain, SOB, NVD. Pt v/s stable. no signs of distress. Pt aware of plan of care. awaiting COVID results. will continue to monitor.
pt resting comfortable. Pt in no signs of distress. pt awaiting COVID results. will continue to monitor.

## 2020-06-10 NOTE — ED CDU PROVIDER SUBSEQUENT DAY NOTE - ATTENDING CONTRIBUTION TO CARE
Refer to initial CDU note- Pt is 75 y/o female with PMHx of HTN, CAD, HLD, Migraines, and Vertigo who presented to the ED for dizziness. Had one episode of n/v. Pt took Meclizine 1 hour PTA with improvement of symptoms. In ED trop neg x 1. ECG +PVC's. Other labs wnl. Pt transferred to CDU for tele and echo.     MARILYN Waite: I have personally performed a face to face bedside history and physical examination of this patient. I have discussed the history, examination, review of systems, assessment and plan of management with the resident. I have reviewed the electronic medical record and amended it to reflect my history, review of systems, physical exam, assessment and plan.

## 2020-06-10 NOTE — ED CDU PROVIDER DISPOSITION NOTE - CLINICAL COURSE
75 Y/O F PMH HTN CAD HLD and vertigo had an episode of DIzz that was different from her usual dizz. Pt sent to CDU for echo, telemetry and cardiology eval. Carotid duplex is similar to prior and symptoms are resolved. Pt would like to go home. Will D/C with outpatient cardiology.

## 2020-06-10 NOTE — ED CDU PROVIDER DISPOSITION NOTE - PATIENT PORTAL LINK FT
You can access the FollowMyHealth Patient Portal offered by [facility] by registering at the following website: [email or web address]. By joining [employer]’s FollowMyHealth portal, you will also be able to view your health information using other applications (apps) compatible with our system.

## 2020-06-10 NOTE — ED CDU PROVIDER SUBSEQUENT DAY NOTE - PROGRESS NOTE DETAILS
Pt reassessed, BP is elevated however pt just took AM HTN medication and states her BP will normally be in the 170s-200s. Pt denies CP, Dizz or any other acute sx. Pt is pending AM Echo. Pt reassessed and is stable, would like to go home. Spoke to PARK Wade. Stenosis is increased from prior as per vascular study but is the same as a more recent duplex from the office. States pt can be D/C'd with office follow up. Pt and daughter in agreement with plan of D/C.

## 2020-06-10 NOTE — ED CDU PROVIDER DISPOSITION NOTE - NSFOLLOWUPINSTRUCTIONS_ED_ALL_ED_FT
Follow up with your cardiologist as soon as possible.  Advance activity as tolerated.  Continue all previously prescribed medications as directed.  Follow up with your primary care physician in 48-72 hours- bring copies of your results.  Return to the ER for worsening or persistent symptoms, and/or ANY NEW OR CONCERNING SYMPTOMS. This includes but is not limited to chest pain, numbness, weakness, tingling or any other symptoms that concern you. If you have issues obtaining follow up, please call: [phone] to obtain a doctor or specialist who takes your insurance in your area.  You may call [phone] to make an appointment with the internal medicine clinic.

## 2020-06-10 NOTE — CONSULT NOTE ADULT - SUBJECTIVE AND OBJECTIVE BOX
HISTORY OF PRESENT ILLNESS: HPI:    74 year old female PMH CAD, remote PCI in 2012, HTN, HLD, hx recurrent syncope s/p ILR in the past, no events ever recorded, known LICA stenosis (50-79%) by last report 3/2020, hx migraines, and vertigo presented to the ER with dizziness and lightheadedness that was different from her usual vertigo.  She reports the room was not spinning.  She felt off balance.  She denies visual disturbances or weakness.  She denies CP or SOB.        PAST MEDICAL & SURGICAL HISTORY:  Sciatica  Legally blind in left eye, as defined in [place]: 20/400 left eye  Borderline diabetes mellitus: Controlled with diet  Migraines: Developed at 10 years of age  Last episode 2 years ago  Vertigo  H/O heart artery stent  History of MI (myocardial infarction)  CAD (coronary artery disease)  HTN (hypertension), benign  Dyslipidemia  History of coronary artery stent placement: 3 stents (Last in )  Same admission as past myocardial infarction  S/P LASIK surgery: Right eye, no complications as per patient.  History of tubal ligation: 35 years ago, no complications as per patient.  History of tonsillectomy      MEDICATIONS  (STANDING):  aspirin  chewable 81 milliGRAM(s) Oral daily  lisinopril 40 milliGRAM(s) Oral daily  metoprolol succinate ER 25 milliGRAM(s) Oral daily      Allergies  fish (Unknown)  iodine (Anaphylaxis)  No Known Drug Allergies      FAMILY HISTORY:  Family history of brain tumor (Sibling)  Family history of hypercholesterolemia (Child, Sibling)  Family history of hypertension (Child, Sibling): Daughters, Sisters  Family history of MI (myocardial infarction) (Child): Father ( at 75)  Mother ( at 87)  Son (  at 35)    Noncontributory for premature coronary disease or sudden cardiac death    SOCIAL HISTORY:    [x ] Non-smoker  [ ] Smoker  [ ] Alcohol    FLU VACCINE THIS YEAR STARTS IN AUGUST:  [ ] Yes    [ ] No    IF OVER 65 HAVE YOU EVER HAD A PNA VACCINE:  [ ] Yes    [ ] No       [ ] N/A      REVIEW OF SYSTEMS:  [ ]chest pain  [  ]shortness of breath  [  ]palpitations  [  ]syncope  [x ]near syncope [ ]upper extremity weakness   [ ] lower extremity weakness  [  ]diplopia  [  ]altered mental status   [  ]fevers  [ ]chills [ ]nausea  [ ]vomitting  [  ]dysphagia    [ ]abdominal pain  [ ]melena  [ ]BRBPR    [  ]epistaxis  [  ]rash    [ ]lower extremity edema        [x ] All others negative	  [ ] Unable to obtain      LABS:	 	    CARDIAC MARKERS:    Trop T <6                          10.8   8.25  )-----------( 298      ( 2020 14:15 )             32.3     Hb Trend: 10.8<--        136  |  98  |  24<H>  ----------------------------<  151<H>  3.6   |  24  |  1.36<H>    Ca    9.4      2020 14:15    TPro  6.8  /  Alb  4.0  /  TBili  0.3  /  DBili  x   /  AST  14  /  ALT  10  /  AlkPhos  66  -    proBNP: Serum Pro-Brain Natriuretic Peptide: 122.0 pg/mL ( @ 14:15)    PHYSICAL EXAM:  T(C): 36.5 (06-10-20 @ 10:00), Max: 36.7 (20 @ 16:06)  HR: 79 (06-10-20 @ 10:00) (66 - 90)  BP: 178/76 (06-10-20 @ 10:00) (142/64 - 203/91)  RR: 16 (06-10-20 @ 10:00) (12 - 18)  SpO2: 99% (06-10-20 @ 10:00) (99% - 100%)    Gen: Appears well in NAD  HEENT:  (-)icterus (-)pallor  CV: N S1 S2 1/6 VICTORIA (+)2 Pulses B/l  Resp:  Clear to auscultation B/L, normal effort  GI: (+) BS Soft, NT, ND  Lymph:  (-)Edema, (-)obvious lymphadenopathy  Skin: Warm to touch, Normal turgor  Psych: Appropriate mood and affect    TELEMETRY: SR no events	      ECG:  	NSR narrow QRS    ASSESSMENT/PLAN: 	74y Female PMH CAD, remote PCI in , HTN, HLD, hx recurrent syncope s/p ILR in the past, no events ever recorded, known LICA stenosis (50-79%) by last report 3/2020, hx migraines, and vertigo presented to the ER with dizziness and lightheadedness that was different from her usual vertigo.    --f/u TTE and Carotid dopplers  --would check orthostatic vitals  --consider CT Head  --has a f/u with Dr Del Castillo  at 1 PM  --DC plan pending above/ can move appt sooner if needed

## 2020-06-10 NOTE — ED CDU PROVIDER SUBSEQUENT DAY NOTE - PHYSICAL EXAMINATION
Vital signs reviewed.   CONSTITUTIONAL: Well-appearing; well-nourished; in no apparent distress. Non-toxic appearing.   HEAD: Normocephalic, atraumatic.  EYES: PERRL, EOM intact, conjunctiva and sclera WNL.  ENT: normal nose; no rhinorrhea;   RESP :NAD  EXT/MS: moves all extremities  SKIN: Normal for age and race

## 2020-06-10 NOTE — ED CDU PROVIDER SUBSEQUENT DAY NOTE - MEDICAL DECISION MAKING DETAILS
Pt is 75 y/o female with PMHx of HTN, CAD, HLD, Migraines, and Vertigo who presented to the ED for dizziness. Had one episode of n/v. Pt took Meclizine 1 hour PTA with improvement of symptoms. In ED trop neg x 1. ECG +PVC's. Other labs wnl. Pt transferred to CDU for tele and echo.

## 2020-07-23 ENCOUNTER — APPOINTMENT (OUTPATIENT)
Dept: VASCULAR SURGERY | Facility: CLINIC | Age: 75
End: 2020-07-23
Payer: MEDICARE

## 2020-07-23 VITALS — WEIGHT: 108 LBS | TEMPERATURE: 97.3 F | BODY MASS INDEX: 21.77 KG/M2 | HEIGHT: 59 IN

## 2020-07-23 VITALS — DIASTOLIC BLOOD PRESSURE: 82 MMHG | SYSTOLIC BLOOD PRESSURE: 142 MMHG | HEART RATE: 72 BPM

## 2020-07-23 VITALS
BODY MASS INDEX: 22.65 KG/M2 | WEIGHT: 105 LBS | HEART RATE: 74 BPM | HEIGHT: 57 IN | SYSTOLIC BLOOD PRESSURE: 149 MMHG | DIASTOLIC BLOOD PRESSURE: 83 MMHG

## 2020-07-23 PROCEDURE — 99203 OFFICE O/P NEW LOW 30 MIN: CPT

## 2020-07-23 PROCEDURE — 93880 EXTRACRANIAL BILAT STUDY: CPT

## 2020-07-23 NOTE — CONSULT LETTER
[Dear  ___] : Dear  [unfilled], [Consult Letter:] : I had the pleasure of evaluating your patient, [unfilled]. [Please see my note below.] : Please see my note below. [Consult Closing:] : Thank you very much for allowing me to participate in the care of this patient.  If you have any questions, please do not hesitate to contact me. [FreeTextEntry3] : Khanh Bolton M.D., RADHA., R.P.V.I.\par \par  [facility]\par  of  Surgery\par Assistant Professor of Radiology\par Vascular Surgery at [facility] [Sincerely,] : Sincerely,

## 2020-07-23 NOTE — PHYSICAL EXAM
[Normal Rate and Rhythm] : normal rate and rhythm [Normal Breath Sounds] : Normal breath sounds [2+] : left 2+ [Alert] : alert [No Rash or Lesion] : No rash or lesion [Calm] : calm [Varicose Veins Of Lower Extremities] : not present [JVD] : no jugular venous distention  [Ankle Swelling (On Exam)] : not present [Skin Ulcer] : no ulcer [Abdomen Tenderness] : ~T ~M No abdominal tenderness [] : not present [de-identified] : Appears well

## 2020-07-23 NOTE — ASSESSMENT
[FreeTextEntry1] : Patient underwent a carotid duplex study which shows a greater than 80% left internal carotid artery stenosis.  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  In the office I had a long discussion with the patient.  Because of the high-grade stenosis she will need a left carotid endarterectomy.  Because of the vertebral artery occlusion there may be some benefit in addition to performing a carotid endarterectomy.  The retrieval of occlusion in conjunction with a high-grade stenosis may be a contributing factor to her ongoing dizziness and syncope.  So I have recommended she undergo a carotid endarterectomy at this time.

## 2020-08-24 ENCOUNTER — OUTPATIENT (OUTPATIENT)
Dept: OUTPATIENT SERVICES | Facility: HOSPITAL | Age: 75
LOS: 1 days | End: 2020-08-24
Payer: COMMERCIAL

## 2020-08-24 VITALS
TEMPERATURE: 99 F | OXYGEN SATURATION: 99 % | DIASTOLIC BLOOD PRESSURE: 88 MMHG | HEART RATE: 82 BPM | HEIGHT: 58 IN | SYSTOLIC BLOOD PRESSURE: 170 MMHG | RESPIRATION RATE: 14 BRPM | WEIGHT: 104.94 LBS

## 2020-08-24 DIAGNOSIS — Z29.9 ENCOUNTER FOR PROPHYLACTIC MEASURES, UNSPECIFIED: ICD-10-CM

## 2020-08-24 DIAGNOSIS — Z01.818 ENCOUNTER FOR OTHER PREPROCEDURAL EXAMINATION: ICD-10-CM

## 2020-08-24 DIAGNOSIS — I65.29 OCCLUSION AND STENOSIS OF UNSPECIFIED CAROTID ARTERY: ICD-10-CM

## 2020-08-24 DIAGNOSIS — Z98.89 OTHER SPECIFIED POSTPROCEDURAL STATES: Chronic | ICD-10-CM

## 2020-08-24 DIAGNOSIS — Z98.51 TUBAL LIGATION STATUS: Chronic | ICD-10-CM

## 2020-08-24 DIAGNOSIS — Z95.5 PRESENCE OF CORONARY ANGIOPLASTY IMPLANT AND GRAFT: Chronic | ICD-10-CM

## 2020-08-24 DIAGNOSIS — Z95.810 PRESENCE OF AUTOMATIC (IMPLANTABLE) CARDIAC DEFIBRILLATOR: Chronic | ICD-10-CM

## 2020-08-24 DIAGNOSIS — I65.22 OCCLUSION AND STENOSIS OF LEFT CAROTID ARTERY: ICD-10-CM

## 2020-08-24 LAB
BLD GP AB SCN SERPL QL: NEGATIVE — SIGNIFICANT CHANGE UP
RH IG SCN BLD-IMP: POSITIVE — SIGNIFICANT CHANGE UP

## 2020-08-24 PROCEDURE — 86850 RBC ANTIBODY SCREEN: CPT

## 2020-08-24 PROCEDURE — G0463: CPT

## 2020-08-24 PROCEDURE — 86901 BLOOD TYPING SEROLOGIC RH(D): CPT

## 2020-08-24 PROCEDURE — 86900 BLOOD TYPING SEROLOGIC ABO: CPT

## 2020-08-24 RX ORDER — CEFAZOLIN SODIUM 1 G
2000 VIAL (EA) INJECTION ONCE
Refills: 0 | Status: DISCONTINUED | OUTPATIENT
Start: 2020-08-31 | End: 2020-09-01

## 2020-08-24 NOTE — H&P PST ADULT - NSICDXPROBLEM_GEN_ALL_CORE_FT
PROBLEM DIAGNOSES  Problem: Occlusion and stenosis of left carotid artery  Assessment and Plan: continue aspirin to DOS per Dr. Bolton  left CEA with EEG monitoring    Problem: Need for prophylactic measure  Assessment and Plan: The Caprini score indicates this patient is at risk for a VTE event (score 3-5).  Most surgical patients in this group would benefit from pharmacologic prophylaxis.  The surgical team will determine the balance between VTE risk and bleeding risk

## 2020-08-24 NOTE — H&P PST ADULT - NSICDXPASTSURGICALHX_GEN_ALL_CORE_FT
PAST SURGICAL HISTORY:  History of coronary artery stent placement 3 stents (Last in 2012)  Same admission as past myocardial infarction    History of tonsillectomy     History of tubal ligation 35 years ago, no complications as per patient.    No Past Surgical History     S/P ICD (internal cardiac defibrillator) procedure loop recorder    S/P LASIK surgery Right eye, no complications as per patient.

## 2020-08-24 NOTE — H&P PST ADULT - ASSESSMENT

## 2020-08-24 NOTE — H&P PST ADULT - NS MD HP INPLANTS MED DEV
coronary stent coronary stent, left anterior chest loop recorder coronary stent, mid anterior chest loop recorder

## 2020-08-24 NOTE — H&P PST ADULT - HISTORY OF PRESENT ILLNESS
74-year-old female with a long history of carotid stenosis presents to the office with a history of dizziness and syncope. Patient said she has had numerous episodes over the past several years. She recently was at [facility] and underwent a work-up at that time. She has had conflicting evidence regarding a left vertebral artery stenosis versus occlusion. She is here for follow-up.       Patient underwent a carotid duplex study which shows a greater than 80% left internal carotid artery stenosis. 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 In the office I had a long discussion with the patient. Because of the high-grade stenosis she will need a left carotid endarterectomy. Because of the vertebral artery occlusion there may be some benefit in addition to performing a carotid endarterectomy. The retrieval of occlusion in conjunction with a high-grade stenosis may be a contributing factor to her ongoing dizziness and syncope. So I have recommended she undergo a carotid endarterectomy at this time. 74-year-old female. PMH CAD (s/p coronary stents 2012), recurrent dizziness and syncope (s/p loop recorder- no afib recorded), carotid duplex study which shows a greater than 80% left internal carotid artery stenosis. 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 evaluated by Dr. Bolton, now scheduled for left CEA.   covid test scheduled on 8/28 at [facility]. 74-year-old female. PMH CAD (s/p coronary stents x3 2012), recurrent dizziness and syncope (s/p loop recorder- no afib recorded), carotid duplex study which shows a greater than 80% left internal carotid artery stenosis. 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 evaluated by Dr. Bolton, now scheduled for left CEA.   covid test scheduled on 8/28 at [facility].

## 2020-08-26 DIAGNOSIS — Z01.818 ENCOUNTER FOR OTHER PREPROCEDURAL EXAMINATION: ICD-10-CM

## 2020-08-28 ENCOUNTER — APPOINTMENT (OUTPATIENT)
Dept: DISASTER EMERGENCY | Facility: CLINIC | Age: 75
End: 2020-08-28

## 2020-08-29 LAB — SARS-COV-2 N GENE NPH QL NAA+PROBE: NOT DETECTED

## 2020-08-30 ENCOUNTER — TRANSCRIPTION ENCOUNTER (OUTPATIENT)
Age: 75
End: 2020-08-30

## 2020-08-30 NOTE — PRE-ANESTHESIA EVALUATION ADULT - NSANTHPMHFT_GEN_ALL_CORE
73 y/o F with PMHx significant for MI due to CAD (s/p coronary stents x3 2012), vertigo, sciatica, recurrent dizziness/syncope (s/p loop recorder- no afib recorded), carotid duplex study which shows a greater than 80% left ICA stenosis. 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 evaluated by Dr. Bolton, now scheduled for left CEA.  TTE 06/2020 - EF 57%, with normal LV and no valvular pathologies.

## 2020-08-31 ENCOUNTER — RESULT REVIEW (OUTPATIENT)
Age: 75
End: 2020-08-31

## 2020-08-31 ENCOUNTER — APPOINTMENT (OUTPATIENT)
Dept: VASCULAR SURGERY | Facility: HOSPITAL | Age: 75
End: 2020-08-31

## 2020-08-31 ENCOUNTER — INPATIENT (INPATIENT)
Facility: HOSPITAL | Age: 75
LOS: 1 days | Discharge: HOME | DRG: 26 | End: 2020-09-02
Attending: SURGERY | Admitting: SURGERY
Payer: COMMERCIAL

## 2020-08-31 VITALS
OXYGEN SATURATION: 99 % | DIASTOLIC BLOOD PRESSURE: 80 MMHG | WEIGHT: 104.94 LBS | HEART RATE: 79 BPM | SYSTOLIC BLOOD PRESSURE: 186 MMHG | TEMPERATURE: 99 F | HEIGHT: 58 IN | RESPIRATION RATE: 18 BRPM

## 2020-08-31 DIAGNOSIS — Z98.51 TUBAL LIGATION STATUS: Chronic | ICD-10-CM

## 2020-08-31 DIAGNOSIS — Z95.810 PRESENCE OF AUTOMATIC (IMPLANTABLE) CARDIAC DEFIBRILLATOR: Chronic | ICD-10-CM

## 2020-08-31 DIAGNOSIS — I65.29 OCCLUSION AND STENOSIS OF UNSPECIFIED CAROTID ARTERY: ICD-10-CM

## 2020-08-31 DIAGNOSIS — Z95.5 PRESENCE OF CORONARY ANGIOPLASTY IMPLANT AND GRAFT: Chronic | ICD-10-CM

## 2020-08-31 DIAGNOSIS — Z98.89 OTHER SPECIFIED POSTPROCEDURAL STATES: Chronic | ICD-10-CM

## 2020-08-31 LAB — RH IG SCN BLD-IMP: POSITIVE — SIGNIFICANT CHANGE UP

## 2020-08-31 PROCEDURE — 35301 RECHANNELING OF ARTERY: CPT | Mod: LT

## 2020-08-31 PROCEDURE — 88311 DECALCIFY TISSUE: CPT | Mod: 26

## 2020-08-31 PROCEDURE — 88304 TISSUE EXAM BY PATHOLOGIST: CPT | Mod: 26

## 2020-08-31 RX ORDER — METOPROLOL TARTRATE 50 MG
25 TABLET ORAL DAILY
Refills: 0 | Status: DISCONTINUED | OUTPATIENT
Start: 2020-08-31 | End: 2020-09-02

## 2020-08-31 RX ORDER — HYDROMORPHONE HYDROCHLORIDE 2 MG/ML
0.25 INJECTION INTRAMUSCULAR; INTRAVENOUS; SUBCUTANEOUS
Refills: 0 | Status: DISCONTINUED | OUTPATIENT
Start: 2020-08-31 | End: 2020-09-02

## 2020-08-31 RX ORDER — OXYCODONE HYDROCHLORIDE 5 MG/1
5 TABLET ORAL EVERY 6 HOURS
Refills: 0 | Status: DISCONTINUED | OUTPATIENT
Start: 2020-08-31 | End: 2020-09-02

## 2020-08-31 RX ORDER — TRIAMTERENE/HYDROCHLOROTHIAZID 75 MG-50MG
1 TABLET ORAL DAILY
Refills: 0 | Status: DISCONTINUED | OUTPATIENT
Start: 2020-08-31 | End: 2020-09-02

## 2020-08-31 RX ORDER — FENTANYL CITRATE 50 UG/ML
50 INJECTION INTRAVENOUS
Refills: 0 | Status: DISCONTINUED | OUTPATIENT
Start: 2020-08-31 | End: 2020-08-31

## 2020-08-31 RX ORDER — ATORVASTATIN CALCIUM 80 MG/1
10 TABLET, FILM COATED ORAL AT BEDTIME
Refills: 0 | Status: DISCONTINUED | OUTPATIENT
Start: 2020-08-31 | End: 2020-09-02

## 2020-08-31 RX ORDER — PROPOFOL 10 MG/ML
10 INJECTION, EMULSION INTRAVENOUS ONCE
Refills: 0 | Status: COMPLETED | OUTPATIENT
Start: 2020-08-31 | End: 2020-08-31

## 2020-08-31 RX ORDER — MECLIZINE HCL 12.5 MG
25 TABLET ORAL THREE TIMES A DAY
Refills: 0 | Status: DISCONTINUED | OUTPATIENT
Start: 2020-08-31 | End: 2020-09-02

## 2020-08-31 RX ORDER — FAMOTIDINE 10 MG/ML
20 INJECTION INTRAVENOUS ONCE
Refills: 0 | Status: COMPLETED | OUTPATIENT
Start: 2020-08-31 | End: 2020-08-31

## 2020-08-31 RX ORDER — NICARDIPINE HYDROCHLORIDE 30 MG/1
5 CAPSULE, EXTENDED RELEASE ORAL
Qty: 40 | Refills: 0 | Status: DISCONTINUED | OUTPATIENT
Start: 2020-08-31 | End: 2020-08-31

## 2020-08-31 RX ORDER — METOPROLOL TARTRATE 50 MG
25 TABLET ORAL ONCE
Refills: 0 | Status: COMPLETED | OUTPATIENT
Start: 2020-08-31 | End: 2020-08-31

## 2020-08-31 RX ORDER — ONDANSETRON 8 MG/1
4 TABLET, FILM COATED ORAL ONCE
Refills: 0 | Status: COMPLETED | OUTPATIENT
Start: 2020-08-31 | End: 2020-08-31

## 2020-08-31 RX ORDER — ACETAMINOPHEN 500 MG
650 TABLET ORAL EVERY 6 HOURS
Refills: 0 | Status: DISCONTINUED | OUTPATIENT
Start: 2020-08-31 | End: 2020-09-02

## 2020-08-31 RX ORDER — ASPIRIN/CALCIUM CARB/MAGNESIUM 324 MG
81 TABLET ORAL DAILY
Refills: 0 | Status: DISCONTINUED | OUTPATIENT
Start: 2020-08-31 | End: 2020-09-02

## 2020-08-31 RX ORDER — SODIUM CHLORIDE 9 MG/ML
3 INJECTION INTRAMUSCULAR; INTRAVENOUS; SUBCUTANEOUS EVERY 8 HOURS
Refills: 0 | Status: DISCONTINUED | OUTPATIENT
Start: 2020-08-31 | End: 2020-08-31

## 2020-08-31 RX ORDER — LISINOPRIL 2.5 MG/1
40 TABLET ORAL DAILY
Refills: 0 | Status: DISCONTINUED | OUTPATIENT
Start: 2020-08-31 | End: 2020-09-01

## 2020-08-31 RX ORDER — SODIUM CHLORIDE 9 MG/ML
1000 INJECTION INTRAMUSCULAR; INTRAVENOUS; SUBCUTANEOUS
Refills: 0 | Status: DISCONTINUED | OUTPATIENT
Start: 2020-08-31 | End: 2020-09-01

## 2020-08-31 RX ORDER — LABETALOL HCL 100 MG
10 TABLET ORAL ONCE
Refills: 0 | Status: COMPLETED | OUTPATIENT
Start: 2020-08-31 | End: 2020-08-31

## 2020-08-31 RX ORDER — LIDOCAINE HCL 20 MG/ML
0.2 VIAL (ML) INJECTION ONCE
Refills: 0 | Status: DISCONTINUED | OUTPATIENT
Start: 2020-08-31 | End: 2020-08-31

## 2020-08-31 RX ORDER — HEPARIN SODIUM 5000 [USP'U]/ML
5000 INJECTION INTRAVENOUS; SUBCUTANEOUS EVERY 8 HOURS
Refills: 0 | Status: DISCONTINUED | OUTPATIENT
Start: 2020-08-31 | End: 2020-09-02

## 2020-08-31 RX ORDER — CHLORHEXIDINE GLUCONATE 213 G/1000ML
1 SOLUTION TOPICAL ONCE
Refills: 0 | Status: DISCONTINUED | OUTPATIENT
Start: 2020-08-31 | End: 2020-08-31

## 2020-08-31 RX ORDER — HYDROMORPHONE HYDROCHLORIDE 2 MG/ML
0.5 INJECTION INTRAMUSCULAR; INTRAVENOUS; SUBCUTANEOUS
Refills: 0 | Status: DISCONTINUED | OUTPATIENT
Start: 2020-08-31 | End: 2020-08-31

## 2020-08-31 RX ADMIN — Medication 25 MILLIGRAM(S): at 18:05

## 2020-08-31 RX ADMIN — LISINOPRIL 40 MILLIGRAM(S): 2.5 TABLET ORAL at 22:08

## 2020-08-31 RX ADMIN — ONDANSETRON 4 MILLIGRAM(S): 8 TABLET, FILM COATED ORAL at 11:00

## 2020-08-31 RX ADMIN — Medication 25 MILLIGRAM(S): at 14:14

## 2020-08-31 RX ADMIN — ATORVASTATIN CALCIUM 10 MILLIGRAM(S): 80 TABLET, FILM COATED ORAL at 21:10

## 2020-08-31 RX ADMIN — Medication 10 MILLIGRAM(S): at 10:32

## 2020-08-31 RX ADMIN — Medication 1 TABLET(S): at 11:33

## 2020-08-31 RX ADMIN — Medication 25 MILLIGRAM(S): at 21:10

## 2020-08-31 RX ADMIN — HEPARIN SODIUM 5000 UNIT(S): 5000 INJECTION INTRAVENOUS; SUBCUTANEOUS at 21:10

## 2020-08-31 RX ADMIN — NICARDIPINE HYDROCHLORIDE 25 MG/HR: 30 CAPSULE, EXTENDED RELEASE ORAL at 11:34

## 2020-08-31 RX ADMIN — HYDROMORPHONE HYDROCHLORIDE 0.25 MILLIGRAM(S): 2 INJECTION INTRAMUSCULAR; INTRAVENOUS; SUBCUTANEOUS at 11:24

## 2020-08-31 RX ADMIN — ONDANSETRON 4 MILLIGRAM(S): 8 TABLET, FILM COATED ORAL at 20:30

## 2020-08-31 RX ADMIN — SODIUM CHLORIDE 75 MILLILITER(S): 9 INJECTION INTRAMUSCULAR; INTRAVENOUS; SUBCUTANEOUS at 17:47

## 2020-08-31 RX ADMIN — FAMOTIDINE 20 MILLIGRAM(S): 10 INJECTION INTRAVENOUS at 12:36

## 2020-08-31 RX ADMIN — HEPARIN SODIUM 5000 UNIT(S): 5000 INJECTION INTRAVENOUS; SUBCUTANEOUS at 15:32

## 2020-08-31 RX ADMIN — HYDROMORPHONE HYDROCHLORIDE 0.25 MILLIGRAM(S): 2 INJECTION INTRAMUSCULAR; INTRAVENOUS; SUBCUTANEOUS at 11:40

## 2020-08-31 RX ADMIN — PROPOFOL 10 MILLIGRAM(S): 10 INJECTION, EMULSION INTRAVENOUS at 14:02

## 2020-08-31 NOTE — CONSULT NOTE ADULT - SUBJECTIVE AND OBJECTIVE BOX
HISTORY OF PRESENT ILLNESS: HPI:  Patient is a 75 y/o female well known to our office (Cardiologist - Dr. Del Castillo) with PMH of CAD s/p remote PCI in , HTN, HLD, recurrent syncope s/p previous ILR with no events ever recorded, LICA stenosis who saw us for preop clearance which included an exercise NST on 8/10 with no evidence of stress induced ischemia or infarct as well as previous TTE 2020 with normal LV/RV function. Patient is now admitted s/p scheduled Left CEA. Patient tolerated procedure well from CV perspective. Patient seen in PACU, laying comfortably with no complaints. Denies chest pain, SOB, palpitations, dizziness, syncope, headaches, vision changes, abd pain, n/v, back pain, cough, fever/chills.    PAST MEDICAL & SURGICAL HISTORY:  Sciatica  Legally blind in left eye, as defined in [place]: 20/400 left eye  Borderline diabetes mellitus: Controlled with diet  Migraines: Developed at 10 years of age  Last episode 2 years ago  Vertigo  H/O heart artery stent  History of MI (myocardial infarction)  CAD (coronary artery disease)  HTN (hypertension), benign  Dyslipidemia  S/P ICD (internal cardiac defibrillator) procedure: loop recorder  History of coronary artery stent placement: 3 stents (Last in )  Same admission as past myocardial infarction  S/P LASIK surgery: Right eye, no complications as per patient.  History of tubal ligation: 35 years ago, no complications as per patient.  History of tonsillectomy  No Past Surgical History      MEDICATIONS:  MEDICATIONS  (STANDING):  aspirin enteric coated 81 milliGRAM(s) Oral daily  atorvastatin 10 milliGRAM(s) Oral at bedtime  ceFAZolin   IVPB 2000 milliGRAM(s) IV Intermittent once  heparin   Injectable 5000 Unit(s) SubCutaneous every 8 hours  lisinopril 40 milliGRAM(s) Oral daily  metoprolol succinate ER 25 milliGRAM(s) Oral daily  niCARdipine Infusion 5 mG/Hr (25 mL/Hr) IV Continuous <Continuous>  triamterene 37.5 mG/hydrochlorothiazide 25 mG Tablet 1 Tablet(s) Oral daily      Allergies    fish (Unknown)  iodine (Anaphylaxis)  No Known Drug Allergies    Intolerances    labetalol (Other)      FAMILY HISTORY:  Family history of brain tumor (Sibling)  Family history of hypercholesterolemia (Child, Sibling)  Family history of hypertension (Child, Sibling): Daughters, Sisters  Family history of MI (myocardial infarction) (Child): Father ( at 75)  Mother ( at 87)  Son (  at 35)    Non-contributary for premature coronary disease or sudden cardiac death    SOCIAL HISTORY:    [x ] Non-smoker  [ ] Smoker  [ ] Alcohol    FLU VACCINE THIS YEAR STARTS IN AUGUST:  [ ] Yes    [ ] No    IF OVER 65 HAVE YOU EVER HAD A PNA VACCINE:  [ ] Yes    [ ] No       [ ] N/A      REVIEW OF SYSTEMS:  [ ]chest pain  [  ]shortness of breath  [  ]palpitations  [  ]syncope  [ ]near syncope [ ]upper extremity weakness   [ ] lower extremity weakness  [  ]diplopia  [  ]altered mental status   [  ]fevers  [ ]chills [ ]nausea  [ ]vomitting  [  ]dysphagia    [ ]abdominal pain  [ ]melena  [ ]BRBPR    [  ]epistaxis  [  ]rash    [ ]lower extremity edema        [x ] All others negative	  [ ] Unable to obtain      LABS:	 	    CARDIAC MARKERS:          Hb Trend:           Creatinine Trend:     Coags:      proBNP:   Lipid Profile:   HgA1c:   TSH:         PHYSICAL EXAM:  T(C): 36.8 (20 @ 10:05), Max: 37 (20 @ 05:56)  HR: 96 (20 @ 15:15) (79 - 97)  BP: 164/76 (20 @ 15:15) (128/62 - 186/80)  RR: 15 (20 @ 15:15) (13 - 18)  SpO2: 100% (20 @ 15:15) (96% - 100%)  Wt(kg): --   BMI (kg/m2): 21.9 (20 @ 06:11)  I&O's Summary    31 Aug 2020 07:01  -  31 Aug 2020 16:14  --------------------------------------------------------  IN: 50 mL / OUT: 0 mL / NET: 50 mL      Gen: Appears well in NAD  HEENT:  (-)icterus (-)pallor  CV: N S1 S2 1/6 VICTORIA (+)2 Pulses B/l  Resp:  Clear to auscultation B/L, normal effort  GI: (+) BS Soft, NT, ND  Lymph:  (-)Edema, (-)obvious lymphadenopathy  Skin: Warm to touch, Normal turgor  Psych: Appropriate mood and affect    TELEMETRY: Sinus 	      ECG:  Pending	    RADIOLOGY:         CXR: Pending    ASSESSMENT/PLAN: Patient is a 75 y/o female well known to our office (Cardiologist - Dr. Del Castillo) with PMH of CAD s/p remote PCI in , HTN, HLD, recurrent syncope s/p previous ILR with no events ever recorded, LICA stenosis who saw us for preop clearance which included an exercise NST on 8/10 with no evidence of stress induced ischemia or infarct as well as previous TTE 2020 with normal LV/RV function. Patient is now admitted s/p scheduled Left CEA.     - Tolerated procedure well from CV perspective  - No evidence of clinical HF or anginal symptoms  - Hypertensive post op - home BP meds resumed (Lisinopril, Toprol XL, Triamterene/HCTZ), titrate cardene gtt for goal BP per vascular team  - Will follow with you  - Patient to f/u in our office with Dr. Del Castillo on  at 2:20 PM    Joel Hernández PA-C  Pager: [phone] HISTORY OF PRESENT ILLNESS: HPI:  Patient is a 73 y/o female well known to our office (Cardiologist - Dr. Del Castillo) with PMH of CAD s/p remote PCI in , HTN, HLD, recurrent syncope s/p previous ILR with no events ever recorded, LICA stenosis who saw us for preop clearance which included an exercise NST on 8/10 with no evidence of stress induced ischemia or infarct as well as previous TTE 2020 with normal LV/RV function. Patient is now admitted s/p scheduled Left CEA. Patient tolerated procedure well from CV perspective. Patient seen in PACU, laying comfortably with no complaints. Denies chest pain, SOB, palpitations, dizziness, syncope, headaches, vision changes, abd pain, n/v, back pain, cough, fever/chills.    PAST MEDICAL & SURGICAL HISTORY:  Sciatica  Legally blind in left eye, as defined in [place]: 20/400 left eye  Borderline diabetes mellitus: Controlled with diet  Migraines: Developed at 10 years of age  Last episode 2 years ago  Vertigo  H/O heart artery stent  History of MI (myocardial infarction)  CAD (coronary artery disease)  HTN (hypertension), benign  Dyslipidemia  S/P ICD (internal cardiac defibrillator) procedure: loop recorder  History of coronary artery stent placement: 3 stents (Last in )  Same admission as past myocardial infarction  S/P LASIK surgery: Right eye, no complications as per patient.  History of tubal ligation: 35 years ago, no complications as per patient.  History of tonsillectomy  No Past Surgical History      MEDICATIONS:  MEDICATIONS  (STANDING):  aspirin enteric coated 81 milliGRAM(s) Oral daily  atorvastatin 10 milliGRAM(s) Oral at bedtime  ceFAZolin   IVPB 2000 milliGRAM(s) IV Intermittent once  heparin   Injectable 5000 Unit(s) SubCutaneous every 8 hours  lisinopril 40 milliGRAM(s) Oral daily  metoprolol succinate ER 25 milliGRAM(s) Oral daily  niCARdipine Infusion 5 mG/Hr (25 mL/Hr) IV Continuous <Continuous>  triamterene 37.5 mG/hydrochlorothiazide 25 mG Tablet 1 Tablet(s) Oral daily      Allergies    fish (Unknown)  iodine (Anaphylaxis)  No Known Drug Allergies    Intolerances    labetalol (Other)      FAMILY HISTORY:  Family history of brain tumor (Sibling)  Family history of hypercholesterolemia (Child, Sibling)  Family history of hypertension (Child, Sibling): Daughters, Sisters  Family history of MI (myocardial infarction) (Child): Father ( at 75)  Mother ( at 87)  Son (  at 35)    Non-contributary for premature coronary disease or sudden cardiac death    SOCIAL HISTORY:    [x ] Non-smoker  [ ] Smoker  [ ] Alcohol    FLU VACCINE THIS YEAR STARTS IN AUGUST:  [ ] Yes    [ ] No    IF OVER 65 HAVE YOU EVER HAD A PNA VACCINE:  [ ] Yes    [ ] No       [ ] N/A      REVIEW OF SYSTEMS:  [ ]chest pain  [  ]shortness of breath  [  ]palpitations  [  ]syncope  [ ]near syncope [ ]upper extremity weakness   [ ] lower extremity weakness  [  ]diplopia  [  ]altered mental status   [  ]fevers  [ ]chills [ ]nausea  [ ]vomitting  [  ]dysphagia    [ ]abdominal pain  [ ]melena  [ ]BRBPR    [  ]epistaxis  [  ]rash    [ ]lower extremity edema        [x ] All others negative	  [ ] Unable to obtain      LABS:	 	    CARDIAC MARKERS:          Hb Trend:           Creatinine Trend:     Coags:      proBNP:   Lipid Profile:   HgA1c:   TSH:         PHYSICAL EXAM:  T(C): 36.8 (20 @ 10:05), Max: 37 (20 @ 05:56)  HR: 96 (20 @ 15:15) (79 - 97)  BP: 164/76 (20 @ 15:15) (128/62 - 186/80)  RR: 15 (20 @ 15:15) (13 - 18)  SpO2: 100% (20 @ 15:15) (96% - 100%)  Wt(kg): --   BMI (kg/m2): 21.9 (20 @ 06:11)  I&O's Summary    31 Aug 2020 07:01  -  31 Aug 2020 16:14  --------------------------------------------------------  IN: 50 mL / OUT: 0 mL / NET: 50 mL      Gen: Appears well in NAD  HEENT:  (-)icterus (-)pallor  CV: N S1 S2 1/6 VICTORIA (+)2 Pulses B/l  Resp:  Clear to auscultation B/L, normal effort  GI: (+) BS Soft, NT, ND  Lymph:  (-)Edema, (-)obvious lymphadenopathy  Skin: Warm to touch, Normal turgor  Psych: Appropriate mood and affect    TELEMETRY: Sinus 	      ECG:  Pending	    RADIOLOGY:         CXR: Pending    ASSESSMENT/PLAN: Patient is a 73 y/o female well known to our office (Cardiologist - Dr. Del Castillo) with PMH of CAD s/p remote PCI in , HTN, HLD, recurrent syncope s/p previous ILR with no events ever recorded, LICA stenosis who saw us for preop clearance which included an exercise NST on 8/10 with no evidence of stress induced ischemia or infarct as well as previous TTE 2020 with normal LV/RV function. Patient is now admitted s/p scheduled Left CEA.     - Tolerated procedure well from CV perspective  - No evidence of clinical HF or anginal symptoms  - Recommend medical management of known CAD  - Hypertensive post op - home BP meds resumed (Lisinopril, Toprol XL, Triamterene/HCTZ), titrate cardene gtt for goal BP per vascular team  - Will follow with you  - Patient to f/u in our office with Dr. Del Castillo on  at 2:20 PM    Joel Hernández PA-C  Pager: [phone]

## 2020-08-31 NOTE — CHART NOTE - NSCHARTNOTEFT_GEN_A_CORE
Pt is a 74 F POD #0 s/p L CEA    S: Patient reports that she is doing okay. She is in a good deal of pain, but otherwise dong well.     O: Exam  Gen: NAD, A+Ox3 laying in bed  CV: Regular rhythm and rate, no palpitations  Resp: Lungs clear to auscultation bilaterally  Neck:  Dressing C/D/I  Subjective tenderness around incision  NAVARRO drain in place, putting out sanguinous output  MSK:  Sensation grossly intact bilateral UE and LE  Strength 5/5 grossly intact bilateral extremities    ICU Vital Signs Last 24 Hrs  T(C): 36.8 (31 Aug 2020 10:05), Max: 37 (31 Aug 2020 05:56)  T(F): 98.2 (31 Aug 2020 10:05), Max: 98.6 (31 Aug 2020 05:56)  HR: 100 (31 Aug 2020 16:00) (79 - 100)  BP: 114/70 (31 Aug 2020 16:00) (114/70 - 186/80)  BP(mean): 101 (31 Aug 2020 16:00) (86 - 115)  ABP: 166/57 (31 Aug 2020 15:45) (135/42 - 206/81)  ABP(mean): 94 (31 Aug 2020 15:45) (74 - 125)  RR: 12 (31 Aug 2020 16:00) (12 - 18)  SpO2: 98% (31 Aug 2020 16:00) (96% - 100%)    MEDICATIONS:  acetaminophen   Tablet .. 650 milliGRAM(s) Oral every 6 hours PRN  aspirin enteric coated 81 milliGRAM(s) Oral daily  atorvastatin 10 milliGRAM(s) Oral at bedtime  ceFAZolin   IVPB 2000 milliGRAM(s) IV Intermittent once  fentaNYL    Injectable 50 MICROGram(s) IV Push every 15 minutes PRN  heparin   Injectable 5000 Unit(s) SubCutaneous every 8 hours  HYDROmorphone  Injectable 0.25 milliGRAM(s) IV Push every 15 minutes PRN  lisinopril 40 milliGRAM(s) Oral daily  meclizine 25 milliGRAM(s) Oral three times a day PRN  metoprolol succinate ER 25 milliGRAM(s) Oral daily  niCARdipine Infusion 5 mG/Hr IV Continuous <Continuous>  oxyCODONE    IR 5 milliGRAM(s) Oral every 6 hours PRN  triamterene 37.5 mG/hydrochlorothiazide 25 mG Tablet 1 Tablet(s) Oral daily    VITALS & I/Os:  Vital Signs Last 24 Hrs  T(C): 36.8 (31 Aug 2020 10:05), Max: 37 (31 Aug 2020 05:56)  T(F): 98.2 (31 Aug 2020 10:05), Max: 98.6 (31 Aug 2020 05:56)  HR: 100 (31 Aug 2020 16:00) (79 - 100)  BP: 114/70 (31 Aug 2020 16:00) (114/70 - 186/80)  BP(mean): 101 (31 Aug 2020 16:00) (86 - 115)  RR: 12 (31 Aug 2020 16:00) (12 - 18)  SpO2: 98% (31 Aug 2020 16:00) (96% - 100%)    I&O's Summary    31 Aug 2020 07:01  -  31 Aug 2020 16:42  --------------------------------------------------------  IN: 50 mL / OUT: 0 mL / NET: 50 mL      A/P  75 y/o F s/p L CEA now POD#0  -Blood pressure control- -160  -GI: Advance diet  -May go to the floor  -Resp: IS  -Pain control Pt is a 74 F POD #0 s/p L CEA    S: Patient reports that she is doing okay. She is in a good deal of pain, but otherwise dong well.     O: Exam  Gen: NAD, A+Ox3 laying in bed  CV: Regular rhythm and rate, no palpitations  Resp: Lungs clear to auscultation bilaterally  Neck:  Dressing C/D/I  Subjective tenderness around incision  NAVARRO drain in place, putting out sanguinous output  MSK:  Sensation grossly intact bilateral UE and LE  Strength 5/5 grossly intact bilateral extremities    ICU Vital Signs Last 24 Hrs  T(C): 36.8 (31 Aug 2020 10:05), Max: 37 (31 Aug 2020 05:56)  T(F): 98.2 (31 Aug 2020 10:05), Max: 98.6 (31 Aug 2020 05:56)  HR: 100 (31 Aug 2020 16:00) (79 - 100)  BP: 114/70 (31 Aug 2020 16:00) (114/70 - 186/80)  BP(mean): 101 (31 Aug 2020 16:00) (86 - 115)  ABP: 166/57 (31 Aug 2020 15:45) (135/42 - 206/81)  ABP(mean): 94 (31 Aug 2020 15:45) (74 - 125)  RR: 12 (31 Aug 2020 16:00) (12 - 18)  SpO2: 98% (31 Aug 2020 16:00) (96% - 100%)    MEDICATIONS:  acetaminophen   Tablet .. 650 milliGRAM(s) Oral every 6 hours PRN  aspirin enteric coated 81 milliGRAM(s) Oral daily  atorvastatin 10 milliGRAM(s) Oral at bedtime  ceFAZolin   IVPB 2000 milliGRAM(s) IV Intermittent once  fentaNYL    Injectable 50 MICROGram(s) IV Push every 15 minutes PRN  heparin   Injectable 5000 Unit(s) SubCutaneous every 8 hours  HYDROmorphone  Injectable 0.25 milliGRAM(s) IV Push every 15 minutes PRN  lisinopril 40 milliGRAM(s) Oral daily  meclizine 25 milliGRAM(s) Oral three times a day PRN  metoprolol succinate ER 25 milliGRAM(s) Oral daily  niCARdipine Infusion 5 mG/Hr IV Continuous <Continuous>  oxyCODONE    IR 5 milliGRAM(s) Oral every 6 hours PRN  triamterene 37.5 mG/hydrochlorothiazide 25 mG Tablet 1 Tablet(s) Oral daily    VITALS & I/Os:  Vital Signs Last 24 Hrs  T(C): 36.8 (31 Aug 2020 10:05), Max: 37 (31 Aug 2020 05:56)  T(F): 98.2 (31 Aug 2020 10:05), Max: 98.6 (31 Aug 2020 05:56)  HR: 100 (31 Aug 2020 16:00) (79 - 100)  BP: 114/70 (31 Aug 2020 16:00) (114/70 - 186/80)  BP(mean): 101 (31 Aug 2020 16:00) (86 - 115)  RR: 12 (31 Aug 2020 16:00) (12 - 18)  SpO2: 98% (31 Aug 2020 16:00) (96% - 100%)    I&O's Summary    31 Aug 2020 07:01  -  31 Aug 2020 16:42  --------------------------------------------------------  IN: 50 mL / OUT: 0 mL / NET: 50 mL      A/P  75 y/o F s/p L CEA now POD#0  -Blood pressure control- -160  -GI: Advance diet  -May go to the floor  -Resp: IS  -Pain control  -I/O  -Monitor vitals  F/U AM labs

## 2020-08-31 NOTE — CONSULT NOTE ADULT - ATTENDING COMMENTS
seen and agree w/ PA.  cardene drip for acute post-op course to keep BP within vascular surgery predefined limits.  will follow and help adjust BP medication as necesesary.

## 2020-08-31 NOTE — PATIENT PROFILE ADULT - NSPROSPIRITUALVALUESFT_GEN_A_NUR
Bedside and Verbal shift change report given to Mirela Downs RN (oncoming nurse) by Emeli Ross RN (offgoing nurse). Report given with SBAR, Kardex, Intake/Output and MAR. Muslim

## 2020-09-01 ENCOUNTER — TRANSCRIPTION ENCOUNTER (OUTPATIENT)
Age: 75
End: 2020-09-01

## 2020-09-01 LAB
A1C WITH ESTIMATED AVERAGE GLUCOSE RESULT: 6.6 % — HIGH (ref 4–5.6)
ANION GAP SERPL CALC-SCNC: 13 MMOL/L — SIGNIFICANT CHANGE UP (ref 5–17)
BUN SERPL-MCNC: 22 MG/DL — SIGNIFICANT CHANGE UP (ref 7–23)
CALCIUM SERPL-MCNC: 8.9 MG/DL — SIGNIFICANT CHANGE UP (ref 8.4–10.5)
CHLORIDE SERPL-SCNC: 101 MMOL/L — SIGNIFICANT CHANGE UP (ref 96–108)
CO2 SERPL-SCNC: 23 MMOL/L — SIGNIFICANT CHANGE UP (ref 22–31)
CREAT SERPL-MCNC: 1.26 MG/DL — SIGNIFICANT CHANGE UP (ref 0.5–1.3)
ESTIMATED AVERAGE GLUCOSE: 143 MG/DL — HIGH (ref 68–114)
GLUCOSE SERPL-MCNC: 107 MG/DL — HIGH (ref 70–99)
HCT VFR BLD CALC: 32.6 % — LOW (ref 34.5–45)
HGB BLD-MCNC: 10.5 G/DL — LOW (ref 11.5–15.5)
LDLC SERPL DIRECT ASSAY-MCNC: 111 MG/DL — SIGNIFICANT CHANGE UP
MCHC RBC-ENTMCNC: 27.1 PG — SIGNIFICANT CHANGE UP (ref 27–34)
MCHC RBC-ENTMCNC: 32.2 GM/DL — SIGNIFICANT CHANGE UP (ref 32–36)
MCV RBC AUTO: 84.2 FL — SIGNIFICANT CHANGE UP (ref 80–100)
NRBC # BLD: 0 /100 WBCS — SIGNIFICANT CHANGE UP (ref 0–0)
PLATELET # BLD AUTO: 283 K/UL — SIGNIFICANT CHANGE UP (ref 150–400)
POTASSIUM SERPL-MCNC: 4.6 MMOL/L — SIGNIFICANT CHANGE UP (ref 3.5–5.3)
POTASSIUM SERPL-SCNC: 4.6 MMOL/L — SIGNIFICANT CHANGE UP (ref 3.5–5.3)
RBC # BLD: 3.87 M/UL — SIGNIFICANT CHANGE UP (ref 3.8–5.2)
RBC # FLD: 12.9 % — SIGNIFICANT CHANGE UP (ref 10.3–14.5)
SODIUM SERPL-SCNC: 137 MMOL/L — SIGNIFICANT CHANGE UP (ref 135–145)
SURGICAL PATHOLOGY STUDY: SIGNIFICANT CHANGE UP
WBC # BLD: 14.38 K/UL — HIGH (ref 3.8–10.5)
WBC # FLD AUTO: 14.38 K/UL — HIGH (ref 3.8–10.5)

## 2020-09-01 RX ORDER — AMLODIPINE BESYLATE 2.5 MG/1
5 TABLET ORAL ONCE
Refills: 0 | Status: COMPLETED | OUTPATIENT
Start: 2020-09-01 | End: 2020-09-01

## 2020-09-01 RX ORDER — ACETAMINOPHEN 500 MG
2 TABLET ORAL
Qty: 0 | Refills: 0 | DISCHARGE
Start: 2020-09-01

## 2020-09-01 RX ORDER — LISINOPRIL 2.5 MG/1
40 TABLET ORAL DAILY
Refills: 0 | Status: DISCONTINUED | OUTPATIENT
Start: 2020-09-01 | End: 2020-09-02

## 2020-09-01 RX ORDER — OXYCODONE HYDROCHLORIDE 5 MG/1
1 TABLET ORAL
Qty: 10 | Refills: 0
Start: 2020-09-01

## 2020-09-01 RX ADMIN — Medication 25 MILLIGRAM(S): at 14:28

## 2020-09-01 RX ADMIN — HEPARIN SODIUM 5000 UNIT(S): 5000 INJECTION INTRAVENOUS; SUBCUTANEOUS at 21:09

## 2020-09-01 RX ADMIN — LISINOPRIL 40 MILLIGRAM(S): 2.5 TABLET ORAL at 19:43

## 2020-09-01 RX ADMIN — Medication 81 MILLIGRAM(S): at 12:27

## 2020-09-01 RX ADMIN — ATORVASTATIN CALCIUM 10 MILLIGRAM(S): 80 TABLET, FILM COATED ORAL at 21:08

## 2020-09-01 RX ADMIN — HEPARIN SODIUM 5000 UNIT(S): 5000 INJECTION INTRAVENOUS; SUBCUTANEOUS at 12:27

## 2020-09-01 RX ADMIN — OXYCODONE HYDROCHLORIDE 5 MILLIGRAM(S): 5 TABLET ORAL at 18:20

## 2020-09-01 RX ADMIN — Medication 25 MILLIGRAM(S): at 05:15

## 2020-09-01 RX ADMIN — HEPARIN SODIUM 5000 UNIT(S): 5000 INJECTION INTRAVENOUS; SUBCUTANEOUS at 05:15

## 2020-09-01 RX ADMIN — AMLODIPINE BESYLATE 5 MILLIGRAM(S): 2.5 TABLET ORAL at 18:14

## 2020-09-01 RX ADMIN — Medication 1 TABLET(S): at 05:15

## 2020-09-01 NOTE — PHYSICAL THERAPY INITIAL EVALUATION ADULT - ADDITIONAL COMMENTS
Patient reports her PLOF independent in all functional activities. Patient lives with children and reports they are able to assist if needed. Patient reports having 13 steps to get to the second floor. Patient delined PT offer to perform stair negotiation. Patient has not used any DME prior to this surgery.

## 2020-09-01 NOTE — DISCHARGE NOTE PROVIDER - NSDCMRMEDTOKEN_GEN_ALL_CORE_FT
acetaminophen 325 mg oral tablet: 2 tab(s) orally every 6 hours, As needed, Mild Pain (1 - 3)  aspirin 81 mg oral tablet, chewable: 1 tab(s) orally once a day  ibuprofen 200 mg oral tablet: 1 tab(s) orally every 8 hours, As Needed  lisinopril 40 mg oral tablet: 1 tab(s) orally once a day  meclizine 25 mg oral tablet: 1 tab(s) orally 3 times a day, As Needed vertigo  metoprolol succinate 25 mg oral tablet, extended release: 1 tab(s) orally once a day  oxyCODONE 5 mg oral tablet: 1 tab(s) orally every 6 hours, As needed, Moderate Pain (4 - 6) MDD:2  pravastatin 20 mg oral tablet: 1 tab(s) orally once a day  triamterene-hydrochlorothiazide 37.5 mg- 25 mg oral capsule: 1 cap(s) orally once a day

## 2020-09-01 NOTE — PROGRESS NOTE ADULT - ATTENDING COMMENTS
seen and agree w/ PA.  some mild left neck swelling.  had junctional rhythm last night associated w/ nausea.  this can occur after carotid artery clamping and post-op edema.  expect it to improve with time.  appears clinically stable for discharge planning.

## 2020-09-01 NOTE — DISCHARGE NOTE PROVIDER - CARE PROVIDER_API CALL
Khanh Bolton  VASCULAR SURGERY  [address] [facility], [address]  [place] [address]  Phone: [phone]  Fax: [phone]  Follow Up Time: Khanh Bolton  VASCULAR SURGERY  [address] [facility], [address]  [place] [address]  Phone: [phone]  Fax: [phone]  Follow Up Time:     Adilia Hardwick)  Cardiovascular Disease; Internal Medicine  [address], [address]  [place] [address]  Phone: [phone]  Fax: [phone]  Follow Up Time:

## 2020-09-01 NOTE — PHYSICAL THERAPY INITIAL EVALUATION ADULT - PERTINENT HX OF CURRENT PROBLEM, REHAB EVAL
PMH CAD (s/p coronary stents x3 2012), recurrent dizziness and syncope (s/p loop recorder- no afib recorded), carotid duplex study which shows a greater than 80% left internal carotid artery stenosis. 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 Underwent L CEA 8/31.

## 2020-09-01 NOTE — DISCHARGE NOTE NURSING/CASE MANAGEMENT/SOCIAL WORK - NSDCPEPTSTRK_GEN_ALL_CORE
Stroke support groups for patients, families, and friends/Call 911 for stroke/Prescribed medications/Need for follow up after discharge/Risk factors for stroke/Stroke education booklet/Stroke warning signs and symptoms/Signs and symptoms of stroke

## 2020-09-01 NOTE — PROVIDER CONTACT NOTE (OTHER) - ASSESSMENT
No acute distress noted. Left  neck dressing intact. No acute distress noted. Left  neck dressing clean and intact.

## 2020-09-01 NOTE — DISCHARGE NOTE PROVIDER - CARE PROVIDERS DIRECT ADDRESSES
[email or web address],DirectAddress_Unknown

## 2020-09-01 NOTE — DISCHARGE NOTE PROVIDER - NSDCFUADDAPPT_GEN_ALL_CORE_FT
Patient to f/u with Dr. Del Castillo on 9/14 at 2:20 PM Please follow up with Dr. Del Castillo Cardiologist outpatient on 9/14 at 2:20 PM

## 2020-09-01 NOTE — PROVIDER CONTACT NOTE (OTHER) - ACTION/TREATMENT ORDERED:
Marilia NICK notified. Provider oredered to monitor closesly the BP. Administer Lisinopril at bedtime.

## 2020-09-01 NOTE — PROVIDER CONTACT NOTE (OTHER) - ASSESSMENT
Pt. has no s/s of discomfort now. Neurologically intact with no defecits noted. Surgical incsion C/D/I wit slight serosangeunouis drainage noted.

## 2020-09-01 NOTE — DISCHARGE NOTE NURSING/CASE MANAGEMENT/SOCIAL WORK - PATIENT PORTAL LINK FT
You can access the FollowMyHealth Patient Portal offered by [facility] by registering at the following website: [email or web address]. By joining [employer]’s FollowMyHealth portal, you will also be able to view your health information using other applications (apps) compatible with our system.

## 2020-09-01 NOTE — DISCHARGE NOTE PROVIDER - NSDCCPCAREPLAN_GEN_ALL_CORE_FT
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  Please pat area dry.   You may cover old drain site with dry gauze and paper tape.    BATHING: Please do not submerge wound underwater. You may shower and/or sponge bathe.  ACTIVITY: No heavy lifting anything more than 10-15lbs or straining. Otherwise, you may return to your usual level of physical activity. If you are taking narcotic pain medication (such as oxycodone or Percocet), do NOT drive a car, operate machinery or make important decisions.  NOTIFY YOUR SURGEON IF: You have any bleeding that does not stop, any pus draining from your wound, any fever (over 100.4 F) or chills, persistent nausea/vomiting with inability to tolerate food or liquids, slurred speech, numbness or tingling, difficulty swallowing, or if your pain is not controlled on your discharge pain medications.  FOLLOW-UP:  1. Please call to make a follow-up appointment in 1-2 weeks upon discharge from the hospital with Dr. Bolton  2. Please follow up with your primary care physician in one week regarding your hospitalization. 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  Please pat area dry.   You may cover old drain site with dry gauze and paper tape.    BATHING: Please do not submerge wound underwater. You may shower and/or sponge bathe.  ACTIVITY: No heavy lifting anything more than 10-15lbs or straining. Otherwise, you may return to your usual level of physical activity. If you are taking narcotic pain medication (such as oxycodone or Percocet), do NOT drive a car, operate machinery or make important decisions.  NOTIFY YOUR SURGEON IF: You have any bleeding that does not stop, any pus draining from your wound, any fever (over 100.4 F) or chills, persistent nausea/vomiting with inability to tolerate food or liquids, slurred speech, lightheadedness or syncope, numbness or tingling, difficulty swallowing, or if your pain is not controlled on your discharge pain medications.  FOLLOW-UP:  1. Please call to make a follow-up appointment in 1-2 weeks upon discharge from the hospital with Dr. Bolton  2. Please follow up with your primary care physician in one week regarding your hospitalization.

## 2020-09-01 NOTE — CHART NOTE - NSCHARTNOTEFT_GEN_A_CORE
Patient is POD 1 s/p left carotid endarterectomy. Informed by nurse that blood pressure for patient was 160-170s/80s and  and that patient has just taken blood pressure medications metoprolol and lisinopril. Patient was asymptomatic. Will reassess vitals in half an hour.  D/w vascular fellow.    Vascular 1740

## 2020-09-01 NOTE — DISCHARGE NOTE PROVIDER - NSDCCPTREATMENT_GEN_ALL_CORE_FT
PRINCIPAL PROCEDURE  Procedure: Carotid endarterectomy  Findings and Treatment: recover from surgery

## 2020-09-01 NOTE — DISCHARGE NOTE PROVIDER - HOSPITAL COURSE
74-year-old female. PMH CAD (s/p coronary stents x3 2012), recurrent dizziness and syncope (s/p loop recorder- no afib recorded), carotid duplex study which shows a greater than 80% left internal carotid artery stenosis. She underwent a left carotid endarterectomy on 8/31.  The patient tolerated the procedure well (see operative report for full details). Postoperatively, she required a cardene gtt for blood pressure control. On POD #1, diet was advanced as tolerated. She was restarted on her home medications.  On day of discharge, the patient was tolerating diet, neurovascularly intact, ambulating well and pain controlled. She will follow up with Dr. Bolton in 1 week. 74-year-old female. PMH CAD (s/p coronary stents x3 2012), recurrent dizziness and syncope (s/p loop recorder- no afib recorded), carotid duplex study which shows a greater than 80% left internal carotid artery stenosis. She underwent a left carotid endarterectomy on 8/31.  The patient tolerated the procedure well (see operative report for full details). Postoperatively, she required a cardene gtt for blood pressure control. On POD #1, diet was advanced as tolerated. She was restarted on her home medications.  She had LDL and A1C checked during admission. She was discharged on statin and aspirin.  On day of discharge, the patient was tolerating diet, neurovascularly intact, ambulating well and pain controlled. She will follow up with Dr. Bolton in 1 week. 74-year-old female. PMH CAD (s/p coronary stents x3 2012), recurrent dizziness and syncope (s/p loop recorder- no afib recorded), carotid duplex study which shows a greater than 80% left internal carotid artery stenosis. She underwent a left carotid endarterectomy on 8/31.  The patient tolerated the procedure well (see operative report for full details). Postoperatively, she required a cardene gtt for blood pressure control. On POD #1, diet was advanced as tolerated. She was restarted on her home medications.  She had LDL and A1C checked during admission. POD 2 her SBP was elevated over night which has no resolved. Pt became a little dizzy and nauseas when getting out of bed. She received meclizine and zofran in which her symptoms resolved. She was discharged on statin and aspirin.  On day of discharge, the patients vital signs were stable, was tolerating diet, neurovascularly intact, ambulating well and pain controlled. She will follow up with Dr. Bolton in 1 week.

## 2020-09-01 NOTE — CHART NOTE - NSCHARTNOTEFT_GEN_A_CORE
Pt is a 75 y/o female with with PMH of CAD s/p remote PCI in 2012, HTN, HLD, recurrent syncope s/p previous ILR with no events ever recorded, LICA stenosis who saw us for preop clearance which included an exercise NST on 8/10 with no evidence of stress induced ischemia or infarct as well as previous TTE 6/2020 with normal LV/RV function. Patient is now POD0 s/p scheduled Left CEA.     Received page from RN regards to nausea + 1 episode of vomiting. Zofran ordered and pt reassessed- nausea relieved and patient reports feeling better. BP noted to be 164/74 with HR of 104, advised patient to take 2nd dose of Metoprolol ER. Pt refused. BP reassessed with 160/76 repeat. Pt agreed to take her home med Lisinopril at that time.   Of note pt refused IV labetalol in PACU, as states she does not feel well when it is given to her and with hx of vertigo (on meclizine) she feels worse.      PE:  Gen: NAD, A+Ox3 laying in bed  Resp: No labored breathing noted  Neck:  Dressing C/D/I  Subjective tenderness around incision  NAVARRO drain in place, putting out sanguinous output approx 10cc when at bedside    Plan: Will continue to monitor vitals, and reassess.    Marilia Field PA-C  Vascular Surgery  [phone]

## 2020-09-01 NOTE — PROVIDER CONTACT NOTE (OTHER) - SITUATION
pt noted to be hypertensive with a bp 201/82 electronic and a repeat manually of 180/80 after ambulating to the bathroom. Pt is symptomatic denies dizziness at this genoveva. pt on tele monitor. pt noted to be hypertensive with a bp 201/82 electronic and a repeat manually of 180/80 after ambulating to the bathroom. Pt is asymptomatic denies dizziness at this time. pt on tele monitor.

## 2020-09-01 NOTE — PROGRESS NOTE ADULT - ASSESSMENT
73 y/o F s/p L CEA now POD#1  -Blood pressure control- -160  -GI: Advance diet  -Resp: IS  -Pain control  -I/O  -Monitor vitals  - F/U AM labs.  -D/C NAVARRO  -D/C later today    Grace Matos PA-C [phone]

## 2020-09-01 NOTE — PROVIDER CONTACT NOTE (OTHER) - REASON
pt on tele with an episode of alexa in the 40's  and junctional rhythm that lasted a minute, also was found sweating profusely

## 2020-09-01 NOTE — DISCHARGE NOTE PROVIDER - PROVIDER TOKENS
PROVIDER:[TOKEN:[[id number]]] PROVIDER:[TOKEN:[[id number]]],PROVIDER:[TOKEN:[[id number]]]

## 2020-09-01 NOTE — CHART NOTE - NSCHARTNOTEFT_GEN_A_CORE
called by nurse for bradycardia to 40s on telemetry with diaphoresis- patient HR now 63 /80- symptoms resolved- called cardiology for further evaluation- spoke with PA - will review telemetry and see patient     Grace Matos PA-C [phone]

## 2020-09-02 VITALS
OXYGEN SATURATION: 98 % | RESPIRATION RATE: 18 BRPM | TEMPERATURE: 98 F | SYSTOLIC BLOOD PRESSURE: 150 MMHG | DIASTOLIC BLOOD PRESSURE: 80 MMHG | HEART RATE: 87 BPM

## 2020-09-02 PROCEDURE — 80048 BASIC METABOLIC PNL TOTAL CA: CPT

## 2020-09-02 PROCEDURE — 83036 HEMOGLOBIN GLYCOSYLATED A1C: CPT

## 2020-09-02 PROCEDURE — 83721 ASSAY OF BLOOD LIPOPROTEIN: CPT

## 2020-09-02 PROCEDURE — 86901 BLOOD TYPING SEROLOGIC RH(D): CPT

## 2020-09-02 PROCEDURE — 88311 DECALCIFY TISSUE: CPT

## 2020-09-02 PROCEDURE — 97161 PT EVAL LOW COMPLEX 20 MIN: CPT

## 2020-09-02 PROCEDURE — 86900 BLOOD TYPING SEROLOGIC ABO: CPT

## 2020-09-02 PROCEDURE — C1768: CPT

## 2020-09-02 PROCEDURE — 85027 COMPLETE CBC AUTOMATED: CPT

## 2020-09-02 PROCEDURE — 88304 TISSUE EXAM BY PATHOLOGIST: CPT

## 2020-09-02 PROCEDURE — C1889: CPT

## 2020-09-02 RX ORDER — ONDANSETRON 8 MG/1
4 TABLET, FILM COATED ORAL ONCE
Refills: 0 | Status: COMPLETED | OUTPATIENT
Start: 2020-09-02 | End: 2020-09-02

## 2020-09-02 RX ADMIN — Medication 81 MILLIGRAM(S): at 12:48

## 2020-09-02 RX ADMIN — HEPARIN SODIUM 5000 UNIT(S): 5000 INJECTION INTRAVENOUS; SUBCUTANEOUS at 05:28

## 2020-09-02 RX ADMIN — Medication 25 MILLIGRAM(S): at 06:45

## 2020-09-02 RX ADMIN — Medication 25 MILLIGRAM(S): at 05:28

## 2020-09-02 RX ADMIN — ONDANSETRON 4 MILLIGRAM(S): 8 TABLET, FILM COATED ORAL at 08:53

## 2020-09-02 RX ADMIN — Medication 1 TABLET(S): at 05:28

## 2020-09-02 NOTE — PROGRESS NOTE ADULT - SUBJECTIVE AND OBJECTIVE BOX
S: Tele events noted - felt nauseous and diaphoretic this morning now improved. denies chest pain or shortness of breath.   Review of systems otherwise (-)  	    MEDICATIONS  (STANDING):  aspirin enteric coated 81 milliGRAM(s) Oral daily  atorvastatin 10 milliGRAM(s) Oral at bedtime  heparin   Injectable 5000 Unit(s) SubCutaneous every 8 hours  lisinopril 40 milliGRAM(s) Oral daily  metoprolol succinate ER 25 milliGRAM(s) Oral daily  triamterene 37.5 mG/hydrochlorothiazide 25 mG Tablet 1 Tablet(s) Oral daily      LABS:	 	                          10.5   14.38 )-----------( 283      ( 01 Sep 2020 07:26 )             32.6     Hemoglobin: 10.5 g/dL (09-01 @ 07:26)    09-01    137  |  101  |  22  ----------------------------<  107<H>  4.6   |  23  |  1.26    Ca    8.9      01 Sep 2020 07:25      Creatinine Trend: 1.26<--  COAGS:       proBNP:   Lipid Profile:   HgA1c:   TSH:     PHYSICAL EXAM:  T(C): 36.9 (09-01-20 @ 13:18), Max: 36.9 (09-01-20 @ 13:18)  HR: 89 (09-01-20 @ 13:18) (63 - 104)  BP: 183/84 (09-01-20 @ 13:18) (114/70 - 183/84)  RR: 16 (09-01-20 @ 13:18) (12 - 18)  SpO2: 98% (09-01-20 @ 13:18) (95% - 100%)  Wt(kg): --  I&O's Summary    31 Aug 2020 07:01  -  01 Sep 2020 07:00  --------------------------------------------------------  IN: 1040 mL / OUT: 715 mL / NET: 325 mL    01 Sep 2020 07:01  -  01 Sep 2020 13:51  --------------------------------------------------------  IN: 480 mL / OUT: 5 mL / NET: 475 mL          Gen: Appears well in NAD  HEENT:  (-)icterus (-)pallor  CV: N S1 S2 1/6 VICTORIA (+)2 Pulses B/l  Resp:  Clear to auscultation B/L, normal effort  GI: (+) BS Soft, NT, ND  Lymph:  (-)Edema, (-)obvious lymphadenopathy  Skin: Warm to touch, Normal turgor  Psych: Appropriate mood and affect      TELEMETRY: SR 70-90, brief junctional with HR 40s this AM	        ASSESSMENT/PLAN: Patient is a 75 y/o female well known to our office (Cardiologist - Dr. Del Castillo) with PMH of CAD s/p remote PCI in 2012, HTN, HLD, recurrent syncope s/p previous ILR with no events ever recorded, LICA stenosis who saw us for preop clearance which included an exercise NST on 8/10 with no evidence of stress induced ischemia or infarct as well as previous TTE 6/2020 with normal LV/RV function. Patient is now admitted s/p scheduled Left CEA.     - Tele events noted - brief junctional rhythm due to vagal tone - non concerning, no intervention needed  - No evidence of clinical HF or anginal symptoms  - Recommend medical management of known CAD  - Continue home BP meds  - Stable for discharge from CV perspective - no further inpatient cardiac w/u needed  - Patient to f/u in our office with Dr. Del Castillo on 9/14 at 2:20 PM    Joel Hernández PA-C  Pager: [phone]
POST OPERATIVE DAY #: s/p L CEA POD 2    SUBJECTIVE: Pt seen and examined at bedside. Pts SBP was elevated overnight. Her elevated SBP has now resolved. She states she had a dull headache overnight which has resolved. She is feeling better this am. She denies any CP, SOB, numbness/tingling in b/l limbs, loss of sensation or motor function, n/v/d      Vital Signs Last 24 Hrs  T(C): 36.7 (02 Sep 2020 06:52), Max: 37 (01 Sep 2020 15:15)  T(F): 98.1 (02 Sep 2020 06:52), Max: 98.6 (01 Sep 2020 15:15)  HR: 75 (02 Sep 2020 06:52) (75 - 118)  BP: 143/83 (02 Sep 2020 06:52) (135/76 - 201/82)  BP(mean): --  RR: 18 (02 Sep 2020 06:52) (16 - 18)  SpO2: 95% (02 Sep 2020 06:52) (94% - 98%)  I&O's Summary    01 Sep 2020 07:01  -  02 Sep 2020 07:00  --------------------------------------------------------  IN: 1100 mL / OUT: 5 mL / NET: 1095 mL      I&O's Detail    01 Sep 2020 07:01  -  02 Sep 2020 07:00  --------------------------------------------------------  IN:    Oral Fluid: 1100 mL  Total IN: 1100 mL    OUT:    Bulb: 5 mL  Total OUT: 5 mL    Total NET: 1095 mL      Labs:                         10.5   14.38 )-----------( 283      ( 01 Sep 2020 07:26 )             32.6     09-01    137  |  101  |  22  ----------------------------<  107<H>  4.6   |  23  |  1.26    Ca    8.9      01 Sep 2020 07:25        Physical Exam:  Gen: NAD, A+Ox3 laying in bed  CV: Regular rhythm and rate, no palpitations  Resp: non-labored breathing, symmetrical chest rise  Neck: Gauze removed at bedside, +steri strips in place, little ecchymosis mild erythema from tape irritation no hematoma or active bleed, subjective tenderness around incision  Extremities: Sensation grossly intact bilateral UE and LE, Strength 5/5 grossly intact bilateral extremities
S: Tele events noted , with another early morning episode of junctional rhythm (45-50bpm).  This correlates with symptomatic nausea.  Patient gave context -states she feels bad after getting SQ Heparin injection.    She is aware of longstanding history of hypertension, and has been on current drug regimen for over a year. Is also aware of terrible family history of early onset HTN and atherosclerotic disease, including many with early demise from ASCVD.      Discussed case with her cardiologist, Dr Del Castillo.  He prefers maintaining her antihypertensive regimen as-is, since she tends to be labile on in-office and in-home checks: patient states her range is 120-200mmHg systolic.  This is an ongoing issue from before surgery which he will manage as an outpatient.    acetaminophen   Tablet .. 650 milliGRAM(s) Oral every 6 hours PRN  aspirin enteric coated 81 milliGRAM(s) Oral daily  atorvastatin 10 milliGRAM(s) Oral at bedtime  heparin   Injectable 5000 Unit(s) SubCutaneous every 8 hours  HYDROmorphone  Injectable 0.25 milliGRAM(s) IV Push every 15 minutes PRN  lisinopril 40 milliGRAM(s) Oral daily  meclizine 25 milliGRAM(s) Oral three times a day PRN  metoprolol succinate ER 25 milliGRAM(s) Oral daily  oxyCODONE    IR 5 milliGRAM(s) Oral every 6 hours PRN  triamterene 37.5 mG/hydrochlorothiazide 25 mG Tablet 1 Tablet(s) Oral daily                            10.5   14.38 )-----------( 283      ( 01 Sep 2020 07:26 )             32.6       09-01    137  |  101  |  22  ----------------------------<  107<H>  4.6   |  23  |  1.26    Ca    8.9      01 Sep 2020 07:25        T(C): 37.1 (09-02-20 @ 10:45), Max: 37.1 (09-02-20 @ 10:45)  HR: 69 (09-02-20 @ 10:45) (69 - 118)  BP: 155/82 (09-02-20 @ 10:45) (135/76 - 201/82)  RR: 17 (09-02-20 @ 10:45) (16 - 18)  SpO2: 96% (09-02-20 @ 10:45) (94% - 98%)  Wt(kg): --    I&O's Summary    01 Sep 2020 07:01  -  02 Sep 2020 07:00  --------------------------------------------------------  IN: 1100 mL / OUT: 5 mL / NET: 1095 mL    Gen: Appears well in NAD  HEENT:  (-)icterus (-)pallor  CV: N S1 S2 1/6 VICTORIA (+)2 Pulses B/l  Resp:  Clear to auscultation B/L, normal effort  GI: (+) BS Soft, NT, ND  Lymph:  (-)Edema, (-)obvious lymphadenopathy  Skin: Warm to touch, Normal turgor  Psych: Appropriate mood and affect      TELEMETRY: SR 70-90, brief junctional with HR 40s this AM	        ASSESSMENT/PLAN: Patient is a 75 y/o female well known to our office (Cardiologist - Dr. Del Castillo) with PMH of CAD s/p remote PCI in 2012, HTN, HLD, recurrent syncope s/p previous ILR with no events ever recorded, LICA stenosis who saw us for preop clearance which included an exercise NST on 8/10 with no evidence of stress induced ischemia or infarct as well as previous TTE 6/2020 with normal LV/RV function. Patient is now admitted s/p scheduled Left CEA.     - Tele events noted - brief junctional rhythm due to vagal tone - per patient, this correlates with timing of heparin injections every morning.  - No angina or TIA-like symptoms.  Luckily, no renal dysfunction, LVH or CHF from longstanding hypertension.  - Recommend medical management of known CAD, and continue home BP meds.  - Patient to f/u in our office with Dr. Del Castillo on 9/14 at 2:20 PM, for continued management of hypertension.    Masoud Gorman M.D.  Cardiac Electrophysiology  [phone]
VASCULAR SURGERY DAILY PROGRESS NOTE:       SUBJECTIVE/ROS: Patient reports some nausea, no vomiting- now resolved- pain is controlled- Denies chest pain, shortness of breath, difficulty speaking, numbness or tinglin         MEDICATIONS  (STANDING):  aspirin enteric coated 81 milliGRAM(s) Oral daily  atorvastatin 10 milliGRAM(s) Oral at bedtime  ceFAZolin   IVPB 2000 milliGRAM(s) IV Intermittent once  heparin   Injectable 5000 Unit(s) SubCutaneous every 8 hours  lisinopril 40 milliGRAM(s) Oral daily  metoprolol succinate ER 25 milliGRAM(s) Oral daily  triamterene 37.5 mG/hydrochlorothiazide 25 mG Tablet 1 Tablet(s) Oral daily    MEDICATIONS  (PRN):  acetaminophen   Tablet .. 650 milliGRAM(s) Oral every 6 hours PRN Mild Pain (1 - 3)  HYDROmorphone  Injectable 0.25 milliGRAM(s) IV Push every 15 minutes PRN Moderate Pain (4 - 6)  meclizine 25 milliGRAM(s) Oral three times a day PRN vertigo  oxyCODONE    IR 5 milliGRAM(s) Oral every 6 hours PRN Moderate Pain (4 - 6)      OBJECTIVE:    Vital Signs Last 24 Hrs  T(C): 36.8 (01 Sep 2020 05:05), Max: 36.8 (31 Aug 2020 10:05)  T(F): 98.3 (01 Sep 2020 05:05), Max: 98.3 (01 Sep 2020 05:05)  HR: 90 (01 Sep 2020 05:05) (86 - 104)  BP: 163/71 (01 Sep 2020 05:05) (114/70 - 168/87)  BP(mean): 112 (31 Aug 2020 18:40) (86 - 115)  RR: 18 (01 Sep 2020 05:05) (12 - 18)  SpO2: 97% (01 Sep 2020 05:05) (96% - 100%)        I&O's Detail    31 Aug 2020 07:01  -  01 Sep 2020 07:00  --------------------------------------------------------  IN:    Oral Fluid: 290 mL    sodium chloride 0.9%: 750 mL  Total IN: 1040 mL    OUT:    Bulb: 15 mL    Voided: 700 mL  Total OUT: 715 mL    Total NET: 325 mL          Daily     Daily     LABS:                        PHYSICAL EXAM:  Gen: NAD, A+Ox3 laying in bed  CV: Regular rhythm and rate, no palpitations  Resp: Lungs clear to auscultation bilaterally  Neck:  Dressing C/D/I  Subjective tenderness around incision  NAVARRO drain in place, putting out sanguinous output  MSK:  Sensation grossly intact bilateral UE and LE  Strength 5/5 grossly intact bilateral extremities

## 2020-09-02 NOTE — PROGRESS NOTE ADULT - ASSESSMENT
Assessment: 75 y/o F s/p L CEA now POD# 2, she is doing well. Dressing was removed at beside. Steri strips in place.    -DVT prophylaxis   -Blood pressure control- -160  -Diet- regular  -Pain control as needed   - F/U AM labs.  -Dispo- home today    Vascular Surgery  [phone]

## 2020-09-02 NOTE — CHART NOTE - NSCHARTNOTEFT_GEN_A_CORE
Pt is a 73 y/o female with with PMH of CAD s/p remote PCI in 2012, HTN, HLD, recurrent syncope s/p previous ILR with no events ever recorded, LICA stenosis who saw us for preop clearance which included an exercise NST on 8/10 with no evidence of stress induced ischemia or infarct as well as previous TTE 6/2020 with normal LV/RV function. Patient is now POD1 s/p scheduled Left CEA c/b by hypertension post op period.    Received call from nursing staff in regards to blood pressure. bp 201/82 electronic and a repeat manually of 180/80 while trying to discharge patient.   Pt seen and examined at bedside. Patient denied any complaints at time of exam, expressing concern over BP meds. Denies cp/sob/n/v. Incision remained c/d/i and no changes in physical exam.    At time of eval, patient had only received Metoprolol 25mg in AM, + Triamterene/hctz in AM. Advised nursing to give norvasc 5mg to lower bp however bp remained elevated.  Cardiology saw patient earlier in the day, and were reconsulted. Decision made to admin lisinopril 40mg (home med) that was due at 11pm, stat. Spoke to PA that has noted pt's persistent elevated bps in office as well.     Pt received Lisinopril 40mg with improvement 163/84 at 21:20 and repeated 135/76 at 1:30am.   Discussed in detail with patient.     Plan:  Cardiology to see pt in AM to f/u with dispo recs for BP  Continue vitals monitoring

## 2020-09-15 ENCOUNTER — APPOINTMENT (OUTPATIENT)
Dept: VASCULAR SURGERY | Facility: CLINIC | Age: 75
End: 2020-09-15
Payer: MEDICARE

## 2020-09-15 VITALS
SYSTOLIC BLOOD PRESSURE: 147 MMHG | BODY MASS INDEX: 22.65 KG/M2 | WEIGHT: 105 LBS | DIASTOLIC BLOOD PRESSURE: 87 MMHG | HEART RATE: 77 BPM | HEIGHT: 57 IN

## 2020-09-15 VITALS — TEMPERATURE: 98 F

## 2020-09-15 PROCEDURE — 99024 POSTOP FOLLOW-UP VISIT: CPT

## 2020-09-17 NOTE — PHYSICAL EXAM
[Alert] : alert [Calm] : calm [JVD] : no jugular venous distention  [Ankle Swelling (On Exam)] : not present [de-identified] : appears well  [de-identified] : tongue is midline, smile is symetrical  [de-identified] : incision is clean and dry

## 2020-09-17 NOTE — DISCUSSION/SUMMARY
[FreeTextEntry1] : 73 yo female with history of cad, hld, htn, carotid stenosis s/p cea \par \par pt doing well \par pt to follow up in 3-4 weeks with duplex

## 2020-10-02 NOTE — ED ADULT NURSE NOTE - NSFALLRSKOUTCOME_ED_ALL_ED
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

## 2020-10-13 ENCOUNTER — APPOINTMENT (OUTPATIENT)
Dept: VASCULAR SURGERY | Facility: CLINIC | Age: 75
End: 2020-10-13
Payer: MEDICARE

## 2020-10-13 VITALS — TEMPERATURE: 97.7 F

## 2020-10-13 PROCEDURE — 99024 POSTOP FOLLOW-UP VISIT: CPT

## 2020-10-13 PROCEDURE — 93880 EXTRACRANIAL BILAT STUDY: CPT

## 2020-10-13 NOTE — DISCUSSION/SUMMARY
[FreeTextEntry1] : 73 yo female with history of cad, hld, htn, carotid stenosis s/p cea \par \par pt doing well \par duplex shows patent CEA\par f/u 6 months

## 2020-10-13 NOTE — REASON FOR VISIT
[de-identified] : left cea  [de-identified] : 8/31/20 [de-identified] : pt s/p left cea presents for follow up without any complaints at this time

## 2020-10-13 NOTE — PHYSICAL EXAM
[JVD] : no jugular venous distention  [Ankle Swelling (On Exam)] : not present [Alert] : alert [Calm] : calm [de-identified] : appears well  [de-identified] : tongue is midline, smile is symetrical  [de-identified] : incision is clean and dry

## 2020-12-15 ENCOUNTER — INPATIENT (INPATIENT)
Facility: HOSPITAL | Age: 75
LOS: 0 days | Discharge: HOME | DRG: 312 | End: 2020-12-16
Attending: INTERNAL MEDICINE | Admitting: INTERNAL MEDICINE
Payer: COMMERCIAL

## 2020-12-15 VITALS
DIASTOLIC BLOOD PRESSURE: 91 MMHG | HEIGHT: 58 IN | WEIGHT: 108.03 LBS | TEMPERATURE: 99 F | SYSTOLIC BLOOD PRESSURE: 179 MMHG | OXYGEN SATURATION: 97 % | RESPIRATION RATE: 18 BRPM | HEART RATE: 69 BPM

## 2020-12-15 DIAGNOSIS — R11.2 NAUSEA WITH VOMITING, UNSPECIFIED: ICD-10-CM

## 2020-12-15 DIAGNOSIS — R55 SYNCOPE AND COLLAPSE: ICD-10-CM

## 2020-12-15 DIAGNOSIS — R10.13 EPIGASTRIC PAIN: ICD-10-CM

## 2020-12-15 DIAGNOSIS — Z98.51 TUBAL LIGATION STATUS: Chronic | ICD-10-CM

## 2020-12-15 DIAGNOSIS — Z98.89 OTHER SPECIFIED POSTPROCEDURAL STATES: Chronic | ICD-10-CM

## 2020-12-15 DIAGNOSIS — Z95.5 PRESENCE OF CORONARY ANGIOPLASTY IMPLANT AND GRAFT: Chronic | ICD-10-CM

## 2020-12-15 DIAGNOSIS — Z95.810 PRESENCE OF AUTOMATIC (IMPLANTABLE) CARDIAC DEFIBRILLATOR: Chronic | ICD-10-CM

## 2020-12-15 LAB
ALBUMIN SERPL ELPH-MCNC: 4.4 G/DL — SIGNIFICANT CHANGE UP (ref 3.3–5)
ALP SERPL-CCNC: 73 U/L — SIGNIFICANT CHANGE UP (ref 40–120)
ALT FLD-CCNC: 9 U/L — LOW (ref 10–45)
ANION GAP SERPL CALC-SCNC: 13 MMOL/L — SIGNIFICANT CHANGE UP (ref 5–17)
APPEARANCE UR: CLEAR — SIGNIFICANT CHANGE UP
APTT BLD: 28.8 SEC — SIGNIFICANT CHANGE UP (ref 27.5–35.5)
AST SERPL-CCNC: 14 U/L — SIGNIFICANT CHANGE UP (ref 10–40)
BACTERIA # UR AUTO: NEGATIVE — SIGNIFICANT CHANGE UP
BASOPHILS # BLD AUTO: 0.06 K/UL — SIGNIFICANT CHANGE UP (ref 0–0.2)
BASOPHILS NFR BLD AUTO: 0.5 % — SIGNIFICANT CHANGE UP (ref 0–2)
BILIRUB SERPL-MCNC: 0.4 MG/DL — SIGNIFICANT CHANGE UP (ref 0.2–1.2)
BILIRUB UR-MCNC: NEGATIVE — SIGNIFICANT CHANGE UP
BUN SERPL-MCNC: 23 MG/DL — SIGNIFICANT CHANGE UP (ref 7–23)
CALCIUM SERPL-MCNC: 9.9 MG/DL — SIGNIFICANT CHANGE UP (ref 8.4–10.5)
CHLORIDE SERPL-SCNC: 102 MMOL/L — SIGNIFICANT CHANGE UP (ref 96–108)
CK SERPL-CCNC: 38 U/L — SIGNIFICANT CHANGE UP (ref 25–170)
CO2 SERPL-SCNC: 25 MMOL/L — SIGNIFICANT CHANGE UP (ref 22–31)
COLOR SPEC: SIGNIFICANT CHANGE UP
CREAT SERPL-MCNC: 1.38 MG/DL — HIGH (ref 0.5–1.3)
DIFF PNL FLD: NEGATIVE — SIGNIFICANT CHANGE UP
EOSINOPHIL # BLD AUTO: 0.08 K/UL — SIGNIFICANT CHANGE UP (ref 0–0.5)
EOSINOPHIL NFR BLD AUTO: 0.7 % — SIGNIFICANT CHANGE UP (ref 0–6)
EPI CELLS # UR: 2 /HPF — SIGNIFICANT CHANGE UP
GLUCOSE SERPL-MCNC: 202 MG/DL — HIGH (ref 70–99)
GLUCOSE UR QL: NEGATIVE — SIGNIFICANT CHANGE UP
HCT VFR BLD CALC: 37.2 % — SIGNIFICANT CHANGE UP (ref 34.5–45)
HGB BLD-MCNC: 11.8 G/DL — SIGNIFICANT CHANGE UP (ref 11.5–15.5)
HYALINE CASTS # UR AUTO: 3 /LPF — HIGH (ref 0–2)
IMM GRANULOCYTES NFR BLD AUTO: 0.8 % — SIGNIFICANT CHANGE UP (ref 0–1.5)
INR BLD: 1 RATIO — SIGNIFICANT CHANGE UP (ref 0.88–1.16)
KETONES UR-MCNC: NEGATIVE — SIGNIFICANT CHANGE UP
LEUKOCYTE ESTERASE UR-ACNC: ABNORMAL
LIDOCAIN IGE QN: 24 U/L — SIGNIFICANT CHANGE UP (ref 7–60)
LYMPHOCYTES # BLD AUTO: 2.89 K/UL — SIGNIFICANT CHANGE UP (ref 1–3.3)
LYMPHOCYTES # BLD AUTO: 24.8 % — SIGNIFICANT CHANGE UP (ref 13–44)
MAGNESIUM SERPL-MCNC: 1.9 MG/DL — SIGNIFICANT CHANGE UP (ref 1.6–2.6)
MCHC RBC-ENTMCNC: 27.1 PG — SIGNIFICANT CHANGE UP (ref 27–34)
MCHC RBC-ENTMCNC: 31.7 GM/DL — LOW (ref 32–36)
MCV RBC AUTO: 85.5 FL — SIGNIFICANT CHANGE UP (ref 80–100)
MONOCYTES # BLD AUTO: 0.43 K/UL — SIGNIFICANT CHANGE UP (ref 0–0.9)
MONOCYTES NFR BLD AUTO: 3.7 % — SIGNIFICANT CHANGE UP (ref 2–14)
NEUTROPHILS # BLD AUTO: 8.11 K/UL — HIGH (ref 1.8–7.4)
NEUTROPHILS NFR BLD AUTO: 69.5 % — SIGNIFICANT CHANGE UP (ref 43–77)
NITRITE UR-MCNC: NEGATIVE — SIGNIFICANT CHANGE UP
NRBC # BLD: 0 /100 WBCS — SIGNIFICANT CHANGE UP (ref 0–0)
PH UR: 6 — SIGNIFICANT CHANGE UP (ref 5–8)
PHOSPHATE SERPL-MCNC: 2.7 MG/DL — SIGNIFICANT CHANGE UP (ref 2.5–4.5)
PLATELET # BLD AUTO: 299 K/UL — SIGNIFICANT CHANGE UP (ref 150–400)
POTASSIUM SERPL-MCNC: 3.9 MMOL/L — SIGNIFICANT CHANGE UP (ref 3.5–5.3)
POTASSIUM SERPL-SCNC: 3.9 MMOL/L — SIGNIFICANT CHANGE UP (ref 3.5–5.3)
PROT SERPL-MCNC: 6.9 G/DL — SIGNIFICANT CHANGE UP (ref 6–8.3)
PROT UR-MCNC: NEGATIVE — SIGNIFICANT CHANGE UP
PROTHROM AB SERPL-ACNC: 12 SEC — SIGNIFICANT CHANGE UP (ref 10.6–13.6)
RBC # BLD: 4.35 M/UL — SIGNIFICANT CHANGE UP (ref 3.8–5.2)
RBC # FLD: 12.4 % — SIGNIFICANT CHANGE UP (ref 10.3–14.5)
RBC CASTS # UR COMP ASSIST: 3 /HPF — SIGNIFICANT CHANGE UP (ref 0–4)
SARS-COV-2 RNA SPEC QL NAA+PROBE: SIGNIFICANT CHANGE UP
SODIUM SERPL-SCNC: 140 MMOL/L — SIGNIFICANT CHANGE UP (ref 135–145)
SP GR SPEC: 1.02 — SIGNIFICANT CHANGE UP (ref 1.01–1.02)
TROPONIN T, HIGH SENSITIVITY RESULT: 7 NG/L — SIGNIFICANT CHANGE UP (ref 0–51)
TROPONIN T, HIGH SENSITIVITY RESULT: 8 NG/L — SIGNIFICANT CHANGE UP (ref 0–51)
UROBILINOGEN FLD QL: NEGATIVE — SIGNIFICANT CHANGE UP
WBC # BLD: 11.66 K/UL — HIGH (ref 3.8–10.5)
WBC # FLD AUTO: 11.66 K/UL — HIGH (ref 3.8–10.5)
WBC UR QL: 11 /HPF — HIGH (ref 0–5)

## 2020-12-15 PROCEDURE — 72125 CT NECK SPINE W/O DYE: CPT | Mod: 26

## 2020-12-15 PROCEDURE — 71045 X-RAY EXAM CHEST 1 VIEW: CPT | Mod: 26

## 2020-12-15 PROCEDURE — 76705 ECHO EXAM OF ABDOMEN: CPT | Mod: 26,RT

## 2020-12-15 PROCEDURE — 99285 EMERGENCY DEPT VISIT HI MDM: CPT

## 2020-12-15 PROCEDURE — 72170 X-RAY EXAM OF PELVIS: CPT | Mod: 26

## 2020-12-15 PROCEDURE — 70450 CT HEAD/BRAIN W/O DYE: CPT | Mod: 26

## 2020-12-15 PROCEDURE — 93010 ELECTROCARDIOGRAM REPORT: CPT

## 2020-12-15 RX ORDER — METOPROLOL TARTRATE 50 MG
25 TABLET ORAL DAILY
Refills: 0 | Status: DISCONTINUED | OUTPATIENT
Start: 2020-12-15 | End: 2020-12-16

## 2020-12-15 RX ORDER — LISINOPRIL 2.5 MG/1
40 TABLET ORAL DAILY
Refills: 0 | Status: DISCONTINUED | OUTPATIENT
Start: 2020-12-15 | End: 2020-12-16

## 2020-12-15 RX ORDER — METOCLOPRAMIDE HCL 10 MG
10 TABLET ORAL ONCE
Refills: 0 | Status: COMPLETED | OUTPATIENT
Start: 2020-12-15 | End: 2020-12-15

## 2020-12-15 RX ORDER — ACETAMINOPHEN 500 MG
650 TABLET ORAL EVERY 6 HOURS
Refills: 0 | Status: DISCONTINUED | OUTPATIENT
Start: 2020-12-15 | End: 2020-12-16

## 2020-12-15 RX ORDER — MECLIZINE HCL 12.5 MG
25 TABLET ORAL THREE TIMES A DAY
Refills: 0 | Status: DISCONTINUED | OUTPATIENT
Start: 2020-12-15 | End: 2020-12-16

## 2020-12-15 RX ORDER — PANTOPRAZOLE SODIUM 20 MG/1
40 TABLET, DELAYED RELEASE ORAL DAILY
Refills: 0 | Status: DISCONTINUED | OUTPATIENT
Start: 2020-12-15 | End: 2020-12-16

## 2020-12-15 RX ORDER — ASPIRIN/CALCIUM CARB/MAGNESIUM 324 MG
81 TABLET ORAL DAILY
Refills: 0 | Status: DISCONTINUED | OUTPATIENT
Start: 2020-12-15 | End: 2020-12-16

## 2020-12-15 RX ORDER — ONDANSETRON 8 MG/1
4 TABLET, FILM COATED ORAL ONCE
Refills: 0 | Status: COMPLETED | OUTPATIENT
Start: 2020-12-15 | End: 2020-12-15

## 2020-12-15 RX ORDER — IBUPROFEN 200 MG
1 TABLET ORAL
Qty: 0 | Refills: 0 | DISCHARGE

## 2020-12-15 RX ORDER — ATORVASTATIN CALCIUM 80 MG/1
10 TABLET, FILM COATED ORAL AT BEDTIME
Refills: 0 | Status: DISCONTINUED | OUTPATIENT
Start: 2020-12-15 | End: 2020-12-16

## 2020-12-15 RX ORDER — TRIAMTERENE/HYDROCHLOROTHIAZID 75 MG-50MG
1 TABLET ORAL DAILY
Refills: 0 | Status: DISCONTINUED | OUTPATIENT
Start: 2020-12-15 | End: 2020-12-16

## 2020-12-15 RX ADMIN — Medication 650 MILLIGRAM(S): at 21:10

## 2020-12-15 RX ADMIN — ATORVASTATIN CALCIUM 10 MILLIGRAM(S): 80 TABLET, FILM COATED ORAL at 21:10

## 2020-12-15 RX ADMIN — PANTOPRAZOLE SODIUM 40 MILLIGRAM(S): 20 TABLET, DELAYED RELEASE ORAL at 17:34

## 2020-12-15 RX ADMIN — Medication 650 MILLIGRAM(S): at 21:40

## 2020-12-15 RX ADMIN — ONDANSETRON 4 MILLIGRAM(S): 8 TABLET, FILM COATED ORAL at 06:03

## 2020-12-15 RX ADMIN — Medication 25 MILLIGRAM(S): at 17:34

## 2020-12-15 RX ADMIN — Medication 81 MILLIGRAM(S): at 17:34

## 2020-12-15 RX ADMIN — Medication 10 MILLIGRAM(S): at 07:44

## 2020-12-15 NOTE — ED PROVIDER NOTE - OBJECTIVE STATEMENT
74 yo F with pmhx of HTN, CAD (stentsx3) presents with fall, epigastric pain, n/v. States she woke up at some time at night, went to use the bathroom, and then found herself on the floor on her back. Floor is carpeted. Did hit her head. No blood thinners. Takes asa 81mg, took it last night. Was able to get up herself, walked back to the room, tried looking for her phone to call her daughter, and called out for her son. Her son came over and called 911. Vomited twice when EMS arrived, did not receive antiemetic. Only complaining of epigastric pain and nausea at this time. Denies headache, change in strength or sensation in her extremities, chest pain, shortness of breath. States he was asxs when she went to sleep. Pt is in c-collar.

## 2020-12-15 NOTE — ED PROVIDER NOTE - NS ED ROS FT
Gen: Denies fever, chills  CV: Denies chest pain  Skin: Denies rash, erythema  Resp: Denies SOB, cough  ENT: Denies nasal congestion  Eyes: Denies blurry vision  GI: +nausea, vomiting  Msk: Denies LE swelling  : Denies dysuria  Neuro: +head trauma Denies headache

## 2020-12-15 NOTE — ED PROVIDER NOTE - ATTENDING CONTRIBUTION TO CARE
Afebrile. Awake and Alert. Head atraumatic. C-collar by EMS, no mid-line TTP. Lungs CTA. Heart RRR. Abdomen soft, +epigastric TTP, ND. CN II-XII grossly intact. Moves all extremities without lateralization.    Trauma:  CTH/CTCS/CXR/Pelvic XR: r/o trauma, given age    Nausea and vomiting:  RUQ US r/o GB pathology  r/o gastritis  r/o pancreatitis    Syncope:  No CP or SOB  r/o ACS  r/o arrhythmia  Possible vaso-vagal as occcured in context of going to bathroom for nausea with h/o vaso-vagal episodes 2/2 pain

## 2020-12-15 NOTE — ED ADULT NURSE NOTE - NSIMPLEMENTINTERV_GEN_ALL_ED
Implemented All Fall with Harm Risk Interventions:  [place] to call system. Call bell, personal items and telephone within reach. Instruct patient to call for assistance. Room bathroom lighting operational. Non-slip footwear when patient is off stretcher. Physically safe environment: no spills, clutter or unnecessary equipment. Stretcher in lowest position, wheels locked, appropriate side rails in place. Provide visual cue, wrist band, yellow gown, etc. Monitor gait and stability. Monitor for mental status changes and reorient to person, place, and time. Review medications for side effects contributing to fall risk. Reinforce activity limits and safety measures with patient and family. Provide visual clues: red socks.

## 2020-12-15 NOTE — CONSULT NOTE ADULT - SUBJECTIVE AND OBJECTIVE BOX
Chief Complaint:  Patient is a 75y old  Female who presents with a chief complaint of     HPI: 74 yo F with pmhx of HTN, CAD (stentsx3) presents with fall, epigastric pain, n/v. States she woke up at some time at night, went to use the bathroom, and then found herself on the floor on her back. Floor is carpeted. Did hit her head. No blood thinners. Takes asa 81mg, took it last night. Was able to get up herself, walked back to the room, tried looking for her phone to call her daughter, and called out for her son. Her son came over and called 911. Vomited twice when EMS arrived, did not receive antiemetic. Only complaining of epigastric pain and nausea at this time. Denies headache, change in strength or sensation in her extremities, chest pain, shortness of breath. States he was asxs when she went to sleep.     GI consulted for epigastric pain with associated nausea, vomiting. Patient seen and examined in the ED. She states she vomited clear fluids last night x 2 and subsequently developed epigastric pain. She denies associated CP, SOB, fever, chills, diarrhea, constipation, melena, hematochezia, decreased appetite, post-prandial pain. She denies experiencing epigastric pain like this in the past. She endorses taking ibuprofen prn for her sciatica pain. She denies prior history of gastrointestinal bleeding. Denies ETOH/Tobacco use. No prior history of endoscopic workup.     Allergies:  fish (Unknown)  iodine (Anaphylaxis)  labetalol (Other)  No Known Drug Allergies      Medications:      PMHX/PSHX:  Sciatica    Legally blind in left eye, as defined in [place]    Borderline diabetes mellitus    Migraines    Vertigo    H/O heart artery stent    History of MI (myocardial infarction)    CAD (coronary artery disease)    HTN (hypertension), benign    Dyslipidemia    S/P ICD (internal cardiac defibrillator) procedure    History of coronary artery stent placement    S/P LASIK surgery    History of tubal ligation    History of tonsillectomy    No Past Surgical History        Family history:  Family history of brain tumor (Sibling)    Family history of hypercholesterolemia (Child, Sibling)    Family history of hypertension (Child, Sibling)    Family history of MI (myocardial infarction) (Child)        Social History: denies etoh/tobacco/illicit drug use     ROS:     General:  No wt loss, fevers, chills, night sweats, fatigue,   Eyes:  Good vision, no reported pain  ENT:  No sore throat, pain, runny nose, dysphagia  CV:  No pain, palpitations, hypo/hypertension  Resp:  No dyspnea, cough, tachypnea, wheezing  GI:  +pain, +nausea, +vomiting, No diarrhea, No constipation, No weight loss, No fever, No pruritis, No rectal bleeding, No tarry stools, No dysphagia,  :  No pain, bleeding, incontinence, nocturia  Muscle:  No pain, weakness  Neuro:  No weakness, tingling, memory problems  Psych:  No fatigue, insomnia, mood problems, depression  Endocrine:  No polyuria, polydipsia, cold/heat intolerance  Heme:  No petechiae, ecchymosis, easy bruisability  Skin:  No rash, tattoos, scars, edema      PHYSICAL EXAM:   Vital Signs:  Vital Signs Last 24 Hrs  T(C): 36.7 (15 Dec 2020 11:07), Max: 37.4 (15 Dec 2020 05:31)  T(F): 98 (15 Dec 2020 11:07), Max: 99.3 (15 Dec 2020 05:31)  HR: 86 (15 Dec 2020 11:07) (64 - 86)  BP: 155/73 (15 Dec 2020 11:07) (155/73 - 196/62)  BP(mean): --  RR: 18 (15 Dec 2020 11:07) (16 - 18)  SpO2: 99% (15 Dec 2020 11:07) (97% - 99%)  Daily Height in cm: 147.32 (15 Dec 2020 05:31)    Daily     GENERAL:  no distress  HEENT:  NC/AT  ABDOMEN:  Soft, non-tender, non-distended, normoactive bowel sounds  EXTEREMITIES:  no cyanosis,clubbing or edema  SKIN:  No rash/erythema/ecchymoses/petechiae/wounds/abscess/warm/dry  NEURO:  Alert, oriented, no asterixis, no tremor, no encephalopathy    LABS:                        11.8   11.66 )-----------( 299      ( 15 Dec 2020 06:09 )             37.2     12-15    140  |  102  |  23  ----------------------------<  202<H>  3.9   |  25  |  1.38<H>    Ca    9.9      15 Dec 2020 06:09  Phos  2.7     12-15  Mg     1.9     12-15    TPro  6.9  /  Alb  4.4  /  TBili  0.4  /  DBili  x   /  AST  14  /  ALT  9<L>  /  AlkPhos  73  12-15    LIVER FUNCTIONS - ( 15 Dec 2020 06:09 )  Alb: 4.4 g/dL / Pro: 6.9 g/dL / ALK PHOS: 73 U/L / ALT: 9 U/L / AST: 14 U/L / GGT: x           PT/INR - ( 15 Dec 2020 06:09 )   PT: 12.0 sec;   INR: 1.00 ratio         PTT - ( 15 Dec 2020 06:09 )  PTT:28.8 sec    Amylase Serum--      Lipase serum24       Ammonia--      Imaging:        < from: US Abdomen Upper Quadrant Right (12.15.20 @ 08:26) >    EXAM:  US ABDOMEN RT UPR QUADRANT                            PROCEDURE DATE:  12/15/2020            INTERPRETATION:  CLINICAL INFORMATION: Epigastric pain.    COMPARISON: None available.    TECHNIQUE: Sonography of the right upper quadrant.    FINDINGS:    Liver: Steatosis with focal fatty sparing.  Bile ducts: Normal caliber. Common bile duct measures 4 mm.  Gallbladder: Within normal limits.  Pancreas: No peripancreatic fluid. Nonspecific heterogeneous echotexture may represent fatty infiltration.  Right kidney: 11.0 cm. No hydronephrosis.  Ascites: None.  IVC: Visualized portions are within normal limits.    IMPRESSION:    No acute pathology.                LEFTY BURRELL MD; Resident Radiology  This document has been electronically signed.  GONZALEZ MOCK MD; Attending Radiologist  This document has been electronically signed. Dec 15 2020  2:05PM    < end of copied text >

## 2020-12-15 NOTE — H&P ADULT - NSHPPHYSICALEXAM_GEN_ALL_CORE
Vital Signs Last 24 Hrs  T(C): 37.2 (15 Dec 2020 15:30), Max: 37.4 (15 Dec 2020 05:31)  T(F): 98.9 (15 Dec 2020 15:30), Max: 99.3 (15 Dec 2020 05:31)  HR: 90 (15 Dec 2020 15:30) (64 - 90)  BP: 148/73 (15 Dec 2020 15:30) (148/73 - 196/62)  BP(mean): --  RR: 18 (15 Dec 2020 15:30) (16 - 18)  SpO2: 97% (15 Dec 2020 15:30) (97% - 99%)    PHYSICAL EXAM:  GENERAL: NAD, well-developed  HEAD:  Atraumatic, Normocephalic  EYES: EOMI, PERRLA, conjunctiva and sclera clear  NECK: Supple, No JVD  CHEST/LUNG: Clear to auscultation bilaterally; No wheeze  HEART: Regular rate and rhythm; No murmurs, rubs, or gallops  ABDOMEN: Soft, Nontender, Nondistended; Bowel sounds present  EXTREMITIES:  2+ Peripheral Pulses, No clubbing, cyanosis, or edema  PSYCH: AAOx3  NEUROLOGY: non-focal  SKIN: No rashes or lesions

## 2020-12-15 NOTE — CONSULT NOTE ADULT - SUBJECTIVE AND OBJECTIVE BOX
HISTORY OF PRESENT ILLNESS: HPI:  Patient is a 76 y/o female well known to our office (Cardiologist - Dr. Del Castillo) with PMH of CAD s/p remote PCI in , HTN, HLD, recurrent syncope s/p previous ILR with no events ever recorded, LICA stenosis s/p recent left CEA (2020), previous exercise NST in 2019 with no evidence of stress induced ischemia or infarct as well as previous TTE 2020 with normal LV/RV function. Patient is now admitted s/p syncopal episode likely vasovagal. Cardiology consulted for further evaluation. Patient reports waking up to go the bathroom at night and had severe cramping in her legs with episode of epigastric pain with n/v. She then passed out and fell onto the floor, got up on her own and called 911. She also reports history of vertigo. She currently has no complaints. She denies chest pain, SOB, HERRING, dizziness, palpitations, back pain, fever/chills.    PAST MEDICAL & SURGICAL HISTORY:  Sciatica    Legally blind in left eye, as defined in [place]  20/400 left eye    Borderline diabetes mellitus  Controlled with diet    Migraines  Developed at 10 years of age  Last episode 2 years ago    Vertigo    H/O heart artery stent    History of MI (myocardial infarction)    CAD (coronary artery disease)    HTN (hypertension), benign    Dyslipidemia    S/P ICD (internal cardiac defibrillator) procedure  loop recorder    History of coronary artery stent placement  3 stents (Last in )  Same admission as past myocardial infarction    S/P LASIK surgery  Right eye, no complications as per patient.    History of tubal ligation  35 years ago, no complications as per patient.    History of tonsillectomy    No Past Surgical History            MEDICATIONS:  MEDICATIONS  (STANDING):  aspirin  chewable 81 milliGRAM(s) Oral daily  atorvastatin 10 milliGRAM(s) Oral at bedtime  lisinopril 40 milliGRAM(s) Oral daily  metoprolol succinate ER 25 milliGRAM(s) Oral daily  pantoprazole    Tablet 40 milliGRAM(s) Oral daily  triamterene 37.5 mG/hydrochlorothiazide 25 mG Tablet 1 Tablet(s) Oral daily      Allergies    fish (Unknown)  iodine (Anaphylaxis)  No Known Drug Allergies    Intolerances    labetalol (Other)      FAMILY HISTORY:  Family history of brain tumor (Sibling)    Family history of hypercholesterolemia (Child, Sibling)    Family history of hypertension (Child, Sibling)  Daughters, Sisters    Family history of MI (myocardial infarction) (Child)  Father ( at 75)  Mother ( at 87)  Son (  at 35)      Non-contributary for premature coronary disease or sudden cardiac death    SOCIAL HISTORY:    [x ] Non-smoker  [ ] Smoker  [ ] Alcohol    FLU VACCINE THIS YEAR STARTS IN AUGUST:  [ ] Yes    [ ] No    IF OVER 65 HAVE YOU EVER HAD A PNA VACCINE:  [ ] Yes    [ ] No       [ ] N/A      REVIEW OF SYSTEMS:  [ ]chest pain  [  ]shortness of breath  [  ]palpitations  [x  ]syncope  [ ]near syncope [ ]upper extremity weakness   [ ] lower extremity weakness  [  ]diplopia  [  ]altered mental status   [  ]fevers  [ ]chills [x ]nausea  [ x]vomitting  [  ]dysphagia    [x ]abdominal pain  [ ]melena  [ ]BRBPR    [  ]epistaxis  [  ]rash    [ ]lower extremity edema    +leg cramps      [ x] All others negative	  [ ] Unable to obtain      LABS:	 	    CARDIAC MARKERS:  CARDIAC MARKERS ( 15 Dec 2020 06:09 )  x     / x     / 38 U/L / x     / x                                  11.8   11.66 )-----------( 299      ( 15 Dec 2020 06:09 )             37.2     Hb Trend: 11.8<--    12-15    140  |  102  |  23  ----------------------------<  202<H>  3.9   |  25  |  1.38<H>    Ca    9.9      15 Dec 2020 06:09  Phos  2.7     12-15  Mg     1.9     12-15    TPro  6.9  /  Alb  4.4  /  TBili  0.4  /  DBili  x   /  AST  14  /  ALT  9<L>  /  AlkPhos  73  12-15    Creatinine Trend: 1.38<--    Coags:  PT/INR - ( 15 Dec 2020 06:09 )   PT: 12.0 sec;   INR: 1.00 ratio         PTT - ( 15 Dec 2020 06:09 )  PTT:28.8 sec    proBNP:   Lipid Profile:   HgA1c:   TSH:         PHYSICAL EXAM:  T(C): 37.2 (12-15-20 @ 15:30), Max: 37.4 (12-15-20 @ 05:31)  HR: 90 (12-15-20 @ 15:30) (64 - 90)  BP: 148/73 (12-15-20 @ 15:30) (148/73 - 196/62)  RR: 18 (12-15-20 @ 15:30) (16 - 18)  SpO2: 97% (12-15-20 @ 15:30) (97% - 99%)  Wt(kg): --   BMI (kg/m2): 22.6 (12-15-20 @ 05:31)  I&O's Summary      Gen: Appears well in NAD  HEENT:  (-)icterus (-)pallor  CV: N S1 S2 1/6 VICTORIA (+)2 Pulses B/l  Resp:  Clear to auscultation B/L, normal effort  GI: (+) BS Soft, NT, ND  Lymph:  (-)Edema, (-)obvious lymphadenopathy  Skin: Warm to touch, Normal turgor  Psych: Appropriate mood and affect    TELEMETRY: 	      ECG: NSR, narrow QRS, no acute ST-T changes  	    RADIOLOGY:         CXR: < from: Xray Chest 1 View AP/PA (12.15.20 @ 07:15) >  IMPRESSION: Clear lungs.    < end of copied text >      ASSESSMENT/PLAN: Patient is a 76 y/o female well known to our office (Cardiologist - Dr. Del Castillo) with PMH of CAD s/p remote PCI in , HTN, HLD, recurrent syncope s/p previous ILR with no events ever recorded, LICA stenosis s/p recent left CEA (2020), previous exercise NST in 2019 with no evidence of stress induced ischemia or infarct as well as previous TTE 2020 with normal LV/RV function. Patient is now admitted s/p syncopal episode likely vasovagal. Cardiology consulted for further evaluation.     - Monitor telemetry - r/o arrhythmia  - No evidence of clinical HF or anginal symptoms  - Continue medical management of CAD - ASA/Statin  - CT head neg for acute changes  - Check LE dopplers given reported severe cramping  - Check orthostatics  - Previous TTE with normal LV function and no sig valvular abnormalities  - Patient to f/u in our office with Dr. Del Castillo after d/c    CHARLOTTE AlejandreC  Pager: [phone]

## 2020-12-15 NOTE — ED PROVIDER NOTE - CLINICAL SUMMARY MEDICAL DECISION MAKING FREE TEXT BOX
Had a fall this morning - unsure if it was mechanical or if she had preceding sxs. Only complaining of epigastric and nausea at this time. CT head, neck, cxr, xr pelvis. ACS workup. EKG - no kasey/std. UA to eval for UTI. Will reassess

## 2020-12-15 NOTE — ED ADULT NURSE NOTE - OBJECTIVE STATEMENT
brought in by [facility] ems from home s/p unwitnessed fall in bathroom tonight about 4 am; pt states she went to the BR and fell but does not recall how she fell; pt hit posterior head on floor; she picked herself up from the ground and got back to bed; felt nausea brought in by [facility] ems from home s/p unwitnessed fall in bathroom tonight about 4 am; pt states she went to the BR and fell but does not recall how she fell; pt hit posterior head on floor; no lacs notedshe picked herself up from the ground and got back to bed; felt nausea; son called ems; also c/o epigastric distress; has c collar in place by EMS; pt has hx of vertigo and HTN; on baby asa daily; pt is fully alert and oriented at this time

## 2020-12-15 NOTE — H&P ADULT - ASSESSMENT
75 female h/o cad s/p pci, htn, p/w syncope, n/v    appears to be vasovagal by history    cards consulted  gi consulted    cont home meds    tele 75 female h/o cad s/p pci, htn, p/w syncope, n/v    syncope  cards consulted  appears to be vasovagal by history  cont tele monitor  check orthosatics    n/v  abd discomfort  gi consulted    cont home meds    dvt ppx      Advanced care planning was discussed with patient and family.  Advanced care planning forms were reviewed and discussed as appropriate.  Differential diagnosis and plan of care discussed with patient after the evaluation.   Pain assessed and judicious use of narcotics when appropriate was discussed.  Importance of Fall prevention discussed.  Counseling on Smoking and Alcohol cessation was offered when appropriate.  Counseling on Diet, exercise, and medication compliance was done.   Approx 45 minutes spent.

## 2020-12-15 NOTE — H&P ADULT - HISTORY OF PRESENT ILLNESS
74 yo F with pmhx of HTN, CAD (stentsx3) presents with fall, epigastric pain, n/v. States she woke up at some time at night, went to use the bathroom, and then found herself on the floor on her back. Floor is carpeted. Did hit her head. No blood thinners. Takes asa 81mg, took it last night. Was able to get up herself, walked back to the room, tried looking for her phone to call her daughter, and called out for her son. Her son came over and called 911. Vomited twice when EMS arrived, did not receive antiemetic. Only complaining of epigastric pain and nausea at this time. Denies headache, change in strength or sensation in her extremities, chest pain, shortness of breath. States he was asxs when she went to sleep.

## 2020-12-15 NOTE — CONSULT NOTE ADULT - PROBLEM SELECTOR RECOMMENDATION 9
- no obvious acute GI pathology appreciated on RUQ U/S  - Lipase, LFTs wnl  - suspect secondary to vasovagal event   - pain currently resolved   - diet as tolerated   - can start PPI 40mg daily   - no need for inpatient endoscopic eval. Can follow-up as outpatient   - d/w patient and GI attending Dr. Molina   - will follow - no obvious acute GI pathology appreciated on RUQ U/S  - Lipase, LFTs wnl  - suspect secondary to vasovagal event   - pain significantly improved   - diet as tolerated   - can start PPI 40mg daily   - no need for inpatient endoscopic eval. Can follow-up as outpatient   - d/w patient and GI attending Dr. Molina   - will follow

## 2020-12-15 NOTE — ED PROVIDER NOTE - PROGRESS NOTE DETAILS
Resident Mini: called CT tech to expedite the CT attending Yuridia: received sign out from Dr. Diaz at usual time of shift change. CT head/cspine negative for acute injury. Pt reassessed. No midline cervical tenderness with FROM neck. Cervical collar cleared. Pt complaining of persistent nausea and dizziness, no relief with zofran. Will administer reglan and reassess.

## 2020-12-15 NOTE — CONSULT NOTE ADULT - ATTENDING COMMENTS
Patient seen and examined, agree with above assessment and plan as transcribed above.    - Likely vagally mediated event  - Check orthostatic BP  - No clinical CHF    Elmer Hernandez MD, [facility]  BEEPER [phone]

## 2020-12-15 NOTE — CONSULT NOTE ADULT - PROBLEM SELECTOR RECOMMENDATION 2
- likely secondary to vasovagal event  - anti-emetics prn - likely secondary to vasovagal event  - s/p reglan and zofran   - symptoms improved   - anti-emetics prn

## 2020-12-15 NOTE — ED PROVIDER NOTE - PHYSICAL EXAMINATION
Gen: appears fatigued, in ccollar  Head: NC/NT  Eyes: PERRL  ENT: airway patent, mmm, oral cavity and pharynx normal. No inflammation, swelling, exudate, or lesions.   CV: RRR, +S1/S2  Resp: CTAB, symmetric breath sounds  GI:  abdomen soft non-distended, +epigastric TTP  Back: no spinal ttp, no paraspinal ttp  Extremities - no edema, 5/5 strength, sensation intact  Neuro: A&Ox3, following commands, speech clear, moving all four extremities spontaneously

## 2020-12-16 ENCOUNTER — TRANSCRIPTION ENCOUNTER (OUTPATIENT)
Age: 75
End: 2020-12-16

## 2020-12-16 VITALS
RESPIRATION RATE: 18 BRPM | OXYGEN SATURATION: 100 % | HEART RATE: 70 BPM | SYSTOLIC BLOOD PRESSURE: 166 MMHG | TEMPERATURE: 97 F | DIASTOLIC BLOOD PRESSURE: 79 MMHG

## 2020-12-16 LAB
ANION GAP SERPL CALC-SCNC: 12 MMOL/L — SIGNIFICANT CHANGE UP (ref 5–17)
BUN SERPL-MCNC: 22 MG/DL — SIGNIFICANT CHANGE UP (ref 7–23)
CALCIUM SERPL-MCNC: 9.3 MG/DL — SIGNIFICANT CHANGE UP (ref 8.4–10.5)
CHLORIDE SERPL-SCNC: 97 MMOL/L — SIGNIFICANT CHANGE UP (ref 96–108)
CO2 SERPL-SCNC: 24 MMOL/L — SIGNIFICANT CHANGE UP (ref 22–31)
CREAT SERPL-MCNC: 1.26 MG/DL — SIGNIFICANT CHANGE UP (ref 0.5–1.3)
GLUCOSE SERPL-MCNC: 117 MG/DL — HIGH (ref 70–99)
HCT VFR BLD CALC: 33.5 % — LOW (ref 34.5–45)
HGB BLD-MCNC: 10.9 G/DL — LOW (ref 11.5–15.5)
MCHC RBC-ENTMCNC: 27 PG — SIGNIFICANT CHANGE UP (ref 27–34)
MCHC RBC-ENTMCNC: 32.5 GM/DL — SIGNIFICANT CHANGE UP (ref 32–36)
MCV RBC AUTO: 83.1 FL — SIGNIFICANT CHANGE UP (ref 80–100)
NRBC # BLD: 0 /100 WBCS — SIGNIFICANT CHANGE UP (ref 0–0)
PLATELET # BLD AUTO: 273 K/UL — SIGNIFICANT CHANGE UP (ref 150–400)
POTASSIUM SERPL-MCNC: 3.7 MMOL/L — SIGNIFICANT CHANGE UP (ref 3.5–5.3)
POTASSIUM SERPL-SCNC: 3.7 MMOL/L — SIGNIFICANT CHANGE UP (ref 3.5–5.3)
RBC # BLD: 4.03 M/UL — SIGNIFICANT CHANGE UP (ref 3.8–5.2)
RBC # FLD: 12.3 % — SIGNIFICANT CHANGE UP (ref 10.3–14.5)
SODIUM SERPL-SCNC: 133 MMOL/L — LOW (ref 135–145)
WBC # BLD: 8.21 K/UL — SIGNIFICANT CHANGE UP (ref 3.8–10.5)
WBC # FLD AUTO: 8.21 K/UL — SIGNIFICANT CHANGE UP (ref 3.8–10.5)

## 2020-12-16 PROCEDURE — 93970 EXTREMITY STUDY: CPT | Mod: 26

## 2020-12-16 RX ORDER — ACETAMINOPHEN 500 MG
2 TABLET ORAL
Qty: 0 | Refills: 0 | DISCHARGE
Start: 2020-12-16

## 2020-12-16 RX ORDER — PANTOPRAZOLE SODIUM 20 MG/1
1 TABLET, DELAYED RELEASE ORAL
Qty: 30 | Refills: 0
Start: 2020-12-16 | End: 2021-01-14

## 2020-12-16 RX ADMIN — Medication 25 MILLIGRAM(S): at 05:25

## 2020-12-16 RX ADMIN — PANTOPRAZOLE SODIUM 40 MILLIGRAM(S): 20 TABLET, DELAYED RELEASE ORAL at 11:12

## 2020-12-16 RX ADMIN — Medication 1 TABLET(S): at 05:24

## 2020-12-16 RX ADMIN — LISINOPRIL 40 MILLIGRAM(S): 2.5 TABLET ORAL at 05:25

## 2020-12-16 RX ADMIN — Medication 81 MILLIGRAM(S): at 11:12

## 2020-12-16 NOTE — DISCHARGE NOTE PROVIDER - NSDCCPCAREPLAN_GEN_ALL_CORE_FT
PRINCIPAL DISCHARGE DIAGNOSIS  Diagnosis: Syncope without other cardiovascular symptoms  Assessment and Plan of Treatment: HOME CARE INSTRUCTIONS  Have someone stay with you until you feel stable.  Do not drive, operate machinery, or play sports until your caregiver says it is okay.  Keep all follow-up appointments as directed by your caregiver.   Lie down right away if you start feeling like you might faint. Breathe deeply and steadily. Wait until all the symptoms have passed.Drink enough fluids to keep your urine clear or pale yellow.  If you are taking blood pressure or heart medicine, get up slowly, taking several minutes to sit and then stand. This can reduce dizziness.  SEEK IMMEDIATE MEDICAL CARE IF:  You have a severe headache.  You have unusual pain in the chest, abdomen, or back.  You are bleeding from the mouth or rectum, or you have black or tarry stool.  You have an irregular or very fast heartbeat.  You have pain with breathing.  You have repeated fainting or seizure-like jerking during an episode.  You faint when sitting or lying down.  You have confusion.  You have difficulty walking.  You have severe weakness.  You have vision problems.  If you fainted, call your local emergency services (_____________________). Do not drive yourself to the hospital        SECONDARY DISCHARGE DIAGNOSES  Diagnosis: Epigastric pain  Assessment and Plan of Treatment: Epigastric pain    Diagnosis: Nausea & vomiting  Assessment and Plan of Treatment: Nausea & vomiting     PRINCIPAL DISCHARGE DIAGNOSIS  Diagnosis: Syncope without other cardiovascular symptoms  Assessment and Plan of Treatment: Cardiology consulted. Appears to be vasovagal by history. Orthostaticnegative. Was monitored on telemetry with no events.  Continue medical management of CAD - ASA/Statin  CT head was  negative for acute changes. Lower extremity dopplers negative  for DVT.   Previous echo showed normal LV function and no significant valvular abnormalities  Follow up with cardiology, Dr. Del Castillo after discharge**  Follow up with PCP.  HOME CARE INSTRUCTIONS  Have someone stay with you until you feel stable.  Do not drive, operate machinery, or play sports until your caregiver says it is okay.  Keep all follow-up appointments as directed by your caregiver.   Lie down right away if you start feeling like you might faint. Breathe deeply and steadily. Wait until all the symptoms have passed.Drink enough fluids to keep your urine clear or pale yellow.  If you are taking blood pressure or heart medicine, get up slowly, taking several minutes to sit and then stand. This can reduce dizziness.  SEEK IMMEDIATE MEDICAL CARE IF:  You have a severe headache.  You have unusual pain in the chest, abdomen, or back.  You are bleeding from the mouth or rectum, or you have black or tarry stool.  You have an irregular or very fast heartbeat.  You have pain with breathing.  You have repeated fainting or seizure-like jerking during an episode.  You faint when sitting or lying down.  You have confusion.  You have difficulty walking.  You have severe weakness.  You have vision problems.  If you fainted, call your local emergency services (_____________________). Do not drive yourself to the hospital        SECONDARY DISCHARGE DIAGNOSES  Diagnosis: Epigastric pain  Assessment and Plan of Treatment: Gastroentetology was consulted; no obvious acute GI pathology   Suspect secondary to vasovagal event.  Continue protonix.   Follow up with gastroenterology as an outpatient.    Diagnosis: Nausea & vomiting  Assessment and Plan of Treatment: Follow up with GI.    Diagnosis: CAD (coronary artery disease)  Assessment and Plan of Treatment: Coronary artery disease is a condition where the arteries the supply the heart muscle get clogges with fatty deposits & puts you at risk for a heart attack  Call your doctor if you have any new pain, pressure, or discomfort in the center of your chest, pain, tingling or discomfort in arms, back, neck, jaw, or stomach, shortness of breath, nausea, vomiting, burping or heartburn, sweating, cold and clammy skin, racing or abnormal heartbeat for more than 10 minutes or if they keep coming & going.  Call 911 and do not tr to get to hospital by care  You can help yourself with lefestyle changes (quitting smoking if you smoke), eat lots of fruits & vegetables & low fat dairy products, not a lot of meat & fatty foods, walk or some form of physical activity most days of the week, lose weight if you are overweight  Take your cardiac medication as prescribed to lower cholesterol, to lower blood pressure, aspirin to prevent blood clots, and diabetes control  Make sure to keep appointments with doctor for cardiac follow up care       PRINCIPAL DISCHARGE DIAGNOSIS  Diagnosis: Syncope without other cardiovascular symptoms  Assessment and Plan of Treatment: Cardiology consulted. Appears to be vasovagal by history. Orthostaticnegative. Was monitored on telemetry with no events.  Continue medical management of CAD - ASA/Statin  CT head was  negative for acute changes. Lower extremity dopplers negative  for DVT.   Previous echo showed normal LV function and no significant valvular abnormalities  Follow up with cardiology, Dr. Del Castillo after discharge**  Follow up with PCP.  HOME CARE INSTRUCTIONS  Have someone stay with you until you feel stable.  Do not drive, operate machinery, or play sports until your caregiver says it is okay.  Keep all follow-up appointments as directed by your caregiver.   Lie down right away if you start feeling like you might faint. Breathe deeply and steadily. Wait until all the symptoms have passed.Drink enough fluids to keep your urine clear or pale yellow.  If you are taking blood pressure or heart medicine, get up slowly, taking several minutes to sit and then stand. This can reduce dizziness.  SEEK IMMEDIATE MEDICAL CARE IF:  You have a severe headache.  You have unusual pain in the chest, abdomen, or back.  You are bleeding from the mouth or rectum, or you have black or tarry stool.  You have an irregular or very fast heartbeat.  You have pain with breathing.  You have repeated fainting or seizure-like jerking during an episode.  You faint when sitting or lying down.  You have confusion.  You have difficulty walking.  You have severe weakness.  You have vision problems.  If you fainted, call your local emergency services (_____________________). Do not drive yourself to the hospital        SECONDARY DISCHARGE DIAGNOSES  Diagnosis: Epigastric pain  Assessment and Plan of Treatment: Gastroentetology was consulted; no obvious acute GI pathology   Suspect secondary to vasovagal event.  Continue protonix.   Follow up with gastroenterology as an outpatient.    Diagnosis: HTN (hypertension)  Assessment and Plan of Treatment: Monitor blood pressure at home and follow up with cardiology as an outpatient.   Low salt diet  Activity as tolerated.  Take all medication as prescribed.  Follow up with your medical doctor for routine blood pressure monitoring at your next visit.  Notify your doctor if you have any of the following symptoms:   Dizziness, Lightheadedness, Blurry vision, Headache, Chest pain, Shortness of breath      Diagnosis: Nausea & vomiting  Assessment and Plan of Treatment: Follow up with GI.    Diagnosis: CAD (coronary artery disease)  Assessment and Plan of Treatment: Coronary artery disease is a condition where the arteries the supply the heart muscle get clogges with fatty deposits & puts you at risk for a heart attack  Call your doctor if you have any new pain, pressure, or discomfort in the center of your chest, pain, tingling or discomfort in arms, back, neck, jaw, or stomach, shortness of breath, nausea, vomiting, burping or heartburn, sweating, cold and clammy skin, racing or abnormal heartbeat for more than 10 minutes or if they keep coming & going.  Call 911 and do not tr to get to hospital by care  You can help yourself with lefestyle changes (quitting smoking if you smoke), eat lots of fruits & vegetables & low fat dairy products, not a lot of meat & fatty foods, walk or some form of physical activity most days of the week, lose weight if you are overweight  Take your cardiac medication as prescribed to lower cholesterol, to lower blood pressure, aspirin to prevent blood clots, and diabetes control  Make sure to keep appointments with doctor for cardiac follow up care

## 2020-12-16 NOTE — DISCHARGE NOTE PROVIDER - NSDCMRMEDTOKEN_GEN_ALL_CORE_FT
acetaminophen 325 mg oral tablet: 2 tab(s) orally every 6 hours, As needed, Mild Pain (1 - 3), Moderate Pain (4 - 6)  aspirin 81 mg oral tablet, chewable: 1 tab(s) orally once a day  lisinopril 40 mg oral tablet: 1 tab(s) orally once a day  meclizine 25 mg oral tablet: 1 tab(s) orally 3 times a day, As Needed vertigo  metoprolol succinate 25 mg oral tablet, extended release: 1 tab(s) orally once a day  pravastatin 10 mg oral tablet: 1 tab(s) orally once a day  triamterene-hydrochlorothiazide 37.5 mg- 25 mg oral capsule: 1 cap(s) orally once a day    NOTE: LOCAL PHARMACY HAS LAST FILL OF MED BACK IN AUG 2020 X 90DAY AND HAS NOT BEEN FILLED SINCE   acetaminophen 325 mg oral tablet: 2 tab(s) orally every 6 hours, As needed, Mild Pain (1 - 3), Moderate Pain (4 - 6)  aspirin 81 mg oral tablet, chewable: 1 tab(s) orally once a day  lisinopril 40 mg oral tablet: 1 tab(s) orally once a day  meclizine 25 mg oral tablet: 1 tab(s) orally 3 times a day, As Needed vertigo  metoprolol succinate 25 mg oral tablet, extended release: 1 tab(s) orally once a day  pantoprazole 40 mg oral delayed release tablet: 1 tab(s) orally once a day  pravastatin 10 mg oral tablet: 1 tab(s) orally once a day  triamterene-hydrochlorothiazide 37.5 mg- 25 mg oral capsule: 1 cap(s) orally once a day    NOTE: LOCAL PHARMACY HAS LAST FILL OF MED BACK IN AUG 2020 X 90DAY AND HAS NOT BEEN FILLED SINCE

## 2020-12-16 NOTE — PROGRESS NOTE ADULT - ASSESSMENT
75 female h/o cad s/p pci, htn, p/w syncope, n/v    syncope  cards consulted  appears to be vasovagal by history  cont tele monitor - no events  check orthosatics - nl  recent echo noted  dopplers neg for dvt    n/v  abd discomfort  gi consulted  resolved    cont home meds    dvt ppx    dc home with outpt f/u        Advanced care planning was discussed with patient and family.  Advanced care planning forms were reviewed and discussed as appropriate.  Differential diagnosis and plan of care discussed with patient after the evaluation.   Pain assessed and judicious use of narcotics when appropriate was discussed.  Importance of Fall prevention discussed.  Counseling on Smoking and Alcohol cessation was offered when appropriate.  Counseling on Diet, exercise, and medication compliance was done.   Approx 45 minutes spent.

## 2020-12-16 NOTE — DISCHARGE NOTE PROVIDER - HOSPITAL COURSE
74 yo F with pmhx of HTN, CAD (stentsx3) presents with fall, epigastric pain, n/v. States she woke up at some time at night,   went to use the bathroom, and then found herself on the floor on her back. Floor is carpeted. Did hit her head. No blood thinners.   Takes asa 81mg, took it last night. Was able to get up herself, walked back to the room, tried looking for her phone to call her daughter,   and called out for her son. Her son came over and called 911. Vomited twice when EMS arrived, did not receive antiemetic.   Only complaining of epigastric pain and nausea at this time. Denies headache, change in strength or sensation in her extremities,   chest pain, shortness of breath. States he was asxs when she went to sleep.   For syncope, cardiology consulted. Appears to be vasovagal by history. Orthostatics checked- negative.  Tele stable in NSR with no events. No evidence of clinical HF or anginal symptoms. Continue medical management of CAD - ASA/Statin  CT head neg for acute changes. LE dopplers neg for DVT. Previous TTE with normal LV function and no sig valvular abnormalities   No further inpatient cardiac w/u needed - stable for discharge from CV perspective  Pt to follow up with cardiology,  Dr. Del Castillo after d/c  For N/V abd discomfort, GI consulted; no obvious acute GI pathology appreciated on RUQ U/S; Lipase, LFTs wnl  Gi suspects secondary to vasovagal event symptoms resolved .cont PPI 40mg daily ; no need for inpatient endoscopic eval.   Follow up with GI as outpatient          76 yo F with pmhx of HTN, CAD (stentsx3) presents with fall, epigastric pain, n/v. States she woke up at some time at night,   went to use the bathroom, and then found herself on the floor on her back. Floor is carpeted. Did hit her head. No blood thinners.   Takes asa 81mg, took it last night. Was able to get up herself, walked back to the room, tried looking for her phone to call her daughter,   and called out for her son. Her son came over and called 911. Denies headache, change in strength or sensation in her extremities,   chest pain, shortness of breath. States he was asxs when she went to sleep.   For syncope, cardiology consulted. Appears to be vasovagal by history. Orthostatics checked- negative.  Tele stable in NSR with no events. No evidence of clinical HF or anginal symptoms. Continue medical management of CAD - ASA/Statin  CT head neg for acute changes. LE dopplers neg for DVT. Previous TTE with normal LV function and no sig valvular abnormalities   No further inpatient cardiac w/u needed - stable for discharge from CV perspective  Pt to follow up with cardiology,  Dr. Del Castillo after d/c  For N/V abd discomfort, GI consulted; no obvious acute GI pathology appreciated on RUQ U/S; Lipase, LFTs wnl  Gi suspects secondary to vasovagal event symptoms resolved .cont PPI 40mg daily ; no need for inpatient endoscopic eval.   Follow up with GI as outpatient       cleared for discharge home on 12/16/20.

## 2020-12-16 NOTE — DISCHARGE NOTE PROVIDER - PROVIDER TOKENS
FREE:[LAST:[Dotson],FIRST:[Angel],PHONE:[phone]],FAX:[(   )    -],FOLLOWUP:[1-3 days]],FREE:[LAST:[Lygricel],PHONE:[(   )    -],FAX:[(   )    -],ADDRESS:[cardiolology]],PROVIDER:[TOKEN:[[id number]]] PROVIDER:[TOKEN:[[id number]]],FREE:[LAST:[Dotson],FIRST:[Angel],PHONE:[phone]],FAX:[(   )    -],FOLLOWUP:[1-3 days]],FREE:[LAST:[Lyandres],PHONE:[(   )    -],FAX:[(   )    -],ADDRESS:[cardiolology]],PROVIDER:[TOKEN:[[id number]]]

## 2020-12-16 NOTE — CHART NOTE - NSCHARTNOTEFT_GEN_A_CORE
[facility] Discharge Note  Patient medically cleared for discharge today, discussed with Dr. Mckenna.  Medications for discharge reviewed with Dr. Mckenna.  /79 today. BP trends discussed with cardiology Ed NICK. Confirmed with cardiology to discharge on current BP medications  and pt will f/u with cardiology as outpatient.  Discharge plan discussed with patient.    Sally Hall PA-C  Dept of Medicine  #[id number]

## 2020-12-16 NOTE — PROGRESS NOTE ADULT - SUBJECTIVE AND OBJECTIVE BOX
S: Denies chest pain, SOB, or palpitations. Review of systems otherwise (-)      Review of Systems:   Constitutional: [ ] fevers, [ ] chills.   Skin: [ ] dry skin. [ ] rashes.  Psychiatric: [ ] depression, [ ] anxiety.   Gastrointestinal: [ ] BRBPR, [ ] melena.   Neurological: [ ] confusion. [ ] seizures. [ ] shuffling gait.   Ears,Nose,Mouth and Throat: [ ] ear pain [ ] sore throat.   Eyes: [ ] diplopia.   Respiratory: [ ] hemoptysis. [ ] shortness of breath  Cardiovascular: See HPI above  Hematologic/Lymphatic: [ ] anemia. [ ] painful nodes. [ ] prolonged bleeding.   Genitourinary: [ ] hematuria. [ ] flank pain.   Endocrine: [ ] significant change in weight. [ ] intolerance to heat and cold.     Review of systems [ x] otherwise negative, [ ] otherwise unable to obtain    FH: no family history of sudden cardiac death in first degree relatives    SH: [ ] tobacco, [ ] alcohol, [ ] drugs    acetaminophen   Tablet .. 650 milliGRAM(s) Oral every 6 hours PRN  aspirin  chewable 81 milliGRAM(s) Oral daily  atorvastatin 10 milliGRAM(s) Oral at bedtime  lisinopril 40 milliGRAM(s) Oral daily  meclizine 25 milliGRAM(s) Oral three times a day PRN  metoprolol succinate ER 25 milliGRAM(s) Oral daily  pantoprazole    Tablet 40 milliGRAM(s) Oral daily  triamterene 37.5 mG/hydrochlorothiazide 25 mG Tablet 1 Tablet(s) Oral daily                            10.9   8.21  )-----------( 273      ( 16 Dec 2020 06:33 )             33.5       12-16    133<L>  |  97  |  22  ----------------------------<  117<H>  3.7   |  24  |  1.26    Ca    9.3      16 Dec 2020 06:33  Phos  2.7     12-15  Mg     1.9     12-15    TPro  6.9  /  Alb  4.4  /  TBili  0.4  /  DBili  x   /  AST  14  /  ALT  9<L>  /  AlkPhos  73  12-15      CARDIAC MARKERS ( 15 Dec 2020 06:09 )  x     / x     / 38 U/L / x     / x            T(C): 36.3 (12-16-20 @ 08:05), Max: 37.2 (12-15-20 @ 15:30)  HR: 70 (12-16-20 @ 08:05) (60 - 92)  BP: 166/79 (12-16-20 @ 08:05) (135/72 - 191/78)  RR: 18 (12-16-20 @ 08:05) (18 - 18)  SpO2: 100% (12-16-20 @ 08:05) (96% - 100%)  Wt(kg): --    I&O's Summary    15 Dec 2020 07:01  -  16 Dec 2020 07:00  --------------------------------------------------------  IN: 480 mL / OUT: 0 mL / NET: 480 mL    16 Dec 2020 07:01  -  16 Dec 2020 11:23  --------------------------------------------------------  IN: 360 mL / OUT: 0 mL / NET: 360 mL        General: Well nourished in no acute distress. Alert and Oriented * 3.   Head: Normocephalic and atraumatic.   Neck: No JVD. No bruits. Supple. Does not appear to be enlarged.   Cardiovascular: + S1,S2 ; RRR Soft systolic murmur at the left lower sternal border. No rubs noted.    Lungs: CTA b/l. No rhonchi, rales or wheezes.   Abdomen: + BS, soft. Non tender. Non distended. No rebound. No guarding.   Extremities: No clubbing/cyanosis/edema.   Neurologic: Moves all four extremities. Full range of motion.   Skin: Warm and moist. The patient's skin has normal elasticity and good skin turgor.   Psychiatric: Appropriate mood and affect.  Musculoskeletal: Normal range of motion, normal strength    TELEMETRY: NSR 70-80	      ECG: NSR, narrow QRS, no acute ST-T changes  	    RADIOLOGY:         CXR: < from: Xray Chest 1 View AP/PA (12.15.20 @ 07:15) >  IMPRESSION: Clear lungs.    < end of copied text >      ASSESSMENT/PLAN: Patient is a 76 y/o female well known to our office (Cardiologist - Dr. 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 Patient is now admitted s/p syncopal episode likely vasovagal. Cardiology consulted for further evaluation.     - Tele stable in NSR with no events  - No evidence of clinical HF or anginal symptoms  - Continue medical management of CAD - ASA/Statin  - CT head neg for acute changes  - LE dopplers neg for DVT  - Orthostatics neg  - Previous TTE with normal LV function and no sig valvular abnormalities  - No further inpatient cardiac w/u needed - stable for discharge from CV perspective  - Patient to f/u in our office with Dr. Del Castillo after d/c    Joel Hernández PA-C  Pager: [phone]

## 2020-12-16 NOTE — DISCHARGE NOTE NURSING/CASE MANAGEMENT/SOCIAL WORK - PATIENT PORTAL LINK FT
You can access the FollowMyHealth Patient Portal offered by [facility] by registering at the following website: [email or web address]. By joining [employer] [facility] [facility]’s FollowMyHealth portal, you will also be able to view your health information using other applications (apps) compatible with our system.

## 2020-12-16 NOTE — PROGRESS NOTE ADULT - PROBLEM SELECTOR PLAN 1
- no obvious acute GI pathology appreciated on RUQ U/S  - Lipase, LFTs wnl  - suspect secondary to vasovagal event   - symptoms resolved   - diet as tolerated   - cont PPI 40mg daily   - no need for inpatient endoscopic eval.   - Can follow-up as outpatient   - d/w patient and GI attending Dr. Molina   - will follow.

## 2020-12-16 NOTE — PROGRESS NOTE ADULT - SUBJECTIVE AND OBJECTIVE BOX
HORTENCIA NAQVI  75y Female  MRN:[id number]    Patient is a 75y old  Female who presents with a chief complaint of   HPI:   76 yo F with pmhx of HTN, CAD (stentsx3) presents with fall, epigastric pain, n/v. States she woke up at some time at night, went to use the bathroom, and then found herself on the floor on her back. Floor is carpeted. Did hit her head. No blood thinners. Takes asa 81mg, took it last night. Was able to get up herself, walked back to the room, tried looking for her phone to call her daughter, and called out for her son. Her son came over and called 911. Vomited twice when EMS arrived, did not receive antiemetic. Only complaining of epigastric pain and nausea at this time. Denies headache, change in strength or sensation in her extremities, chest pain, shortness of breath. States he was asxs when she went to sleep.   (15 Dec 2020 15:44)      Patient seen and evaluated at bedside. No acute events overnight except as noted.    Interval HPI: no events o/n     PAST MEDICAL & SURGICAL HISTORY:  Sciatica    Legally blind in left eye, as defined in [place]  20/400 left eye    Borderline diabetes mellitus  Controlled with diet    Migraines  Developed at 10 years of age  Last episode 2 years ago    Vertigo    H/O heart artery stent    History of MI (myocardial infarction)    CAD (coronary artery disease)    HTN (hypertension), benign    Dyslipidemia    S/P ICD (internal cardiac defibrillator) procedure  loop recorder    History of coronary artery stent placement  3 stents (Last in )  Same admission as past myocardial infarction    S/P LASIK surgery  Right eye, no complications as per patient.    History of tubal ligation  35 years ago, no complications as per patient.    History of tonsillectomy    No Past Surgical History        REVIEW OF SYSTEMS:  as per hpi     VITALS:  Vital Signs Last 24 Hrs  T(C): 36.3 (16 Dec 2020 08:05), Max: 37.2 (15 Dec 2020 15:30)  T(F): 97.4 (16 Dec 2020 08:05), Max: 98.9 (15 Dec 2020 15:30)  HR: 70 (16 Dec 2020 08:05) (60 - 92)  BP: 166/79 (16 Dec 2020 08:05) (135/72 - 191/78)  BP(mean): --  RR: 18 (16 Dec 2020 08:05) (18 - 18)  SpO2: 100% (16 Dec 2020 08:05) (96% - 100%)  CAPILLARY BLOOD GLUCOSE        I&O's Summary    15 Dec 2020 07:  -  16 Dec 2020 07:00  --------------------------------------------------------  IN: 480 mL / OUT: 0 mL / NET: 480 mL    16 Dec 2020 07:01  -  16 Dec 2020 12:46  --------------------------------------------------------  IN: 360 mL / OUT: 0 mL / NET: 360 mL        PHYSICAL EXAM:  GENERAL: NAD, well-developed  HEAD:  Atraumatic, Normocephalic  EYES: EOMI, PERRLA, conjunctiva and sclera clear  NECK: Supple, No JVD  CHEST/LUNG: Clear to auscultation bilaterally; No wheeze  HEART: S1, S2; No murmurs, rubs, or gallops  ABDOMEN: Soft, Nontender, Nondistended; Bowel sounds present  EXTREMITIES:  2+ Peripheral Pulses, No clubbing, cyanosis, or edema  PSYCH: Normal affect  NEUROLOGY: AAOX3; non-focal  SKIN: No rashes or lesions    Consultant(s) Notes Reviewed:  [x ] YES  [ ] NO  Care Discussed with Consultants/Other Providers [ x] YES  [ ] NO    MEDS:  MEDICATIONS  (STANDING):  aspirin  chewable 81 milliGRAM(s) Oral daily  atorvastatin 10 milliGRAM(s) Oral at bedtime  lisinopril 40 milliGRAM(s) Oral daily  metoprolol succinate ER 25 milliGRAM(s) Oral daily  pantoprazole    Tablet 40 milliGRAM(s) Oral daily  triamterene 37.5 mG/hydrochlorothiazide 25 mG Tablet 1 Tablet(s) Oral daily    MEDICATIONS  (PRN):  acetaminophen   Tablet .. 650 milliGRAM(s) Oral every 6 hours PRN Mild Pain (1 - 3), Moderate Pain (4 - 6)  meclizine 25 milliGRAM(s) Oral three times a day PRN vertigo    ALLERGIES:  fish (Unknown)  iodine (Anaphylaxis)  labetalol (Other)  No Known Drug Allergies      LABS:                        10.9   8.21  )-----------( 273      ( 16 Dec 2020 06:33 )             33.5     12-16    133<L>  |  97  |  22  ----------------------------<  117<H>  3.7   |  24  |  1.26    Ca    9.3      16 Dec 2020 06:33  Phos  2.7     12-15  Mg     1.9     12-15    TPro  6.9  /  Alb  4.4  /  TBili  0.4  /  DBili  x   /  AST  14  /  ALT  9<L>  /  AlkPhos  73  12-15    PT/INR - ( 15 Dec 2020 06:09 )   PT: 12.0 sec;   INR: 1.00 ratio         PTT - ( 15 Dec 2020 06:09 )  PTT:28.8 sec  CARDIAC MARKERS ( 15 Dec 2020 06:09 )  x     / x     / 38 U/L / x     / x          LIVER FUNCTIONS - ( 15 Dec 2020 06:09 )  Alb: 4.4 g/dL / Pro: 6.9 g/dL / ALK PHOS: 73 U/L / ALT: 9 U/L / AST: 14 U/L / GGT: x           Urinalysis Basic - ( 15 Dec 2020 18:07 )    Color: Light Yellow / Appearance: Clear / S.017 / pH: x  Gluc: x / Ketone: Negative  / Bili: Negative / Urobili: Negative   Blood: x / Protein: Negative / Nitrite: Negative   Leuk Esterase: Moderate / RBC: 3 /hpf / WBC 11 /HPF   Sq Epi: x / Non Sq Epi: 2 /hpf / Bacteria: Negative       < from: [facility] Duplex Lower Ext Vein Scan, Bilat (. @ 09:35) >  IMPRESSION:  No evidence of deep venous thrombosis in either lower extremity.    < end of copied text >

## 2020-12-16 NOTE — DISCHARGE NOTE PROVIDER - CARE PROVIDER_API CALL
Angel Dotson  Phone: [phone]  Fax: (   )    -  Follow Up Time: 1-3 days    [facility]   cardiolology  Phone: (   )    -  Fax: (   )    -  Follow Up Time:     Keyshawn Molina (DO)  Gastroenterology; Internal Medicine  [address] [place] [address]  Phone: [phone]  Fax: [phone]  Follow Up Time:    Keyshawn Molina (DO)  Gastroenterology; Internal Medicine  [address] [place] [address]  Phone: [phone]  Fax: [phone]  Follow Up Time:     Angel Dotson  Phone: [phone]  Fax: (   )    -  Follow Up Time: 1-3 days    Abundio   cardiolology  Phone: (   )    -  Fax: (   )    -  Follow Up Time:     Masoud Gorman)  Cardiovascular Disease; Internal Medicine  [address] [place] [id number]  Phone: [phone]  Fax: [phone]  Follow Up Time:

## 2020-12-16 NOTE — PROGRESS NOTE ADULT - SUBJECTIVE AND OBJECTIVE BOX
INTERVAL HPI/OVERNIGHT EVENTS:    patient seen and examined  no acute overnight events  tolerating diet   denies fever, chills, nausea, vomiting, abdominal pain     MEDICATIONS  (STANDING):  aspirin  chewable 81 milliGRAM(s) Oral daily  atorvastatin 10 milliGRAM(s) Oral at bedtime  lisinopril 40 milliGRAM(s) Oral daily  metoprolol succinate ER 25 milliGRAM(s) Oral daily  pantoprazole    Tablet 40 milliGRAM(s) Oral daily  triamterene 37.5 mG/hydrochlorothiazide 25 mG Tablet 1 Tablet(s) Oral daily    MEDICATIONS  (PRN):  acetaminophen   Tablet .. 650 milliGRAM(s) Oral every 6 hours PRN Mild Pain (1 - 3), Moderate Pain (4 - 6)  meclizine 25 milliGRAM(s) Oral three times a day PRN vertigo      Allergies    fish (Unknown)  iodine (Anaphylaxis)  No Known Drug Allergies    Intolerances    labetalol (Other)      Review of Systems:    General:  No wt loss, fevers, chills, night sweats,fatigue,   Eyes:  Good vision, no reported pain  ENT:  No sore throat, pain, runny nose, dysphagia  CV:  No pain, palpitatioins, hypo/hypertension  Resp:  No dyspnea, cough, tachypnea, wheezing  GI:  No pain, No nausea, No vomiting, No diarrhea, No constipatiion, No weight loss, No fever, No pruritis, No rectal bleeding, No tarry stools, No dysphagia,  :  No pain, bleeding, incontinence, nocturia  Muscle:  No pain, weakness  Neuro:  No weakness, tingling, memory problems  Psych:  No fatigue, insomnia, mood problems, depression  Endocrine:  No polyuria, polydypsia, cold/heat intolerance  Heme:  No petechiae, ecchymosis, easy bruisability  Skin:  No rash, tattoos, scars, edema      Vital Signs Last 24 Hrs  T(C): 36.3 (16 Dec 2020 08:05), Max: 37.2 (15 Dec 2020 15:30)  T(F): 97.4 (16 Dec 2020 08:05), Max: 98.9 (15 Dec 2020 15:30)  HR: 70 (16 Dec 2020 08:05) (60 - 92)  BP: 166/79 (16 Dec 2020 08:05) (135/72 - 191/78)  BP(mean): --  RR: 18 (16 Dec 2020 08:05) (18 - 18)  SpO2: 100% (16 Dec 2020 08:05) (96% - 100%)    PHYSICAL EXAM:    Constitutional: NAD  HEENT: EOMI, throat clear  Neck: No LAD, supple  Gastrointestinal: BS+, soft, NT/ND  Extremities: No peripheral edema  Neurological: A/O x 3, no focal deficits        LABS:                        10.9   8.21  )-----------( 273      ( 16 Dec 2020 06:33 )             33.5     12-16    133<L>  |  97  |  22  ----------------------------<  117<H>  3.7   |  24  |  1.26    Ca    9.3      16 Dec 2020 06:33  Phos  2.7     12-15  Mg     1.9     12-15    TPro  6.9  /  Alb  4.4  /  TBili  0.4  /  DBili  x   /  AST  14  /  ALT  9<L>  /  AlkPhos  73  12-15    PT/INR - ( 15 Dec 2020 06:09 )   PT: 12.0 sec;   INR: 1.00 ratio         PTT - ( 15 Dec 2020 06:09 )  PTT:28.8 sec  Urinalysis Basic - ( 15 Dec 2020 18:07 )    Color: Light Yellow / Appearance: Clear / S.017 / pH: x  Gluc: x / Ketone: Negative  / Bili: Negative / Urobili: Negative   Blood: x / Protein: Negative / Nitrite: Negative   Leuk Esterase: Moderate / RBC: 3 /hpf / WBC 11 /HPF   Sq Epi: x / Non Sq Epi: 2 /hpf / Bacteria: Negative        RADIOLOGY & ADDITIONAL TESTS:

## 2020-12-17 NOTE — ED POST DISCHARGE NOTE - ADDITIONAL DOCUMENTATION
: Pt called asking for results of covid. Confirmed name and .  States "no one told her results", [facility] Advised Covid not detected.

## 2021-01-21 PROCEDURE — 80053 COMPREHEN METABOLIC PANEL: CPT

## 2021-01-21 PROCEDURE — 72170 X-RAY EXAM OF PELVIS: CPT

## 2021-01-21 PROCEDURE — 76705 ECHO EXAM OF ABDOMEN: CPT

## 2021-01-21 PROCEDURE — U0003: CPT

## 2021-01-21 PROCEDURE — 93005 ELECTROCARDIOGRAM TRACING: CPT

## 2021-01-21 PROCEDURE — 96375 TX/PRO/DX INJ NEW DRUG ADDON: CPT

## 2021-01-21 PROCEDURE — 84484 ASSAY OF TROPONIN QUANT: CPT

## 2021-01-21 PROCEDURE — 85027 COMPLETE CBC AUTOMATED: CPT

## 2021-01-21 PROCEDURE — 80048 BASIC METABOLIC PNL TOTAL CA: CPT

## 2021-01-21 PROCEDURE — 85025 COMPLETE CBC W/AUTO DIFF WBC: CPT

## 2021-01-21 PROCEDURE — 85730 THROMBOPLASTIN TIME PARTIAL: CPT

## 2021-01-21 PROCEDURE — 70450 CT HEAD/BRAIN W/O DYE: CPT

## 2021-01-21 PROCEDURE — 72125 CT NECK SPINE W/O DYE: CPT

## 2021-01-21 PROCEDURE — 83735 ASSAY OF MAGNESIUM: CPT

## 2021-01-21 PROCEDURE — 85610 PROTHROMBIN TIME: CPT

## 2021-01-21 PROCEDURE — 82550 ASSAY OF CK (CPK): CPT

## 2021-01-21 PROCEDURE — 96374 THER/PROPH/DIAG INJ IV PUSH: CPT

## 2021-01-21 PROCEDURE — 71045 X-RAY EXAM CHEST 1 VIEW: CPT

## 2021-01-21 PROCEDURE — 99285 EMERGENCY DEPT VISIT HI MDM: CPT | Mod: 25

## 2021-01-21 PROCEDURE — 83690 ASSAY OF LIPASE: CPT

## 2021-01-21 PROCEDURE — 84100 ASSAY OF PHOSPHORUS: CPT

## 2021-01-21 PROCEDURE — 81001 URINALYSIS AUTO W/SCOPE: CPT

## 2021-01-21 PROCEDURE — 93970 EXTREMITY STUDY: CPT

## 2021-02-16 ENCOUNTER — FORM ENCOUNTER (OUTPATIENT)
Age: 76
End: 2021-02-16

## 2021-03-24 NOTE — ED ADULT NURSE NOTE - CAS DISCH CONDITION
Discharge Instruction for Undelivered Patients      You were seen for: abdominal pain  We Consulted: Dr. Green  You had (Test or Medicine):fetal monitoring, UA, Iv fluids, IV zofran and abdominal ultrasound     Diet:   Drink 8 to 12 glasses of liquids (milk, juice, water) every day.  You may eat meals and snacks.     Activity:  Call your doctor or nurse midwife if your baby is moving less than usual.     Call your provider if you notice:  Swelling in your face or increased swelling in your hands or legs.  Headaches that are not relieved by Tylenol (acetaminophen).  Changes in your vision (blurring: seeing spots or stars.)  Nausea (sick to your stomach) and vomiting (throwing up).   Weight gain of 5 pounds or more per week.  Heartburn that doesn't go away.  Signs of bladder infection: pain when you urinate (use the toilet), need to go more often and more urgently.  The bag of carvajal (rupture of membranes) breaks, or you notice leaking in your underwear.  Bright red blood in your underwear.  Abdominal (lower belly) or stomach pain.  Second (plus) baby: Contractions (tightening) less than 10 minutes apart and getting stronger.  *If less than 34 weeks: Contractions (tightenings) more than 6 times in one hour.  Increase or change in vaginal discharge (note the color and amount)  Other:   Md would like you to take it easy for the next day and rest.    Follow-up:  As scheduled in the clinic         Stable

## 2021-04-20 ENCOUNTER — APPOINTMENT (OUTPATIENT)
Dept: VASCULAR SURGERY | Facility: CLINIC | Age: 76
End: 2021-04-20
Payer: MEDICARE

## 2021-04-20 VITALS
HEIGHT: 57 IN | SYSTOLIC BLOOD PRESSURE: 159 MMHG | BODY MASS INDEX: 22.65 KG/M2 | DIASTOLIC BLOOD PRESSURE: 76 MMHG | WEIGHT: 105 LBS | HEART RATE: 78 BPM

## 2021-04-20 VITALS — TEMPERATURE: 95.3 F

## 2021-04-20 PROCEDURE — 99072 ADDL SUPL MATRL&STAF TM PHE: CPT

## 2021-04-20 PROCEDURE — 93880 EXTRACRANIAL BILAT STUDY: CPT

## 2021-04-20 PROCEDURE — 99213 OFFICE O/P EST LOW 20 MIN: CPT

## 2021-04-20 NOTE — ASSESSMENT
[FreeTextEntry1] : 74 yo female with history of htn, hld, carotid stenosis s/p left cea presents for follow up.\par \par carotid duplex shows right ica stenosis of 50-60% and patent left ica s/p cea \par \par will schedule for victor m/pvr with exercise given lower extremity pain \par \par pt to follow up in 6 months with repeat carotid duplex

## 2021-04-20 NOTE — HISTORY OF PRESENT ILLNESS
[FreeTextEntry1] : 76 yo female with history of htn, hld, carotid stenosis s/p left cea presents for follow up.  pt denies any episodes of visual changes, weakness or other stroke like symptoms.  \par pt does report lower extremity pain that limits her walking and also bothers her in the evening while she is sleeping\par pt denies any lower extremity ulcers or wounds

## 2021-04-20 NOTE — PHYSICAL EXAM
[2+] : left 2+ [No Rash or Lesion] : No rash or lesion [Alert] : alert [Calm] : calm [JVD] : no jugular venous distention  [Ankle Swelling (On Exam)] : not present [] : not present [Varicose Veins Of Lower Extremities] : not present [Skin Ulcer] : no ulcer [de-identified] : appears well

## 2021-04-22 ENCOUNTER — APPOINTMENT (OUTPATIENT)
Dept: VASCULAR SURGERY | Facility: CLINIC | Age: 76
End: 2021-04-22

## 2021-06-17 NOTE — REASON FOR VISIT
[FreeTextEntry1] : This is a 75-year-old man with Parkinson's disease.\par \par I would recommend he continue his current dosage of Sinemet and selegiline.\par \par I would recommend he continue rehabilitation.\par \par He may followup with his regular neurologist once she is out of rehabilitation. [de-identified] : left cea  [de-identified] : 8/31/20 [de-identified] : pt s/p left cea presents for follow up without any complaints at this time

## 2021-10-19 ENCOUNTER — APPOINTMENT (OUTPATIENT)
Dept: VASCULAR SURGERY | Facility: CLINIC | Age: 76
End: 2021-10-19

## 2021-11-02 NOTE — PROGRESS NOTE ADULT - PROBLEM SELECTOR PLAN 2
Yes
Continue lipitor.  Will inquire about why she's not on ASA.
Continue lipitor.  Will inquire about why she's not on ASA.

## 2022-01-10 ENCOUNTER — INPATIENT (INPATIENT)
Facility: HOSPITAL | Age: 77
LOS: 3 days | Discharge: HOME | End: 2022-01-14
Attending: INTERNAL MEDICINE | Admitting: INTERNAL MEDICINE
Payer: MEDICARE

## 2022-01-10 VITALS
HEART RATE: 64 BPM | OXYGEN SATURATION: 100 % | SYSTOLIC BLOOD PRESSURE: 167 MMHG | DIASTOLIC BLOOD PRESSURE: 68 MMHG | HEIGHT: 58 IN | RESPIRATION RATE: 18 BRPM | TEMPERATURE: 98 F

## 2022-01-10 DIAGNOSIS — Z95.810 PRESENCE OF AUTOMATIC (IMPLANTABLE) CARDIAC DEFIBRILLATOR: Chronic | ICD-10-CM

## 2022-01-10 DIAGNOSIS — Z29.9 ENCOUNTER FOR PROPHYLACTIC MEASURES, UNSPECIFIED: ICD-10-CM

## 2022-01-10 DIAGNOSIS — Z98.89 OTHER SPECIFIED POSTPROCEDURAL STATES: Chronic | ICD-10-CM

## 2022-01-10 DIAGNOSIS — R55 SYNCOPE AND COLLAPSE: ICD-10-CM

## 2022-01-10 DIAGNOSIS — I25.10 ATHEROSCLEROTIC HEART DISEASE OF NATIVE CORONARY ARTERY WITHOUT ANGINA PECTORIS: ICD-10-CM

## 2022-01-10 DIAGNOSIS — I10 ESSENTIAL (PRIMARY) HYPERTENSION: ICD-10-CM

## 2022-01-10 DIAGNOSIS — Z95.5 PRESENCE OF CORONARY ANGIOPLASTY IMPLANT AND GRAFT: Chronic | ICD-10-CM

## 2022-01-10 DIAGNOSIS — D64.9 ANEMIA, UNSPECIFIED: ICD-10-CM

## 2022-01-10 DIAGNOSIS — Z98.51 TUBAL LIGATION STATUS: Chronic | ICD-10-CM

## 2022-01-10 DIAGNOSIS — E87.8 OTHER DISORDERS OF ELECTROLYTE AND FLUID BALANCE, NOT ELSEWHERE CLASSIFIED: ICD-10-CM

## 2022-01-10 LAB
ALBUMIN SERPL ELPH-MCNC: 2.3 G/DL — LOW (ref 3.3–5)
ALP SERPL-CCNC: 53 U/L — SIGNIFICANT CHANGE UP (ref 40–120)
ALT FLD-CCNC: 8 U/L — SIGNIFICANT CHANGE UP (ref 4–33)
ANION GAP SERPL CALC-SCNC: 11 MMOL/L — SIGNIFICANT CHANGE UP (ref 7–14)
ANION GAP SERPL CALC-SCNC: 12 MMOL/L — SIGNIFICANT CHANGE UP (ref 7–14)
APPEARANCE UR: CLEAR — SIGNIFICANT CHANGE UP
AST SERPL-CCNC: 12 U/L — SIGNIFICANT CHANGE UP (ref 4–32)
BASE EXCESS BLDV CALC-SCNC: 2.3 MMOL/L — SIGNIFICANT CHANGE UP (ref -2–3)
BASOPHILS # BLD AUTO: 0.05 K/UL — SIGNIFICANT CHANGE UP (ref 0–0.2)
BASOPHILS NFR BLD AUTO: 0.5 % — SIGNIFICANT CHANGE UP (ref 0–2)
BILIRUB SERPL-MCNC: 0.7 MG/DL — SIGNIFICANT CHANGE UP (ref 0.2–1.2)
BILIRUB UR-MCNC: NEGATIVE — SIGNIFICANT CHANGE UP
BLOOD GAS VENOUS COMPREHENSIVE RESULT: SIGNIFICANT CHANGE UP
BUN SERPL-MCNC: 13 MG/DL — SIGNIFICANT CHANGE UP (ref 7–23)
BUN SERPL-MCNC: 14 MG/DL — SIGNIFICANT CHANGE UP (ref 7–23)
CALCIUM SERPL-MCNC: 6 MG/DL — CRITICAL LOW (ref 8.4–10.5)
CALCIUM SERPL-MCNC: 8.7 MG/DL — SIGNIFICANT CHANGE UP (ref 8.4–10.5)
CHLORIDE BLDV-SCNC: 98 MMOL/L — SIGNIFICANT CHANGE UP (ref 96–108)
CHLORIDE SERPL-SCNC: 111 MMOL/L — HIGH (ref 98–107)
CHLORIDE SERPL-SCNC: 99 MMOL/L — SIGNIFICANT CHANGE UP (ref 98–107)
CHLORIDE UR-SCNC: 112 MMOL/L — SIGNIFICANT CHANGE UP
CO2 BLDV-SCNC: 29.2 MMOL/L — HIGH (ref 22–26)
CO2 SERPL-SCNC: 17 MMOL/L — LOW (ref 22–31)
CO2 SERPL-SCNC: 23 MMOL/L — SIGNIFICANT CHANGE UP (ref 22–31)
COLOR SPEC: COLORLESS — SIGNIFICANT CHANGE UP
CREAT SERPL-MCNC: 0.73 MG/DL — SIGNIFICANT CHANGE UP (ref 0.5–1.3)
CREAT SERPL-MCNC: 0.93 MG/DL — SIGNIFICANT CHANGE UP (ref 0.5–1.3)
DIFF PNL FLD: NEGATIVE — SIGNIFICANT CHANGE UP
EOSINOPHIL # BLD AUTO: 0.04 K/UL — SIGNIFICANT CHANGE UP (ref 0–0.5)
EOSINOPHIL NFR BLD AUTO: 0.4 % — SIGNIFICANT CHANGE UP (ref 0–6)
GAS PNL BLDV: 135 MMOL/L — LOW (ref 136–145)
GLUCOSE BLDV-MCNC: 158 MG/DL — HIGH (ref 70–99)
GLUCOSE SERPL-MCNC: 146 MG/DL — HIGH (ref 70–99)
GLUCOSE SERPL-MCNC: 156 MG/DL — HIGH (ref 70–99)
GLUCOSE UR QL: NEGATIVE — SIGNIFICANT CHANGE UP
HCO3 BLDV-SCNC: 28 MMOL/L — SIGNIFICANT CHANGE UP (ref 22–29)
HCT VFR BLD CALC: 32.3 % — LOW (ref 34.5–45)
HCT VFR BLDA CALC: 34 % — LOW (ref 34.5–46.5)
HGB BLD CALC-MCNC: 11.3 G/DL — LOW (ref 11.5–15.5)
HGB BLD-MCNC: 10.3 G/DL — LOW (ref 11.5–15.5)
IANC: 6.58 K/UL — SIGNIFICANT CHANGE UP (ref 1.5–8.5)
IMM GRANULOCYTES NFR BLD AUTO: 0.7 % — SIGNIFICANT CHANGE UP (ref 0–1.5)
INR BLD: 1.17 RATIO — HIGH (ref 0.88–1.16)
KETONES UR-MCNC: NEGATIVE — SIGNIFICANT CHANGE UP
LACTATE BLDV-MCNC: 2.1 MMOL/L — HIGH (ref 0.5–2)
LEUKOCYTE ESTERASE UR-ACNC: NEGATIVE — SIGNIFICANT CHANGE UP
LYMPHOCYTES # BLD AUTO: 1.96 K/UL — SIGNIFICANT CHANGE UP (ref 1–3.3)
LYMPHOCYTES # BLD AUTO: 21.5 % — SIGNIFICANT CHANGE UP (ref 13–44)
MAGNESIUM SERPL-MCNC: 3.3 MG/DL — HIGH (ref 1.6–2.6)
MCHC RBC-ENTMCNC: 26.3 PG — LOW (ref 27–34)
MCHC RBC-ENTMCNC: 31.9 GM/DL — LOW (ref 32–36)
MCV RBC AUTO: 82.4 FL — SIGNIFICANT CHANGE UP (ref 80–100)
MONOCYTES # BLD AUTO: 0.41 K/UL — SIGNIFICANT CHANGE UP (ref 0–0.9)
MONOCYTES NFR BLD AUTO: 4.5 % — SIGNIFICANT CHANGE UP (ref 2–14)
NEUTROPHILS # BLD AUTO: 6.58 K/UL — SIGNIFICANT CHANGE UP (ref 1.8–7.4)
NEUTROPHILS NFR BLD AUTO: 72.4 % — SIGNIFICANT CHANGE UP (ref 43–77)
NITRITE UR-MCNC: NEGATIVE — SIGNIFICANT CHANGE UP
NRBC # BLD: 0 /100 WBCS — SIGNIFICANT CHANGE UP
NRBC # FLD: 0 K/UL — SIGNIFICANT CHANGE UP
PCO2 BLDV: 46 MMHG — HIGH (ref 39–42)
PH BLDV: 7.39 — SIGNIFICANT CHANGE UP (ref 7.32–7.43)
PH UR: 6.5 — SIGNIFICANT CHANGE UP (ref 5–8)
PHOSPHATE SERPL-MCNC: 3.3 MG/DL — SIGNIFICANT CHANGE UP (ref 2.5–4.5)
PLATELET # BLD AUTO: 220 K/UL — SIGNIFICANT CHANGE UP (ref 150–400)
PO2 BLDV: 32 MMHG — SIGNIFICANT CHANGE UP
POTASSIUM BLDV-SCNC: 2.9 MMOL/L — CRITICAL LOW (ref 3.5–5.1)
POTASSIUM SERPL-MCNC: 1.9 MMOL/L — CRITICAL LOW (ref 3.5–5.3)
POTASSIUM SERPL-MCNC: 3.9 MMOL/L — SIGNIFICANT CHANGE UP (ref 3.5–5.3)
POTASSIUM SERPL-SCNC: 1.9 MMOL/L — CRITICAL LOW (ref 3.5–5.3)
POTASSIUM SERPL-SCNC: 3.9 MMOL/L — SIGNIFICANT CHANGE UP (ref 3.5–5.3)
POTASSIUM UR-SCNC: 29.6 MMOL/L — SIGNIFICANT CHANGE UP
PROT SERPL-MCNC: 4.3 G/DL — LOW (ref 6–8.3)
PROT UR-MCNC: NEGATIVE — SIGNIFICANT CHANGE UP
PROTHROM AB SERPL-ACNC: 13.4 SEC — SIGNIFICANT CHANGE UP (ref 10.6–13.6)
RBC # BLD: 3.92 M/UL — SIGNIFICANT CHANGE UP (ref 3.8–5.2)
RBC # FLD: 12.6 % — SIGNIFICANT CHANGE UP (ref 10.3–14.5)
SAO2 % BLDV: 56.9 % — SIGNIFICANT CHANGE UP
SARS-COV-2 RNA SPEC QL NAA+PROBE: SIGNIFICANT CHANGE UP
SODIUM SERPL-SCNC: 134 MMOL/L — LOW (ref 135–145)
SODIUM SERPL-SCNC: 139 MMOL/L — SIGNIFICANT CHANGE UP (ref 135–145)
SODIUM UR-SCNC: 114 MMOL/L — SIGNIFICANT CHANGE UP
SP GR SPEC: 1.01 — SIGNIFICANT CHANGE UP (ref 1–1.05)
TROPONIN T, HIGH SENSITIVITY RESULT: <6 NG/L — SIGNIFICANT CHANGE UP
UROBILINOGEN FLD QL: SIGNIFICANT CHANGE UP
WBC # BLD: 9.1 K/UL — SIGNIFICANT CHANGE UP (ref 3.8–10.5)
WBC # FLD AUTO: 9.1 K/UL — SIGNIFICANT CHANGE UP (ref 3.8–10.5)

## 2022-01-10 PROCEDURE — 99223 1ST HOSP IP/OBS HIGH 75: CPT | Mod: GC

## 2022-01-10 PROCEDURE — 70450 CT HEAD/BRAIN W/O DYE: CPT | Mod: 26,MA

## 2022-01-10 PROCEDURE — 93010 ELECTROCARDIOGRAM REPORT: CPT

## 2022-01-10 PROCEDURE — 71275 CT ANGIOGRAPHY CHEST: CPT | Mod: 26,MA

## 2022-01-10 PROCEDURE — 75635 CT ANGIO ABDOMINAL ARTERIES: CPT | Mod: 26,MA

## 2022-01-10 PROCEDURE — 99285 EMERGENCY DEPT VISIT HI MDM: CPT | Mod: 25

## 2022-01-10 RX ORDER — LISINOPRIL 2.5 MG/1
1 TABLET ORAL
Qty: 0 | Refills: 0 | DISCHARGE

## 2022-01-10 RX ORDER — ACETAMINOPHEN 500 MG
650 TABLET ORAL EVERY 6 HOURS
Refills: 0 | Status: DISCONTINUED | OUTPATIENT
Start: 2022-01-10 | End: 2022-01-14

## 2022-01-10 RX ORDER — SODIUM CHLORIDE 9 MG/ML
1000 INJECTION INTRAMUSCULAR; INTRAVENOUS; SUBCUTANEOUS ONCE
Refills: 0 | Status: COMPLETED | OUTPATIENT
Start: 2022-01-10 | End: 2022-01-10

## 2022-01-10 RX ORDER — POTASSIUM CHLORIDE 20 MEQ
10 PACKET (EA) ORAL
Refills: 0 | Status: DISCONTINUED | OUTPATIENT
Start: 2022-01-10 | End: 2022-01-10

## 2022-01-10 RX ORDER — SODIUM,POTASSIUM PHOSPHATES 278-250MG
2 POWDER IN PACKET (EA) ORAL ONCE
Refills: 0 | Status: COMPLETED | OUTPATIENT
Start: 2022-01-10 | End: 2022-01-10

## 2022-01-10 RX ORDER — POTASSIUM CHLORIDE 20 MEQ
40 PACKET (EA) ORAL ONCE
Refills: 0 | Status: DISCONTINUED | OUTPATIENT
Start: 2022-01-10 | End: 2022-01-10

## 2022-01-10 RX ORDER — ATORVASTATIN CALCIUM 80 MG/1
20 TABLET, FILM COATED ORAL AT BEDTIME
Refills: 0 | Status: DISCONTINUED | OUTPATIENT
Start: 2022-01-10 | End: 2022-01-14

## 2022-01-10 RX ORDER — ONDANSETRON 8 MG/1
4 TABLET, FILM COATED ORAL ONCE
Refills: 0 | Status: COMPLETED | OUTPATIENT
Start: 2022-01-10 | End: 2022-01-10

## 2022-01-10 RX ORDER — FAMOTIDINE 10 MG/ML
20 INJECTION INTRAVENOUS ONCE
Refills: 0 | Status: COMPLETED | OUTPATIENT
Start: 2022-01-10 | End: 2022-01-10

## 2022-01-10 RX ORDER — POTASSIUM CHLORIDE 20 MEQ
10 PACKET (EA) ORAL ONCE
Refills: 0 | Status: DISCONTINUED | OUTPATIENT
Start: 2022-01-10 | End: 2022-01-10

## 2022-01-10 RX ORDER — DIPHENHYDRAMINE HCL 50 MG
50 CAPSULE ORAL ONCE
Refills: 0 | Status: DISCONTINUED | OUTPATIENT
Start: 2022-01-10 | End: 2022-01-10

## 2022-01-10 RX ORDER — POTASSIUM CHLORIDE 20 MEQ
40 PACKET (EA) ORAL ONCE
Refills: 0 | Status: COMPLETED | OUTPATIENT
Start: 2022-01-10 | End: 2022-01-10

## 2022-01-10 RX ORDER — LANOLIN ALCOHOL/MO/W.PET/CERES
3 CREAM (GRAM) TOPICAL AT BEDTIME
Refills: 0 | Status: DISCONTINUED | OUTPATIENT
Start: 2022-01-10 | End: 2022-01-14

## 2022-01-10 RX ORDER — ASPIRIN/CALCIUM CARB/MAGNESIUM 324 MG
1 TABLET ORAL
Qty: 30 | Refills: 0

## 2022-01-10 RX ORDER — DIPHENHYDRAMINE HCL 50 MG
50 CAPSULE ORAL ONCE
Refills: 0 | Status: COMPLETED | OUTPATIENT
Start: 2022-01-10 | End: 2022-01-10

## 2022-01-10 RX ORDER — METOPROLOL TARTRATE 50 MG
1 TABLET ORAL
Qty: 0 | Refills: 0 | DISCHARGE

## 2022-01-10 RX ORDER — ONDANSETRON 8 MG/1
4 TABLET, FILM COATED ORAL EVERY 8 HOURS
Refills: 0 | Status: DISCONTINUED | OUTPATIENT
Start: 2022-01-10 | End: 2022-01-14

## 2022-01-10 RX ORDER — POTASSIUM CHLORIDE 20 MEQ
10 PACKET (EA) ORAL
Refills: 0 | Status: COMPLETED | OUTPATIENT
Start: 2022-01-10 | End: 2022-01-10

## 2022-01-10 RX ORDER — MAGNESIUM SULFATE 500 MG/ML
2 VIAL (ML) INJECTION ONCE
Refills: 0 | Status: COMPLETED | OUTPATIENT
Start: 2022-01-10 | End: 2022-01-10

## 2022-01-10 RX ORDER — AMLODIPINE BESYLATE 2.5 MG/1
1 TABLET ORAL
Qty: 0 | Refills: 0 | DISCHARGE

## 2022-01-10 RX ORDER — AMLODIPINE BESYLATE 2.5 MG/1
2.5 TABLET ORAL DAILY
Refills: 0 | Status: DISCONTINUED | OUTPATIENT
Start: 2022-01-10 | End: 2022-01-14

## 2022-01-10 RX ORDER — METOPROLOL TARTRATE 50 MG
50 TABLET ORAL DAILY
Refills: 0 | Status: DISCONTINUED | OUTPATIENT
Start: 2022-01-10 | End: 2022-01-14

## 2022-01-10 RX ADMIN — Medication 50 MILLIGRAM(S): at 14:52

## 2022-01-10 RX ADMIN — ATORVASTATIN CALCIUM 20 MILLIGRAM(S): 80 TABLET, FILM COATED ORAL at 22:05

## 2022-01-10 RX ADMIN — Medication 25 GRAM(S): at 11:24

## 2022-01-10 RX ADMIN — Medication 40 MILLIGRAM(S): at 11:24

## 2022-01-10 RX ADMIN — Medication 100 MILLIEQUIVALENT(S): at 10:21

## 2022-01-10 RX ADMIN — Medication 100 MILLIEQUIVALENT(S): at 09:20

## 2022-01-10 RX ADMIN — FAMOTIDINE 20 MILLIGRAM(S): 10 INJECTION INTRAVENOUS at 08:00

## 2022-01-10 RX ADMIN — Medication 2 TABLET(S): at 10:03

## 2022-01-10 RX ADMIN — Medication 100 MILLIEQUIVALENT(S): at 11:24

## 2022-01-10 RX ADMIN — Medication 40 MILLIEQUIVALENT(S): at 09:21

## 2022-01-10 RX ADMIN — SODIUM CHLORIDE 1000 MILLILITER(S): 9 INJECTION INTRAMUSCULAR; INTRAVENOUS; SUBCUTANEOUS at 08:00

## 2022-01-10 RX ADMIN — ONDANSETRON 4 MILLIGRAM(S): 8 TABLET, FILM COATED ORAL at 08:00

## 2022-01-10 RX ADMIN — Medication 25 GRAM(S): at 09:21

## 2022-01-10 NOTE — H&P ADULT - ATTENDING COMMENTS
75 y/o female with MHx of CAD s/p remote PCI in 2012 (stents), HTN, HLD, recurrent syncope s/p previous ILR with no events ever recorded, LICA stenosis s/p recent left CEA (August 2020), previous exercise NST in 8/2019 with no evidence of stress induced ischemia or infarct as well as previous TTE 6/2020 with normal LV/RV function a/w syncopal episode r/o severe CA stenosis; Also found to have prolonged RUv=426 in the setting of electrolyte abnormalities; 75 y/o female with MHx of CAD s/p remote PCI in 2012 (stents), HTN, HLD, recurrent syncope s/p previous ILR with no events ever recorded, LICA stenosis s/p recent left CEA (August 2020), previous exercise NST in 8/2019 with no evidence of stress induced ischemia or infarct as well as previous TTE 6/2020 with normal LV/RV function a/w syncopal episode r/o severe CA stenosis; Also found to have prolonged SCn=619 in the setting of electrolyte abnormalities;    Assessment and plan supplemented and modified by attending where indicated;

## 2022-01-10 NOTE — ED PROVIDER NOTE - OBJECTIVE STATEMENT
76yF with PMH of vertigo, CAD/MI presents to the ED following a syncopal episode this morning at approx 6 am. Pt was found face down in the bathroom by her son who she lives with after he heard a noise in the bathroom. Pt was lethargic and disoriented as per what the daughter was told by the son. Pt is currently A&O but lethargic and does not remember falling. Pt had similar episode back in 2020 and was told to follow-up with GI. Pt is currently c/o lightheadedness, epigastric pain and nausea. Pt states she was feeling "weird" last night. Denies CP, SOB, vomiting, numbness or tingling, neck or back pain, 76yF with PMH of vertigo, CAD/MI presents to the ED following a syncopal episode this morning at approx 6 am. Pt was found face down in the bathroom by her son who she lives with after he heard a noise in the bathroom. Pt was lethargic and disoriented as per what the daughter was told by the son. Pt is currently A&O but lethargic and does not remember falling. Pt had similar episode back in 2020 and was told to follow-up with GI. Pt is currently c/o lightheadedness, epigastric pain and nausea with leg cramps. Pt states she was feeling "weird" last night. Denies CP, SOB, vomiting, numbness or tingling, neck or back pain.

## 2022-01-10 NOTE — MEDICAL STUDENT ADULT H&P (EDUCATION) - NS MD HP STUD ASPLAN PLAN FT
Syncope: differential includes cardiac (aortic stenosis, conduction abnormality, Vtach), orthostatic (Medications, hypovolemia, autonomic dysfunction), reflex (vasovagal, situational, carotid hypersensitivity) etiologies. Additionally, can work up possible seizure given likely postictal state.   -orthostatics are normal   -put patient on telemetry monitoring   - MRA head without contrast   - MRA neck with contrast   - EKG/EEG to assess possible cardiac contribution, Consider cardiology evaluation and ILR interrogation   problem 1 - Syncope: differential includes cardiac (aortic stenosis, conduction abnormality, Vtach), orthostatic (Medications, hypovolemia, autonomic dysfunction), reflex (vasovagal, situational, carotid hypersensitivity) etiologies. Additionally, can work up possible seizure given likely postictal state.   -orthostatics are normal   -put patient on telemetry monitoring   - MRA head without contrast   - MRA neck with contrast   - EKG/EEG to assess possible cardiac contribution, Consider cardiology evaluation and ILR interrogation     Problem/Plan - 2:  ·  Problem: CAD (coronary artery disease).   ·  Plan: -stable CAD, can continue statin, metoprolol   -card eval.     Problem/Plan - 3:  ·  Problem: Electrolyte imbalance.   ·  Plan: -likely 2/2 possibly meds/poor PO intake   -repletion for hypokalemia, hypocalcemia   -will continue to monitor.     Problem/Plan - 4:  ·  Problem: Hypertension.   ·  Plan: -patient on amlodipine 2.5 mg, triam-hctz 37.5-25mg, metoprolol 50mg    -BP hypertensive, can continue amlodipine and metoprolol for now hold diuretic and reintroduce as necessary.     Problem/Plan - 5:  ·  Problem: Anemia.   ·  Plan: -normocytic anemia, no evidence of bleeding   -hgb stable   -can check iron studies in AM.     Problem/Plan - 6:  ·  Problem: Prophylactic measure.   ·  Plan: lovenox for DVT ppx   -PT eval   -DASH diet.

## 2022-01-10 NOTE — CONSULT NOTE ADULT - SUBJECTIVE AND OBJECTIVE BOX
HPI:      ROS: A 10-system ROS was performed and is negative except for those items noted above and/or in the HPI.    PAST MEDICAL & SURGICAL HISTORY:  Dyslipidemia    HTN (hypertension), benign    CAD (coronary artery disease)    History of MI (myocardial infarction)    H/O heart artery stent    Vertigo    Migraines  Developed at 10 years of age  Last episode 2 years ago    Borderline diabetes mellitus  Controlled with diet    Legally blind in left eye, as defined in [place]  20/400 left eye    Sciatica    No Past Surgical History    History of tonsillectomy    History of tubal ligation  35 years ago, no complications as per patient.    S/P LASIK surgery  Right eye, no complications as per patient.    History of coronary artery stent placement  3 stents (Last in )  Same admission as past myocardial infarction    S/P ICD (internal cardiac defibrillator) procedure  loop recorder      FAMILY HISTORY:  Family history of MI (myocardial infarction) (Child)  Father ( at 75)  Mother ( at 87)  Son (  at 35)    Family history of hypertension (Child, Sibling)  Daughters, Sisters    Family history of hypercholesterolemia (Child, Sibling)    Family history of brain tumor (Sibling)        SOCIAL HISTORY: SOCIAL HISTORY:     Marital Status: (  )   (  ) Single  (  )   (  )      Occupation:      Lives: (  ) alone  (  ) with children   (  ) with spouse  (  ) with parents  (  ) other     Illicit Drug Use: (  ) never used  (  ) other _____     Tobacco Use:  (  ) never smoked  (  ) former smoker  (  ) current smoker  (  ) pack year  (  ) last cigarette date     Alcohol Use:      Sexual History:        MEDICATIONS  Home Medications:  acetaminophen 325 mg oral tablet: 2 tab(s) orally every 6 hours, As needed, Mild Pain (1 - 3), Moderate Pain (4 - 6) (16 Dec 2020 11:44)  lisinopril 40 mg oral tablet: 1 tab(s) orally once a day (15 Dec 2020 14:23)  meclizine 25 mg oral tablet: 1 tab(s) orally 3 times a day, As Needed vertigo (15 Dec 2020 14:23)  metoprolol succinate 25 mg oral tablet, extended release: 1 tab(s) orally once a day (15 Dec 2020 14:23)  pravastatin 10 mg oral tablet: 1 tab(s) orally once a day (15 Dec 2020 14:23)  triamterene-hydrochlorothiazide 37.5 mg- 25 mg oral capsule: 1 cap(s) orally once a day    NOTE: LOCAL PHARMACY HAS LAST FILL OF MED BACK IN AUG 2020 X 90DAY AND HAS NOT BEEN FILLED SINCE (15 Dec 2020 14:23)      MEDICATIONS  (STANDING):    MEDICATIONS  (PRN):      ALLERGIES/INTOLERANCES:  Allergies  fish (Unknown)  iodine (Anaphylaxis)  No Known Drug Allergies    Intolerances  labetalol (Other)      OBJECTIVE:  VITALS   Vital Signs Last 24 Hrs  T(C): 36.8 (10 Miguel 2022 16:48), Max: 36.8 (10 Miguel 2022 16:48)  T(F): 98.3 (10 Miguel 2022 16:48), Max: 98.3 (10 Miguel 2022 16:48)  HR: 99 (10 Miguel 2022 16:48) (64 - 99)  BP: 161/78 (10 Miguel 2022 16:48) (156/66 - 167/68)  BP(mean): --  RR: 19 (10 Miguel 2022 16:48) (16 - 19)  SpO2: 100% (10 Miguel 2022 16:48) (100% - 100%)    PHYSICAL EXAM:  Neurological Exam:  Mental Status: Orientated to self, date and place.  Attention intact.  No dysarthria, aphasia or neglect.  Knowledge intact.  Registration intact.  Short and long term memory grossly intact.      Cranial Nerves: PERRL, EOMI, VFF, no nystagmus or diplopia.  CN V1-3 intact to light touch and pinprick.  No facial asymmetry.  Hearing intact.  Tongue midline.  Sternocleidomastoid and Trapezius intact bilaterally.    Motor:   Tone: normal            Strength:     Upper extremity                      Delt       Bicep    Tricep                                               R         5/5        5/5        5/5       5/5                                            L          5/5        5/5        5/5       5/5  Lower extremity                       HF          KE          KF        DF         PF                                            R        5/5        5/5        5/5       5/5       5/5                                            L         5/5        5/5       5/5       5/5        5/5  Pronator drift: none                 Dysmetria: None to finger-nose-finger or heel-shin-heel  No truncal ataxia.    Tremor: No resting, postural or action tremor.  No myoclonus.    Sensation: intact to light touch, pinprick, vibration and proprioception    Deep Tendon Reflexes: 2+ bilateral biceps, triceps, brachioradialis, knee and ankle  Toes flexor bilaterally    Gait: normal and stable.      LABORATORY:  CBC                       10.3   9.10  )-----------( 220      ( 10 Miguel 2022 08:07 )             32.3     Chem -10    134<L>  |  99  |  14  ----------------------------<  146<H>  3.9   |  23  |  0.93    Ca    8.7      10 Miguel 2022 13:46  Phos  3.3     01-10  Mg     3.30     -10    TPro  4.3<L>  /  Alb  2.3<L>  /  TBili  0.7  /  DBili  x   /  AST  12  /  ALT  8   /  AlkPhos  53  01-10    LFTs LIVER FUNCTIONS - ( 10 Miguel 2022 08:07 )  Alb: 2.3 g/dL / Pro: 4.3 g/dL / ALK PHOS: 53 U/L / ALT: 8 U/L / AST: 12 U/L / GGT: x           Coagulopathy PT/INR - ( 10 Miguel 2022 08:07 )   PT: 13.4 sec;   INR: 1.17 ratio           Lipid Panel   A1c   Cardiac enzymes     U/A Urinalysis Basic - ( 10 Miguel 2022 11:57 )    Color: Colorless / Appearance: Clear / S.008 / pH: x  Gluc: x / Ketone: Negative  / Bili: Negative / Urobili: <2 mg/dL   Blood: x / Protein: Negative / Nitrite: Negative   Leuk Esterase: Negative / RBC: x / WBC x   Sq Epi: x / Non Sq Epi: x / Bacteria: x      CSF  Immunological  Other    STUDIES & IMAGING:  Studies (EKG, EEG, EMG, etc):     Radiology (XR, CT, MR, U/S, TTE/CHEPE): HPI:  76yF with PMH of vertigo, CAD/MI presents to the ED following a syncopal episode this morning at approx 6 am. The patient recalls feeling intense leg pain and tingling in her feet upon waking. Pt was found face down in the bathroom by her son who she lives with after he heard a noise in the bathroom. At the time, the patient was lethargic and disoriented as per what the daughter was told by the son. The disorientation, dizziness, nausea, and muscle cramps continued for several hours after her arrival to the ED. Currently, these symptoms are all resolved and the patient is only experiencing a slight headache. She denies nausea, dizziness, weakness, double vision, or fevers. The patient was in her usual state of health until this morning and recalls no symptoms last night. She has experienced multiple syncopal episodes in the past (last 2020) that have been attributed to cardiac abnormalities. This episode is the first time she has experienced prolonged confusion or muscle cramps. She has never seen a neurologist.       ROS: A 10-system ROS was performed and is negative except for those items noted above and/or in the HPI.    PAST MEDICAL & SURGICAL HISTORY:  Dyslipidemia    HTN (hypertension), benign    CAD (coronary artery disease)    History of MI (myocardial infarction)    H/O heart artery stent    Vertigo    Migraines  Developed at 10 years of age  Last episode 2 years ago    Borderline diabetes mellitus  Controlled with diet    [place]ally blind in left [place], [place] in [place]  20/400 left eye    Sciatica    No Past Surgical History    History of tonsillectomy    History of tubal ligation  35 years ago, no complications as per patient.    S/P LASIK surgery  Right eye, no complications as per patient.    History of coronary artery stent placement  3 stents (Last in )  Same admission as past myocardial infarction    S/P ICD (internal cardiac defibrillator) procedure  loop recorder      FAMILY HISTORY:  Family history of MI (myocardial infarction) (Child)  Father ( at 75)  Mother ( at 87)  Son (  at 35)  Son (early onset dementia)    Family history of hypertension (Child, Sibling)  Daughters, Sisters    Family history of hypercholesterolemia (Child, Sibling)    Family history of brain tumor (Sibling)        SOCIAL HISTORY: SOCIAL HISTORY:     Marital Status: (  )   (  ) Single  (  )   (  )      Occupation:      Lives: (  ) alone  (  ) with children   (  ) with spouse  (  ) with parents  (  ) other     Illicit Drug Use: (  ) never used  (  ) other _____     Tobacco Use:  (  ) never smoked  (  ) former smoker  (  ) current smoker  (  ) pack year  (  ) last cigarette date     Alcohol Use:      Sexual History:        MEDICATIONS  Home Medications:  acetaminophen 325 mg oral tablet: 2 tab(s) orally every 6 hours, As needed, Mild Pain (1 - 3), Moderate Pain (4 - 6) (16 Dec 2020 11:44)  lisinopril 40 mg oral tablet: 1 tab(s) orally once a day (15 Dec 2020 14:23)  meclizine 25 mg oral tablet: 1 tab(s) orally 3 times a day, As Needed vertigo (15 Dec 2020 14:23)  metoprolol succinate 25 mg oral tablet, extended release: 1 tab(s) orally once a day (15 Dec 2020 14:23)  pravastatin 10 mg oral tablet: 1 tab(s) orally once a day (15 Dec 2020 14:23)  triamterene-hydrochlorothiazide 37.5 mg- 25 mg oral capsule: 1 cap(s) orally once a day    NOTE: LOCAL PHARMACY HAS LAST FILL OF MED BACK IN AUG 2020 X 90DAY AND HAS NOT BEEN FILLED SINCE (15 Dec 2020 14:23)      MEDICATIONS  (STANDING):    MEDICATIONS  (PRN):      ALLERGIES/INTOLERANCES:  Allergies  fish (Unknown)  iodine (Anaphylaxis)  No Known Drug Allergies    Intolerances  labetalol (Other)      OBJECTIVE:  VITALS   Vital Signs Last 24 Hrs  T(C): 36.8 (10 Miguel 2022 16:48), Max: 36.8 (10 Miguel 2022 16:48)  T(F): 98.3 (10 Miguel 2022 16:48), Max: 98.3 (10 Miguel 2022 16:48)  HR: 99 (10 Miguel 2022 16:48) (64 - 99)  BP: 161/78 (10 Miguel 2022 16:48) (156/66 - 167/68)  BP(mean): --  RR: 19 (10 Miguel 2022 16:48) (16 - 19)  SpO2: 100% (10 Miguel 2022 16:48) (100% - 100%)    PHYSICAL EXAM:  Neurological Exam:  Mental Status: Orientated to self, date and place.  Attention intact.  No dysarthria, aphasia or neglect.  Knowledge intact.  Registration intact.  Short and long term memory grossly intact.      Cranial Nerves: PERRL, EOMI, VFF, no nystagmus or diplopia.  CN V1-3 intact to light touch.  No facial asymmetry.  Hearing diminished in right ear.  Tongue midline.  Sternocleidomastoid and Trapezius intact bilaterally.    Motor:   Tone: normal            Strength:     Upper extremity                      Delt       Bicep    Tricep                                               R         5/5        5/5        5/5       5/5                                            L          5/5        5/5        5/5       5/5  Lower extremity                       HF          KE          KF        DF         PF                                            R        /5        5/5        5/5       5/5       5/5                                            L         5/5        5/5       5/5       5/5        5/5  Pronator drift: none                 Dysmetria: None to finger-nose-finger   No truncal ataxia.    Tremor: No resting, postural or action tremor.  No myoclonus.    Sensation: intact to light touch    Deep Tendon Reflexes: 2+ bilateral biceps, triceps, brachioradialis, knee and ankle  Toes flexor bilaterally    Gait: normal and stable.      LABORATORY:  CBC                       10.3   9.10  )-----------( 220      ( 10 Miguel 2022 08:07 )             32.3     Chem 01-10    134<L>  |  99  |  14  ----------------------------<  146<H>  3.9   |  23  |  0.93    Ca    8.7      10 Miguel 2022 13:46  Phos  3.3     01-10  Mg     3.30     01-10        LFTs LIVER FUNCTIONS - ( 10 Miguel 2022 08:07 )  Alb: 2.3 g/dL / Pro: 4.3 g/dL / ALK PHOS: 53 U/L / ALT: 8 U/L / AST: 12 U/L / GGT: x           Coagulopathy PT/INR - ( 10 Miguel 2022 08:07 )   PT: 13.4 sec;   INR: 1.17 ratio           Lipid Panel   A1c   Cardiac enzymes     U/A Urinalysis Basic - ( 10 Miguel 2022 11:57 )    Color: Colorless / Appearance: Clear / S.008 / pH: x  Gluc: x / Ketone: Negative  / Bili: Negative / Urobili: <2 mg/dL   Blood: x / Protein: Negative / Nitrite: Negative   Leuk Esterase: Negative / RBC: x / WBC x   Sq Epi: x / Non Sq Epi: x / Bacteria: x      CSF  Immunological  Other    STUDIES & IMAGING:  Studies (EKG, EEG, EMG, etc):     Radiology (XR, CT, MR, U/S, TTE/CHEPE):    Asymmetric hypodensity involving the central semiovale bilaterally, more   to the right in the right frontal lobe may represent sequela of   hypoxic/ischemic encephalopathy. Further correlation with MRI of the   brain is suggested for more detailed evaluation.    No intracranial hemorrhage, midline shift or herniation. HPI:  76yF with PMH of vertigo, CAD/MI presents to the ED following a syncopal episode this morning at approx 6 am. The patient recalls feeling intense leg pain and tingling in her feet upon waking. Pt was found face down in the bathroom by her son who she lives with after he heard a noise in the bathroom. At the time, the patient was lethargic and disoriented as per what the daughter was told by the son. The disorientation, dizziness, nausea, and muscle cramps continued for several hours after her arrival to the ED. Currently, these symptoms are all resolved and the patient is only experiencing a slight headache. She denies nausea, dizziness, weakness, double vision, or fevers. The patient was in her usual state of health until this morning and recalls no symptoms last night. She has experienced multiple syncopal episodes in the past (last 2020) that have been attributed to cardiac abnormalities. This episode is the first time she has experienced prolonged confusion or muscle cramps. She has never seen a neurologist.       ROS: A 10-system ROS was performed and is negative except for those items noted above and/or in the HPI.    PAST MEDICAL & SURGICAL HISTORY:  Dyslipidemia    HTN (hypertension), benign    CAD (coronary artery disease)    History of MI (myocardial infarction)    H/O heart artery stent    Vertigo    Migraines  Developed at 10 years of age  Last episode 2 years ago    Borderline diabetes mellitus  Controlled with diet    [place]ally blind in left [place], [place] in [place]  20/400 left eye    Sciatica    No Past Surgical History    History of tonsillectomy    History of tubal ligation  35 years ago, no complications as per patient.    S/P LASIK surgery  Right eye, no complications as per patient.    History of coronary artery stent placement  3 stents (Last in )  Same admission as past myocardial infarction    S/P ICD (internal cardiac defibrillator) procedure  loop recorder      FAMILY HISTORY:  Family history of MI (myocardial infarction) (Child)  Father ( at 75)  Mother ( at 87)  Son (  at 35)  Son (early onset dementia)    Family history of hypertension (Child, Sibling)  Daughters, Sisters    Family history of hypercholesterolemia (Child, Sibling)    Family history of brain tumor (Sibling)        SOCIAL HISTORY: SOCIAL HISTORY:     Marital Status: (  )   (  ) Single  (  )   (  )      Occupation:      Lives: (  ) alone  (  ) with children   (  ) with spouse  (  ) with parents  (  ) other     Illicit Drug Use: (  ) never used  (  ) other _____     Tobacco Use:  (  ) never smoked  (  ) former smoker  (  ) current smoker  (  ) pack year  (  ) last cigarette date     Alcohol Use:      Sexual History:        MEDICATIONS  Home Medications:  acetaminophen 325 mg oral tablet: 2 tab(s) orally every 6 hours, As needed, Mild Pain (1 - 3), Moderate Pain (4 - 6) (16 Dec 2020 11:44)  lisinopril 40 mg oral tablet: 1 tab(s) orally once a day (15 Dec 2020 14:23)  meclizine 25 mg oral tablet: 1 tab(s) orally 3 times a day, As Needed vertigo (15 Dec 2020 14:23)  metoprolol succinate 25 mg oral tablet, extended release: 1 tab(s) orally once a day (15 Dec 2020 14:23)  pravastatin 10 mg oral tablet: 1 tab(s) orally once a day (15 Dec 2020 14:23)  triamterene-hydrochlorothiazide 37.5 mg- 25 mg oral capsule: 1 cap(s) orally once a day    NOTE: LOCAL PHARMACY HAS LAST FILL OF MED BACK IN AUG 2020 X 90DAY AND HAS NOT BEEN FILLED SINCE (15 Dec 2020 14:23)      MEDICATIONS  (STANDING):    MEDICATIONS  (PRN):      ALLERGIES/INTOLERANCES:  Allergies  fish (Unknown)  iodine (Anaphylaxis)  No Known Drug Allergies    Intolerances  labetalol (Other)      OBJECTIVE:  VITALS   Vital Signs Last 24 Hrs  T(C): 36.8 (10 Miguel 2022 16:48), Max: 36.8 (10 Miguel 2022 16:48)  T(F): 98.3 (10 Miguel 2022 16:48), Max: 98.3 (10 Miguel 2022 16:48)  HR: 99 (10 Miguel 2022 16:48) (64 - 99)  BP: 161/78 (10 Miguel 2022 16:48) (156/66 - 167/68)  BP(mean): --  RR: 19 (10 Miguel 2022 16:48) (16 - 19)  SpO2: 100% (10 Miguel 2022 16:48) (100% - 100%)    PHYSICAL EXAM:  Neurological Exam:  Mental Status: Orientated to self, date and place.  Attention intact.  No dysarthria, aphasia or neglect.  Knowledge intact.  Registration intact.  Short and long term memory grossly intact.      Cranial Nerves: PERRL, EOMI, VFF, no nystagmus or diplopia.  CN V1-3 intact to light touch.  No facial asymmetry.  Hearing diminished in right ear.  Tongue midline.  Sternocleidomastoid and Trapezius intact bilaterally.    Motor:   Tone: normal            Strength:     Upper extremity                      Delt       Bicep    Tricep                                               R         5/5        5/5        5/5       5/5                                            L          5/5        5/5        5/5       5/5  Lower extremity                       HF          KE          KF        DF         PF                                            R        /5        5/5        5/5       5/5       5/5                                            L         5/5        5/5       5/5       5/5        5/5  Pronator drift: none                 Dysmetria: None to finger-nose-finger   No truncal ataxia.    Tremor: No resting, postural or action tremor.  No myoclonus.    Sensation: intact to light touch    Deep Tendon Reflexes: 2+ bilateral biceps, triceps, brachioradialis, knee and ankle  Toes flexor bilaterally    Gait: normal and stable.      LABORATORY:  CBC                       10.3   9.10  )-----------( 220      ( 10 Miguel 2022 08:07 )             32.3     Chem 01-10    134<L>  |  99  |  14  ----------------------------<  146<H>  3.9   |  23  |  0.93    Ca    8.7      10 Miguel 2022 13:46  Phos  3.3     01-10  Mg     3.30     01-10        LFTs LIVER FUNCTIONS - ( 10 Miguel 2022 08:07 )  Alb: 2.3 g/dL / Pro: 4.3 g/dL / ALK PHOS: 53 U/L / ALT: 8 U/L / AST: 12 U/L / GGT: x           Coagulopathy PT/INR - ( 10 Miguel 2022 08:07 )   PT: 13.4 sec;   INR: 1.17 ratio           Lipid Panel   A1c   Cardiac enzymes     U/A Urinalysis Basic - ( 10 Miguel 2022 11:57 )    Color: Colorless / Appearance: Clear / S.008 / pH: x  Gluc: x / Ketone: Negative  / Bili: Negative / Urobili: <2 mg/dL   Blood: x / Protein: Negative / Nitrite: Negative   Leuk Esterase: Negative / RBC: x / WBC x   Sq Epi: x / Non Sq Epi: x / Bacteria: x      CSF  Immunological  Other    STUDIES & IMAGING:  Studies (EKG, EEG, EMG, etc):     Radiology (XR, CT, MR, U/S, TTE/CHEPE):    Asymmetric hypodensity involving the central semiovale bilaterally, more   to the right in the right frontal lobe may represent sequela of   hypoxic/ischemic encephalopathy. Further correlation with MRI of the   brain is suggested for more detailed evaluation.    No intracranial hemorrhage, midline shift or herniation.    Atherosclerotic disease of bilateral carotid arteries.

## 2022-01-10 NOTE — H&P ADULT - NSHPPHYSICALEXAM_GEN_ALL_CORE
PHYSICAL EXAM:  Vital Signs Last 24 Hrs  T(C): 36.8 (01-10-22 @ 18:53)  T(F): 98.2 (01-10-22 @ 18:53), Max: 98.3 (01-10-22 @ 16:48)  HR: 96 (01-10-22 @ 18:53) (64 - 99)  BP: 157/84 (01-10-22 @ 18:53)  BP(mean): --  RR: 16 (01-10-22 @ 18:53) (16 - 19)  SpO2: 100% (01-10-22 @ 18:53) (100% - 100%)  Wt(kg): --    Constitutional: NAD, awake and alert  EYES: EOMI, pupils equally reactive to light   ENT:  Normal Hearing, no tonsillar exudates   Neck: Soft and supple , no thyromegaly   Respiratory: Breath sounds are clear bilaterally, No wheezing, rales or rhonchi  Cardiovascular: S1 and S2, regular rate and rhythm, no Murmurs, gallops or rubs, no JVD,    Gastrointestinal: Bowel Sounds present, soft, nontender, nondistended, no guarding, no rebound  Extremities: No cyanosis or clubbing; warm to touch  Vascular: 2+ peripheral pulses lower ex  Neurological: No focal deficits, moving extremities spontaneously   Musculoskeletal: 5/5 strength b/l upper and lower extremities; no joint swelling.  Skin: No rashes  Psych: no depression or anhedonia, AAOx3  HEME: large ecchymosis under chin on face

## 2022-01-10 NOTE — CONSULT NOTE ADULT - ASSESSMENT
76yF with PMH of vertigo, CAD/MI presents to the ED following a syncopal episode this morning followed by several hours of disorientation, dizziness, nausea, and muscle cramps, found to have K 1.9 and Ca 6. Patient's symptoms improved with fluid and electrolyte repletion. Pt also found to have CT abnormalities possibly consistent with hypoxic/ischemic encephalopathy.      76yF with PMH of vertigo, CAD/MI presents to the ED following a syncopal episode this morning followed by several hours of disorientation, dizziness, nausea, and muscle cramps. Found to have severe electrolyte abnormalities (K 1.9). Patient's symptoms improved with fluid and electrolyte repletion. Pt also found to have CT abnormalities possibly consistent with hypoxic/ischemic encephalopathy and bilateral carotid atherosclerosis. Concern for cerebral hypoperfusion caused by pt's existing cardiac abnormalities, possibly paired with hypokalemia-induced cardiac dysfunction.  Plan:  MRI to further explore the abnormalities found on CT  EKG/EEG to assess possible cardiac contribution  Carotid US for atherosclerosis found on CT     76yF with PMH of vertigo, CAD/MI presents to the ED following a syncopal episode this morning followed by several hours of disorientation, dizziness, nausea, and muscle cramps. Found to have severe electrolyte abnormalities (K 1.9). Patient's symptoms improved with fluid and electrolyte repletion. Pt also found to have CT abnormalities possibly consistent with hypoxic/ischemic encephalopathy and bilateral carotid atherosclerosis. Neuro exam non-focal, no motor or sensory deficits noted. Cognitive evaluation grossly normal.     Impression  Syncope with associated disorientation n/v possibly 2/2 vasovagal given prior hx and correlation with similar episodes after going to the bathroom vs seizure, concern given confusion/ disorientation after regaining consciousness however confusion possibly 2/2 TME suprimposed on vasovagal event.       Plan:  - No contraindication to MR brain without contrast   - MRA head without contrast   - MRA neck with contrast   - EKG/EEG to assess possible cardiac contribution, Consider cardiology evaluation   - Orthostatics   - Patient should follow up with neurology outpt for evaluation with EEG on discharge: [address] [facility] [phone]    Case to be discussed with neurology attending Dr. Naranjo          76yF with PMH of vertigo, CAD/MI presents to the ED following a syncopal episode this morning followed by several hours of disorientation, dizziness, nausea, and muscle cramps. Found to have severe electrolyte abnormalities (K 1.9). Patient's symptoms improved with fluid and electrolyte repletion. Pt also found to have CT abnormalities possibly consistent with hypoxic/ischemic encephalopathy and bilateral carotid atherosclerosis. Neuro exam non-focal, no motor or sensory deficits noted. Cognitive evaluation grossly normal.     Impression  Syncope with associated disorientation n/v possibly 2/2 vasovagal given prior hx and correlation with similar episodes after going to the bathroom vs seizure, concern given confusion/ disorientation after regaining consciousness however confusion possibly 2/2 TME suprimposed on vasovagal event.       Plan:  - No contraindication to MR brain without contrast   - MRA head without contrast   - MRA neck with contrast   - EKG/EEG to assess possible cardiac contribution, Consider cardiology evaluation and ILR interrogation  - Orthostatics   - Patient should follow up with neurology outpt for evaluation with EEG on discharge: [address] [facility] [phone]    Case to be discussed with neurology attending Dr. Naranjo          76yF with PMH of vertigo, CAD/MI presents to the ED following a syncopal episode this morning followed by several hours of disorientation, dizziness, nausea, and muscle cramps. Found to have severe electrolyte abnormalities (K 1.9). Patient's symptoms improved with fluid and electrolyte repletion. Pt also found to have CT abnormalities possibly consistent with hypoxic/ischemic encephalopathy and bilateral carotid atherosclerosis. Neuro exam non-focal, no motor or sensory deficits noted. Cognitive evaluation grossly normal.     Impression  Syncope with associated disorientation n/v possibly 2/2 vasovagal given prior hx and correlation with similar episodes after going to the bathroom vs seizure, concern given confusion/ disorientation after regaining consciousness however confusion possibly 2/2 TME suprimposed on vasovagal event.       Plan:  - No contraindication to MR brain without contrast   - MRA head without contrast   - MRA neck with contrast   - EKG/EEG to assess possible cardiac contribution, Consider cardiology evaluation and ILR interrogation  - Orthostatics   - Patient should follow up with neurology outpt for evaluation with EEG on discharge: Dr. Nissenbaum [phone]    Case to be discussed with neurology attending Dr. Naranjo

## 2022-01-10 NOTE — ED PROVIDER NOTE - NS ED ROS FT
GENERAL: No fever, chills  CARDIAC: no chest pain/pressure, SOB, lower extremity swelling  PULMONARY: no cough, SOB  GI: no abdominal pain, n/v/d  : no dysuria, no hematuria  SKIN: no rashes, no ecchymosis  NEURO: no headache, +lightheadedness  MSK: No joint pain, myalgia, weakness.

## 2022-01-10 NOTE — ED ADULT NURSE NOTE - OBJECTIVE STATEMENT
77 yo F AAOx4 received to room 13 s/p syncope, pt presents lethargic, pallor noted to skin, states she "just feels weird," lac noted to chin with no active bleeding, no daily AC's, no additional injuries/deformities, denies this feels like a vertigo episode, MD Cardoza at bedside for eval, pending further orders

## 2022-01-10 NOTE — H&P ADULT - PROBLEM SELECTOR PLAN 3
-likely 2/2 possibly meds/poor PO intake   -repletion for hypokalemia, hypocalcemia   -will continue to monitor Severe hypokalemia, hypomagnesemia and hypocalcemia;   -likely 2/2 possibly meds/poor PO intake   -Replete Magnesium, Potassium and Calcium   -Repeat EKG (pending)   -Telemetry Severe hypokalemia, hypomagnesemia and hypocalcemia;   -likely 2/2 possibly meds/poor PO intake   -Replete Magnesium, Potassium and Calcium   -Repeat EKG improved  -Telemetry

## 2022-01-10 NOTE — MEDICAL STUDENT ADULT H&P (EDUCATION) - NS MD HP STUD ASPLAN ASSES FT
77 y/o F with 77 y/o F w/ hx of CAD s/p stents (last 2012), migraines, carotid artery stenosis s/p L carotid endarterectomy 2020, and vertigo, presenting with 1x episode of syncope this morning. Currently hemodynamically stable. 77 y/o F with 77 y/o F w/ hx of CAD s/p stents (last 2012), migraines, carotid artery stenosis s/p L carotid endarterectomy 2020, and vertigo, presenting with 1x episode of syncope this morning. Currently hemodynamically stable.  Found to have severe electrolyte abnormalities (K 1.9). Patient's symptoms improved with fluid and electrolyte repletion in the ED.

## 2022-01-10 NOTE — ED PROVIDER NOTE - PHYSICAL EXAMINATION
GEN: Patient awake alert NAD.   HEENT: normocephalic, 1 cm linear area of ecchymosis inferior aspect of chin, moist MM  CARDIAC: RRR, S1, S2, no murmur.   PULM: CTA B/L no wheeze, rhonchi, rales.   ABD: soft NT, ND, no rebound no guarding, no CVA tenderness.   MSK: Moving all extremities, no edema. 5/5 strength and full ROM in all extremities.     NEURO: A&Ox3, gait normal, no focal neurological deficits, CN 2-12 grossly intact  SKIN: warm, dry, no rash.

## 2022-01-10 NOTE — H&P ADULT - PROBLEM SELECTOR PLAN 2
-stable CAD, can continue statin, metoprolol   -card eval -stable CAD, can continue statin, metoprolol   -card eval, unclear why patient not taking aspirin Screening EKG c/w XWm=501  -Avoid QT prolonging agents  -Telemetry  -Repeat EKG (pending)   -Repleted Magnesium in ED  -TSH Screening EKG c/w VJe=173  -Avoid QT prolonging agents  -Telemetry  -Repleted Magnesium in ED  -TSH  -Addendum, 8:20am, repeat EKG: KWt=554

## 2022-01-10 NOTE — MEDICAL STUDENT ADULT H&P (EDUCATION) - NS MD HP STUD PSH FT
Carotid endarterectomy     tonsillectomy    History of tubal ligation    S/P LASIK surgery  Right eye, no complications as per patient.    History of coronary artery stent placement  3 stents (Last in 2012)  Same admission as past myocardial infarction    S/P ICD (internal cardiac defibrillator) procedure  loop recorder

## 2022-01-10 NOTE — ED PROVIDER NOTE - ATTENDING CONTRIBUTION TO CARE
77 yo F with pmhx of HTN, CAD (stentsx3) presents ?syncope. pt states she was having leg cramps and walked to bathroom, she does not recall falling but son heard a thump and found her on the floor, pt could not recall what happened but started complaining of epigastric pain, nausea, and lightheadedness. pt appears lethargic but able to give complete history, lungs clear, cv rrr, abd with epigastric tenderness  will check labs, ro acs, ct head ro ich, CT chest/abd pelvis r/o dissection and CLARENCE mehta admit tele for syncope

## 2022-01-10 NOTE — MEDICAL STUDENT ADULT H&P (EDUCATION) - NS MD HP STUD PE VITALS FT
Vital Signs Last 24 Hrs  T(C): 36.8 (10 Miguel 2022 18:53), Max: 36.8 (10 Miguel 2022 16:48)  T(F): 98.2 (10 Miguel 2022 18:53), Max: 98.3 (10 Miguel 2022 16:48)  HR: 96 (10 Miguel 2022 18:53) (64 - 99)  BP: 157/84 (10 Miguel 2022 18:53) (156/66 - 167/68)  BP(mean): --  RR: 16 (10 Miguel 2022 18:53) (16 - 19)  SpO2: 100% (10 Miguel 2022 18:53) (100% - 100%)    Orthostatic VS    01-10-22 @ 21:24  Lying BP: 147/86 HR: 98   Sitting BP: 155/82 HR: 103  Standing BP: 156/87 HR: 107

## 2022-01-10 NOTE — H&P ADULT - PROBLEM SELECTOR PLAN 6
lovenox for DVT ppx   -PT eval   -DASH diet SCD   -PT eval   -DASH diet EKG: no acute ischemic changes   -stable CAD, can continue statin, metoprolol, ASA

## 2022-01-10 NOTE — MEDICAL STUDENT ADULT H&P (EDUCATION) - NS MD HP STUD PMH FT
Dyslipidemia, htn, CAD and MI s/p PCI with stent, vertigo, migraines, DM2 (A1C 6.6) diet controlled, sciatica, legally blind in . eye.

## 2022-01-10 NOTE — ED ADULT TRIAGE NOTE - CHIEF COMPLAINT QUOTE
pt c/o possible syncopal episode. Reports woke up around 6AM was dizziness, walked to bathroom and son found pt face down on floor. Pt c/o abd pain, n/v and mild dizziness. PMHX CAD x2 cardiac stents, MI, T2DM, Vertigo. Denies weakness, fever, chills. Arrives with IV 18G Lft forearm 1L NS running by EMS. pt c/o possible syncopal episode. Reports woke up around 6AM was dizziness, walked to bathroom and son found pt face down on floor. Pt c/o abd pain, n/v and mild dizziness. PMHX CAD x2 cardiac stents, MI, T2DM, Vertigo. Denies weakness, fever, chills, no facial droop noted. Arrives with IV 18G Lft forearm 1L NS running by EMS. pt c/o possible syncopal episode. Reports woke up around 6AM felt dizzy, walked to bathroom and son found pt face down on floor. Pt c/o abd pain, n/v and mild dizziness. PMHX CAD x2 cardiac stents, MI, T2DM, Vertigo. Denies weakness, fever, chills, no facial droop noted. Arrives with IV 18G Lft forearm 1L NS running by EMS. pt c/o possible syncopal episode. Woke up around 6AM felt dizzy, walked to bathroom and son found pt face down on floor. Reports maybe tripped over bathroom floor ledge unsure? Pt now c/o abd pain, n/v and mild dizziness. PMHX CAD x2 cardiac stents, MI, T2DM, Vertigo. Denies weakness, fever, chills, no facial droop noted. Arrives with IV 18G Lft forearm 1L NS running by EMS. pt c/o possible syncopal episode. Woke up around 6AM felt dizzy, walked to bathroom and son found pt face down on floor. Reports maybe tripped over bathroom floor ledge unsure? Pt now c/o abd pain, n/v and dizziness. PMHX CAD x2 cardiac stents, MI, T2DM, Vertigo. Denies blood thinners, weakness, fever, chills, no facial droop noted. Arrives with IV 18G Lft forearm 1L NS running by EMS. pt c/o possible syncopal episode. Woke up around 6AM felt dizzy, walked to bathroom and son found pt face down on floor. Reports maybe tripped over bathroom floor ledge unsure? Pt now c/o abd pain, n/v and dizziness. PMHX CAD x2 cardiac stents, MI, T2DM, Vertigo. Denies blood thinners, weakness, fever, chills, no facial droop noted. Arrives with IV 18G Lft forearm and 1L NS by EMS. pt c/o possible syncopal episode. Woke up around 6AM felt dizzy, walked to bathroom and son found pt face down on floor. Reports maybe tripped over bathroom floor ledge unsure? Pt now c/o abd pain, n/v and dizziness. PMHX CAD x3 cardiac stents, MI, T2DM, Vertigo. Denies blood thinners, weakness, fever, chills, no facial droop noted. Arrives with IV 18G Lft forearm and 1L NS by EMS.

## 2022-01-10 NOTE — ED ADULT NURSE NOTE - CHIEF COMPLAINT QUOTE
pt c/o possible syncopal episode. Woke up around 6AM felt dizzy, walked to bathroom and son found pt face down on floor. Reports maybe tripped over bathroom floor ledge unsure? Pt now c/o abd pain, n/v and dizziness. PMHX CAD x2 cardiac stents, MI, T2DM, Vertigo. Denies blood thinners, weakness, fever, chills, no facial droop noted. Arrives with IV 18G Lft forearm and 1L NS by EMS.

## 2022-01-10 NOTE — H&P ADULT - NSHPLABSRESULTS_GEN_ALL_CORE
Personally reviewed imaging, labs, EKG  LABS:                        10.3   9.10  )-----------( 220      ( 10 Miguel 2022 08:07 )             32.3     01-10    134<L>  |  99  |  14  ----------------------------<  146<H>  3.9   |  23  |  0.93    Ca    8.7      10 Miguel 2022 13:46  Phos  3.3     -10  Mg     3.30     -10    TPro  4.3<L>  /  Alb  2.3<L>  /  TBili  0.7  /  DBili  x   /  AST  12  /  ALT  8   /  AlkPhos  53  01-10    PT/INR - ( 10 Miguel 2022 08:07 )   PT: 13.4 sec;   INR: 1.17 ratio               Urinalysis Basic - ( 10 Miguel 2022 11:57 )    Color: Colorless / Appearance: Clear / S.008 / pH: x  Gluc: x / Ketone: Negative  / Bili: Negative / Urobili: <2 mg/dL   Blood: x / Protein: Negative / Nitrite: Negative   Leuk Esterase: Negative / RBC: x / WBC x   Sq Epi: x / Non Sq Epi: x / Bacteria: x        RADIOLOGY & ADDITIONAL TESTS:    Imaging Personally Reviewed:  CT Angio Abdomen/Chest   BOWEL: No bowel obstruction. Appendix is normal. Small hiatal hernia.  PERITONEUM: No ascites.  VESSELS: No aortic dissection or aneurysm. 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 SMA, duplicated right renal artery   and KIARRA are patent. Aortic calcifications.  RETROPERITONEUM/LYMPH NODES: No lymphadenopathy.  ABDOMINAL WALL: Within normal limits.  BONES: Degenerative changes. Aging indeterminate compression deformity of   T11 vertebral body.    IMPRESSION:  No aortic dissection or aneurysm.    No acute pathology within chest, abdomen or pelvis.    Atrophic left kidney.    Atherosclerotic disease of bilateral carotid arteries.    CT Head:   IMPRESSION:    Asymmetric hypodensity involving the central semiovale bilaterally, more   to the right in the right frontal lobe may represent sequela of   hypoxic/ischemic encephalopathy. Further correlation with MRI of the   brain is suggested for more detailed evaluation.    No intracranial hemorrhage, midline shift or herniation.    Notification to clinician of alert:    Provider   Dr. Baxter was notified about the final results at 1/10/2022   3:46 PM via telephone by neuroradiologist TREVOR Newsome. Read back   confirmation was obtained.    EKG:   Normal Sinus Rhythm   · Axis: Normal  · WV: 168  · QRS: 86  · ST/T Wave: no ischemic changes      Consultant(s) Notes Reviewed:      Neurology EKG, NSR, 64bpm, qtc 519, no acute Tw or ST changes --- personally interpreted     ---Personally reviewed the labs below:                         10.3   9.10  )-----------( 220      ( 10 Miguel 2022 08:07 )             32.3     01-10    134<L>  |  99  |  14  ----------------------------<  146<H>  3.9   |  23  |  0.93    Ca    8.7      10 Miguel 2022 13:46  Phos  3.3     01-10  Mg     3.30     -10    TPro  4.3<L>  /  Alb  2.3<L>  /  TBili  0.7  /  DBili  x   /  AST  12  /  ALT  8   /  AlkPhos  53  01-10    PT/INR - ( 10 Miguel 2022 08:07 )   PT: 13.4 sec;   INR: 1.17 ratio          Urinalysis Basic - ( 10 Miguel 2022 11:57 )    Color: Colorless / Appearance: Clear / S.008 / pH: x  Gluc: x / Ketone: Negative  / Bili: Negative / Urobili: <2 mg/dL   Blood: x / Protein: Negative / Nitrite: Negative   Leuk Esterase: Negative / RBC: x / WBC x   Sq Epi: x / Non Sq Epi: x / Bacteria: x      ---Personally reviewed the radiological imaging below:     CT Angio Abdomen/Chest   BOWEL: No bowel obstruction. Appendix is normal. Small hiatal hernia.  PERITONEUM: No ascites.  VESSELS: No aortic dissection or aneurysm. 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 SMA, duplicated right renal artery   and KIARRA are patent. Aortic calcifications.  RETROPERITONEUM/LYMPH NODES: No lymphadenopathy.  ABDOMINAL WALL: Within normal limits.  BONES: Degenerative changes. Aging indeterminate compression deformity of   T11 vertebral body.    IMPRESSION:  No aortic dissection or aneurysm.  No acute pathology within chest, abdomen or pelvis.  Atrophic left kidney.  Atherosclerotic disease of bilateral carotid arteries.    CT Head:   IMPRESSION:    Asymmetric hypodensity involving the central semiovale bilaterally, more   to the right in the right frontal lobe may represent sequela of   hypoxic/ischemic encephalopathy. Further correlation with MRI of the   brain is suggested for more detailed evaluation.  No intracranial hemorrhage, midline shift or herniation.

## 2022-01-10 NOTE — H&P ADULT - HISTORY OF PRESENT ILLNESS
75 y/o female with PMH of CAD s/p remote PCI in 2012 (stents), HTN, HLD, recurrent syncope s/p previous ILR with no events ever recorded, LICA stenosis s/p recent left CEA (August 2020), previous exercise NST in 8/2019 with no evidence of stress induced ischemia or infarct as well as previous TTE 6/2020 with normal LV/RV function. Patient is now admitted s/p syncopal episode.  The patient recalls feeling intense leg pain and tingling in her feet upon waking which has been happening for the past several weeks or so. Pt was found face down in the bathroom by her son who she lives with after he heard a noise in the bathroom. At the time, the patient was lethargic and disoriented as per what the daughter was told by the son. The disorientation, dizziness, nausea, and muscle cramps continued for several hours after her arrival to the ED.  Patient says before she went to bed last night she had a headache. She denies nausea, dizziness, weakness, double vision, or fevers no recent illnesses. She has experienced multiple syncopal episodes in the past (last 9/2020) that have been attributed to cardiac abnormalities. This episode is the first time she has experienced prolonged confusion or muscle cramps, as previous episodes it seems more vasovagal in nature. She has never seen a neurologist.      In ED, patient afebrile, /70s, saturating well on RA, given 1L NS and taken for CAT scan.  75 y/o female with MHx of CAD s/p remote PCI in 2012 (stents), HTN, HLD, recurrent syncope s/p previous ILR with no events ever recorded, LICA stenosis s/p recent left CEA (August 2020), previous exercise NST in 8/2019 with no evidence of stress induced ischemia or infarct as well as previous TTE 6/2020 with normal LV/RV function. Patient is now admitted s/p syncopal episode.  The patient recalls feeling intense leg pain and tingling in her feet upon waking which has been happening for the past several weeks or so. Pt was found face down in the bathroom by her son who she lives with after he heard a noise in the bathroom. At the time, the patient was lethargic and disoriented as per what the daughter was told by the son. The disorientation, dizziness, nausea, and muscle cramps continued for several hours after her arrival to the ED.  Patient says before she went to bed last night she had a headache. She denies nausea, dizziness, weakness, double vision, or fevers no recent illnesses. She has experienced multiple syncopal episodes in the past (last 9/2020) that have been attributed to cardiac abnormalities. This episode is the first time she has experienced prolonged confusion or muscle cramps, as previous episodes it seems more vasovagal in nature. She has never seen a neurologist.      In ED, patient afebrile, /70s, saturating well on RA, given 1L NS and taken for CAT scan.  77 y/o female with MHx of CAD s/p remote PCI in 2012 (stents), HTN, HLD, recurrent syncope s/p previous ILR with no events ever recorded, LICA stenosis s/p recent left CEA (August 2020), previous exercise NST in 8/2019 with no evidence of stress induced ischemia or infarct as well as previous TTE 6/2020 with normal LV/RV function. Patient is now admitted s/p syncopal episode.  The patient recalls feeling intense leg pain and tingling in her feet upon waking which has been happening for the past several weeks or so. Pt was found face down in the bathroom by her son who she lives with after he heard a noise in the bathroom. At the time, the patient was lethargic and disoriented as per what the daughter was told by the son. The disorientation, dizziness, nausea, and muscle cramps continued for several hours after her arrival to the ED.  Patient says before she went to bed last night she had a headache. She associated nausea, dizziness, weakness, double vision, or fevers. Reports no recent illness. She has experienced multiple syncopal episodes in the past (last 9/2020) that have been attributed to cardiac abnormalities. This episode is the first time she has experienced prolonged confusion or muscle cramps, as previous episodes it seems more vasovagal in nature. She has never been evaluated by a neurologist prior to this admission;    In ED, patient afebrile, /70s, saturating well on RA, given 1L NS and taken for CAT scan.

## 2022-01-10 NOTE — H&P ADULT - ASSESSMENT
76yF with PMH of vertigo, CAD/MI presents to the ED following a syncopal episode this morning followed by several hours of disorientation, dizziness, nausea, and muscle cramps. Found to have severe electrolyte abnormalities (K 1.9). Patient's symptoms improved with fluid and electrolyte repletion. Pt also found to have CT abnormalities possibly consistent with hypoxic/ischemic encephalopathy and bilateral carotid atherosclerosis.        77 y/o female with MHx of CAD s/p remote PCI in 2012 (stents), HTN, HLD, recurrent syncope s/p previous ILR with no events ever recorded, LICA stenosis s/p recent left CEA (August 2020), previous exercise NST in 8/2019 with no evidence of stress induced ischemia or infarct as well as previous TTE 6/2020 with normal LV/RV function a/w syncopal episode r/o severe CA stenosis; Also found to have prolonged AJy=446 in the setting of electrolyte abnormalities, and severe stenosis of the celiac axis at its origin.

## 2022-01-10 NOTE — H&P ADULT - PROBLEM SELECTOR PLAN 7
-patient on amlodipine 2.5 mg, triam-hctz 37.5-25mg, metoprolol 50mg    -BP hypertensive, can continue amlodipine and metoprolol for  - hold diuretic  due to severe electrolytes imbalance

## 2022-01-10 NOTE — H&P ADULT - PROBLEM SELECTOR PLAN 4
-patient on amlodipine 2.5 mg, triam-hctz 37.5-25mg, metoprolol 50mg    -BP hypertensive, can continue amlodipine and metoprolol for now hold diuretic and reintroduce as necessary Normocytic anemia, no evidence of bleeding but questionable occult GI bleed; Daily ASA regimen;   Hgb baseline 11.5-12; Hgb fluctuating in the past per EMR review   -check iron level, ferritin, reticulocyte count, FOBT

## 2022-01-10 NOTE — H&P ADULT - PROBLEM SELECTOR PLAN 5
-normocytic anemia, no evidence of bleeding   -hgb stable   -can check iron studies in AM -normocytic anemia, no evidence of bleeding but questionable occult GI bleed?   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   -Monitor renal function

## 2022-01-10 NOTE — ED PROVIDER NOTE - PROGRESS NOTE DETAILS
Barrera Cardoza DO PGY-1: Pt K 1.9 on CMP. Will give 3 runs of 10meq IV, 40 meq PO with 2g Mg. Reassess. Barrera Cardoza DO PGY-1: Pt K 1.9 on CMP. Will give 3 runs of 10meq IV, 40 meq PO with 2g Mg. Pt does take diuretics. Barrera Cardoza DO PGY-1: Pt K 1.9 on CMP. Will give 3 runs of 10meq IV, 40 meq PO with 2g Mg. Corrected Ca 7.3, Mg 1.1. Will give additional Mg after 2g. Pt does take diuretics.

## 2022-01-10 NOTE — MEDICAL STUDENT ADULT H&P (EDUCATION) - NS MD HP STUD HX OF PRESENT ILLNESS FT
77 y/o F w/ hx of CAD s/p stents (last 2012), migraines, carotid artery stenosis s/p L carotid endarterectomy, and vertigo, presenting with 1x episode of syncope this morning. Patient says her syncopal episodes started in 2012, when she felt some warmth and dizziness before passing out. Since then, she has had monthly syncopal episodes, however they were not preceded by any vasovagal symptoms as was the first syncopal episode. The prior syncopal episodes were all unprovoked and would happen in various settings such as her bed, couch, bathroom, and outside stores. This stopped last year. Since then, she has only had her most recent syncopal episode. Last night the patient went to bed and remembers feeling anxious, as well as experiencing some pressure on her head. She states "I didn't want to go to sleep because I thought I was gonna die." She remembers waking up in the morning but then doesn't remember anything until her memory of laying down on the floor of her bathroom, calling for her son. She denies any pre-syncopal symptoms including flushing, diaphoresis, or nausea. When she regained consciousness, she says she felt nauseous, dizzy, and confused about her surroundings. This is the first time she has felt this dizziness and confusion after a syncopal episode. Patient continues to endorse current dizziness and nausea but denies blurriness of vision, chest pain, palpitations or dyspnea. She does not recall hitting anything but says she had some pain in her leg and tingling in her feet, which she attributes to her long-standing sciatica.     Patient denies any recent changes to her medications, patient states she active at home and frequently walks up her stairs without any exertional dyspnea or lightheadedness. She states she has a varied diet and cooks for her son and herself. Moreover, patient states she normally wears loose clothing and doesn't usually wear anything tight around her neck. The syncopal episodes have impacted her life greatly, as she is very scared about felling and going unconscious without anyone near her. She does not know what may be causing the episodes but thinks the medications she is on might be a factor.  77 y/o F w/ hx of CAD s/p stents (last 2012), migraines, carotid artery stenosis s/p L carotid endarterectomy 2020, and vertigo, presenting with 1x episode of syncope this morning. Patient says her syncopal episodes started in 2012, when she felt some warmth and dizziness before passing out. Since then, she has had monthly syncopal episodes, however they were not preceded by any vasovagal symptoms as was the first syncopal episode. The prior syncopal episodes were all unprovoked and would happen in various settings such as her bed, couch, bathroom, and outside stores. This stopped last year. Since then, she has only had her most recent syncopal episode. Last night the patient went to bed and remembers feeling anxious, as well as experiencing some pressure on her head. She states "I didn't want to go to sleep because I thought I was gonna die." She remembers waking up in the morning but then doesn't remember anything until her memory of laying down on the floor of her bathroom, calling for her son. She denies any pre-syncopal symptoms including flushing, diaphoresis, or nausea. When she regained consciousness, she says she felt nauseous, dizzy, and confused about her surroundings. This is the first time she has felt this dizziness and confusion after a syncopal episode. Patient continues to endorse current dizziness and nausea but denies blurriness of vision, chest pain, palpitations or dyspnea. She does not recall hitting anything but says she had some pain in her leg and tingling in her feet, which she attributes to her long-standing sciatica.     Patient denies any recent changes to her medications, patient states she active at home and frequently walks up her stairs without any exertional dyspnea or lightheadedness. She states she has a varied diet and cooks for her son and herself. Moreover, patient states she normally wears loose clothing and doesn't usually wear anything tight around her neck. The syncopal episodes have impacted her life greatly, as she is very scared about felling and going unconscious without anyone near her. She does not know what may be causing the episodes but thinks the medications she is on might be a factor.     REVIEW OF SYSTEMS:    CONSTITUTIONAL:  +weakness over the past few weeks, fevers or chills  EYES/ENT: No visual changes;  No vertigo or throat pain   NECK: No pain or stiffness  RESPIRATORY: No cough, wheezing, hemoptysis; No shortness of breath  CARDIOVASCULAR: No chest pain or palpitations  GASTROINTESTINAL: No abdominal or epigastric pain. No vomiting, or hematemesis; No diarrhea or constipation. No melena or hematochezia. +Nausea  GENITOURINARY: No dysuria, frequency or hematuria  NEUROLOGICAL: No numbness or weakness, +tingling in lower extremities   SKIN: No itching, rashes       75 y/o F w/ hx of CAD s/p stents (last 2012), migraines, carotid artery stenosis s/p L carotid endarterectomy 2020, and vertigo, presenting with 1x episode of syncope this morning. Patient says her syncopal episodes started in 2012, when she felt some warmth and dizziness before passing out. Since then, she has had monthly syncopal episodes, however they were not preceded by any vasovagal symptoms as was the first syncopal episode. The prior syncopal episodes were all unprovoked and would happen in various settings such as her bed, couch, bathroom, and outside stores. This stopped last year. Since then, she has only had her most recent syncopal episode. Last night the patient went to bed and remembers feeling anxious, as well as experiencing some pressure on her head. She states "I didn't want to go to sleep because I thought I was gonna die." She remembers waking up in the morning but then doesn't remember anything until her memory of laying down on the floor of her bathroom, calling for her son. She denies any pre-syncopal symptoms including flushing, diaphoresis, or nausea. When she regained consciousness, she says she felt nauseous, dizzy, and confused about her surroundings. This is the first time she has felt this dizziness and confusion after a syncopal episode. Patient continues to endorse current dizziness and nausea but denies blurriness of vision, chest pain, palpitations or dyspnea. She does not recall hitting anything but says she had some pain in her leg and tingling in her feet, which she attributes to her long-standing sciatica.     Patient denies any recent changes to her medications, patient states she active at home and frequently walks up her stairs without any exertional dyspnea or lightheadedness. She states she has a varied diet and cooks for her son and herself. Moreover, patient states she normally wears loose clothing and doesn't usually wear anything tight around her neck. The syncopal episodes have impacted her life greatly, as she is very scared about felling and going unconscious without anyone near her. She does not know what may be causing the episodes but thinks the medications she is on might be a factor.     ED:  Found to have severe electrolyte abnormalities (K 1.9). Patient's symptoms improved with 1L NS and electrolyte repletion.    REVIEW OF SYSTEMS:    CONSTITUTIONAL:  +weakness over the past few weeks, fevers or chills  EYES/ENT: No visual changes;  No vertigo or throat pain   NECK: No pain or stiffness  RESPIRATORY: No cough, wheezing, hemoptysis; No shortness of breath  CARDIOVASCULAR: No chest pain or palpitations  GASTROINTESTINAL: No abdominal or epigastric pain. No vomiting, or hematemesis; No diarrhea or constipation. No melena or hematochezia. +Nausea  GENITOURINARY: No dysuria, frequency or hematuria  NEUROLOGICAL: No numbness or weakness, +tingling in lower extremities   SKIN: No itching, rashes

## 2022-01-10 NOTE — H&P ADULT - PROBLEM SELECTOR PLAN 1
-ddx include electrolyte imbalance causing possible cardiac event, cardiac 2/2 valvular disturbance, orthostatic, vasovagal, questionable seizure given hx of ?post-ictal state   -CT Head revealing Asymmetric hypodensity involving the central semiovale bilaterally, more to the right in the right frontal lobe may represent sequela of hypoxic/ischemic encephalopathy--> Neurology following and rec MRA head w/o con, MRA neck w/ con, EEG r/o sz   -will obtain orthostatic vital signs and treat accordingly with fluids if orthostatic   -potential Cardiology eval as pt well known to them, repeat TTE if rec by them   -correct electrolyte imbalances -ddx include electrolyte imbalance causing possible cardiac event, cardiac 2/2 valvular disturbance, orthostatic, vasovagal, questionable seizure given hx of ?post-ictal state   -CT Head revealing Asymmetric hypodensity involving the central semiovale bilaterally, more to the right in the right frontal lobe may represent sequela of hypoxic/ischemic encephalopathy--> Neurology following and rec MRA head w/o con, MRA neck w/ con, EEG r/o sz   -will obtain orthostatic vital signs and treat accordingly with fluids if orthostatic   -potential Cardiology eval as pt well known to them, repeat TTE if rec by them   -correct electrolyte imbalances  -will admit to telemetry to assess for any arrhythmias -likely in the setting of carotid artery stenosis and vasculopathy, patient can intermittently become orthostatic and syncopize   -CT Head revealing Asymmetric hypodensity involving the central semiovale bilaterally, more to the right in the right frontal lobe may represent sequela of hypoxic/ischemic encephalopathy--> Neurology following and rec MRA head w/o con, MRA neck w/ con, EEG r/o sz   -orthostatic vital signs WNL   -potential Cardiology eval as pt well known to them, repeat TTE if rec by them   -correct electrolyte imbalances  -will admit to telemetry to assess for any arrhythmias  -unclear why patient not taking aspirin Suspect carotid artery stenosis given sudden nature of episodes and no prior h/o seizure or Dx of arrhythmia despite IRL; PE significant for severe carotid bruits Rt > Lt; CT Head revealing asymmetric hypodensity involving the central semiovale bilaterally, more to the right in the right frontal lobe may represent sequela of hypoxic/ischemic encephalopathy; CT A/P significant for atherosclerotic disease of bilateral carotid arteries;   -Must r/o sudden arrythmia given co-current severe hypokalemia and hypomagnesemia;   -Neurology following  - MRA head w/o con, MRA neck w/ con, EEG r/o sz   -Orthostatic vital signs WNL   -Cardiology eval, defer TTE to cardiology team, ILR interrogation   -correct electrolyte imbalances  -Telemetry    -c/w Aspirin  -TSH

## 2022-01-10 NOTE — MEDICAL STUDENT ADULT H&P (EDUCATION) - NS MD HP STUD FAM HX FT
Family history of MI (myocardial infarction) (Child)  Father ( at 75)  Mother ( at 87)  Son (  at 35)  Son (early onset dementia)    Family history of hypertension (Child, Sibling)  Daughters, Sisters    Family history of hypercholesterolemia (Child, Sibling)    Family history of brain tumor (Sibling)

## 2022-01-10 NOTE — H&P ADULT - NSHPSOCIALHISTORY_GEN_ALL_CORE
Denies smoking, drinking, other substance use   Lives at home with son   Independent with ADLs Denies h/o smoking, drinking, other substance use   Lives at home with son   Independent with ADLs Denies h/o smoking, drinking, other substance use   Lives at home with son   Was not formally employed   Independent with ADLs

## 2022-01-10 NOTE — MEDICAL STUDENT ADULT H&P (EDUCATION) - NS MD HP STUD MEDICATIONS FT
acetaminophen 325 mg oral tablet: 2 tab(s) orally every 6 hours, As needed, Mild Pain (1 - 3), Moderate Pain (4 - 6) (16 Dec 2020 11:44)  lisinopril 40 mg oral tablet: 1 tab(s) orally once a day (15 Dec 2020 14:23)  meclizine 25 mg oral tablet: 1 tab(s) orally 3 times a day, As Needed vertigo (15 Dec 2020 14:23)  metoprolol succinate 25 mg oral tablet, extended release: 1 tab(s) orally once a day (15 Dec 2020 14:23)  pravastatin 10 mg oral tablet: 1 tab(s) orally once a day (15 Dec 2020 14:23)  triamterene-hydrochlorothiazide 37.5 mg- 25 mg oral capsule: 1 cap(s) orally once a day

## 2022-01-10 NOTE — ED ADULT NURSE REASSESSMENT NOTE - NS ED NURSE REASSESS COMMENT FT1
patient aaox4. ambulatory w/ assist. came in with syncope and fall. patient sustained ecchymosis at the chin. skin otherwise intact. patient denies dizziness, weakness, lightheadedness, numbness, tingling at this time. cardiac monitor nsr. denies chest pain, palpitations. Will continue to monitor
Report recd from night RN, pt feeling weak this AM. Pt reports feeling a little better but still weak. Denies CP/SPB/N/V/D/f/c. Recd call from lab [facility] with critical results of K- 1.9 and Ca- 6.0. reported to attending MD.

## 2022-01-10 NOTE — ED PROVIDER NOTE - CLINICAL SUMMARY MEDICAL DECISION MAKING FREE TEXT BOX
76yF with PMH of vertigo, CAD/MI presents to the ED following a syncopal episode this morning at approx 6 am. Pt was found face down in the bathroom by her son who she lives with after he heard a noise in the bathroom. Pt was lethargic and disoriented as per what the daughter was told by the son. Pt is currently A&O but lethargic and does not remember falling. Pt had similar episode back in 2020 and was told to follow-up with GI. Pt is currently c/o lightheadedness, epigastric pain and nausea with leg cramps. Concerning for cardiac cause, electrolyte abnormality, AAA/dissection, less likely PE. Will obtain cbc, cmp, vbg, ekg, trop, CTA c/a/p. Will give zofran, IVF, pepcid. Reassess, likely admit.

## 2022-01-10 NOTE — MEDICAL STUDENT ADULT H&P (EDUCATION) - NS MD HP STUD PE CONSITUTIONAL FT
GENERAL: NAD, well-groomed, well-developed  HEAD:  Atraumatic, Normocephalic  EYES: EOMI, PERRLA, conjunctiva and sclera clear  NECK: Supple, No JVD  HEART: Regular rate and rhythm; No murmurs, rubs, or gallops  RESPIRATORY: CTA B/L, No W/R/R  ABDOMEN: Soft, Nontender, Nondistended; Bowel sounds present  NEUROLOGY: A&Ox3, nonfocal, moving all extremities, CN II-XII intact  EXTREMITIES:  2+ Peripheral Pulses, No clubbing, cyanosis, or edema  SKIN: warm, dry, normal color, no rash or abnormal lesions

## 2022-01-11 DIAGNOSIS — I77.1 STRICTURE OF ARTERY: ICD-10-CM

## 2022-01-11 DIAGNOSIS — R94.31 ABNORMAL ELECTROCARDIOGRAM [ECG] [EKG]: ICD-10-CM

## 2022-01-11 LAB
ALBUMIN SERPL ELPH-MCNC: 3.9 G/DL — SIGNIFICANT CHANGE UP (ref 3.3–5)
ALP SERPL-CCNC: 78 U/L — SIGNIFICANT CHANGE UP (ref 40–120)
ALT FLD-CCNC: 13 U/L — SIGNIFICANT CHANGE UP (ref 4–33)
ANION GAP SERPL CALC-SCNC: 12 MMOL/L — SIGNIFICANT CHANGE UP (ref 7–14)
AST SERPL-CCNC: 23 U/L — SIGNIFICANT CHANGE UP (ref 4–32)
BASE EXCESS BLDV CALC-SCNC: 1.3 MMOL/L — SIGNIFICANT CHANGE UP (ref -2–3)
BASOPHILS # BLD AUTO: 0.02 K/UL — SIGNIFICANT CHANGE UP (ref 0–0.2)
BASOPHILS NFR BLD AUTO: 0.1 % — SIGNIFICANT CHANGE UP (ref 0–2)
BILIRUB SERPL-MCNC: 0.8 MG/DL — SIGNIFICANT CHANGE UP (ref 0.2–1.2)
BLOOD GAS VENOUS COMPREHENSIVE RESULT: SIGNIFICANT CHANGE UP
BUN SERPL-MCNC: 20 MG/DL — SIGNIFICANT CHANGE UP (ref 7–23)
CALCIUM SERPL-MCNC: 9.4 MG/DL — SIGNIFICANT CHANGE UP (ref 8.4–10.5)
CHLORIDE BLDV-SCNC: 100 MMOL/L — SIGNIFICANT CHANGE UP (ref 96–108)
CHLORIDE SERPL-SCNC: 98 MMOL/L — SIGNIFICANT CHANGE UP (ref 98–107)
CO2 BLDV-SCNC: 28 MMOL/L — HIGH (ref 22–26)
CO2 SERPL-SCNC: 24 MMOL/L — SIGNIFICANT CHANGE UP (ref 22–31)
CREAT SERPL-MCNC: 1.11 MG/DL — SIGNIFICANT CHANGE UP (ref 0.5–1.3)
CULTURE RESULTS: SIGNIFICANT CHANGE UP
EOSINOPHIL # BLD AUTO: 0 K/UL — SIGNIFICANT CHANGE UP (ref 0–0.5)
EOSINOPHIL NFR BLD AUTO: 0 % — SIGNIFICANT CHANGE UP (ref 0–6)
GAS PNL BLDV: 133 MMOL/L — LOW (ref 136–145)
GLUCOSE BLDV-MCNC: 123 MG/DL — HIGH (ref 70–99)
GLUCOSE SERPL-MCNC: 169 MG/DL — HIGH (ref 70–99)
HCO3 BLDV-SCNC: 27 MMOL/L — SIGNIFICANT CHANGE UP (ref 22–29)
HCT VFR BLD CALC: 34.3 % — LOW (ref 34.5–45)
HCT VFR BLDA CALC: 35 % — SIGNIFICANT CHANGE UP (ref 34.5–46.5)
HGB BLD CALC-MCNC: 11.8 G/DL — SIGNIFICANT CHANGE UP (ref 11.5–15.5)
HGB BLD-MCNC: 11.5 G/DL — SIGNIFICANT CHANGE UP (ref 11.5–15.5)
IANC: 11.46 K/UL — HIGH (ref 1.5–8.5)
IMM GRANULOCYTES NFR BLD AUTO: 0.7 % — SIGNIFICANT CHANGE UP (ref 0–1.5)
LACTATE BLDV-MCNC: 1 MMOL/L — SIGNIFICANT CHANGE UP (ref 0.5–2)
LYMPHOCYTES # BLD AUTO: 1.52 K/UL — SIGNIFICANT CHANGE UP (ref 1–3.3)
LYMPHOCYTES # BLD AUTO: 11 % — LOW (ref 13–44)
MAGNESIUM SERPL-MCNC: 2.3 MG/DL — SIGNIFICANT CHANGE UP (ref 1.6–2.6)
MCHC RBC-ENTMCNC: 27.3 PG — SIGNIFICANT CHANGE UP (ref 27–34)
MCHC RBC-ENTMCNC: 33.5 GM/DL — SIGNIFICANT CHANGE UP (ref 32–36)
MCV RBC AUTO: 81.3 FL — SIGNIFICANT CHANGE UP (ref 80–100)
MONOCYTES # BLD AUTO: 0.7 K/UL — SIGNIFICANT CHANGE UP (ref 0–0.9)
MONOCYTES NFR BLD AUTO: 5.1 % — SIGNIFICANT CHANGE UP (ref 2–14)
NEUTROPHILS # BLD AUTO: 11.46 K/UL — HIGH (ref 1.8–7.4)
NEUTROPHILS NFR BLD AUTO: 83.1 % — HIGH (ref 43–77)
NRBC # BLD: 0 /100 WBCS — SIGNIFICANT CHANGE UP
NRBC # FLD: 0 K/UL — SIGNIFICANT CHANGE UP
PCO2 BLDV: 44 MMHG — HIGH (ref 39–42)
PH BLDV: 7.39 — SIGNIFICANT CHANGE UP (ref 7.32–7.43)
PHOSPHATE SERPL-MCNC: 2.4 MG/DL — LOW (ref 2.5–4.5)
PLATELET # BLD AUTO: 289 K/UL — SIGNIFICANT CHANGE UP (ref 150–400)
PO2 BLDV: 51 MMHG — SIGNIFICANT CHANGE UP
POTASSIUM BLDV-SCNC: 3.4 MMOL/L — LOW (ref 3.5–5.1)
POTASSIUM SERPL-MCNC: 3.6 MMOL/L — SIGNIFICANT CHANGE UP (ref 3.5–5.3)
POTASSIUM SERPL-SCNC: 3.6 MMOL/L — SIGNIFICANT CHANGE UP (ref 3.5–5.3)
PROT SERPL-MCNC: 6.7 G/DL — SIGNIFICANT CHANGE UP (ref 6–8.3)
RBC # BLD: 4.22 M/UL — SIGNIFICANT CHANGE UP (ref 3.8–5.2)
RBC # FLD: 12.9 % — SIGNIFICANT CHANGE UP (ref 10.3–14.5)
SAO2 % BLDV: 84.5 % — SIGNIFICANT CHANGE UP
SODIUM SERPL-SCNC: 134 MMOL/L — LOW (ref 135–145)
SPECIMEN SOURCE: SIGNIFICANT CHANGE UP
TSH SERPL-MCNC: 0.37 UIU/ML — SIGNIFICANT CHANGE UP (ref 0.27–4.2)
WBC # BLD: 13.8 K/UL — HIGH (ref 3.8–10.5)
WBC # FLD AUTO: 13.8 K/UL — HIGH (ref 3.8–10.5)

## 2022-01-11 PROCEDURE — 99222 1ST HOSP IP/OBS MODERATE 55: CPT

## 2022-01-11 PROCEDURE — 93970 EXTREMITY STUDY: CPT | Mod: 26

## 2022-01-11 PROCEDURE — 93010 ELECTROCARDIOGRAM REPORT: CPT

## 2022-01-11 RX ORDER — ENOXAPARIN SODIUM 100 MG/ML
30 INJECTION SUBCUTANEOUS DAILY
Refills: 0 | Status: DISCONTINUED | OUTPATIENT
Start: 2022-01-11 | End: 2022-01-14

## 2022-01-11 RX ORDER — ENOXAPARIN SODIUM 100 MG/ML
40 INJECTION SUBCUTANEOUS DAILY
Refills: 0 | Status: DISCONTINUED | OUTPATIENT
Start: 2022-01-11 | End: 2022-01-11

## 2022-01-11 RX ORDER — POTASSIUM CHLORIDE 20 MEQ
40 PACKET (EA) ORAL ONCE
Refills: 0 | Status: COMPLETED | OUTPATIENT
Start: 2022-01-11 | End: 2022-01-11

## 2022-01-11 RX ORDER — ASPIRIN/CALCIUM CARB/MAGNESIUM 324 MG
81 TABLET ORAL DAILY
Refills: 0 | Status: DISCONTINUED | OUTPATIENT
Start: 2022-01-11 | End: 2022-01-14

## 2022-01-11 RX ORDER — SODIUM,POTASSIUM PHOSPHATES 278-250MG
1 POWDER IN PACKET (EA) ORAL THREE TIMES A DAY
Refills: 0 | Status: COMPLETED | OUTPATIENT
Start: 2022-01-11 | End: 2022-01-12

## 2022-01-11 RX ADMIN — Medication 40 MILLIEQUIVALENT(S): at 14:04

## 2022-01-11 RX ADMIN — Medication 1 PACKET(S): at 21:18

## 2022-01-11 RX ADMIN — Medication 50 MILLIGRAM(S): at 05:46

## 2022-01-11 RX ADMIN — ATORVASTATIN CALCIUM 20 MILLIGRAM(S): 80 TABLET, FILM COATED ORAL at 21:17

## 2022-01-11 RX ADMIN — Medication 1 PACKET(S): at 14:04

## 2022-01-11 RX ADMIN — Medication 81 MILLIGRAM(S): at 11:22

## 2022-01-11 RX ADMIN — AMLODIPINE BESYLATE 2.5 MILLIGRAM(S): 2.5 TABLET ORAL at 21:17

## 2022-01-11 NOTE — CONSULT NOTE ADULT - ATTENDING COMMENTS
Patient seen and examined.  Agree with above.   Admitted with ? syncope   Monitor on tele  Monitor QTc  EP eval appreciated  Check TTE  Neuro workup in progress     Adebayo Montaño MD
I performed a history and physical examination of the patient and discussed the management of the patient with the resident. I reviewed the resident's note and agree with the documented findings and plan of care with the following additions/exceptions.    dos 1/11/22    she reports feeling well now. doesn't recall going to the bathroom. says she feels like something is wrong in her head.  on exam alert, fluent, no dysarthria. eomi. face symm. no drift. legs move symm    reviewed mri from 2002 and every hct from 2010 to 1/10/22. each was done for indication of either syncope, fall, or trauma (10 in total). hct 1/10/22 report notes possible HIE but I don't see this abnormality, looks similar to all the priors with mild vol loss, and mild chronic ischemic changes. I don't see sign change daying back to 2002. 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    impression: syncope in context of multiple electrolyte abnormalities.  low suspicion for primary cns process but agree with mri and mra given her lack of recall for the event and disorientation afterwards.   prefer to complete w/u inpatient but could consider deferring to outpatient if she remains well and can be seen by outpatient neurologist this week (Dr. Sanchez for example)  rec ilr interrogation

## 2022-01-11 NOTE — PROGRESS NOTE ADULT - ASSESSMENT
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        Problem/Plan - 1:  ·  Problem: Syncope.   ·  Plan: Recurrent . Work up in progress.   -Neurology following  - MRA head w/o con, MRA neck w/ con, EEG r/o sz   -Orthostatic vital signs WNL   -Cardiology and EP following.   -correct electrolyte imbalances  -Telemetry    -c/w Aspirin  -TSH.     Problem/Plan - 2:  ·  Problem: QT prolongation.   ·  Plan: Screening EKG c/w SIf=712  -Avoid QT prolonging agents  -Telemetry  -Repleted Magnesium in ED  -TSH  -Addendum, 8:20am, repeat EKG: UCl=032.     Problem/Plan - 3:  ·  Problem: Electrolyte imbalance.   ·  Plan: Severe hypokalemia, hypomagnesemia and hypocalcemia;   -likely 2/2 possibly meds/poor PO intake   -Replete Magnesium, Potassium and Calcium   -Repeat EKG improved  -Telemetry.     Problem/Plan - 4:  ·  Problem: Anemia.   ·  Plan: Normocytic anemia, no evidence of bleeding but questionable occult GI bleed; Daily ASA regimen;   Hgb baseline 11.5-12; Hgb fluctuating in the past per EMR review   -check iron level, ferritin, reticulocyte count, FOBT.     Problem/Plan - 5:  ·  Problem: Stenosis of celiac artery.   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   -Monitor renal function.     Problem/Plan - 6:  ·  Problem: CAD (coronary artery disease).   ·  Plan: EKG: no acute ischemic changes   -stable CAD, can continue statin, metoprolol, ASA.     Problem/Plan - 7:  ·  Problem: Hypertension.   ·  Plan: - BP readings high .   -patient on amlodipine 2.5 mg, triam-hctz 37.5-25mg, metoprolol 50mg    -BP hypertensive, can continue amlodipine and metoprolol for  - hold diuretic  due to severe electrolytes imbalance.     Problem/Plan - 8:  ·  Problem: Prophylactic measure.   ·  Plan: SCD   -PT eval   -DASH diet.

## 2022-01-11 NOTE — PROGRESS NOTE ADULT - SUBJECTIVE AND OBJECTIVE BOX
Date of Service  : 22     INTERVAL HPI/OVERNIGHT EVENTS: No new concerns.   Vital Signs Last 24 Hrs  T(C): 36.7 (2022 14:00), Max: 36.8 (10 Miguel 2022 18:53)  T(F): 98.1 (2022 14:00), Max: 98.2 (10 Miguel 2022 18:53)  HR: 81 (2022 14:00) (81 - 103)  BP: 160/78 (2022 14:00) (144/74 - 163/75)  BP(mean): --  RR: 17 (2022 14:00) (14 - 18)  SpO2: 100% (2022 14:00) (100% - 100%)  I&O's Summary    MEDICATIONS  (STANDING):  amLODIPine   Tablet 2.5 milliGRAM(s) Oral daily  aspirin enteric coated 81 milliGRAM(s) Oral daily  atorvastatin 20 milliGRAM(s) Oral at bedtime  metoprolol succinate ER 50 milliGRAM(s) Oral daily  potassium phosphate / sodium phosphate Powder (PHOS-NaK) 1 Packet(s) Oral three times a day    MEDICATIONS  (PRN):  acetaminophen     Tablet .. 650 milliGRAM(s) Oral every 6 hours PRN Temp greater or equal to 38C (100.4F), Mild Pain (1 - 3)  aluminum hydroxide/magnesium hydroxide/simethicone Suspension 30 milliLiter(s) Oral every 4 hours PRN Dyspepsia  melatonin 3 milliGRAM(s) Oral at bedtime PRN Insomnia  ondansetron Injectable 4 milliGRAM(s) IV Push every 8 hours PRN Nausea and/or Vomiting    LABS:                        11.5   13.80 )-----------( 289      ( 2022 07:54 )             34.3     01-11    134<L>  |  98  |  20  ----------------------------<  169<H>  3.6   |  24  |  1.11    Ca    9.4      2022 10:20  Phos  2.4     01-11  Mg     2.30     -11    TPro  6.7  /  Alb  3.9  /  TBili  0.8  /  DBili  x   /  AST  23  /  ALT  13  /  AlkPhos  78  01-    PT/INR - ( 10 Miguel 2022 08:07 )   PT: 13.4 sec;   INR: 1.17 ratio           Urinalysis Basic - ( 10 Miguel 2022 11:57 )    Color: Colorless / Appearance: Clear / S.008 / pH: x  Gluc: x / Ketone: Negative  / Bili: Negative / Urobili: <2 mg/dL   Blood: x / Protein: Negative / Nitrite: Negative   Leuk Esterase: Negative / RBC: x / WBC x   Sq Epi: x / Non Sq Epi: x / Bacteria: x      CAPILLARY BLOOD GLUCOSE            Urinalysis Basic - ( 10 Miguel 2022 11:57 )    Color: Colorless / Appearance: Clear / S.008 / pH: x  Gluc: x / Ketone: Negative  / Bili: Negative / Urobili: <2 mg/dL   Blood: x / Protein: Negative / Nitrite: Negative   Leuk Esterase: Negative / RBC: x / WBC x   Sq Epi: x / Non Sq Epi: x / Bacteria: x      REVIEW OF SYSTEMS:  CONSTITUTIONAL: No fever, weight loss, or fatigue  EYES: No eye pain, visual disturbances, or discharge  ENMT:  No difficulty hearing, tinnitus, vertigo; No sinus or throat pain  NECK: No pain or stiffness  RESPIRATORY: No cough, wheezing, chills or hemoptysis; No shortness of breath  CARDIOVASCULAR: No chest pain, palpitations, dizziness, or leg swelling  GASTROINTESTINAL: No abdominal or epigastric pain. No nausea, vomiting, or hematemesis; No diarrhea or constipation. No melena or hematochezia.  GENITOURINARY: No dysuria, frequency, hematuria, or incontinence  NEUROLOGICAL: No headaches, memory loss, loss of strength, numbness, or tremors      Consultant(s) Notes Reviewed:  [x ] YES  [ ] NO    PHYSICAL EXAM:  GENERAL: NAD, well-groomed, well-developed, not in any distress ,  HEAD:  Atraumatic, Normocephalic  NECK: Supple, No JVD, Normal thyroid  NERVOUS SYSTEM:  Alert & Oriented X3, No focal deficit   CHEST/LUNG: Good air entry bilateral with no  rales, rhonchi, wheezing, or rubs  HEART: Regular rate and rhythm; No murmurs, rubs, or gallops  ABDOMEN: Soft, Nontender, Nondistended; Bowel sounds present  EXTREMITIES:  2+ Peripheral Pulses, No clubbing, cyanosis, or edema      Care Discussed with Consultants/Other Providers [ x] YES  [ ] NO

## 2022-01-11 NOTE — PATIENT PROFILE ADULT - FALL HARM RISK - HARM RISK INTERVENTIONS

## 2022-01-11 NOTE — CONSULT NOTE ADULT - SUBJECTIVE AND OBJECTIVE BOX
EP Attending    HISTORY OF PRESENT ILLNESS: HPI:  77 y/o female with MHx of CAD s/p remote PCI in  (stents), HTN, HLD, recurrent syncope s/p previous ILR with no events ever recorded, LICA stenosis s/p recent left CEA (2020), previous exercise NST in 2019 with no evidence of stress induced ischemia or infarct as well as previous TTE 2020 with normal LV/RV function. Patient is now admitted s/p syncopal episode.  The patient recalls feeling intense leg pain and tingling in her feet upon waking which has been happening for the past several weeks or so. Pt was found face down in the bathroom by her son who she lives with after he heard a noise in the bathroom. At the time, the patient was lethargic and disoriented as per what the daughter was told by the son. The disorientation, dizziness, nausea, and muscle cramps continued for several hours after her arrival to the ED.  Patient says before she went to bed last night she had a headache. She associated nausea, dizziness, weakness, double vision, or fevers. Reports no recent illness. She has experienced multiple syncopal episodes in the past (last 2020) that have been attributed to cardiac abnormalities. This episode is the first time she has experienced prolonged confusion or muscle cramps, as previous episodes it seems more vasovagal in nature. She has never been evaluated by a neurologist prior to this admission;  In ED, patient afebrile, /70s, saturating well on RA, given 1L NS and taken for CAT scan.  (10 Miguel 2022 20:52)    Had a loop recorder implanted in , which reached end of life in [id number].  Had syncopal events with no diagnosis on the monitor.  Has since had more syncopal episodes but unmonitored.  She has had headaches but no angina, palpitations, or presyncope while standing.  A 10 pt ROS is otherwise negative    PAST MEDICAL & SURGICAL HISTORY:  Dyslipidemia    HTN (hypertension), benign    CAD (coronary artery disease)    History of MI (myocardial infarction)    H/O heart artery stent    Vertigo    Migraines  Developed at 10 years of age  Last episode 2 years ago    Borderline diabetes mellitus  Controlled with diet    Legally blind in left eye, as defined in [place]  20/400 left eye    Sciatica    No Past Surgical History    History of tonsillectomy    History of tubal ligation  35 years ago, no complications as per patient.    S/P LASIK surgery  Right eye, no complications as per patient.    History of coronary artery stent placement  3 stents (Last in )  Same admission as past myocardial infarction    S/P ICD (internal cardiac defibrillator) procedure  loop recorder    MEDICATIONS  (STANDING):  amLODIPine   Tablet 2.5 milliGRAM(s) Oral daily  aspirin enteric coated 81 milliGRAM(s) Oral daily  atorvastatin 20 milliGRAM(s) Oral at bedtime  metoprolol succinate ER 50 milliGRAM(s) Oral daily  potassium phosphate / sodium phosphate Powder (PHOS-NaK) 1 Packet(s) Oral three times a day    Allergies    fish (Unknown)  iodine (Anaphylaxis)  No Known Drug Allergies    Intolerances    labetalol (Other)    FAMILY HISTORY:  Family history of MI (myocardial infarction) (Child)  Father ( at 75)  Mother ( at 87)  Son (  at 35)    Family history of hypertension (Child, Sibling)  Daughters, Sisters    Family history of hypercholesterolemia (Child, Sibling)    Family history of brain tumor (Sibling)      Non-contributary for premature coronary disease or sudden cardiac death    SOCIAL HISTORY:    [ ]x Non-smoker  [ ] Smoker  [ ] Alcohol      PHYSICAL EXAM:  T(C): 36.7 (22 @ 14:00), Max: 36.8 (01-10-22 @ 16:48)  HR: 81 (22 @ 14:00) (81 - 103)  BP: 160/78 (22 @ 14:00) (144/74 - 163/75)  RR: 17 (22 @ 14:00) (14 - 19)  SpO2: 100% (22 @ 14:00) (100% - 100%)  Wt(kg): --    Appearance: thin adult woman in no acute distress, cooperative	  HEENT:   Normal oral mucosa, PERRL, EOMI	  Lymphatic: No lymphadenopathy , no edema  Cardiovascular: Normal S1 S2, No JVD, No murmurs , Peripheral pulses palpable 2+ bilaterally, ILR implanted over the sternum.  Respiratory: Lungs clear to auscultation, normal effort 	  Gastrointestinal:  Soft, Non-tender, + BS	  Skin: No rashes, No ecchymoses, No cyanosis, warm to touch  Musculoskeletal: Normal range of motion, normal strength  Psychiatry:  Mood & affect appropriate    TELEMETRY: NSR 	    ECG:  NSR  Echo: normal LV EF and normal valves ()  	  LABS:	 	                          11.5   13.80 )-----------( 289      ( 2022 07:54 )             34.3         134<L>  |  98  |  20  ----------------------------<  169<H>  3.6   |  24  |  1.11    Ca    9.4      2022 10:20  Phos  2.4       Mg     2.30         TPro  6.7  /  Alb  3.9  /  TBili  0.8  /  DBili  x   /  AST  23  /  ALT  13  /  AlkPhos  78    TSH: Thyroid Stimulating Hormone, Serum: 0.37 uIU/mL ( @ 07:27)      ASSESSMENT/PLAN: 	76y Female found down in bathroom.  Does not remember getting out of bed or how she may have lost consciousness.  Has history of syncope that was probably vasovagal in the past, with no arrhythmia identified on loop recorder from [id number].  She has CAD, PAD (carotids) but a normal LV EF%, and normal heart valves.    I am not entirely certain that this event was syncopal.  She awoke confused/disoriented, nauseated, and with muscle cramps, which is not typical for simple syncope.  Recommend broad based neuro workup with EEG.    We discused management of her loop recorder:  At this time she is NOT willing to undergo ILR explant and re-implant.  Consider event monitoring on discharge.      Maosud Gorman M.D.  Cardiac Electrophysiology    office [phone]  pager [phone]

## 2022-01-11 NOTE — PATIENT PROFILE ADULT - NSPROPTRIGHTCAREGIVER_GEN_A_NUR
Size Of Lesion In Cm: 0 Render Post-Care Instructions In Note?: no Depth Of Biopsy: dermis Wound Care: Vaseline Notification Instructions: Patient will be notified of biopsy results. However, patient instructed to call the office if not contacted within 2 weeks. Anesthesia Volume In Cc: 0.3 Path Notes (To The Dermatopathologist): Topical or Ed&c if positive Cryotherapy Text: The wound bed was treated with cryotherapy after the biopsy was performed. Anesthesia Type: 1% lidocaine with epinephrine Type Of Destruction Used: Curettage no Biopsy Type: H and E Post-care instructions were reviewed in detail and written instructions are provided. Patient is to keep the biopsy site dry overnight, and then apply bacitracin twice daily until healed. Patient may apply hydrogen peroxide soaks to remove any crusting. Accession #: [id number] Electrodesiccation Text: The wound bed was treated with electrodesiccation after the biopsy was performed. Silver Nitrate Text: The wound bed was treated with silver nitrate after the biopsy was performed. Dressing: bandage Consent: Written consent was obtained and risks were reviewed including but not limited to scarring, infection, bleeding, scabbing, incomplete removal, and allergy to anesthesia. Hemostasis: Aluminum Chloride Was A Bandage Applied: Yes Electrodesiccation And Curettage Text: The wound bed was treated with electrodesiccation and curettage after the biopsy was performed. Detail Level: Detailed Billing Type: Third-Party Bill Biopsy Method: Dermablade

## 2022-01-11 NOTE — CONSULT NOTE ADULT - SUBJECTIVE AND OBJECTIVE BOX
HISTORY OF PRESENT ILLNESS: HPI:    75 y/o female with MHx of CAD s/p remote PCI in  (stents), HTN, HLD, recurrent syncope s/p previous ILR with no events ever recorded, LICA stenosis s/p left CEA (2020), previous exercise NST in 2019 with no evidence of stress induced ischemia or infarct as well as previous TTE 2020 with normal LV/RV function. Patient is now admitted s/p syncopal episode.      The patient recalls feeling intense leg pain and tingling in her feet upon waking which has been happening for the past several weeks or so. Pt was found face down in the bathroom by her son who she lives with after he heard a noise in the bathroom. At the time, the patient was lethargic and disoriented as per what the daughter was told by the son. The disorientation, dizziness, nausea, and muscle cramps continued for several hours after her arrival to the ED.  Patient says before she went to bed last night she had a headache. She associated nausea, dizziness, weakness, double vision, or fevers. Reports no recent illness. She has experienced multiple syncopal episodes in the past (last 2020) that have been attributed to cardiac abnormalities. This episode is the first time she has experienced prolonged confusion or muscle cramps, as previous episodes it seems more vasovagal in nature. She has never been evaluated by a neurologist prior to this admission;    In ED, patient afebrile, /70s, saturating well on RA, given 1L NS and taken for CAT scan.  (10 Miguel 2022 20:52)      PAST MEDICAL & SURGICAL HISTORY:  Dyslipidemia    HTN (hypertension), benign    CAD (coronary artery disease)    History of MI (myocardial infarction)    H/O heart artery stent    Vertigo    Migraines  Developed at 10 years of age  Last episode 2 years ago    Borderline diabetes mellitus  Controlled with diet    Legally blind in left eye, as defined in [place]  20/400 left eye    Sciatica    No Past Surgical History    History of tonsillectomy    History of tubal ligation  35 years ago, no complications as per patient.    S/P LASIK surgery  Right eye, no complications as per patient.    History of coronary artery stent placement  3 stents (Last in )  Same admission as past myocardial infarction    S/P ICD (internal cardiac defibrillator) procedure  loop recorder      MEDICATIONS  (STANDING):  amLODIPine   Tablet 2.5 milliGRAM(s) Oral daily  aspirin enteric coated 81 milliGRAM(s) Oral daily  atorvastatin 20 milliGRAM(s) Oral at bedtime  metoprolol succinate ER 50 milliGRAM(s) Oral daily  potassium phosphate / sodium phosphate Powder (PHOS-NaK) 1 Packet(s) Oral three times a day      Allergies  fish (Unknown)  iodine (Anaphylaxis)  No Known Drug Allergies    Intolerances  labetalol (Other)      FAMILY HISTORY:  Family history of MI (myocardial infarction) (Child)  Father ( at 75)  Mother ( at 87)  Son (  at 35)  Family history of hypertension (Child, Sibling)  Daughters, Sisters  Family history of hypercholesterolemia (Child, Sibling)  Family history of brain tumor (Sibling)    Noncontributory for premature coronary disease or sudden cardiac death    SOCIAL HISTORY:    [ x] Non-smoker  [ ] Smoker  [ ] Alcohol    FLU VACCINE THIS YEAR STARTS IN AUGUST:  [ ] Yes    [ ] No    IF OVER 65 HAVE YOU EVER HAD A PNA VACCINE:  [ ] Yes    [ ] No       [ ] N/A      REVIEW OF SYSTEMS:  [ ]chest pain  [  ]shortness of breath  [  ]palpitations  [ x ]syncope  [ ]near syncope [ ]upper extremity weakness   [ ] lower extremity weakness  [  ]diplopia  [  ]altered mental status   [  ]fevers  [ ]chills [ ]nausea  [ ]vomiting  [  ]dysphagia    [ ]abdominal pain  [ ]melena  [ ]BRBPR    [  ]epistaxis  [  ]rash    [ ]lower extremity edema        [x ] All others negative	  [ ] Unable to obtain      LABS:	 	    trop t <6                        11.5   13.80 )-----------( 289      ( 2022 07:54 )             34.3     134<L>  |  98  |  20  ----------------------------<  169<H>  3.6   |  24  |  1.11    Ca    9.4      2022 10:20  Phos  2.4       Mg     2.30         TPro  6.7  /  Alb  3.9  /  TBili  0.8  /  DBili  x   /  AST  23  /  ALT  13  /  AlkPhos  78      Creatinine Trend: 1.11<--, 0.93<--, 0.73<--    TSH: Thyroid Stimulating Hormone, Serum: 0.37 uIU/mL ( @ 07:27)    PHYSICAL EXAM:  T(C): 36.7 (22 @ 14:00), Max: 36.8 (01-10-22 @ 16:48)  HR: 81 (22 @ 14:00) (81 - 103)  BP: 160/78 (22 @ 14:00) (144/74 - 163/75)  RR: 17 (22 @ 14:00) (14 - 19)  SpO2: 100% (22 @ 14:00) (100% - 100%)    Gen: Appears well in NAD  HEENT:  (-)icterus (-)pallor  CV: N S1 S2 1/6 VICTORIA (+)2 Pulses B/l  Resp:  Clear to ausculatation B/L, normal effort  GI: (+) BS Soft, NT, ND  Lymph:  (-)Edema, (-)obvious lymphadenopathy  Skin: Warm to touch, Normal turgor  Psych: Appropriate mood and affect    TELEMETRY: SR	      ECG:  	SR Qtc 517    < from: CT Head No Cont (01.10.22 @ 15:49) >  IMPRESSION:    Asymmetric hypodensity involving the central semiovale bilaterally, more   to the right in the right frontal lobe may represent sequela of   hypoxic/ischemic encephalopathy. Further correlation with MRI of the   brain is suggested for more detailed evaluation.    No intracranial hemorrhage, midline shift or herniation.    < end of copied text >    < from: CT Angio Chest PE Protocol w/ IV Cont (01.10.22 @ 15:50) >  IMPRESSION:  No aortic dissection or aneurysm.    No acute pathology within chest, abdomen or pelvis.    Atrophic left kidney.    Atherosclerotic disease of bilateral carotid arteries.    < end of copied text >        ASSESSMENT/PLAN: 	75 y/o female with MHx of CAD s/p remote PCI in  (stents), HTN, HLD, recurrent syncope s/p previous ILR with no events ever recorded, LICA stenosis s/p left CEA (2020), previous exercise NST in 2019 with no evidence of stress induced ischemia or infarct as well as previous TTE 2020 with normal LV/RV function. Patient is now admitted s/p syncopal episode.      --cont tele  --EP consult given hx ILR (>3 years ago, no longer functioning)  --check orthostatics and echo  --given LE pain and cramps, check LE dopplers  --Full Neuro w/u given abnormal imaging and hx CEA  --further reccs pending above  --repeat 12 lead EKG tomorrow

## 2022-01-11 NOTE — PHYSICAL THERAPY INITIAL EVALUATION ADULT - PERTINENT HX OF CURRENT PROBLEM, REHAB EVAL
patient presented after sustaining a syncopal episode resulting in a fall in her bathroom, was found by her son; prior evening, patient with complaints of a headache and head "heaviness"

## 2022-01-11 NOTE — EEG REPORT - NS EEG TEXT BOX
[facility]   COMPREHENSIVE EPILEPSY CENTER   REPORT OF ROUTINE VIDEO EEG     [facility]: [address], [place] [address], Ph#: [phone]  [facility]: [address], [place] [address], Ph#: [phone]  [facility]: [address] [place] [address], Ph#: [phone]    Patient Name: SHIVANI NAQVI  Age and : 76y ([birth year])  MRN #: [id number]  Location: [place] [address] [address]  Referring Physician: Theresa Jolly    Study Date: 22    _____________________________________________________________  TECHNICAL INFORMATION    Placement and Labeling of Electrodes:  The EEG was performed utilizing 20 channels referential EEG connections (coronal over temporal over parasagittal montage) using all standard 10-20 electrode placements with EKG.  Recording was at a sampling rate of 256 samples per second per channel.  Time synchronized digital video recording was done simultaneously with EEG recording.  A low light infrared camera was used for low light recording.  Sandor and seizure detection algorithms were utilized.    _____________________________________________________________  HISTORY    76yF with PMH of vertigo, CAD/MI presents to the ED following a syncopal episode this morning followed by several hours of disorientation, dizziness, nausea, and muscle cramps. Found to have severe electrolyte abnormalities (K 1.9). Patient's symptoms improved with fluid and electrolyte repletion. Pt also found to have CT abnormalities possibly consistent with hypoxic/ischemic encephalopathy and bilateral carotid atherosclerosis. Neuro exam non-focal, no motor or sensory deficits noted. Cognitive evaluation grossly normal.     PERTINENT MEDICATION:  No sedating or antiseizure medications given.  _____________________________________________________________  STUDY INTERPRETATION    Findings: The background was continuous and reactive. During wakefulness, the posterior dominant rhythm consisted of symmetric, well-modulated 9 Hz activity, with amplitude to 30 uV, that attenuated to eye opening.      Background Slowing:  No generalized background slowing was present.    Focal Slowing:   None were present.    Sleep Background:  Drowsiness was characterized by fragmentation, attenuation, and slowing of the background activity.    Stage II sleep transients were present.    Other Non-Epileptiform Findings:  None were present.    Interictal Epileptiform Activity:   None were present.    Events:  Clinical events: None recorded.  Seizures: None recorded.    Activation Procedures:   Hyperventilation was not performed.    Photic stimulation did not elicit abnormalities.     Artifacts:  Intermittent myogenic and movement artifacts were noted.    ECG:  The heart rate on single channel ECG was predominantly between 70-80 BPM.    _____________________________________________________________  EEG SUMMARY/CLASSIFICATION    Normal EEG in the awake, drowsy, and asleep states.    _____________________________________________________________  EEG IMPRESSION/CLINICAL CORRELATE    Normal EEG study.  No epileptiform pattern or seizure seen.    _____________________________________________________________    Shivani Culp MD  Fellow | [facility] [place]   [facility]   COMPREHENSIVE EPILEPSY CENTER   REPORT OF ROUTINE VIDEO EEG     [facility]: [address], [place] [address], Ph#: [phone]  [facility]: [address], [place] [address], Ph#: [phone]  [facility]: [address] [place] [address], Ph#: [phone]    Patient Name: HORTENCIA NAQVI  Age and : 76y ([birth year])  MRN #: [id number]  Location: [place] [address] [address]  Referring Physician: Theresa Jolly    Study Date: 22    _____________________________________________________________  TECHNICAL INFORMATION    Placement and Labeling of Electrodes:  The EEG was performed utilizing 20 channels referential EEG connections (coronal over temporal over parasagittal montage) using all standard 10-20 electrode placements with EKG.  Recording was at a sampling rate of 256 samples per second per channel.  Time synchronized digital video recording was done simultaneously with EEG recording.  A low light infrared camera was used for low light recording.  Sandor and seizure detection algorithms were utilized.    _____________________________________________________________  HISTORY    76yF with PMH of vertigo, CAD/MI presents to the ED following a syncopal episode this morning followed by several hours of disorientation, dizziness, nausea, and muscle cramps. Found to have severe electrolyte abnormalities (K 1.9). Patient's symptoms improved with fluid and electrolyte repletion. Pt also found to have CT abnormalities possibly consistent with hypoxic/ischemic encephalopathy and bilateral carotid atherosclerosis. Neuro exam non-focal, no motor or sensory deficits noted. Cognitive evaluation grossly normal.     PERTINENT MEDICATION:  No sedating or antiseizure medications given.  _____________________________________________________________  STUDY INTERPRETATION    Findings: The background was continuous and reactive. During wakefulness, the posterior dominant rhythm consisted of symmetric, well-modulated 9 Hz activity, with amplitude to 30 uV, that attenuated to eye opening.      Background Slowing:  No generalized background slowing was present.    Focal Slowing:   None were present.    Sleep Background:  Drowsiness was characterized by fragmentation, attenuation, and slowing of the background activity.    Stage II sleep transients were present.    Other Non-Epileptiform Findings:  None were present.    Interictal Epileptiform Activity:   None were present.    Events:  Clinical events: None recorded.  Seizures: None recorded.    Activation Procedures:   Hyperventilation was not performed.    Photic stimulation did not elicit abnormalities.     Artifacts:  Intermittent myogenic and movement artifacts were noted.    ECG:  The heart rate on single channel ECG was predominantly between 70-80 BPM.    _____________________________________________________________  EEG SUMMARY/CLASSIFICATION    Normal EEG in the awake, drowsy, and asleep states.    _____________________________________________________________  EEG IMPRESSION/CLINICAL CORRELATE    Normal EEG study.  No epileptiform pattern or seizure seen.    _____________________________________________________________    Hortencia Culp MD  Fellow | [facility]    Dirk Hilario MD  Neurology Attending Physician

## 2022-01-12 LAB
A1C WITH ESTIMATED AVERAGE GLUCOSE RESULT: 6.9 % — HIGH (ref 4–5.6)
ANION GAP SERPL CALC-SCNC: 10 MMOL/L — SIGNIFICANT CHANGE UP (ref 7–14)
BUN SERPL-MCNC: 17 MG/DL — SIGNIFICANT CHANGE UP (ref 7–23)
CALCIUM SERPL-MCNC: 9.2 MG/DL — SIGNIFICANT CHANGE UP (ref 8.4–10.5)
CHLORIDE SERPL-SCNC: 102 MMOL/L — SIGNIFICANT CHANGE UP (ref 98–107)
CO2 SERPL-SCNC: 28 MMOL/L — SIGNIFICANT CHANGE UP (ref 22–31)
CREAT SERPL-MCNC: 1.02 MG/DL — SIGNIFICANT CHANGE UP (ref 0.5–1.3)
ESTIMATED AVERAGE GLUCOSE: 151 — SIGNIFICANT CHANGE UP
FERRITIN SERPL-MCNC: 78 NG/ML — SIGNIFICANT CHANGE UP (ref 15–150)
GLUCOSE SERPL-MCNC: 175 MG/DL — HIGH (ref 70–99)
HCT VFR BLD CALC: 35.2 % — SIGNIFICANT CHANGE UP (ref 34.5–45)
HGB BLD-MCNC: 11.8 G/DL — SIGNIFICANT CHANGE UP (ref 11.5–15.5)
IRON SATN MFR SERPL: 21 % — SIGNIFICANT CHANGE UP (ref 14–50)
IRON SATN MFR SERPL: 46 UG/DL — SIGNIFICANT CHANGE UP (ref 30–160)
MAGNESIUM SERPL-MCNC: 1.8 MG/DL — SIGNIFICANT CHANGE UP (ref 1.6–2.6)
MCHC RBC-ENTMCNC: 27.3 PG — SIGNIFICANT CHANGE UP (ref 27–34)
MCHC RBC-ENTMCNC: 33.5 GM/DL — SIGNIFICANT CHANGE UP (ref 32–36)
MCV RBC AUTO: 81.5 FL — SIGNIFICANT CHANGE UP (ref 80–100)
NRBC # BLD: 0 /100 WBCS — SIGNIFICANT CHANGE UP
NRBC # FLD: 0 K/UL — SIGNIFICANT CHANGE UP
PHOSPHATE SERPL-MCNC: 2.4 MG/DL — LOW (ref 2.5–4.5)
PLATELET # BLD AUTO: 280 K/UL — SIGNIFICANT CHANGE UP (ref 150–400)
POTASSIUM SERPL-MCNC: 3.8 MMOL/L — SIGNIFICANT CHANGE UP (ref 3.5–5.3)
POTASSIUM SERPL-SCNC: 3.8 MMOL/L — SIGNIFICANT CHANGE UP (ref 3.5–5.3)
RBC # BLD: 4.32 M/UL — SIGNIFICANT CHANGE UP (ref 3.8–5.2)
RBC # BLD: 4.32 M/UL — SIGNIFICANT CHANGE UP (ref 3.8–5.2)
RBC # FLD: 13 % — SIGNIFICANT CHANGE UP (ref 10.3–14.5)
RETICS #: 66.1 K/UL — SIGNIFICANT CHANGE UP (ref 25–125)
RETICS/RBC NFR: 1.5 % — SIGNIFICANT CHANGE UP (ref 0.5–2.5)
SODIUM SERPL-SCNC: 140 MMOL/L — SIGNIFICANT CHANGE UP (ref 135–145)
TIBC SERPL-MCNC: 217 UG/DL — LOW (ref 220–430)
UIBC SERPL-MCNC: 171 UG/DL — SIGNIFICANT CHANGE UP (ref 110–370)
WBC # BLD: 12.88 K/UL — HIGH (ref 3.8–10.5)
WBC # FLD AUTO: 12.88 K/UL — HIGH (ref 3.8–10.5)

## 2022-01-12 PROCEDURE — 93306 TTE W/DOPPLER COMPLETE: CPT | Mod: 26

## 2022-01-12 RX ADMIN — Medication 1 PACKET(S): at 05:34

## 2022-01-12 RX ADMIN — ENOXAPARIN SODIUM 30 MILLIGRAM(S): 100 INJECTION SUBCUTANEOUS at 13:14

## 2022-01-12 RX ADMIN — Medication 81 MILLIGRAM(S): at 13:13

## 2022-01-12 RX ADMIN — ATORVASTATIN CALCIUM 20 MILLIGRAM(S): 80 TABLET, FILM COATED ORAL at 21:32

## 2022-01-12 RX ADMIN — Medication 50 MILLIGRAM(S): at 05:33

## 2022-01-12 RX ADMIN — AMLODIPINE BESYLATE 2.5 MILLIGRAM(S): 2.5 TABLET ORAL at 21:32

## 2022-01-12 NOTE — PROGRESS NOTE ADULT - ASSESSMENT
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        Problem/Plan - 1:  ·  Problem: Syncope.   ·  Plan: Recurrent . Work up in progress.   -Neurology following  - MRA head w/o con, MRA neck w/ con,   -Orthostatic vital signs WNL   -Cardiology and EP following.   -correct electrolyte imbalances  -Telemetry    -c/w Aspirin  -TSH.     Problem/Plan - 2:  ·  Problem: QT prolongation.   ·  Plan: Screening EKG c/w NYg=751  -Avoid QT prolonging agents  -Telemetry  -Repleted Magnesium in ED  -Addendum, 8:20am, repeat EKG: OUv=887.     Problem/Plan - 3:  ·  Problem: Electrolyte imbalance.   ·  Plan: Severe hypokalemia, hypomagnesemia and hypocalcemia;   -likely 2/2 possibly meds/poor PO intake   -Replete Magnesium, Potassium and Calcium   -Repeat EKG improved  -Telemetry.     Problem/Plan - 4:  ·  Problem: Anemia.   ·  Plan: Normocytic anemia, no evidence of bleeding but questionable occult GI bleed; Daily ASA regimen;   Hgb baseline 11.5-12; Hgb fluctuating in the past per EMR review   -check iron level, ferritin, reticulocyte count, FOBT.     Problem/Plan - 5:  ·  Problem: Stenosis of celiac artery.   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 Lactate initially elevated 2.1;   -Vasc Sx consult noted.     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   -Monitor renal function.     Problem/Plan - 6:  ·  Problem: CAD (coronary artery disease).   ·  Plan: EKG: no acute ischemic changes   -stable CAD, can continue statin, metoprolol, ASA.     Problem/Plan - 7:  ·  Problem: Hypertension.   ·  Plan: - BP readings better.    -patient on amlodipine 2.5 mg, triam-hctz 37.5-25mg, metoprolol 50mg    -BP hypertensive, can continue amlodipine and metoprolol for  - hold diuretic  due to severe electrolytes imbalance.     Problem/Plan - 8:  ·  Problem: Prophylactic measure.   ·  Plan: SCD   -PT eval   -DASH diet.      Disposition ; DC planning pending MRI/MRA.

## 2022-01-12 NOTE — PROGRESS NOTE ADULT - SUBJECTIVE AND OBJECTIVE BOX
Date of Service  : 01-12-22     INTERVAL HPI/OVERNIGHT EVENTS: Seen and examined this morning . i want to go home as feel better.   Vital Signs Last 24 Hrs  T(C): 36.6 (12 Jan 2022 13:15), Max: 36.6 (11 Jan 2022 21:15)  T(F): 97.8 (12 Jan 2022 13:15), Max: 97.9 (11 Jan 2022 21:15)  HR: 77 (12 Jan 2022 13:15) (77 - 79)  BP: 132/68 (12 Jan 2022 13:15) (132/68 - 145/74)  BP(mean): --  RR: 18 (12 Jan 2022 13:15) (16 - 18)  SpO2: 99% (12 Jan 2022 13:15) (98% - 100%)  I&O's Summary    MEDICATIONS  (STANDING):  amLODIPine   Tablet 2.5 milliGRAM(s) Oral daily  aspirin enteric coated 81 milliGRAM(s) Oral daily  atorvastatin 20 milliGRAM(s) Oral at bedtime  enoxaparin Injectable 30 milliGRAM(s) SubCutaneous daily  metoprolol succinate ER 50 milliGRAM(s) Oral daily    MEDICATIONS  (PRN):  acetaminophen     Tablet .. 650 milliGRAM(s) Oral every 6 hours PRN Temp greater or equal to 38C (100.4F), Mild Pain (1 - 3)  aluminum hydroxide/magnesium hydroxide/simethicone Suspension 30 milliLiter(s) Oral every 4 hours PRN Dyspepsia  melatonin 3 milliGRAM(s) Oral at bedtime PRN Insomnia  ondansetron Injectable 4 milliGRAM(s) IV Push every 8 hours PRN Nausea and/or Vomiting    LABS:                        11.8   12.88 )-----------( 280      ( 12 Jan 2022 10:48 )             35.2     01-12    140  |  102  |  17  ----------------------------<  175<H>  3.8   |  28  |  1.02    Ca    9.2      12 Jan 2022 10:48  Phos  2.4     01-12  Mg     1.80     01-12    TPro  6.7  /  Alb  3.9  /  TBili  0.8  /  DBili  x   /  AST  23  /  ALT  13  /  AlkPhos  78  01-11        CAPILLARY BLOOD GLUCOSE              REVIEW OF SYSTEMS:  CONSTITUTIONAL: No fever, weight loss, or fatigue  EYES: No eye pain, visual disturbances, or discharge  ENMT:  No difficulty hearing, tinnitus, vertigo; No sinus or throat pain  NECK: No pain or stiffness  RESPIRATORY: No cough, wheezing, chills or hemoptysis; No shortness of breath  CARDIOVASCULAR: No chest pain, palpitations, dizziness, or leg swelling  GASTROINTESTINAL: No abdominal or epigastric pain. No nausea, vomiting, or hematemesis; No diarrhea or constipation. No melena or hematochezia.  GENITOURINARY: No dysuria, frequency, hematuria, or incontinence  NEUROLOGICAL: No headaches, memory loss, loss of strength, numbness, or tremors      Consultant(s) Notes Reviewed:  [x ] YES  [ ] NO    PHYSICAL EXAM:  GENERAL: NAD, well-groomed, well-developed,not in any distress ,  HEAD:  Atraumatic, Normocephalic  EYES: EOMI, PERRLA, conjunctiva and sclera clear  ENMT: No tonsillar erythema, exudates, or enlargement; Moist mucous membranes, Good dentition, No lesions  NECK: Supple, No JVD, Normal thyroid  NERVOUS SYSTEM:  Alert & Oriented X3, No focal deficit   CHEST/LUNG: Good air entry bilateral with no  rales, rhonchi, wheezing, or rubs  HEART: Regular rate and rhythm; No murmurs, rubs, or gallops  ABDOMEN: Soft, Nontender, Nondistended; Bowel sounds present  EXTREMITIES:  2+ Peripheral Pulses, No clubbing, cyanosis, or edema  SKIN: No rashes or lesions    Care Discussed with Consultants/Other Providers [ x] YES  [ ] NO

## 2022-01-12 NOTE — PROGRESS NOTE ADULT - SUBJECTIVE AND OBJECTIVE BOX
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 Patient is now admitted s/p syncopal episode.  The patient recalls feeling intense leg pain and tingling in her feet upon waking which has been happening for the past several weeks or so. Pt was found face down in the bathroom by her son who she lives with after he heard a noise in the bathroom. At the time, the patient was lethargic and disoriented as per what the daughter was told by the son. The disorientation, dizziness, nausea, and muscle cramps continued for several hours after her arrival to the ED.  Patient says before she went to bed last night she had a headache. She associated nausea, dizziness, weakness, double vision, or fevers. Reports no recent illness. She has experienced multiple syncopal episodes in the past (last 9/2020) that have been attributed to cardiac abnormalities. This episode is the first time she has experienced prolonged confusion or muscle cramps, as previous episodes it seems more vasovagal in nature. She has never been evaluated by a neurologist prior to this admission;  In ED, patient afebrile, /70s, saturating well on RA, given 1L NS and taken for CAT scan.  (10 Miguel 2022 20:52)    Had a loop recorder implanted in 2016, which reached end of life in 7848-7449.  Had syncopal events with no diagnosis on the monitor.  Has since had more syncopal episodes but unmonitored.  She has had headaches but no angina, palpitations, or presyncope while standing.  A 10 pt ROS is otherwise negative  Date of service 1/12- no new complaints, resting comfortably.    acetaminophen     Tablet .. 650 milliGRAM(s) Oral every 6 hours PRN  aluminum hydroxide/magnesium hydroxide/simethicone Suspension 30 milliLiter(s) Oral every 4 hours PRN  amLODIPine   Tablet 2.5 milliGRAM(s) Oral daily  aspirin enteric coated 81 milliGRAM(s) Oral daily  atorvastatin 20 milliGRAM(s) Oral at bedtime  enoxaparin Injectable 30 milliGRAM(s) SubCutaneous daily  melatonin 3 milliGRAM(s) Oral at bedtime PRN  metoprolol succinate ER 50 milliGRAM(s) Oral daily  ondansetron Injectable 4 milliGRAM(s) IV Push every 8 hours PRN                            11.8   12.88 )-----------( 280      ( 12 Jan 2022 10:48 )             35.2       01-12    140  |  102  |  17  ----------------------------<  175<H>  3.8   |  28  |  1.02    Ca    9.2      12 Jan 2022 10:48  Phos  2.4     01-12  Mg     1.80     01-12    TPro  6.7  /  Alb  3.9  /  TBili  0.8  /  DBili  x   /  AST  23  /  ALT  13  /  AlkPhos  78  01-11      T(C): 36.6 (01-12-22 @ 05:15), Max: 36.7 (01-11-22 @ 14:00)  HR: 79 (01-12-22 @ 05:15) (78 - 81)  BP: 138/68 (01-12-22 @ 05:15) (138/68 - 160/78)  RR: 16 (01-12-22 @ 05:15) (16 - 17)  SpO2: 98% (01-12-22 @ 05:15) (98% - 100%)  Wt(kg): --    I&O's Summary    Appearance: thin adult woman in no acute distress, cooperative	  HEENT:   Normal oral mucosa, PERRL, EOMI	  Lymphatic: No lymphadenopathy , no edema  Cardiovascular: Normal S1 S2, No JVD, No murmurs , Peripheral pulses palpable 2+ bilaterally, ILR implanted over the sternum.  Respiratory: Lungs clear to auscultation, normal effort 	  Gastrointestinal:  Soft, Non-tender, + BS	  Skin: No rashes, No ecchymoses, No cyanosis, warm to touch  Musculoskeletal: Normal range of motion, normal strength  Psychiatry:  Mood & affect appropriate    TELEMETRY: NSR 	    ECG:  NSR  Echo: normal LV EF and normal valves (2020)    TPro  6.7  /  Alb  3.9  /  TBili  0.8  /  DBili  x   /  AST  23  /  ALT  13  /  AlkPhos  78  01-11  TSH: Thyroid Stimulating Hormone, Serum: 0.37 uIU/mL (01-11 @ 07:27)      ASSESSMENT/PLAN: 	76y Female found down in bathroom.  Does not remember getting out of bed or how she may have lost consciousness.  Has history of syncope that was probably vasovagal in the past, with no arrhythmia identified on loop recorder from [id number].  She has CAD, PAD (carotids) but a normal LV EF%, and normal heart valves.    I am not entirely certain that this event was syncopal.  She awoke confused/disoriented, nauseated, and with muscle cramps, which is not typical for simple syncope.  Recommend broad based neuro workup with EEG.    We discused management of her loop recorder:  At this time she is NOT willing to undergo ILR explant and re-implant.  Consider event monitoring on discharge.      Masoud Gorman M.D.  Cardiac Electrophysiology    office [phone]  pager [phone]

## 2022-01-12 NOTE — CONSULT NOTE ADULT - SUBJECTIVE AND OBJECTIVE BOX
VASCULAR SURGERY CONSULT NOTE  Attending: Dr. Bolton  Service: C Team  Contact: [phone]    HPI  77 y/o female with MHx of CAD s/p PCI stents in 2012, HTN, HLD, recurrent syncope s/p previous ILR with no events ever recorded, LICA stenosis s/p left CEA (August 2020 with Dr. Bolton), Patient is now admitted s/p syncopal episode.  The patient recalls feeling leg pain and tingling in her feet upon waking which has been happening for the past several weeks. She was found  down in the bathroom by her son who she lives with. At the time, the patient was lethargic and disoriented. The patient states that she has experienced multiple syncopal episodes in the past (last 9/2020) that have been attributed to cardiac abnormalities. This episode is the first time she has experienced prolonged confusion or muscle cramps associated with her syncope.    The patient has been following with Dr. Bolton in the office for her carotid stenosis. Last duplex in August 2021 showed 50-60% stenosis on right. As per note follow-up duplex is to be scheduled for February. 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 Patient has been asymptomatic from this finding. She denies abdominal pain or weight loss. Vascular surgery called to evaluate for both carotid artery stenosis and celiac artery stenosis.     PMH/PSH  Dyslipidemia    HTN (hypertension), benign    CAD (coronary artery disease)    History of MI (myocardial infarction)    H/O heart artery stent    Vertigo    Migraines    Borderline diabetes mellitus    Legally blind in left eye, as defined in [place]    Sciatica      No Past Surgical History    History of tonsillectomy    History of tubal ligation    S/P LASIK surgery    History of coronary artery stent placement    S/P ICD (internal cardiac defibrillator) procedure        MEDICATIONS  acetaminophen     Tablet .. 650 milliGRAM(s) Oral every 6 hours PRN  aluminum hydroxide/magnesium hydroxide/simethicone Suspension 30 milliLiter(s) Oral every 4 hours PRN  amLODIPine   Tablet 2.5 milliGRAM(s) Oral daily  aspirin enteric coated 81 milliGRAM(s) Oral daily  atorvastatin 20 milliGRAM(s) Oral at bedtime  enoxaparin Injectable 30 milliGRAM(s) SubCutaneous daily  melatonin 3 milliGRAM(s) Oral at bedtime PRN  metoprolol succinate ER 50 milliGRAM(s) Oral daily  ondansetron Injectable 4 milliGRAM(s) IV Push every 8 hours PRN      Allergies    fish (Unknown)  iodine (Anaphylaxis)  No Known Drug Allergies    Intolerances    labetalol (Other)      Social    Physical Exam  T(C): 36.6 (01-12-22 @ 05:15), Max: 36.7 (01-11-22 @ 14:00)  HR: 79 (01-12-22 @ 05:15) (78 - 81)  BP: 138/68 (01-12-22 @ 05:15) (138/68 - 160/78)  RR: 16 (01-12-22 @ 05:15) (16 - 17)  SpO2: 98% (01-12-22 @ 05:15) (98% - 100%)  Wt(kg): --  Tmax: T(C): , Max: 36.7 (01-11-22 @ 14:00)  Wt(kg): --      Gen: NAD  Neuro: AAOx3, CN II- XII intact. Motor and sensory grossly intact.  HEENT: normocephalic, atraumatic, no scleral icterus  CV: S1, S2, RRR  Pulm: CTA B/L  Abd: Soft, ND, NT, no rebound, no guarding, no palpable organomegaly/masses  Ext: warm, no edema. Palpable DP/PT bilaterally     LABS                        11.8   12.88 )-----------( 280      ( 12 Jan 2022 10:48 )             35.2     01-12    140  |  102  |  17  ----------------------------<  175<H>  3.8   |  28  |  1.02    Ca    9.2      12 Jan 2022 10:48  Phos  2.4     01-12  Mg     1.80     01-12    TPro  6.7  /  Alb  3.9  /  TBili  0.8  /  DBili  x   /  AST  23  /  ALT  13  /  AlkPhos  78  01-11              IMAGING  < from: CT Angio Chest PE Protocol w/ IV Cont (01.10.22 @ 15:50) >    FINDINGS:  CHEST:  LUNGS AND LARGE AIRWAYS: Patent central airways. Mild bibasilar dependent   changes. The lungs are otherwise clear.  PLEURA: No pleural effusion.  VESSELS: No aortic dissection, aneurysm, or intramural hematoma.   Partially imaged atherosclerotic plaque at the bilateral carotid   bifurcations. No pulmonary embolus.  HEART: Heart size is normal. No pericardial effusion. Coronary   calcifications/stents.  MEDIASTINUM AND ELLE: No lymphadenopathy.  CHEST WALLAND LOWER NECK: Subcutaneous loop recorder.    ABDOMEN AND PELVIS:  LIVER: Within normal limits.  BILE DUCTS: Normal caliber.  GALLBLADDER: Within normal limits.  SPLEEN: Within normal limits. Aortic and coronary artery calcifications.  PANCREAS: Within normal limits.  ADRENALS: Within normal limits.  KIDNEYS/URETERS: Atrophic left kidney with small left renal cyst. No   hydronephrosis.    BLADDER: Within normal limits.  REPRODUCTIVE ORGANS: Uterus and adnexa within normal limits.    BOWEL: No bowel obstruction. Appendix is normal. Small hiatal hernia.  PERITONEUM: No ascites.  VESSELS: No aortic dissection or aneurysm. 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 SMA, duplicated right renal artery   and KIARRA are patent. Aortic calcifications.  RETROPERITONEUM/LYMPH NODES: No lymphadenopathy.  ABDOMINAL WALL: Within normal limits.  BONES: Degenerative changes. Aging indeterminate compression deformity of   T11 vertebral body.    IMPRESSION:  No aortic dissection or aneurysm.    No acute pathology within chest, abdomen or pelvis.    Atrophic left kidney.    Atherosclerotic disease of bilateral carotid arteries.    --- End of Report ---    < end of copied text >

## 2022-01-12 NOTE — PROGRESS NOTE ADULT - SUBJECTIVE AND OBJECTIVE BOX
Date of service 1/12/22    chief complaint: syncope    extended hpi: 75 y/o female with MHx of CAD s/p remote PCI in 2012 (stents), HTN, HLD, recurrent syncope s/p previous ILR with no events ever recorded, LICA stenosis s/p left CEA (August 2020), previous exercise NST in 8/2019 with no evidence of stress induced ischemia or infarct as well as previous TTE 6/2020 with normal LV/RV function. Patient is now admitted s/p syncopal episode.     denies dizziness, palps, chest pain or SOB    Review of Systems:   Constitutional: [ ] fevers, [ ] chills.   Skin: [ ] dry skin. [ ] rashes.  Psychiatric: [ ] depression, [ ] anxiety.   Gastrointestinal: [ ] BRBPR, [ ] melena.   Neurological: [ ] confusion. [ ] seizures. [ ] shuffling gait.   Ears,Nose,Mouth and Throat: [ ] ear pain [ ] sore throat.   Eyes: [ ] diplopia.   Respiratory: [ ] hemoptysis. [ ] shortness of breath  Cardiovascular: See HPI above  Hematologic/Lymphatic: [ ] anemia. [ ] painful nodes. [ ] prolonged bleeding.   Genitourinary: [ ] hematuria. [ ] flank pain.   Endocrine: [ ] significant change in weight. [ ] intolerance to heat and cold.     Review of systems [ x] otherwise negative, [ ] otherwise unable to obtain    FH: no family history of sudden cardiac death in first degree relatives    SH: [ ] tobacco, [ ] alcohol, [ ] drugs    acetaminophen     Tablet .. 650 milliGRAM(s) Oral every 6 hours PRN  aluminum hydroxide/magnesium hydroxide/simethicone Suspension 30 milliLiter(s) Oral every 4 hours PRN  amLODIPine   Tablet 2.5 milliGRAM(s) Oral daily  aspirin enteric coated 81 milliGRAM(s) Oral daily  atorvastatin 20 milliGRAM(s) Oral at bedtime  enoxaparin Injectable 30 milliGRAM(s) SubCutaneous daily  melatonin 3 milliGRAM(s) Oral at bedtime PRN  metoprolol succinate ER 50 milliGRAM(s) Oral daily  ondansetron Injectable 4 milliGRAM(s) IV Push every 8 hours PRN                            11.8   12.88 )-----------( 280      ( 12 Jan 2022 10:48 )             35.2     140  |  102  |  17  ----------------------------<  175<H>  3.8   |  28  |  1.02    Ca    9.2      12 Jan 2022 10:48  Phos  2.4     01-12  Mg     1.80     01-12    TPro  6.7  /  Alb  3.9  /  TBili  0.8  /  DBili  x   /  AST  23  /  ALT  13  /  AlkPhos  78  01-11      T(C): 36.6 (01-12-22 @ 05:15), Max: 36.7 (01-11-22 @ 14:00)  HR: 79 (01-12-22 @ 05:15) (78 - 81)  BP: 138/68 (01-12-22 @ 05:15) (138/68 - 160/78)  RR: 16 (01-12-22 @ 05:15) (16 - 17)  SpO2: 98% (01-12-22 @ 05:15) (98% - 100%)    General: Well nourished in no acute distress. Alert and Oriented * 3.   Head: Normocephalic and atraumatic.   Neck: No JVD. No bruits. Supple. Does not appear to be enlarged.   Cardiovascular: + S1,S2 ; RRR Soft systolic murmur at the left lower sternal border. No rubs noted.    Lungs: CTA b/l. No rhonchi, rales or wheezes.   Abdomen: + BS, soft. Non tender. Non distended. No rebound. No guarding.   Extremities: No clubbing/cyanosis/edema.   Neurologic: Moves all four extremities. Full range of motion.   Skin: Warm and moist. The patient's skin has normal elasticity and good skin turgor.   Psychiatric: Appropriate mood and affect.  Musculoskeletal: Normal range of motion, normal strength    DATA    tele- SR      < from: Transthoracic Echocardiogram (01.12.22 @ 06:58) >  CONCLUSIONS:  1. Calcified trileaflet aortic valve with normal opening.  2. Normal left ventricular internal dimensions and wall  thicknesses.  3. Normal left ventricular systolic function. No segmental  wall motion abnormalities.  4. Normal right ventricular size and function.  ------------------------------------------------------------------------  Confirmed on  1/12/2022 - 12:06:39 by Harinder Hammer M.D. [facility]    < end of copied text >      ASSESSMENT/PLAN: 	75 y/o female with MHx of CAD s/p remote PCI in 2012 (stents), HTN, HLD, recurrent syncope s/p previous ILR with no events ever recorded, LICA stenosis s/p left CEA (August 2020), previous exercise NST in 8/2019 with no evidence of stress induced ischemia or infarct as well as previous TTE 6/2020 with normal LV/RV function. Patient is now admitted s/p syncopal episode.      --no events on tele thus far  --orthostatics negative  --TTe with structurally normal heart  --LE dopplers with no DVT BL  --MRI brain pending  --repeat 12 lead EKG today re eval QT interval

## 2022-01-12 NOTE — CONSULT NOTE ADULT - ASSESSMENT
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    - Will follow-up MRA head and neck  -Can follow-up outpatient as planned for repeat carotid duplex next month  -No acute intervention for celiac artery stenosis seen on CT. Patient can follow-up in office as well for mesenteric duplex  -Plan discussed with Dr. Bolton

## 2022-01-13 LAB
ANION GAP SERPL CALC-SCNC: 12 MMOL/L — SIGNIFICANT CHANGE UP (ref 7–14)
BASOPHILS # BLD AUTO: 0.06 K/UL — SIGNIFICANT CHANGE UP (ref 0–0.2)
BASOPHILS NFR BLD AUTO: 0.6 % — SIGNIFICANT CHANGE UP (ref 0–2)
BUN SERPL-MCNC: 16 MG/DL — SIGNIFICANT CHANGE UP (ref 7–23)
CALCIUM SERPL-MCNC: 9.6 MG/DL — SIGNIFICANT CHANGE UP (ref 8.4–10.5)
CHLORIDE SERPL-SCNC: 99 MMOL/L — SIGNIFICANT CHANGE UP (ref 98–107)
CO2 SERPL-SCNC: 25 MMOL/L — SIGNIFICANT CHANGE UP (ref 22–31)
CREAT SERPL-MCNC: 1.03 MG/DL — SIGNIFICANT CHANGE UP (ref 0.5–1.3)
EOSINOPHIL # BLD AUTO: 0.09 K/UL — SIGNIFICANT CHANGE UP (ref 0–0.5)
EOSINOPHIL NFR BLD AUTO: 0.9 % — SIGNIFICANT CHANGE UP (ref 0–6)
GLUCOSE SERPL-MCNC: 104 MG/DL — HIGH (ref 70–99)
HCT VFR BLD CALC: 36.7 % — SIGNIFICANT CHANGE UP (ref 34.5–45)
HGB BLD-MCNC: 11.8 G/DL — SIGNIFICANT CHANGE UP (ref 11.5–15.5)
IANC: 6.97 K/UL — SIGNIFICANT CHANGE UP (ref 1.5–8.5)
IMM GRANULOCYTES NFR BLD AUTO: 0.4 % — SIGNIFICANT CHANGE UP (ref 0–1.5)
LYMPHOCYTES # BLD AUTO: 2.52 K/UL — SIGNIFICANT CHANGE UP (ref 1–3.3)
LYMPHOCYTES # BLD AUTO: 24.4 % — SIGNIFICANT CHANGE UP (ref 13–44)
MAGNESIUM SERPL-MCNC: 1.8 MG/DL — SIGNIFICANT CHANGE UP (ref 1.6–2.6)
MCHC RBC-ENTMCNC: 26.5 PG — LOW (ref 27–34)
MCHC RBC-ENTMCNC: 32.2 GM/DL — SIGNIFICANT CHANGE UP (ref 32–36)
MCV RBC AUTO: 82.5 FL — SIGNIFICANT CHANGE UP (ref 80–100)
MONOCYTES # BLD AUTO: 0.65 K/UL — SIGNIFICANT CHANGE UP (ref 0–0.9)
MONOCYTES NFR BLD AUTO: 6.3 % — SIGNIFICANT CHANGE UP (ref 2–14)
NEUTROPHILS # BLD AUTO: 6.97 K/UL — SIGNIFICANT CHANGE UP (ref 1.8–7.4)
NEUTROPHILS NFR BLD AUTO: 67.4 % — SIGNIFICANT CHANGE UP (ref 43–77)
NRBC # BLD: 0 /100 WBCS — SIGNIFICANT CHANGE UP
NRBC # FLD: 0 K/UL — SIGNIFICANT CHANGE UP
PHOSPHATE SERPL-MCNC: 2.9 MG/DL — SIGNIFICANT CHANGE UP (ref 2.5–4.5)
PLATELET # BLD AUTO: 278 K/UL — SIGNIFICANT CHANGE UP (ref 150–400)
POTASSIUM SERPL-MCNC: 3.8 MMOL/L — SIGNIFICANT CHANGE UP (ref 3.5–5.3)
POTASSIUM SERPL-SCNC: 3.8 MMOL/L — SIGNIFICANT CHANGE UP (ref 3.5–5.3)
RBC # BLD: 4.45 M/UL — SIGNIFICANT CHANGE UP (ref 3.8–5.2)
RBC # FLD: 13 % — SIGNIFICANT CHANGE UP (ref 10.3–14.5)
SODIUM SERPL-SCNC: 136 MMOL/L — SIGNIFICANT CHANGE UP (ref 135–145)
WBC # BLD: 10.33 K/UL — SIGNIFICANT CHANGE UP (ref 3.8–10.5)
WBC # FLD AUTO: 10.33 K/UL — SIGNIFICANT CHANGE UP (ref 3.8–10.5)

## 2022-01-13 PROCEDURE — 70548 MR ANGIOGRAPHY NECK W/DYE: CPT | Mod: 26

## 2022-01-13 PROCEDURE — 70544 MR ANGIOGRAPHY HEAD W/O DYE: CPT | Mod: 26,59

## 2022-01-13 PROCEDURE — 70551 MRI BRAIN STEM W/O DYE: CPT | Mod: 26

## 2022-01-13 RX ADMIN — ENOXAPARIN SODIUM 30 MILLIGRAM(S): 100 INJECTION SUBCUTANEOUS at 12:36

## 2022-01-13 RX ADMIN — Medication 50 MILLIGRAM(S): at 06:28

## 2022-01-13 RX ADMIN — AMLODIPINE BESYLATE 2.5 MILLIGRAM(S): 2.5 TABLET ORAL at 22:19

## 2022-01-13 RX ADMIN — ATORVASTATIN CALCIUM 20 MILLIGRAM(S): 80 TABLET, FILM COATED ORAL at 22:18

## 2022-01-13 RX ADMIN — Medication 81 MILLIGRAM(S): at 12:36

## 2022-01-13 NOTE — PROGRESS NOTE ADULT - SUBJECTIVE AND OBJECTIVE BOX
EP Attending    HISTORY OF PRESENT ILLNESS: HPI:  75 y/o female with MHx of CAD s/p remote PCI in 2012 (stents), HTN, HLD, recurrent syncope s/p previous ILR with no events ever recorded, LICA stenosis s/p recent left CEA (August 2020), previous exercise NST in 8/2019 with no evidence of stress induced ischemia or infarct as well as previous TTE 6/2020 with normal LV/RV function. Patient is now admitted s/p syncopal episode.  The patient recalls feeling intense leg pain and tingling in her feet upon waking which has been happening for the past several weeks or so. Pt was found face down in the bathroom by her son who she lives with after he heard a noise in the bathroom. At the time, the patient was lethargic and disoriented as per what the daughter was told by the son. The disorientation, dizziness, nausea, and muscle cramps continued for several hours after her arrival to the ED.  Patient says before she went to bed last night she had a headache. She associated nausea, dizziness, weakness, double vision, or fevers. Reports no recent illness. She has experienced multiple syncopal episodes in the past (last 9/2020) that have been attributed to cardiac abnormalities. This episode is the first time she has experienced prolonged confusion or muscle cramps, as previous episodes it seems more vasovagal in nature. She has never been evaluated by a neurologist prior to this admission;  In ED, patient afebrile, /70s, saturating well on RA, given 1L NS and taken for CAT scan.  (10 Miguel 2022 20:52)    Had a loop recorder implanted in 2016, which reached end of life in 6915-2031.  Had syncopal events with no diagnosis on the monitor.  Has since had more syncopal episodes but unmonitored.  She has had headaches but no angina, palpitations, or presyncope while standing.  A 10 pt ROS is otherwise negative  1/12- no new complaints, resting comfortably.  Date of service 1/13- resting comfortably.    acetaminophen     Tablet .. 650 milliGRAM(s) Oral every 6 hours PRN  aluminum hydroxide/magnesium hydroxide/simethicone Suspension 30 milliLiter(s) Oral every 4 hours PRN  amLODIPine   Tablet 2.5 milliGRAM(s) Oral daily  aspirin enteric coated 81 milliGRAM(s) Oral daily  atorvastatin 20 milliGRAM(s) Oral at bedtime  enoxaparin Injectable 30 milliGRAM(s) SubCutaneous daily  melatonin 3 milliGRAM(s) Oral at bedtime PRN  metoprolol succinate ER 50 milliGRAM(s) Oral daily  ondansetron Injectable 4 milliGRAM(s) IV Push every 8 hours PRN                          11.8   10.33 )-----------( 278      ( 13 Jan 2022 08:26 )             36.7       01-13    136  |  99  |  16  ----------------------------<  104<H>  3.8   |  25  |  1.03    Ca    9.6      13 Jan 2022 08:26  Phos  2.9     01-13  Mg     1.80     01-13    T(C): 37.2 (01-13-22 @ 12:35), Max: 37.2 (01-13-22 @ 12:35)  HR: 86 (01-13-22 @ 12:35) (76 - 86)  BP: 149/72 (01-13-22 @ 12:35) (145/65 - 165/79)  RR: 16 (01-13-22 @ 12:35) (16 - 18)  SpO2: 100% (01-13-22 @ 12:35) (99% - 100%)  Wt(kg): --    I&O's Summary    Appearance: thin adult woman in no acute distress, cooperative	  HEENT:   Normal oral mucosa, PERRL, EOMI	  Lymphatic: No lymphadenopathy , no edema  Cardiovascular: Normal S1 S2, No JVD, No murmurs , Peripheral pulses palpable 2+ bilaterally, ILR implanted over the sternum.  Respiratory: Lungs clear to auscultation, normal effort 	  Gastrointestinal:  Soft, Non-tender, + BS	  Skin: No rashes, No ecchymoses, No cyanosis, warm to touch  Musculoskeletal: Normal range of motion, normal strength  Psychiatry:  Mood & affect appropriate    TELEMETRY: NSR 	    ECG:  NSR  Echo: normal LV EF and normal valves (2020)    TPro  6.7  /  Alb  3.9  /  TBili  0.8  /  DBili  x   /  AST  23  /  ALT  13  /  AlkPhos  78  01-11  TSH: Thyroid Stimulating Hormone, Serum: 0.37 uIU/mL (01-11 @ 07:27)      ASSESSMENT/PLAN: 	76y Female found down in bathroom.  Does not remember getting out of bed or how she may have lost consciousness.  Has history of syncope that was probably vasovagal in the past, with no arrhythmia identified on loop recorder from [id number].  She has CAD, PAD (carotids) but a normal LV EF%, and normal heart valves.    I am not entirely certain that this event was syncopal.  She awoke confused/disoriented, nauseated, and with muscle cramps, which is not typical for simple syncope.  Recommend broad based neuro workup with EEG.    We discussed management of her loop recorder:  At this time she is NOT willing to undergo ILR explant and re-implant.  Consider event monitoring on discharge.  No further inpatient EP workup planned.    Masoud Gorman M.D.  Cardiac Electrophysiology    office [phone]  pager [phone]

## 2022-01-13 NOTE — PROGRESS NOTE ADULT - ASSESSMENT
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        Problem/Plan - 1:  ·  Problem: Syncope.   ·  Plan: Recurrent . Work up in progress.   -Neurology following  - MRA head w/o con, MRA neck w/ con,   -Orthostatic vital signs WNL   -Cardiology and EP following.   -correct electrolyte imbalances  -Telemetry    -c/w Aspirin  -TSH.     Problem/Plan - 2:  ·  Problem: QT prolongation.   ·  Plan: Screening EKG c/w EQf=041  -Avoid QT prolonging agents  -Telemetry  -Repleted Magnesium in ED  -Addendum, 8:20am, repeat EKG: PWz=416.     Problem/Plan - 3:  ·  Problem: Electrolyte imbalance.   ·  Plan: Severe hypokalemia, hypomagnesemia and hypocalcemia;   -likely 2/2 possibly meds/poor PO intake   -Replete Magnesium, Potassium and Calcium   -Repeat EKG improved  -Telemetry.     Problem/Plan - 4:  ·  Problem: Anemia.   ·  Plan: Normocytic anemia, no evidence of bleeding but questionable occult GI bleed; Daily ASA regimen;   Hgb baseline 11.5-12; Hgb fluctuating in the past per EMR review   -check iron level, ferritin, reticulocyte count, FOBT.     Problem/Plan - 5:  ·  Problem: Stenosis of celiac artery.   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 Lactate initially elevated 2.1;   -Vasc Sx consult noted.     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   -Monitor renal function.     Problem/Plan - 6:  ·  Problem: CAD (coronary artery disease).   ·  Plan: EKG: no acute ischemic changes   -stable CAD, can continue statin, metoprolol, ASA.     Problem/Plan - 7:  ·  Problem: Hypertension.   ·  Plan: - BP readings better.    -patient on amlodipine 2.5 mg, triam-hctz 37.5-25mg, metoprolol 50mg    -BP hypertensive, can continue amlodipine and metoprolol for  - hold diuretic  due to severe electrolytes imbalance.     Problem/Plan - 8:  ·  Problem: Prophylactic measure.   ·  Plan: SCD   -PT eval   -DASH diet.      Disposition ; DC planning as wants outpt  MRI/MRA.

## 2022-01-13 NOTE — PROGRESS NOTE ADULT - SUBJECTIVE AND OBJECTIVE BOX
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 Patient is now admitted s/p syncopal episode.     S: no chest pain or sob; ros otherwise negative.     Review of Systems:   Constitutional: [ ] fevers, [ ] chills.   Skin: [ ] dry skin. [ ] rashes.  Psychiatric: [ ] depression, [ ] anxiety.   Gastrointestinal: [ ] BRBPR, [ ] melena.   Neurological: [ ] confusion. [ ] seizures. [ ] shuffling gait.   Ears,Nose,Mouth and Throat: [ ] ear pain [ ] sore throat.   Eyes: [ ] diplopia.   Respiratory: [ ] hemoptysis. [ ] shortness of breath  Cardiovascular: See HPI above  Hematologic/Lymphatic: [ ] anemia. [ ] painful nodes. [ ] prolonged bleeding.   Genitourinary: [ ] hematuria. [ ] flank pain.   Endocrine: [ ] significant change in weight. [ ] intolerance to heat and cold.     Review of systems [ x] otherwise negative, [ ] otherwise unable to obtain    FH: no family history of sudden cardiac death in first degree relatives    SH: [ ] tobacco, [ ] alcohol, [ ] drugs      acetaminophen     Tablet .. 650 milliGRAM(s) Oral every 6 hours PRN  aluminum hydroxide/magnesium hydroxide/simethicone Suspension 30 milliLiter(s) Oral every 4 hours PRN  amLODIPine   Tablet 2.5 milliGRAM(s) Oral daily  aspirin enteric coated 81 milliGRAM(s) Oral daily  atorvastatin 20 milliGRAM(s) Oral at bedtime  enoxaparin Injectable 30 milliGRAM(s) SubCutaneous daily  melatonin 3 milliGRAM(s) Oral at bedtime PRN  metoprolol succinate ER 50 milliGRAM(s) Oral daily  ondansetron Injectable 4 milliGRAM(s) IV Push every 8 hours PRN                            11.8   10.33 )-----------( 278      ( 13 Jan 2022 08:26 )             36.7       01-13    136  |  99  |  16  ----------------------------<  104<H>  3.8   |  25  |  1.03    Ca    9.6      13 Jan 2022 08:26  Phos  2.9     01-13  Mg     1.80     01-13              T(C): 36.4 (01-13-22 @ 06:58), Max: 36.6 (01-12-22 @ 13:15)  HR: 76 (01-13-22 @ 06:58) (76 - 80)  BP: 165/79 (01-13-22 @ 06:58) (132/68 - 165/79)  RR: 18 (01-13-22 @ 06:58) (18 - 18)  SpO2: 99% (01-13-22 @ 06:58) (99% - 99%)  Wt(kg): --    I&O's Summary      General: Well nourished in no acute distress. Alert and Oriented * 3.   Head: Normocephalic and atraumatic.   Neck: No JVD. No bruits. Supple. Does not appear to be enlarged.   Cardiovascular: + S1,S2 ; RRR Soft systolic murmur at the left lower sternal border. No rubs noted.    Lungs: CTA b/l. No rhonchi, rales or wheezes.   Abdomen: + BS, soft. Non tender. Non distended. No rebound. No guarding.   Extremities: No clubbing/cyanosis/edema.   Neurologic: Moves all four extremities. Full range of motion.   Skin: Warm and moist. The patient's skin has normal elasticity and good skin turgor.   Psychiatric: Appropriate mood and affect.  Musculoskeletal: Normal range of motion, normal strength    DATA    tele- SR      < from: Transthoracic Echocardiogram (01.12.22 @ 06:58) >  CONCLUSIONS:  1. Calcified trileaflet aortic valve with normal opening.  2. Normal left ventricular internal dimensions and wall  thicknesses.  3. Normal left ventricular systolic function. No segmental  wall motion abnormalities.  4. Normal right ventricular size and function.  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 Patient is now admitted s/p syncopal episode.      --no events on tele thus far  --orthostatics negative  --TTE with structurally normal heart  --MI ruled out   --LE dopplers with no DVT BL  --MRI brain pending  --EP follow up appreciated - pt. refusing ILR re-implant   --no further inpatient cardiac workup anticipated at this time    Adebayo Montaño MD

## 2022-01-13 NOTE — PROGRESS NOTE ADULT - SUBJECTIVE AND OBJECTIVE BOX
Date of Service  : 01-13-22 @ 15:08    INTERVAL HPI/OVERNIGHT EVENTS: Seen and examined earlier . If mRI not done today will go home and do outpt .  My daughter in nurse and will pick me up after 2.30  Vital Signs Last 24 Hrs  T(C): 37.2 (13 Jan 2022 12:35), Max: 37.2 (13 Jan 2022 12:35)  T(F): 98.9 (13 Jan 2022 12:35), Max: 98.9 (13 Jan 2022 12:35)  HR: 86 (13 Jan 2022 12:35) (76 - 86)  BP: 149/72 (13 Jan 2022 12:35) (145/65 - 165/79)  BP(mean): --  RR: 16 (13 Jan 2022 12:35) (16 - 18)  SpO2: 100% (13 Jan 2022 12:35) (99% - 100%)  I&O's Summary    MEDICATIONS  (STANDING):  amLODIPine   Tablet 2.5 milliGRAM(s) Oral daily  aspirin enteric coated 81 milliGRAM(s) Oral daily  atorvastatin 20 milliGRAM(s) Oral at bedtime  enoxaparin Injectable 30 milliGRAM(s) SubCutaneous daily  metoprolol succinate ER 50 milliGRAM(s) Oral daily    MEDICATIONS  (PRN):  acetaminophen     Tablet .. 650 milliGRAM(s) Oral every 6 hours PRN Temp greater or equal to 38C (100.4F), Mild Pain (1 - 3)  aluminum hydroxide/magnesium hydroxide/simethicone Suspension 30 milliLiter(s) Oral every 4 hours PRN Dyspepsia  melatonin 3 milliGRAM(s) Oral at bedtime PRN Insomnia  ondansetron Injectable 4 milliGRAM(s) IV Push every 8 hours PRN Nausea and/or Vomiting    LABS:                        11.8   10.33 )-----------( 278      ( 13 Jan 2022 08:26 )             36.7     01-13    136  |  99  |  16  ----------------------------<  104<H>  3.8   |  25  |  1.03    Ca    9.6      13 Jan 2022 08:26  Phos  2.9     01-13  Mg     1.80     01-13          CAPILLARY BLOOD GLUCOSE              REVIEW OF SYSTEMS:  CONSTITUTIONAL: No fever, weight loss, or fatigue  EYES: No eye pain, visual disturbances, or discharge  ENMT:  No difficulty hearing, tinnitus, vertigo; No sinus or throat pain  NECK: No pain or stiffness  RESPIRATORY: No cough, wheezing, chills or hemoptysis; No shortness of breath  CARDIOVASCULAR: No chest pain, palpitations, dizziness, or leg swelling  GASTROINTESTINAL: No abdominal or epigastric pain. No nausea, vomiting, or hematemesis; No diarrhea or constipation. No melena or hematochezia.  GENITOURINARY: No dysuria, frequency, hematuria, or incontinence  NEUROLOGICAL: No headaches, memory loss, loss of strength, numbness, or tremors  SKIN: No itching, burning, rashes, or lesions   ENDOCRINE: No heat or cold intolerance; No hair loss  MUSCULOSKELETAL: No joint pain or swelling; No muscle, back, or extremity pain  PSYCHIATRIC: No depression, anxiety, mood swings, or difficulty sleeping  HEME/LYMPH: No easy bruising, or bleeding gums  ALLERY AND IMMUNOLOGIC: No hives or eczema    RADIOLOGY & ADDITIONAL TESTS:    Consultant(s) Notes Reviewed:  [x ] YES  [ ] NO    PHYSICAL EXAM:  GENERAL: NAD, well-groomed, well-developed,not in any distress ,  HEAD:  Atraumatic, Normocephalic  EYES: EOMI, PERRLA, conjunctiva and sclera clear  ENMT: No tonsillar erythema, exudates, or enlargement; Moist mucous membranes, Good dentition, No lesions  NECK: Supple, No JVD, Normal thyroid  NERVOUS SYSTEM:  Alert & Oriented X3, No focal deficit   CHEST/LUNG: Good air entry bilateral with no  rales, rhonchi, wheezing, or rubs  HEART: Regular rate and rhythm; No murmurs, rubs, or gallops  ABDOMEN: Soft, Nontender, Nondistended; Bowel sounds present  EXTREMITIES:  2+ Peripheral Pulses, No clubbing, cyanosis, or edema  SKIN: No rashes or lesions    Care Discussed with Consultants/Other Providers [ x] YES  [ ] NO

## 2022-01-14 ENCOUNTER — TRANSCRIPTION ENCOUNTER (OUTPATIENT)
Age: 77
End: 2022-01-14

## 2022-01-14 VITALS
SYSTOLIC BLOOD PRESSURE: 112 MMHG | OXYGEN SATURATION: 97 % | RESPIRATION RATE: 16 BRPM | TEMPERATURE: 99 F | HEART RATE: 79 BPM | DIASTOLIC BLOOD PRESSURE: 61 MMHG

## 2022-01-14 PROCEDURE — 99233 SBSQ HOSP IP/OBS HIGH 50: CPT

## 2022-01-14 RX ORDER — TRIAMTERENE/HYDROCHLOROTHIAZID 75 MG-50MG
1 TABLET ORAL
Qty: 0 | Refills: 0 | DISCHARGE

## 2022-01-14 RX ADMIN — Medication 50 MILLIGRAM(S): at 05:07

## 2022-01-14 RX ADMIN — Medication 81 MILLIGRAM(S): at 12:47

## 2022-01-14 RX ADMIN — ENOXAPARIN SODIUM 30 MILLIGRAM(S): 100 INJECTION SUBCUTANEOUS at 12:47

## 2022-01-14 NOTE — DISCHARGE NOTE NURSING/CASE MANAGEMENT/SOCIAL WORK - PATIENT PORTAL LINK FT
You can access the FollowMyHealth Patient Portal offered by [facility] by registering at the following website: [email or web address]. By joining [employer]’s FollowMyHealth portal, you will also be able to view your health information using other applications (apps) compatible with our system.

## 2022-01-14 NOTE — DISCHARGE NOTE NURSING/CASE MANAGEMENT/SOCIAL WORK - NSDCFUADDAPPT_GEN_ALL_CORE_FT
Vascular surgery follow-up in the office with Dr. Bolton for f/u carotid duplex and mesenteric duplex. She should continue her ASA and statin.     EP Follow up with Dr. Perez for loop recorder as outpt in 1 week.     Follow up with Neurologist Dr. Jose Guadalupe Ledezma after discharge, with availability within 2-3days. Please instruct the patient to call [phone] to schedule an appointment. The office is located at [address] [place] [address].  Patient should follow up with neurology outpatient for evaluation with EEG on discharge: Dr. Nissenbaum [phone].

## 2022-01-14 NOTE — DISCHARGE NOTE PROVIDER - NSDCQMCOGNITION_NEU_ALL_CORE
ESTABLISHED PATIENT PRE-VISIT PLANNING     Patient was NOT contacted to complete PVP.     Note: Patient will not be contacted if there is no indication to call.     1.  Reviewed notes from the last few office visits within the medical group: Yes    2.  If any orders were placed at last visit or intended to be done for this visit (i.e. 6 mos follow-up), do we have Results/Consult Notes?        •  Labs - Labs ordered, completed on 1/18/19 and results are in chart.   Note: If patient appointment is for lab review and patient did not complete labs, check with provider if OK to reschedule patient until labs completed.       •  Imaging - Imaging ordered, completed and results are in chart.       •  Referrals - Referral ordered, patient has NOT been seen.    3. Is this appointment scheduled as a Hospital Follow-Up? No    4.  Immunizations were updated in Epic using WebIZ?: Epic matches WebIZ       •  Web Iz Recommendations: SHINGRIX (Shingles)    5.  Patient is due for the following Health Maintenance Topics:   Health Maintenance Due   Topic Date Due   • Annual Wellness Visit  1946   • COLONOSCOPY  08/02/2010   • IMM ZOSTER VACCINES (2 of 3) 01/20/2016           6. Orders for overdue Health Maintenance topics pended in Pre-Charting? YES    7.  AHA (MDX) form printed for Provider? YES    8.  Patient was NOT informed to arrive 15 min prior to their scheduled appointment and bring in their medication bottles.  
No difficulties

## 2022-01-14 NOTE — PROGRESS NOTE ADULT - SUBJECTIVE AND OBJECTIVE BOX
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 Patient is now admitted s/p syncopal episode.     S: no chest pain or sob; ros otherwise negative.     Review of Systems:   Constitutional: [ ] fevers, [ ] chills.   Skin: [ ] dry skin. [ ] rashes.  Psychiatric: [ ] depression, [ ] anxiety.   Gastrointestinal: [ ] BRBPR, [ ] melena.   Neurological: [ ] confusion. [ ] seizures. [ ] shuffling gait.   Ears,Nose,Mouth and Throat: [ ] ear pain [ ] sore throat.   Eyes: [ ] diplopia.   Respiratory: [ ] hemoptysis. [ ] shortness of breath  Cardiovascular: See HPI above  Hematologic/Lymphatic: [ ] anemia. [ ] painful nodes. [ ] prolonged bleeding.   Genitourinary: [ ] hematuria. [ ] flank pain.   Endocrine: [ ] significant change in weight. [ ] intolerance to heat and cold.     Review of systems [ x] otherwise negative, [ ] otherwise unable to obtain    FH: no family history of sudden cardiac death in first degree relatives    SH: [ ] tobacco, [ ] alcohol, [ ] drugs    acetaminophen     Tablet .. 650 milliGRAM(s) Oral every 6 hours PRN  aluminum hydroxide/magnesium hydroxide/simethicone Suspension 30 milliLiter(s) Oral every 4 hours PRN  amLODIPine   Tablet 2.5 milliGRAM(s) Oral daily  aspirin enteric coated 81 milliGRAM(s) Oral daily  atorvastatin 20 milliGRAM(s) Oral at bedtime  enoxaparin Injectable 30 milliGRAM(s) SubCutaneous daily  melatonin 3 milliGRAM(s) Oral at bedtime PRN  metoprolol succinate ER 50 milliGRAM(s) Oral daily  ondansetron Injectable 4 milliGRAM(s) IV Push every 8 hours PRN                            11.8   10.33 )-----------( 278      ( 13 Jan 2022 08:26 )             36.7       01-13    136  |  99  |  16  ----------------------------<  104<H>  3.8   |  25  |  1.03    Ca    9.6      13 Jan 2022 08:26  Phos  2.9     01-13  Mg     1.80     01-13              T(C): 36.6 (01-14-22 @ 05:05), Max: 36.6 (01-14-22 @ 05:05)  HR: 79 (01-14-22 @ 05:05) (78 - 79)  BP: 166/93 (01-14-22 @ 05:05) (156/80 - 166/93)  RR: 18 (01-14-22 @ 05:05) (18 - 18)  SpO2: 100% (01-14-22 @ 05:05) (100% - 100%)  Wt(kg): --    I&O's Summary      General: Well nourished in no acute distress. Alert and Oriented * 3.   Head: Normocephalic and atraumatic.   Neck: No JVD. No bruits. Supple. Does not appear to be enlarged.   Cardiovascular: + S1,S2 ; RRR Soft systolic murmur at the left lower sternal border. No rubs noted.    Lungs: CTA b/l. No rhonchi, rales or wheezes.   Abdomen: + BS, soft. Non tender. Non distended. No rebound. No guarding.   Extremities: No clubbing/cyanosis/edema.   Neurologic: Moves all four extremities. Full range of motion.   Skin: Warm and moist. The patient's skin has normal elasticity and good skin turgor.   Psychiatric: Appropriate mood and affect.  Musculoskeletal: Normal range of motion, normal strength    DATA    tele- SR      < from: Transthoracic Echocardiogram (01.12.22 @ 06:58) >  CONCLUSIONS:  1. Calcified trileaflet aortic valve with normal opening.  2. Normal left ventricular internal dimensions and wall  thicknesses.  3. Normal left ventricular systolic function. No segmental  wall motion abnormalities.  4. Normal right ventricular size and function.  ------------------------------------------------------------------------  Confirmed on  1/12/2022 - 12:06:39 by CLAYTON Poe    < end of copied text >      MRI: < from: MR Head No Cont (01.13.22 @ 18:35) >  Multiple small rounded scattered acute/subacute infarcts   within the right frontoparietal and right occipital lobes and rightbasal   ganglia. Embolic phenomenon can be considered given different vascular   distributions.    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 Patient is now admitted s/p syncopal episode.      --no events on tele thus far  --orthostatics negative  --TTE with structurally normal heart  --MI ruled out   --LE dopplers with no DVT BL  --MRI brain with multiple infarcts    --outpatient ILR per neuro and EP       Adebayo Montaño MD

## 2022-01-14 NOTE — PROGRESS NOTE ADULT - SUBJECTIVE AND OBJECTIVE BOX
Date of Service  : 01-14-22 @ 12:59    INTERVAL HPI/OVERNIGHT EVENTS: I feel fine so want to go home.   Vital Signs Last 24 Hrs  T(C): 36.6 (14 Jan 2022 05:05), Max: 36.6 (14 Jan 2022 05:05)  T(F): 97.8 (14 Jan 2022 05:05), Max: 97.8 (14 Jan 2022 05:05)  HR: 79 (14 Jan 2022 05:05) (78 - 79)  BP: 166/93 (14 Jan 2022 05:05) (156/80 - 166/93)  BP(mean): --  RR: 18 (14 Jan 2022 05:05) (18 - 18)  SpO2: 100% (14 Jan 2022 05:05) (100% - 100%)  I&O's Summary    MEDICATIONS  (STANDING):  amLODIPine   Tablet 2.5 milliGRAM(s) Oral daily  aspirin enteric coated 81 milliGRAM(s) Oral daily  atorvastatin 20 milliGRAM(s) Oral at bedtime  enoxaparin Injectable 30 milliGRAM(s) SubCutaneous daily  metoprolol succinate ER 50 milliGRAM(s) Oral daily    MEDICATIONS  (PRN):  acetaminophen     Tablet .. 650 milliGRAM(s) Oral every 6 hours PRN Temp greater or equal to 38C (100.4F), Mild Pain (1 - 3)  aluminum hydroxide/magnesium hydroxide/simethicone Suspension 30 milliLiter(s) Oral every 4 hours PRN Dyspepsia  melatonin 3 milliGRAM(s) Oral at bedtime PRN Insomnia  ondansetron Injectable 4 milliGRAM(s) IV Push every 8 hours PRN Nausea and/or Vomiting    LABS:                        11.8   10.33 )-----------( 278      ( 13 Jan 2022 08:26 )             36.7     01-13    136  |  99  |  16  ----------------------------<  104<H>  3.8   |  25  |  1.03    Ca    9.6      13 Jan 2022 08:26  Phos  2.9     01-13  Mg     1.80     01-13          CAPILLARY BLOOD GLUCOSE              REVIEW OF SYSTEMS:  CONSTITUTIONAL: No fever, weight loss, or fatigue  EYES: No eye pain, visual disturbances, or discharge  ENMT:  No difficulty hearing, tinnitus, vertigo; No sinus or throat pain  NECK: No pain or stiffness  RESPIRATORY: No cough, wheezing, chills or hemoptysis; No shortness of breath  CARDIOVASCULAR: No chest pain, palpitations, dizziness, or leg swelling  GASTROINTESTINAL: No abdominal or epigastric pain. No nausea, vomiting, or hematemesis; No diarrhea or constipation. No melena or hematochezia.  GENITOURINARY: No dysuria, frequency, hematuria, or incontinence  NEUROLOGICAL: No headaches, memory loss, loss of strength, numbness, or tremors      Consultant(s) Notes Reviewed:  [x ] YES  [ ] NO    PHYSICAL EXAM:  GENERAL: NAD, well-groomed, well-developed, not in any distress ,  HEAD:  Atraumatic, Normocephalic  EYES: EOMI, PERRLA, conjunctiva and sclera clear  ENMT: No tonsillar erythema, exudates, or enlargement; Moist mucous membranes, Good dentition, No lesions  NECK: Supple, No JVD, Normal thyroid  NERVOUS SYSTEM:  Alert & Oriented X3, No focal deficit   CHEST/LUNG: Good air entry bilateral with no  rales, rhonchi, wheezing, or rubs  HEART: Regular rate and rhythm; No murmurs, rubs, or gallops  ABDOMEN: Soft, Nontender, Nondistended; Bowel sounds present  EXTREMITIES:  2+ Peripheral Pulses, No clubbing, cyanosis, or edema  SKIN: No rashes or lesions    Care Discussed with Consultants/Other Providers [ x] YES  [ ] NO

## 2022-01-14 NOTE — DISCHARGE NOTE PROVIDER - CARE PROVIDERS DIRECT ADDRESSES
,DirectAddress_Unknown,[email or web address],DirectAddress_Unknown ,DirectAddress_Unknown,[email or web address],DirectAddress_Unknown,DirectAddress_Unknown

## 2022-01-14 NOTE — DISCHARGE NOTE PROVIDER - PROVIDER TOKENS
PROVIDER:[TOKEN:[[id number]]],PROVIDER:[TOKEN:[[id number]]],PROVIDER:[TOKEN:[[id number]]] PROVIDER:[TOKEN:[[id number]]],PROVIDER:[TOKEN:[[id number]]],PROVIDER:[TOKEN:[[id number]]],PROVIDER:[TOKEN:[[id number]]]

## 2022-01-14 NOTE — DISCHARGE NOTE NURSING/CASE MANAGEMENT/SOCIAL WORK - NSDCPEFALRISK_GEN_ALL_CORE
For information on Fall & Injury Prevention, visit: [email or web address] OR  [email or web address] OR  https://www.cdc.gov/steadi/patient.html

## 2022-01-14 NOTE — PROGRESS NOTE ADULT - ATTENDING COMMENTS
Patient seen and examined.  Agree with above.   TTE with normal LV function   Follow up EP  MRI per neuro    Adebayo Montaño MD
I agree with above assessment and plan per resident note.  I spoke with the patient along with patient's cardiologist Dr. 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  She has had a loop recorder in the past with no activity of paroxysmal atrial fibrillation detected (loop recorder was put in for history of multiple episodes of syncope).  She is somewhat reluctant to get a new loop recorder however we strongly recommend she does  She has left carotid moderate stenosis for which she will follow-up in our office with serial Dopplers.  Continue aspirin, statins

## 2022-01-14 NOTE — PROGRESS NOTE ADULT - SUBJECTIVE AND OBJECTIVE BOX
**********[facility]********************  Paging system for all non-urgent communication 7 days/week, 7A-7P.   Pages are answered in order recieved. Microsoft Teams per provider.  [facility] #[id number] (dial [phone]-# - callback -# )   [facility] #[id number] (dial [phone]-#-callback -#)   **************************************************************    GENERAL INFORMATION   patient name: HORTENCIA NAQVI | age: 76y ([birth year]) | gender: Female| chief compliant:   arrival date: 01-10-22 Inpatient | LOS: 4d     SUBJECTIVE:   Pt seen at bedside with stroke team. Does not recall "passing out" but came to during EMS transit. States prior ILR placed in setting of recurrent syncopal events with negative workup. ILR still in place because she is hesitant with removal but is no longer active. Denies dizziness, lightheadedness, changes in vision, numbness, tingling, or weakness at this time.     Limited ROS was negative outside those listed above.     MEDICATIONS  (STANDING):  amLODIPine   Tablet 2.5 milliGRAM(s) Oral daily  aspirin enteric coated 81 milliGRAM(s) Oral daily  atorvastatin 20 milliGRAM(s) Oral at bedtime  enoxaparin Injectable 30 milliGRAM(s) SubCutaneous daily  metoprolol succinate ER 50 milliGRAM(s) Oral daily    MEDICATIONS  (PRN):  acetaminophen     Tablet .. 650 milliGRAM(s) Oral every 6 hours PRN Temp greater or equal to 38C (100.4F), Mild Pain (1 - 3)  aluminum hydroxide/magnesium hydroxide/simethicone Suspension 30 milliLiter(s) Oral every 4 hours PRN Dyspepsia  melatonin 3 milliGRAM(s) Oral at bedtime PRN Insomnia  ondansetron Injectable 4 milliGRAM(s) IV Push every 8 hours PRN Nausea and/or Vomiting    Diet, DASH/TLC:   Sodium & Cholesterol Restricted (01-11-22 @ 04:25) [Active]    OBJECTIVE DATA  Daily     Daily   Orthostatic VS    PHYSICAL EXAM:      LABS, IMAGING, WORKUP:  COVID-19 PCR: NotDetec (10 Miguel 2022 10:08)                          11.8   10.33 )-----------( 278      ( 13 Jan 2022 08:26 )             36.7     01-13    136  |  99  |  16  ----------------------------<  104<H>  3.8   |  25  |  1.03    Ca    9.6      13 Jan 2022 08:26  Phos  2.9     01-13  Mg     1.80     01-13                           **********[facility]********************  Paging system for all non-urgent communication 7 days/week, 7A-7P.   Pages are answered in order recieved. Microsoft Teams per provider.  [facility] #[id number] (dial [phone]-# - callback -# )   [facility] #[id number] (dial [phone]-#-callback -#)   **************************************************************    GENERAL INFORMATION   patient name: HORTENCIA NAQVI | age: 76y ([birth year]) | gender: Female| chief compliant: syncope  arrival date: 01-10-22 Inpatient | LOS: 4d     SUBJECTIVE:   Pt seen at bedside with stroke team. Does not recall "passing out" but came to during EMS transit. States prior ILR placed approximately 3 years ago in setting of recurrent syncopal events with negative workup. ILR still in place because she is hesitant with removal but is no longer active. States left eye gaze problems have been present for several years. Denies dizziness, lightheadedness, changes in vision, numbness, tingling, or weakness at this time.     Limited ROS was negative outside those listed above.     MEDICATIONS  (STANDING):  amLODIPine   Tablet 2.5 milliGRAM(s) Oral daily  aspirin enteric coated 81 milliGRAM(s) Oral daily  atorvastatin 20 milliGRAM(s) Oral at bedtime  enoxaparin Injectable 30 milliGRAM(s) SubCutaneous daily  metoprolol succinate ER 50 milliGRAM(s) Oral daily    MEDICATIONS  (PRN):  acetaminophen     Tablet .. 650 milliGRAM(s) Oral every 6 hours PRN Temp greater or equal to 38C (100.4F), Mild Pain (1 - 3)  aluminum hydroxide/magnesium hydroxide/simethicone Suspension 30 milliLiter(s) Oral every 4 hours PRN Dyspepsia  melatonin 3 milliGRAM(s) Oral at bedtime PRN Insomnia  ondansetron Injectable 4 milliGRAM(s) IV Push every 8 hours PRN Nausea and/or Vomiting    Diet, DASH/TLC:   Sodium & Cholesterol Restricted (01-11-22 @ 04:25) [Active]    OBJECTIVE DATA  Vital Signs Last 24 Hrs  T(C): 36.6 (14 Jan 2022 05:05), Max: 36.6 (14 Jan 2022 05:05)  T(F): 97.8 (14 Jan 2022 05:05), Max: 97.8 (14 Jan 2022 05:05)  HR: 79 (14 Jan 2022 05:05) (78 - 79)  BP: 166/93 (14 Jan 2022 05:05) (156/80 - 166/93)  BP(mean): --  RR: 18 (14 Jan 2022 05:05) (18 - 18)  SpO2: 100% (14 Jan 2022 05:05) (100% - 100%)    NEUROLOGICAL EXAM:    - Mental Status:  AAOx3; speech is fluent with intact naming, repetition, and comprehension  - Cranial Nerves II-XII:    II:  PERRLA; visual fields are full to confrontation  III, IV, VI:  EOMI, left eye gaze deviation towards the left  V:  facial sensation is intact in the V1-V3 distribution bilaterally.  VII:  face is symmetric with normal eye closure and smile  - Motor:  strength is 5/5 throughout; normal muscle bulk and tone throughout; no pronator drift  - Sensory:  intact to light touch, pin prick, vibration, and joint-position sense throughout    LABS, IMAGING, WORKUP:  COVID-19 PCR: NotDetec (10 Miguel 2022 10:08)                          11.8   10.33 )-----------( 278      ( 13 Jan 2022 08:26 )             36.7     01-13    136  |  99  |  16  ----------------------------<  104<H>  3.8   |  25  |  1.03    Ca    9.6      13 Jan 2022 08:26  Phos  2.9     01-13  Mg     1.80     01-13    < from: MR Angio Neck w/ IV Cont (01.13.22 @ 18:35) >  IMPRESSION:    MRI BRAIN: Multiple small rounded scattered acute/subacute infarcts   within the right frontoparietal and right occipital lobes and rightbasal   ganglia. Embolic phenomenon can be considered given different vascular   distributions.    No acute intracranial hemorrhage.    Multiple additional patchy confluent nonspecific abnormal white matter   foci of T2/FLAIR prolongation statistically favoring microvascular   disease.    MRA NECK: Atherosclerotic plaque affecting the proximal left internal   carotid artery with mild (less than 50%) focal stenosis which appears   similar when compared to the prior study, given differences in modality.    Otherwise, no large vessel occlusion or major stenosis.    MRA HEAD: Unchanged appearing 3 mm aneurysm off the left cavernous   segment of the internal carotid artery. Recommend serial imaging over   time to assess for stability or change.    < end of copied text >

## 2022-01-14 NOTE — DISCHARGE NOTE PROVIDER - NSDCCPCAREPLAN_GEN_ALL_CORE_FT
PRINCIPAL DISCHARGE DIAGNOSIS  Diagnosis: Stroke  Assessment and Plan of Treatment: Continue aspirin and lipitor as directed to reduce your risk of recurrent stroke.   Follow up with Neurologist Dr. Jose Guadalupe Ledezma after discharge, with availability within 2-3days. Please instruct the patient to call [phone] to schedule an appointment. The office is located at [address], [place] [address].  Patient should follow up with neurology outpatient for evaluation with EEG on discharge: Dr. Nissenbaum [phone].  You were recommended for a loop recorder, but did not want to have it replaced whiel admitted. Follow up with electrophysiologist Dr. Perez within 1-2 weeks to consider replacing implanted loop recorder.      SECONDARY DISCHARGE DIAGNOSES  Diagnosis: Hypertension  Assessment and Plan of Treatment: Ensure compliance with your medications as directed. Follow up with your primary care physician for further monitoring in 1-2 weeks. Please call to arrange appointment. Low salt diet.    Diagnosis: CAD (coronary artery disease)  Assessment and Plan of Treatment: Follow up with your primary care physician for further monitoring in 1-2 weeks. Please call to arrange appointment. Ensure compliance with your medications at home. Low cholesterol diet. Salt restriction.    Diagnosis: Electrolyte imbalance  Assessment and Plan of Treatment: Resolved. Ensure compliance with your medications as directed. Follow up with your primary care physician for further monitoring in 1-2 weeks. Please call to arrange appointment.    Diagnosis: Celiac artery stenosis  Assessment and Plan of Treatment: Follow up with Dr. Bolton for carotid duplex and mesenteric duplex. continue ASA and Lipitor. Please call for an appointment in 1-2 weeks.     PRINCIPAL DISCHARGE DIAGNOSIS  Diagnosis: Stroke  Assessment and Plan of Treatment: Continue aspirin and lipitor as directed to reduce your risk of recurrent stroke.   Follow up with Neurologist Dr. Jose Guadalupe Ledezma after discharge, with availability within 2-3days. Please instruct the patient to call [phone] to schedule an appointment. The office is located at [address] [facility], [place] [address].  Patient should follow up with neurology outpatient for evaluation with EEG on discharge: Dr. Nissenbaum [phone].  You were recommended for a loop recorder, but did not want to have it replaced whiel admitted. Follow up with electrophysiologist Dr. Perez within 1-2 weeks to consider replacing implanted loop recorder.      SECONDARY DISCHARGE DIAGNOSES  Diagnosis: Hypertension  Assessment and Plan of Treatment: Ensure compliance with your medications as directed. Follow up with your primary care physician for further monitoring in 1-2 weeks. Please call to arrange appointment. Low salt diet.    Diagnosis: CAD (coronary artery disease)  Assessment and Plan of Treatment: Follow up with your primary care physician for further monitoring in 1-2 weeks. Please call to arrange appointment. Ensure compliance with your medications at home. Low cholesterol diet. Salt restriction.    Diagnosis: Electrolyte imbalance  Assessment and Plan of Treatment: Resolved. Ensure compliance with your medications as directed. Follow up with your primary care physician for further monitoring in 1-2 weeks. Please call to arrange appointment.    Diagnosis: Celiac artery stenosis  Assessment and Plan of Treatment: Follow up with Dr. Bolton for carotid duplex and mesenteric duplex. continue ASA and Lipitor. Please call for an appointment in 1-2 weeks.    Diagnosis: H/O aneurysm  Assessment and Plan of Treatment: On MRI you were found to have unchanged appearing 3 mm aneurysm off the left cavernous segment of the internal carotid artery. Outpt follow up with neurology Dr. Ledezma as noted above for montioring.

## 2022-01-14 NOTE — PROGRESS NOTE ADULT - ASSESSMENT
75 y/o female with MHx of CAD s/p remote PCI in 2012 (stents), HTN, HLD, recurrent syncope s/p previous ILR with no events ever recorded, LICA stenosis s/p recent left CEA (August 2020), previous exercise NST in 8/2019 with no evidence of stress induced ischemia or infarct as well as previous TTE 6/2020 with normal LV/RV function a/w syncopal episode r/o severe CA stenosis; Also found to have prolonged PLk=033 in the setting of electrolyte abnormalities, and severe stenosis of the celiac axis at its origin.      Problem/Plan - 1:  ·  Problem: Syncope.   ·  Plan: Recurrent . Work up completed.   -Neurology following  -Orthostatic negative   -Cardiology and EP following.   -c/w Aspirin    < from: MR Head No Cont (01.13.22 @ 18:35) >  IMPRESSION:    MRI BRAIN: Multiple small rounded scattered acute/subacute infarcts   within the right frontoparietal and right occipital lobes and rightbasal   ganglia. Embolic phenomenon can be considered given different vascular   distributions.    No acute intracranial hemorrhage.    Multiple additional patchy confluent nonspecific abnormal white matter   foci of T2/FLAIR prolongation statistically favoring microvascular   disease.    MRA NECK: Atherosclerotic plaque affecting the proximal left internal   carotid artery with mild (less than 50%) focal stenosis which appears   similar when compared to the prior study, given differences in modality.    Otherwise, no large vessel occlusion or major stenosis.    MRA HEAD: Unchanged appearing 3 mm aneurysm off the left cavernous   segment of the internal carotid artery. Recommend serial imaging over   time to assess for stability or change.    < end of copied text >     Problem/Plan - 2:  ·  Problem: Acute and subacute infarcts Aneurysm left cavernous .   ·  Plan: Neurology and Vascular helping.        Problem/Plan - 3:  ·  Problem: Electrolyte imbalance.   ·  Plan: Severe hypokalemia, hypomagnesemia and hypocalcemia;   -likely 2/2 possibly meds/poor PO intake   -Replete Magnesium, Potassium and Calcium   -Repeat EKG improved  -Telemetry.     Problem/Plan - 4:  ·  Problem: Anemia.   ·  Plan: Normocytic anemia, no evidence of bleeding but questionable occult GI bleed; Daily ASA regimen;   Hgb baseline 11.5-12; Hgb fluctuating in the past per EMR review   -check iron level, ferritin, reticulocyte count, FOBT.     Problem/Plan - 5:  ·  Problem: Stenosis of celiac artery.   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 Lactate initially elevated 2.1;   -Vasc Sx consult noted.     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   -Monitor renal function.     Problem/Plan - 6:  ·  Problem: CAD (coronary artery disease).   ·  Plan: EKG: no acute ischemic changes   -stable CAD, can continue statin, metoprolol, ASA.     Problem/Plan - 7:  ·  Problem: Hypertension.   ·  Plan: - BP readings better.    -patient on amlodipine 2.5 mg, triam-hctz 37.5-25mg, metoprolol 50mg    -BP hypertensive, can continue amlodipine and metoprolol for  - hold diuretic  due to severe electrolytes imbalance.     Problem/Plan - 8:  ·  Problem: Prophylactic measure.   ·  Plan: SCD   -PT eval   -DASH diet.      Disposition ; DC planning pending EP and neurology clearance.

## 2022-01-14 NOTE — CHART NOTE - NSCHARTNOTEFT_GEN_A_CORE
No need for acute vascular surgery intervention on this admission. Patient should follow-up in the office with Dr. Bolton for f/u carotid duplex and mesenteric duplex. She should continue her ASA and statin.     Please call back if any further questions arise  #[phone]

## 2022-01-14 NOTE — DISCHARGE NOTE PROVIDER - NSDCFUADDAPPT_GEN_ALL_CORE_FT
Follow up with neurology outpatient for evaluation with EEG on discharge: Dr. Nissenbaum [phone]    Vascular surgery follow-up in the office with Dr. Bolton for f/u carotid duplex and mesenteric duplex. She should continue her ASA and statin.     EP Follow up with Dr. Perez for loop recorder as outpt in 1 week.    Vascular surgery follow-up in the office with Dr. Bolton for f/u carotid duplex and mesenteric duplex. She should continue her ASA and statin.     EP Follow up with Dr. Perez for loop recorder as outpt in 1 week.     Follow up with Neurologist Dr. Jose Guadalupe Ledezma after discharge, with availability within 2-3days. Please instruct the patient to call [phone] to schedule an appointment. The office is located at [address] [place] [address].  Patient should follow up with neurology outpatient for evaluation with EEG on discharge: Dr. Nissenbaum [phone].

## 2022-01-14 NOTE — PROGRESS NOTE ADULT - PROVIDER SPECIALTY LIST ADULT
Cardiology
Electrophysiology
Electrophysiology
Internal Medicine
Cardiology
Electrophysiology
Internal Medicine
Neurology
Cardiology
Internal Medicine
Internal Medicine

## 2022-01-14 NOTE — DISCHARGE NOTE PROVIDER - CARE PROVIDER_API CALL
Nissenbaum, Michael A (MD)  Neurology  [address] [facility], [address]  [place] [address]  Phone: [phone]  Fax: [phone]  Follow Up Time:     Khanh Bolton)  Vascular Surgery  [address] [facility], [address]  [place] [address]  Phone: [phone]  Fax: [phone]  Follow Up Time:     Faby Perez  CARDIAC ELECTROPHYSIOLOGY  [address] [facility] [address]  [place] [address]  Phone: [phone]  Fax: [phone]  Follow Up Time:    Nissenbaum, Michael A (MD)  Neurology  [address] [facility], [address]  [place] [address]  Phone: [phone]  Fax: [phone]  Follow Up Time:     Khanh Bolton)  Vascular Surgery  [address] [facility], [address]  [place] [address]  Phone: [phone]  Fax: [phone]  Follow Up Time:     Faby Perez  CARDIAC ELECTROPHYSIOLOGY  [address] [facility] [address]  [place] [address]  Phone: [phone]  Fax: [phone]  Follow Up Time:     Jose Guadalupe Ledezma (DO)  Neurology; Vascular Neurology  [address] [facility], [address]  [place] [address]  Phone: [phone]  Fax: [phone]  Follow Up Time:

## 2022-01-14 NOTE — PROGRESS NOTE ADULT - SUBJECTIVE AND OBJECTIVE BOX
EP Attending    HISTORY OF PRESENT ILLNESS: HPI:  75 y/o female with MHx of CAD s/p remote PCI in 2012 (stents), HTN, HLD, recurrent syncope s/p previous ILR with no events ever recorded, LICA stenosis s/p recent left CEA (August 2020), previous exercise NST in 8/2019 with no evidence of stress induced ischemia or infarct as well as previous TTE 6/2020 with normal LV/RV function. Patient is now admitted s/p syncopal episode.  The patient recalls feeling intense leg pain and tingling in her feet upon waking which has been happening for the past several weeks or so. Pt was found face down in the bathroom by her son who she lives with after he heard a noise in the bathroom. At the time, the patient was lethargic and disoriented as per what the daughter was told by the son. The disorientation, dizziness, nausea, and muscle cramps continued for several hours after her arrival to the ED.  Patient says before she went to bed last night she had a headache. She associated nausea, dizziness, weakness, double vision, or fevers. Reports no recent illness. She has experienced multiple syncopal episodes in the past (last 9/2020) that have been attributed to cardiac abnormalities. This episode is the first time she has experienced prolonged confusion or muscle cramps, as previous episodes it seems more vasovagal in nature. She has never been evaluated by a neurologist prior to this admission;  In ED, patient afebrile, /70s, saturating well on RA, given 1L NS and taken for CAT scan.  (10 Miguel 2022 20:52)    Had a loop recorder implanted in 2016, which reached end of life in [id number].  Had syncopal events with no diagnosis on the monitor.  Has since had more syncopal episodes but unmonitored.  She has had headaches but no angina, palpitations, or presyncope while standing.  A 10 pt ROS is otherwise negative  1/12- no new complaints, resting comfortably.  1/13- resting comfortably.  Date of service1/14- no new complaints.    acetaminophen     Tablet .. 650 milliGRAM(s) Oral every 6 hours PRN  aluminum hydroxide/magnesium hydroxide/simethicone Suspension 30 milliLiter(s) Oral every 4 hours PRN  amLODIPine   Tablet 2.5 milliGRAM(s) Oral daily  aspirin enteric coated 81 milliGRAM(s) Oral daily  atorvastatin 20 milliGRAM(s) Oral at bedtime  enoxaparin Injectable 30 milliGRAM(s) SubCutaneous daily  melatonin 3 milliGRAM(s) Oral at bedtime PRN  metoprolol succinate ER 50 milliGRAM(s) Oral daily  ondansetron Injectable 4 milliGRAM(s) IV Push every 8 hours PRN                            11.8   10.33 )-----------( 278      ( 13 Jan 2022 08:26 )             36.7       01-13    136  |  99  |  16  ----------------------------<  104<H>  3.8   |  25  |  1.03    Ca    9.6      13 Jan 2022 08:26  Phos  2.9     01-13  Mg     1.80     01-13      T(C): 36.6 (01-14-22 @ 05:05), Max: 36.6 (01-14-22 @ 05:05)  HR: 79 (01-14-22 @ 05:05) (78 - 79)  BP: 166/93 (01-14-22 @ 05:05) (156/80 - 166/93)  RR: 18 (01-14-22 @ 05:05) (18 - 18)  SpO2: 100% (01-14-22 @ 05:05) (100% - 100%)  Wt(kg): --    I&O's Summary    Appearance: thin adult woman in no acute distress, cooperative	  HEENT:   Normal oral mucosa, PERRL, EOMI	  Lymphatic: No lymphadenopathy , no edema  Cardiovascular: Normal S1 S2, No JVD, No murmurs , Peripheral pulses palpable 2+ bilaterally, ILR implanted over the sternum.  Respiratory: Lungs clear to auscultation, normal effort 	  Gastrointestinal:  Soft, Non-tender, + BS	  Skin: No rashes, No ecchymoses, No cyanosis, warm to touch  Musculoskeletal: Normal range of motion, normal strength  Psychiatry:  Mood & affect appropriate    TELEMETRY: NSR 	    ECG:  NSR  Echo: normal LV EF and normal valves (2020)    TPro  6.7  /  Alb  3.9  /  TBili  0.8  /  DBili  x   /  AST  23  /  ALT  13  /  AlkPhos  78  01-11  TSH: Thyroid Stimulating Hormone, Serum: 0.37 uIU/mL (01-11 @ 07:27)      ASSESSMENT/PLAN: 	76y Female found down in bathroom.  Does not remember getting out of bed or how she may have lost consciousness.  Has history of syncope that was probably vasovagal in the past, with no arrhythmia identified on loop recorder from [id number].  She has CAD, PAD (carotids) but a normal LV EF%, and normal heart valves.    I am not entirely certain that this event was syncopal.  She awoke confused/disoriented, nauseated, and with muscle cramps, which is not typical for simple syncope.  Willing to have ILR re-implant as outpatient. Will see Dr Perez in followup to discuss further.  No further inpatient EP workup planned.    Masoud Gorman M.D.  Cardiac Electrophysiology    office [phone]  pager [phone]

## 2022-01-14 NOTE — DISCHARGE NOTE PROVIDER - HOSPITAL COURSE
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 Syncope followed by several hours of disorientation, dizziness, nausea, and muscle cramps, found to have severe electrolyte abnormalities now improved.     Hospital Course:  Pt p/w syncope with associated disorientation n/v possibly 2/2 vasovagal given prior hx and correlation with similar episodes after going to the bathroom. 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  Neuro following, recommend Pt to get new loop recorder since old loop recorder is inactive to evaluate for atrial fibrillation as source of infarcts. Continue aspirin and statin. Patient should follow up with neurology outpatient for evaluation with EEG on discharge: Dr. Nissenbaum [phone]. Orthostatic negative. Cardiology and EP following. c/w Aspirin  ---->MRI BRAIN: Multiple small rounded scattered acute/subacute infarcts within the right frontoparietal and right occipital lobes and right basal ganglia. Embolic phenomenon can be considered given different vascular   distributions. No acute intracranial hemorrhage. Multiple additional patchy confluent nonspecific abnormal white matter foci of T2/FLAIR prolongation statistically favoring microvascular disease. MRA NECK: Atherosclerotic plaque affecting the proximal left internal carotid artery with mild (less than 50%) focal stenosis which appears similar when compared to the prior study, given differences in modality. Otherwise, no large vessel occlusion or major stenosis. MRA HEAD: Unchanged appearing 3 mm aneurysm off the left cavernous segment of the internal carotid artery. Recommend serial imaging over time to assess for stability or change.    Electrolyte imbalance: Severe hypokalemia, hypomagnesemia and hypocalcemia; likely 2/2 possibly meds/poor PO intake. Replete Magnesium, Potassium and Calcium. Repeat EKG improved    Anemia: Normocytic anemia, no evidence of bleeding but questionable occult GI bleed; Daily ASA regimen; Hgb baseline 11.5-12; Hgb fluctuating in the past per EMR review. -check iron level, ferritin, reticulocyte count, FOBT.    Stenosis of celiac artery: Abd pain resolved; Lipase wnl; CT A/P severe stenosis of the celiac axis at its origin. Lactate initially elevated 2.1; Vasc Sx following. 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 No need for intervention, outpt follow up.     CAD (coronary artery disease): EKG: no acute ischemic changes. Stable CAD, can continue statin, metoprolol, ASA.    Hypertension: BP readings better. Patient on amlodipine 2.5 mg, triam-hctz 37.5-25mg, metoprolol 50mg. BP hypertensive, can continue amlodipine and metoprolol for. Hold diuretic due to severe electrolytes imbalance.    Case discussed with Dr. Jolly, Pt does not want to have ILR replaced at this time, recommend outpt f/u with EP Dr. Gorman, labs/vitals/meds reviewed, Pt medically cleared for discharge to home with outpt follow up as directed.

## 2022-01-14 NOTE — PROGRESS NOTE ADULT - ASSESSMENT
76yF with PMH of vertigo, CAD/MI presents to the ED following a syncopal episode. Neuro exam non-focal, no motor or sensory deficits noted. Cognitive evaluation grossly normal. MRI from 1/13 showed multiple small right hemispheric infarcts.    Impression  Syncope with associated disorientation n/v possibly 2/2 vasovagal given prior hx and correlation with similar episodes after going to the bathroom.   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     Plan:  - encourage pt to get new loop recorder since old loop recorder is inactive to evaluate for atrial fibrillation as source of embolic infarcts.  - Orthostatics   - Patient should follow up with neurology outpt for evaluation with EEG on discharge: Dr. Nissenbaum [phone]         76yF with PMH of vertigo, CAD/MI presents to the ED following a syncopal episode. Neuro exam non-focal, no motor or sensory deficits noted. Cognitive evaluation grossly normal. MRI from 1/13 showed multiple small right hemispheric infarcts.    Impression  Presented with syncope with associated disorientation n/v possibly 2/2 vasovagal given prior hx and correlation with similar episodes after going to the bathroom.   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     Plan:  - encourage pt to get new loop recorder since old loop recorder is inactive to evaluate for atrial fibrillation as source of infarcts.  - Orthostatics   - Patient should follow up with neurology outpatient for evaluation with EEG on discharge: Dr. Nissenbaum [phone]         76yF with PMH of vertigo, CAD/MI presents to the ED following a syncopal episode. Neuro exam non-focal, no motor or sensory deficits noted. Cognitive evaluation grossly normal. MRI from 1/13 showed multiple small right hemispheric infarcts.    Impression  Presented with syncope with associated disorientation n/v possibly 2/2 vasovagal given prior hx and correlation with similar episodes after going to the bathroom.   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     Plan:  - encourage pt to get new loop recorder since old loop recorder is inactive to evaluate for atrial fibrillation as source of infarcts.  - continue aspirin and statin  - DVT prophylaxis  - Orthostatics   - Patient should follow up with neurology outpatient for evaluation with EEG on discharge: Dr. Nissenbaum [phone]         76yF with PMH of vertigo, CAD/MI presents to the ED following a syncopal episode. Neuro exam non-focal, no motor or sensory deficits noted. Cognitive evaluation grossly normal. MRI from 1/13 showed multiple small right hemispheric infarcts.    Impression  Presented with syncope with associated disorientation n/v possibly 2/2 vasovagal given prior hx and correlation with similar episodes after going to the bathroom.   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     Plan:  - encourage pt to get new loop recorder since old loop recorder is inactive to evaluate for atrial fibrillation as source of infarcts.  - continue aspirin and statin  - Orthostatics   - Patient can follow up with Dr. Jose Guadalupe eLdezma after discharge, with availability within 2-3days. Please instruct the patient to call [phone] to schedule an appointment. The office is located at [address] [place] [address].   - Patient should follow up with neurology outpatient for evaluation with EEG on discharge: Dr. Nissenbaum [phone]           76yF with PMH of vertigo, CAD/MI presents to the ED following a syncopal episode. Neuro exam non-focal, no motor or sensory deficits noted. Cognitive evaluation grossly normal. MRI from 1/13 showed multiple small right hemispheric infarcts concerning for embolic etiology. Patient had prior ILR that was not interrogated prior to its termination. Pt would benefit from ILR placement given current workup favors potential cardioembolic etiology. Discussed with patient who would like to consider this outpatient.     Impression  intact neurologic exam with multiple infarcts on MR secondary to embolic stroke of unknown source, concern for cardioembolic     Plan:  - encourage pt to get new loop recorder since old loop recorder is inactive to evaluate for atrial fibrillation as source of infarcts. discussed with patient, would like to consider outpatient   - continue aspirin and statin  - Orthostatics   - Patient can follow up with Dr. Jose Guadalupe Ledezma or Dr. Tyrone Locke after discharge, with availability within 2-3days. Please instruct the patient to call [phone] to schedule an appointment. The office is located at [facility], [facility], [place] [address].   - Patient should follow up with neurology outpatient for evaluation with EEG on discharge: Dr. Nissenbaum [phone]    Please see attending attestation to follow. Case discussed on rounds with stroke attending. Thank you for this consult.          76yF with PMH of vertigo, CAD/MI presents to the ED following a syncopal episode. Neuro exam non-focal, no motor or sensory deficits noted. Cognitive evaluation grossly normal. MRI from 1/13 showed multiple small right hemispheric infarcts concerning for embolic etiology. Patient had prior ILR that was not interrogated prior to its termination. Pt would benefit from ILR placement given current workup favors potential cardioembolic etiology. Discussed with patient who would like to consider this outpatient.     Impression  intact neurologic exam with multiple infarcts on MR secondary to embolic stroke of unknown source, concern for cardioembolic     Plan:  - encourage pt to get new loop recorder since old loop recorder is inactive to evaluate for atrial fibrillation as source of infarcts. discussed with patient, would like to consider outpatient   - continue aspirin and statin  - Orthostatics   Please see attending attestation to follow. Case discussed on rounds with stroke attending. Thank you for this consult.

## 2022-01-14 NOTE — DISCHARGE NOTE PROVIDER - NSDCMRMEDTOKEN_GEN_ALL_CORE_FT
acetaminophen 325 mg oral tablet: 2 tab(s) orally every 6 hours, As needed, Mild Pain (1 - 3), Moderate Pain (4 - 6)  amLODIPine 2.5 mg oral tablet: 1 tab(s) orally once a day  Aspirin Enteric Coated 81 mg oral delayed release tablet: 1 tab(s) orally once a day  atorvastatin 20 mg oral tablet: 1 tab(s) orally once a day  meclizine 25 mg oral tablet: 1 tab(s) orally 3 times a day, As Needed vertigo  metoprolol succinate 50 mg oral tablet, extended release: 1 tab(s) orally once a day

## 2022-01-15 NOTE — ED ADULT NURSE NOTE - NS ED NURSE PATIENT LEFT UNIT TIME
Patient Education     Influenza (Child)    Your child has the flu (influenza). It's a viral illness that affects the air passages of your child's lungs. It's different from the common cold. The flu can easily be passed from one child to another. It may be spread through the air by coughing and sneezing. Or it can be spread by touching the sick person and then touching your own eyes, nose, or mouth.   Symptoms of the flu may be mild or severe. They can include extreme tiredness (wanting to stay in bed all day), chills, fevers, muscle aches, soreness with eye movement, headache, and a dry, hacking cough.   Your child may be treated with an antiviral medicine if there is risk for or signs of complications. Your child may need antibiotics but only if they have a bacterial infection along with the flu. This might be an ear or sinus infection or pneumonia. Antiviral medicine works best if started within 48 hours of the first symptoms.   Home care  Follow these guidelines when caring for your child at home:     Fluids. Fever increases the amount of water your child loses from his or her body. For babies younger than 1 year old, keep giving regular feedings (formula or breast). Talk with your child s healthcare provider to find out how much fluid your baby should be getting. If needed, give an oral rehydration solution. You can buy this at the grocery or pharmacy without a prescription. For a child older than 1 year, give him or her more fluids and continue his or her normal diet. If your child is dehydrated, give an oral rehydration solution. Go back to your child s normal diet as soon as possible. If your child has diarrhea, don t give juice, flavored gelatin water, soft drinks without caffeine, lemonade, fruit drinks, or frozen ice pops. These may make diarrhea worse.    Food. If your child doesn t want to eat solid foods, it s OK for a few days. Make sure your child drinks lots of fluid and has a normal amount of  urine.    Activity. Keep children with fever at home resting or playing quietly. Encourage your child to take naps. Your child may go back to  or school when the fever is gone for at least 24 hours. The fever should be gone without giving your child acetaminophen or other medicine to reduce fever. Your child should also be eating well and feeling better.    Sleep. It s normal for your child to be unable to sleep or be irritable if he or she has the flu. A child who has congestion will sleep best with his or her head and upper body raised up. Or you can raise the head of the bed frame on a 6-inch block.    Cough. Coughing is a normal part of the flu. You can use a cool mist humidifier at the bedside. Don t give over-the-counter cough and cold medicines to children younger than 6 years of age, unless the healthcare provider tells you to do so. These medicines don t help ease symptoms. And they can cause serious side effects, especially in babies younger than 2 years of age. Don t let anyone smoke around your child. Smoke can make the cough worse.    Nasal congestion. Use a rubber bulb syringe to suction the nose of a baby. You may put 2 to 3 drops of saltwater (saline) nose drops in each nostril before suctioning. This will help remove secretions. You can buy saline nose drops without a prescription. You can make the drops yourself by adding 1/4 teaspoon table salt to 1 cup of water.    Fever. Use acetaminophen to control pain, unless another medicine was prescribed. In babies older than 6 months of age, you may use ibuprofen instead of acetaminophen. If your child has chronic liver or kidney disease, talk with your child s provider before using these medicines. Also talk with the provider if your child has ever had a stomach ulcer or digestive bleeding. Don t give aspirin to anyone younger than 18 years old who is ill with a fever. It may cause severe liver damage.  Follow-up care  Follow up with your child s  "healthcare provider, or as advised.   When to seek medical advice  Call your child s healthcare provider right away if any of these occur:     Fever (see Fever and children, below)    Fast breathing. In a child age 6 weeks to 2 years, this is more than 45 breaths per minute. In a child 3 to 6 years, this is more than 35 breaths per minute. In a child 7 to 10 years, this is more than 30 breaths per minute. In a child older than 10 years, this is more than 25 breaths per minute.    Earache, sinus pain, stiff or painful neck, headache, or repeated diarrhea or vomiting    Unusual fussiness, drowsiness, or confusion    Your child doesn t interact with you as he or she normally does    Your child doesn t want to be held    Your child is not drinking enough fluid. This may show as no tears when crying, or \"sunken\" eyes or dry mouth. It may also be no wet diapers for 8 hours in a baby. Or it may be less urine than usual in older children.    Rash with fever  Fever and children  Use a digital thermometer to check your child s temperature. Don t use a mercury thermometer. There are different kinds of digital thermometers. They include ones for the mouth, ear, forehead (temporal), rectum, or armpit. Ear temperatures aren t accurate before 6 months of age. Don t take an oral temperature until your child is at least 4 years old.   Use a rectal thermometer with care. It may accidentally poke a hole in the rectum. It may pass on germs from the stool. Follow the product maker s directions for correct use. If you don t feel OK using a rectal thermometer, use another type. When you talk to your child s healthcare provider, tell him or her which type you used.   Below are guidelines to know if your child has a fever. Your child s healthcare provider may give you different numbers for your child.   A baby under 3 months old:    First, ask your child s healthcare provider how you should take the temperature.    Rectal or forehead: " 100.4 F (38 C) or higher    Armpit: 99 F (37.2 C) or higher  A child age 3 months to 36 months (3 years):     Rectal, forehead, or ear: 102 F (38.9 C) or higher    Armpit: 101 F (38.3 C) or higher  Call the healthcare provider in these cases:     Repeated temperature of 104 F (40 C) or higher    Fever that lasts more than 24 hours in a child under age 2    Fever that lasts for 3 days in a child age 2 or older  [employer] last reviewed this educational content on 2019    8939-4907 [employer]. All rights reserved. This information is not intended as a substitute for professional medical care. Always follow your healthcare professional's instructions.                     January 15, 2022 [facility]:     Deedee tested positive for Influenza A here today. I did prescribe Tamiflu for her today.     As we discussed, it is possible to have more than one viral illness, including a Covid-19 viral illness, at the same time.      A Covid-19 PCR test was done here today.  The result typically comes back in 1-2 days (watch your MyChart or call the urgent care clinic for your result in 1-2 days).     A quick Strep test was done here today (the result was negative/normal). Another Strep PCR test was done here today. Please watch your MyChart for that result (it typically comes back in 1-2 days).     Her RSV test was negative/normal here today.      Please continue with home comfort care measures (including as needed Tylenol or Ibuprofen for sore throat and fever) and extra rest and fluids.     Follow-up immediately if you have any sudden, severe, worsening of your symptoms or if you develop any of the below:         Chest pain, shortness of breath, wheezing, or difficulty breathing    Coughing up blood    Very severe pain with swallowing, especially if it goes along with a muffled voice        Please stay home/self-isolate (no contact with others outside of home) until your Covid-19 test result is back  (this typically takes 1-2 days), you are without fever for 24 hours (without use of fever reducing medication) and your symptoms are improving.       Please contact your child's  regarding their return to  policy when your child has been diagnosed with influenza A and has had a Covid-19 test (with result pending).      11:01

## 2022-02-01 ENCOUNTER — APPOINTMENT (OUTPATIENT)
Dept: VASCULAR SURGERY | Facility: CLINIC | Age: 77
End: 2022-02-01
Payer: MEDICARE

## 2022-02-01 VITALS
DIASTOLIC BLOOD PRESSURE: 106 MMHG | SYSTOLIC BLOOD PRESSURE: 197 MMHG | WEIGHT: 105 LBS | BODY MASS INDEX: 22.65 KG/M2 | HEIGHT: 57 IN

## 2022-02-01 VITALS — HEART RATE: 109 BPM | DIASTOLIC BLOOD PRESSURE: 106 MMHG | SYSTOLIC BLOOD PRESSURE: 202 MMHG

## 2022-02-01 PROCEDURE — 99213 OFFICE O/P EST LOW 20 MIN: CPT

## 2022-02-01 PROCEDURE — 93880 EXTRACRANIAL BILAT STUDY: CPT

## 2022-02-01 NOTE — REVIEW OF SYSTEMS
CC: SOB    HPI: Patient is a 60y old  Female with multiple medical comorbidities on agatraban and history of epistaxis requiring operative management c/b septal perforation, presented to ED with SOB now complaining of epistaxis this afternoon which resolved. Pt initially noticed some dried blood on nares and visualized brb in mirror in bathroom. Has not reexamined but does not endorse blood from nares or in OP currently. BP well controlled. Pt with hx of HIT. No recent URI or sinusitis. Pt denies trauma, nausea, vomiting, new sob, odynophagia, pain.     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)  Calf DVT (Deep Venous Thrombosis) (ICD9 453.42)  Adenomatous Goiter (ICD9 241.9)  Chronic Gout (ICD9 274.02)  LBBB (Left Bundle Branch Block) (ICD9 426.3)  CHF (Congestive Heart Failure) (ICD9 428.0)  Hx of aorta-iliac-femoral bypass  TOP (Termination of Pregnancy)  S/P Dilatation and Curettage  History of Tubal Ligation  s/p AAA (Abdominal Aortic Aneurysm) Repair: 2009 2010  History of Cholecystectomy  S/P Partial Hysterectomy    Allergies    heparin (Unknown)  losartan (Anaphylaxis)  nitroglycerin (Other)    Intolerances      MEDICATIONS  (STANDING):  amLODIPine   Tablet 10 milliGRAM(s) Oral daily  AQUAPHOR (petrolatum Ointment) 1 Application(s) Topical two times a day  ascorbic acid 500 milliGRAM(s) Oral daily  carvedilol 25 milliGRAM(s) Oral every 12 hours  ferrous    sulfate 325 milliGRAM(s) Oral daily  furosemide    Tablet 60 milliGRAM(s) Oral daily  hydrALAZINE 25 milliGRAM(s) Oral every 8 hours  influenza   Vaccine 0.5 milliLiter(s) IntraMuscular once  labetalol 200 milliGRAM(s) Oral every 8 hours  Nephro-alyse 1 Tablet(s) Oral daily  pantoprazole    Tablet 40 milliGRAM(s) Oral before breakfast  predniSONE   Tablet 10 milliGRAM(s) Oral daily  sucralfate suspension 1 Gram(s) Oral three times a day    MEDICATIONS  (PRN):    Social History:     ROS: ENT, GI, , CV, Pulm, Neuro, Psych, MS, Heme, Endo, Constitutional; all negative except as noted in HPI    Vital Signs Last 24 Hrs  T(C): 37.2 (09 Nov 2017 13:10), Max: 37.2 (09 Nov 2017 13:10)  T(F): 98.9 (09 Nov 2017 13:10), Max: 98.9 (09 Nov 2017 13:10)  HR: 79 (09 Nov 2017 13:10) (78 - 91)  BP: 149/80 (09 Nov 2017 13:10) (149/80 - 160/78)  BP(mean): --  RR: 16 (09 Nov 2017 13:10) (16 - 18)  SpO2: 97% (09 Nov 2017 13:10) (97% - 98%)                          10.5   5.6   )-----------( 142      ( 09 Nov 2017 06:50 )             32.7    11-09    145  |  103  |  68<H>  ----------------------------<  74  4.0   |  29  |  2.93<H>    Ca    8.9      09 Nov 2017 06:50  Phos  3.6     11-09  Mg     2.1     11-09    TPro  6.2  /  Alb  3.9  /  TBili  0.3  /  DBili  x   /  AST  16  /  ALT  13  /  AlkPhos  64  11-09   PT/INR - ( 09 Nov 2017 07:01 )   PT: 45.0 sec;   INR: 4.01 ratio         PTT - ( 09 Nov 2017 07:01 )  PTT:80.1 sec    PHYSICAL EXAM:  Gen: NAD, well-developed, obese   Head: Normocephalic, Atraumatic  Face: no edema/erythema/fluctuance, parotid glands soft without mass  Eyes: PERRL, EOMI, no scleral injection  Ears: Right - ear canal clear, TM intact without effusion            Left - ear canal clear, TM intact without effusion  Nose: right nares partially occluded by surgical scar. Left nares patent. Moderate septal perforation. dry. No active bleeding or clotted blood. No purulent exudate or polyps.   Mouth: Mucosa moist, tongue/uvula midline, oropharynx clear  Neck: Flat, supple, no lymphadenopathy appreciated, trachea midline,  Resp: no use of accessory muscles, no stridor  CV: no peripheral edema/cyanosis [Negative] : Neurological

## 2022-02-01 NOTE — HISTORY OF PRESENT ILLNESS
[FreeTextEntry1] : 77 yo female with history of htn, hld, carotid stenosis s/p left cea presents for follow up.  pt denies any episodes of visual changes, weakness or other stroke like symptoms.  \par pt does report lower extremity pain that limits her walking and also bothers her in the evening while she is sleeping\par pt denies any lower extremity ulcers or wounds

## 2022-02-01 NOTE — ASSESSMENT
[FreeTextEntry1] : 75 yo female with history of htn, hld, carotid stenosis s/p left cea presents for follow up.\par \par carotid duplex shows right ica stenosis of 50-69% and patent left ica s/p cea \par \par \par pt to follow up in 6 months with repeat carotid duplex

## 2022-02-01 NOTE — PHYSICAL EXAM
[JVD] : no jugular venous distention  [2+] : left 2+ [Ankle Swelling (On Exam)] : not present [Varicose Veins Of Lower Extremities] : not present [] : not present [No Rash or Lesion] : No rash or lesion [Skin Ulcer] : no ulcer [Alert] : alert [Calm] : calm [de-identified] : appears well

## 2022-04-30 ENCOUNTER — EMERGENCY (EMERGENCY)
Facility: HOSPITAL | Age: 77
LOS: 1 days | Discharge: HOME | End: 2022-04-30
Attending: EMERGENCY MEDICINE | Admitting: EMERGENCY MEDICINE
Payer: MEDICARE

## 2022-04-30 VITALS — RESPIRATION RATE: 161 BRPM | SYSTOLIC BLOOD PRESSURE: 201 MMHG | HEART RATE: 65 BPM | DIASTOLIC BLOOD PRESSURE: 76 MMHG

## 2022-04-30 VITALS
SYSTOLIC BLOOD PRESSURE: 232 MMHG | DIASTOLIC BLOOD PRESSURE: 88 MMHG | HEART RATE: 77 BPM | TEMPERATURE: 98 F | OXYGEN SATURATION: 100 % | HEIGHT: 58 IN | RESPIRATION RATE: 16 BRPM

## 2022-04-30 DIAGNOSIS — Z95.5 PRESENCE OF CORONARY ANGIOPLASTY IMPLANT AND GRAFT: Chronic | ICD-10-CM

## 2022-04-30 DIAGNOSIS — Z98.89 OTHER SPECIFIED POSTPROCEDURAL STATES: Chronic | ICD-10-CM

## 2022-04-30 DIAGNOSIS — Z98.51 TUBAL LIGATION STATUS: Chronic | ICD-10-CM

## 2022-04-30 DIAGNOSIS — Z95.810 PRESENCE OF AUTOMATIC (IMPLANTABLE) CARDIAC DEFIBRILLATOR: Chronic | ICD-10-CM

## 2022-04-30 LAB
ALBUMIN SERPL ELPH-MCNC: 4.3 G/DL — SIGNIFICANT CHANGE UP (ref 3.3–5)
ALP SERPL-CCNC: 91 U/L — SIGNIFICANT CHANGE UP (ref 40–120)
ALT FLD-CCNC: 14 U/L — SIGNIFICANT CHANGE UP (ref 4–33)
ANION GAP SERPL CALC-SCNC: 8 MMOL/L — SIGNIFICANT CHANGE UP (ref 7–14)
AST SERPL-CCNC: 17 U/L — SIGNIFICANT CHANGE UP (ref 4–32)
BASOPHILS # BLD AUTO: 0.05 K/UL — SIGNIFICANT CHANGE UP (ref 0–0.2)
BASOPHILS NFR BLD AUTO: 0.6 % — SIGNIFICANT CHANGE UP (ref 0–2)
BILIRUB SERPL-MCNC: 0.8 MG/DL — SIGNIFICANT CHANGE UP (ref 0.2–1.2)
BUN SERPL-MCNC: 14 MG/DL — SIGNIFICANT CHANGE UP (ref 7–23)
CALCIUM SERPL-MCNC: 9.8 MG/DL — SIGNIFICANT CHANGE UP (ref 8.4–10.5)
CHLORIDE SERPL-SCNC: 103 MMOL/L — SIGNIFICANT CHANGE UP (ref 98–107)
CO2 SERPL-SCNC: 29 MMOL/L — SIGNIFICANT CHANGE UP (ref 22–31)
CREAT SERPL-MCNC: 1.04 MG/DL — SIGNIFICANT CHANGE UP (ref 0.5–1.3)
EGFR: 56 ML/MIN/1.73M2 — LOW
EOSINOPHIL # BLD AUTO: 0.1 K/UL — SIGNIFICANT CHANGE UP (ref 0–0.5)
EOSINOPHIL NFR BLD AUTO: 1.2 % — SIGNIFICANT CHANGE UP (ref 0–6)
GLUCOSE SERPL-MCNC: 130 MG/DL — HIGH (ref 70–99)
HCT VFR BLD CALC: 39 % — SIGNIFICANT CHANGE UP (ref 34.5–45)
HGB BLD-MCNC: 12.2 G/DL — SIGNIFICANT CHANGE UP (ref 11.5–15.5)
IANC: 5.6 K/UL — SIGNIFICANT CHANGE UP (ref 1.8–7.4)
IMM GRANULOCYTES NFR BLD AUTO: 0.4 % — SIGNIFICANT CHANGE UP (ref 0–1.5)
LYMPHOCYTES # BLD AUTO: 2.25 K/UL — SIGNIFICANT CHANGE UP (ref 1–3.3)
LYMPHOCYTES # BLD AUTO: 26.5 % — SIGNIFICANT CHANGE UP (ref 13–44)
MCHC RBC-ENTMCNC: 26.1 PG — LOW (ref 27–34)
MCHC RBC-ENTMCNC: 31.3 GM/DL — LOW (ref 32–36)
MCV RBC AUTO: 83.3 FL — SIGNIFICANT CHANGE UP (ref 80–100)
MONOCYTES # BLD AUTO: 0.47 K/UL — SIGNIFICANT CHANGE UP (ref 0–0.9)
MONOCYTES NFR BLD AUTO: 5.5 % — SIGNIFICANT CHANGE UP (ref 2–14)
NEUTROPHILS # BLD AUTO: 5.6 K/UL — SIGNIFICANT CHANGE UP (ref 1.8–7.4)
NEUTROPHILS NFR BLD AUTO: 65.8 % — SIGNIFICANT CHANGE UP (ref 43–77)
NRBC # BLD: 0 /100 WBCS — SIGNIFICANT CHANGE UP
NRBC # FLD: 0 K/UL — SIGNIFICANT CHANGE UP
PLATELET # BLD AUTO: 316 K/UL — SIGNIFICANT CHANGE UP (ref 150–400)
POTASSIUM SERPL-MCNC: 4.3 MMOL/L — SIGNIFICANT CHANGE UP (ref 3.5–5.3)
POTASSIUM SERPL-SCNC: 4.3 MMOL/L — SIGNIFICANT CHANGE UP (ref 3.5–5.3)
PROT SERPL-MCNC: 7.6 G/DL — SIGNIFICANT CHANGE UP (ref 6–8.3)
RBC # BLD: 4.68 M/UL — SIGNIFICANT CHANGE UP (ref 3.8–5.2)
RBC # FLD: 13.6 % — SIGNIFICANT CHANGE UP (ref 10.3–14.5)
SODIUM SERPL-SCNC: 140 MMOL/L — SIGNIFICANT CHANGE UP (ref 135–145)
TROPONIN T, HIGH SENSITIVITY RESULT: 7 NG/L — SIGNIFICANT CHANGE UP
TROPONIN T, HIGH SENSITIVITY RESULT: 7 NG/L — SIGNIFICANT CHANGE UP
WBC # BLD: 8.5 K/UL — SIGNIFICANT CHANGE UP (ref 3.8–10.5)
WBC # FLD AUTO: 8.5 K/UL — SIGNIFICANT CHANGE UP (ref 3.8–10.5)

## 2022-04-30 PROCEDURE — 99285 EMERGENCY DEPT VISIT HI MDM: CPT | Mod: 25

## 2022-04-30 PROCEDURE — 93010 ELECTROCARDIOGRAM REPORT: CPT

## 2022-04-30 NOTE — ED PROVIDER NOTE - PROGRESS NOTE DETAILS
PARK Everett: Labs reviewed. trop 7>7. BP improved to 174/66 without any intervention. Neuro non focal. EKG non-ischemic. clinically stable for dc home. PMD follow up. Strict return precautions.

## 2022-04-30 NOTE — ED ADULT TRIAGE NOTE - ADDITIONAL SAFETY/BANDS...
59F hx of Hypertension, Asthma, MS, L2-S1 Disc Herniations s/p Spinal Fusion Surgery, R shoulder surgery being admitted for acute on chronic intractable back pain Additional Safety/Bands:

## 2022-04-30 NOTE — ED PROVIDER NOTE - PATIENT PORTAL LINK FT
You can access the FollowMyHealth Patient Portal offered by [facility] by registering at the following website: [email or web address]. By joining [employer]’s FollowMyHealth portal, you will also be able to view your health information using other applications (apps) compatible with our system.

## 2022-04-30 NOTE — ED ADULT NURSE NOTE - OBJECTIVE STATEMENT
Pt received to RM 26: A&Ox4, PMH of HTN, CAD, TIAs, presents to ED c/o high BP readings. PMD doubled her usual dose of metoprolol from 25 mg to 50 mg with no improvement of BP readings with systolics in the 200s. Pt denies chest pain, SOB, headaches, dizziness at this time. Respirations are even and unlabored, sating at 100% on RA, 20 G to L AC placed. Awaiting orders.

## 2022-04-30 NOTE — ED PROVIDER NOTE - NS ED ATTENDING STATEMENT MOD
This was a shared visit with the JOE. I reviewed and verified the documentation and independently performed the documented:

## 2022-04-30 NOTE — ED PROVIDER NOTE - ATTENDING APP SHARED VISIT CONTRIBUTION OF CARE
DR. BUSTILLOS, ATTENDING MD-  I personally saw the patient with the PA and performed a substantive portion of the visit including all aspects of the medical decision making.    75 y/o female h/o htn with recent discontinuation of ccb and doubling of other anti-htn med doses here with elevated bp.  States she has int dizziness over past few weeks which is consistent with her chronic vertigo.  No cp sob palpitations syncope.  Feels at [facility].  Will eval for kidney injury, acs, anemia, lyte abn, likely secondary to current optimization of anti-htn meds.  Obtain cbc cmp trop ekg likely dc with pcp f/u as o/p.

## 2022-04-30 NOTE — ED PROVIDER NOTE - CLINICAL SUMMARY MEDICAL DECISION MAKING FREE TEXT BOX
76 year old female with PMH of HTN, TIA and HLD presents to the ED complaining of elevated blood pressure for 2 weeks. VS reviewed, /66. On exam, well appearing and neurologically intact. Imp: Asymptomatic HTN. Plan to check EKG and Labs, reassess.

## 2022-04-30 NOTE — ED PROVIDER NOTE - PRO INTERPRETER NEED 2
Follow-up Pulm Progress Note    Was able to maintain sats on room air but then reportedly desaturated to 86% with exertion and was visibly SOB  Sats maintaining 100% on 3L N/C  Reports feeling well today    Medications:  MEDICATIONS  (STANDING):  dextrose 40% Gel 15 Gram(s) Oral once  dextrose 5%. 1000 milliLiter(s) (50 mL/Hr) IV Continuous <Continuous>  dextrose 5%. 1000 milliLiter(s) (100 mL/Hr) IV Continuous <Continuous>  dextrose 50% Injectable 25 Gram(s) IV Push once  dextrose 50% Injectable 12.5 Gram(s) IV Push once  dextrose 50% Injectable 25 Gram(s) IV Push once  diVALproex  milliGRAM(s) Oral three times a day  enoxaparin Injectable 100 milliGRAM(s) SubCutaneous two times a day  gabapentin 400 milliGRAM(s) Oral two times a day  glucagon  Injectable 1 milliGRAM(s) IntraMuscular once  insulin lispro (ADMELOG) corrective regimen sliding scale   SubCutaneous three times a day before meals  insulin lispro (ADMELOG) corrective regimen sliding scale   SubCutaneous at bedtime  levothyroxine 100 MICROGram(s) Oral daily  melatonin 10 milliGRAM(s) Oral at bedtime  mirtazapine 7.5 milliGRAM(s) Oral at bedtime  OLANZapine 5 milliGRAM(s) Oral two times a day  sertraline 100 milliGRAM(s) Oral daily  simvastatin 40 milliGRAM(s) Oral at bedtime  tiotropium 2.5 MICROgram(s)/olodaterol 2.5 MICROgram(s) (STIOLTO) Inhaler 2 Puff(s) Inhalation daily    MEDICATIONS  (PRN):  ALBUTerol    90 MICROgram(s) HFA Inhaler 2 Puff(s) Inhalation every 6 hours PRN Shortness of Breath and/or Wheezing    Vital Signs Last 24 Hrs  T(C): 35.7 (04 May 2021 11:13), Max: 36.8 (03 May 2021 21:11)  T(F): 96.3 (04 May 2021 11:13), Max: 98.2 (03 May 2021 21:11)  HR: 72 (04 May 2021 11:13) (59 - 89)  BP: 127/71 (04 May 2021 11:13) (117/68 - 141/78)  BP(mean): --  RR: 18 (04 May 2021 11:13) (18 - 19)  SpO2: 100% (04 May 2021 11:13) (95% - 100%)    05-03 @ 07:01  -  05-04 @ 07:00  --------------------------------------------------------  IN: 50 mL / OUT: 950 mL / NET: -900 mL    LABS:                        10.0   8.73  )-----------( 377      ( 04 May 2021 05:30 )             31.9     05-04    142  |  100  |  16  ----------------------------<  145<H>  3.9   |  27  |  0.64    Ca    9.0      04 May 2021 05:30  Phos  4.4     05-04  Mg     1.4     05-04    TPro  6.7  /  Alb  3.4  /  TBili  0.2  /  DBili  x   /  AST  9<L>  /  ALT  7<L>  /  AlkPhos  57  05-04    CAPILLARY BLOOD GLUCOSE  POCT Blood Glucose.: 256 mg/dL (04 May 2021 12:07)    Procalcitonin, Serum: 0.05 ng/mL (05-03-21 @ 06:09)    Serum Pro-Brain Natriuretic Peptide: 1214 pg/mL (05-02-21 @ 05:13)    Physical Examination:  PULM: Lungs clear to auscultation  CVS: RRR    RADIOLOGY REVIEWED  CT chest: 5/2  FINDINGS:  LUNGS AND AIRWAYS: Patent central airways. There is mild linear atelectasis in the lower lobes and lingula.  PLEURA: No pleural effusion.  MEDIASTINUM AND ROB: Mediastinal and hilar lymphadenopathy which is a change from the prior study. Right paratracheal lymph node measures up to 1.0 cm in short axis. Subcarinal lymph node measures 1.7 cm..  VESSELS: Subsegmental pulmonary embolism. Right lower lobe best seen on image 285 of series 3. Coronary artery calcifications.  HEART: Heart size is normal. No pericardial effusion. Trace fluid within the superior aortic recess.  CHEST WALL AND LOWER NECK: Within normal limits.  VISUALIZED UPPER ABDOMEN: Within normal limits.  BONES: Healed fracture deformities of the right lateral eighth and ninth ribs. There is also a healed right fifth lateral rib fracture The previously noted sclerotic focus within the anterolateral right eighth rib is not visualized.    IMPRESSION:  Subsegmental pulmonary embolism to the right lower lobe.  Mediastinal and hilar adenopathy new from the prior study which is nonspecific.  Healed fracture deformities of the right lateral eighth and ninth ribs. Healed rib fracture of the fifth lateral right rib. The previously noted sclerotic focus within the anterolateral right eighth rib is not visualized and likely represented healing rib fracture.    TTE: 5/3  Dimensions:    Normal Values:  LA:     4.3    2.0 - 4.0 cm  Ao:     3.8    2.0 - 3.8 cm  SEPTUM: 1.1    0.6 - 1.2 cm  PWT:    1.1    0.6 - 1.1 cm  LVIDd:  5.9    3.0 - 5.6 cm  LVIDs:  4.4    1.8 - 4.0 cm  Derived variables:  LVMI: 119 g/m2  RWT: 0.37  EF (Visual Estimate): 55 %  Doppler Peak Velocity (m/sec): AoV=1.9 TV=2.2  ------------------------------------------------------------------------  Observations:  Mitral Valve: Mitral annular calcification.  Aortic Valve/Aorta: Calcified aortic valve with normal  opening. Mild aortic regurgitation.  Normal aortic root size.  Left Atrium: Normal left atrium.  Left Ventricle: Endocardial visualization enhanced with  intravenous injection of Ultrasonic Enhancing Agent  (Definity).  Mild left ventricular enlargement.  Normal left ventricular systolic function. No segmental  wall motion abnormalities.  ---LVOTd 2.5 cm, LVOT TVI 22.4 cm. EDV about  160 cc.  Normal diastolic function.  Right Heart: Normal right atrium. Normal right ventricular  size and function.  Normal tricuspid valve. Minimal tricuspid regurgitation.  Normal pulmonic valve.  Pericardium/Pleura: Normal pericardium with no pericardial  effusion.  Hemodynamic: Estimated right atrial pressure is normal.  No evidence of pulmoanry hypertension.  No PFO seen with color Doppler.  ------------------------------------------------------------------------  Conclusions:  Endocardial visualization enhanced with intravenous  injection of Ultrasonic Enhancing Agent (Definity).  Mild left ventricular enlargement.  Normal left ventricular systolic function. No segmental  wall motion abnormalities. English

## 2022-04-30 NOTE — ED PROVIDER NOTE - OBJECTIVE STATEMENT
76 year old female with PMH of HTN, TIA and HLD presents to the ED complaining of elevated blood pressure for 2 weeks. Pt states she takes Metoprolol 25mg qd and lisinopril 5mg qd, her PMD recently advised her to double the dose and despite that she has been having elevated blood pressure in the systolic 200s and diastolic 100s. Pt reports intermittent dizzy over the last 2 weeks similar to her vertigo. At this time, she reports feeling well and denies dizziness, headache, blurry vision, diplopia, blurry vision, nausea, vomiting, chest pain, dyspnea, leg pain or swelling, weakness, numbness or tingling sensation. Denies any other complaints.

## 2022-04-30 NOTE — ED ADULT TRIAGE NOTE - CHIEF COMPLAINT QUOTE
pt c/o hypertension, intermittent  dizziness x 2 weeks. PMH of HTN, CAD, TIAs. pt had been to her PMD and they doubled her HTN medications with no relief. No facial droop or slurred speech noted. Pt has equal strength, motor, and sensation to bilateral upper and lower extremities. No drifts noted to bilateral upper and lower extremities.

## 2022-05-29 ENCOUNTER — EMERGENCY (EMERGENCY)
Facility: HOSPITAL | Age: 77
LOS: 1 days | Discharge: HOME | End: 2022-05-29
Attending: EMERGENCY MEDICINE | Admitting: EMERGENCY MEDICINE
Payer: MEDICARE

## 2022-05-29 VITALS
OXYGEN SATURATION: 98 % | HEIGHT: 58 IN | SYSTOLIC BLOOD PRESSURE: 147 MMHG | DIASTOLIC BLOOD PRESSURE: 87 MMHG | HEART RATE: 63 BPM | RESPIRATION RATE: 18 BRPM | TEMPERATURE: 98 F

## 2022-05-29 VITALS — DIASTOLIC BLOOD PRESSURE: 77 MMHG | SYSTOLIC BLOOD PRESSURE: 154 MMHG | HEART RATE: 63 BPM

## 2022-05-29 DIAGNOSIS — Z98.51 TUBAL LIGATION STATUS: Chronic | ICD-10-CM

## 2022-05-29 DIAGNOSIS — Z95.5 PRESENCE OF CORONARY ANGIOPLASTY IMPLANT AND GRAFT: Chronic | ICD-10-CM

## 2022-05-29 DIAGNOSIS — Z98.89 OTHER SPECIFIED POSTPROCEDURAL STATES: Chronic | ICD-10-CM

## 2022-05-29 DIAGNOSIS — Z95.810 PRESENCE OF AUTOMATIC (IMPLANTABLE) CARDIAC DEFIBRILLATOR: Chronic | ICD-10-CM

## 2022-05-29 LAB
ALBUMIN SERPL ELPH-MCNC: 4 G/DL — SIGNIFICANT CHANGE UP (ref 3.3–5)
ALP SERPL-CCNC: 83 U/L — SIGNIFICANT CHANGE UP (ref 40–120)
ALT FLD-CCNC: 13 U/L — SIGNIFICANT CHANGE UP (ref 4–33)
ANION GAP SERPL CALC-SCNC: 10 MMOL/L — SIGNIFICANT CHANGE UP (ref 7–14)
AST SERPL-CCNC: 18 U/L — SIGNIFICANT CHANGE UP (ref 4–32)
BASOPHILS # BLD AUTO: 0.03 K/UL — SIGNIFICANT CHANGE UP (ref 0–0.2)
BASOPHILS NFR BLD AUTO: 0.3 % — SIGNIFICANT CHANGE UP (ref 0–2)
BILIRUB SERPL-MCNC: 0.9 MG/DL — SIGNIFICANT CHANGE UP (ref 0.2–1.2)
BUN SERPL-MCNC: 19 MG/DL — SIGNIFICANT CHANGE UP (ref 7–23)
CALCIUM SERPL-MCNC: 9.8 MG/DL — SIGNIFICANT CHANGE UP (ref 8.4–10.5)
CHLORIDE SERPL-SCNC: 103 MMOL/L — SIGNIFICANT CHANGE UP (ref 98–107)
CO2 SERPL-SCNC: 27 MMOL/L — SIGNIFICANT CHANGE UP (ref 22–31)
CREAT SERPL-MCNC: 1.12 MG/DL — SIGNIFICANT CHANGE UP (ref 0.5–1.3)
EGFR: 51 ML/MIN/1.73M2 — LOW
EOSINOPHIL # BLD AUTO: 0.09 K/UL — SIGNIFICANT CHANGE UP (ref 0–0.5)
EOSINOPHIL NFR BLD AUTO: 1 % — SIGNIFICANT CHANGE UP (ref 0–6)
GLUCOSE SERPL-MCNC: 141 MG/DL — HIGH (ref 70–99)
HCT VFR BLD CALC: 37 % — SIGNIFICANT CHANGE UP (ref 34.5–45)
HGB BLD-MCNC: 11.6 G/DL — SIGNIFICANT CHANGE UP (ref 11.5–15.5)
IANC: 6.44 K/UL — SIGNIFICANT CHANGE UP (ref 1.8–7.4)
IMM GRANULOCYTES NFR BLD AUTO: 0.3 % — SIGNIFICANT CHANGE UP (ref 0–1.5)
LYMPHOCYTES # BLD AUTO: 1.73 K/UL — SIGNIFICANT CHANGE UP (ref 1–3.3)
LYMPHOCYTES # BLD AUTO: 19.7 % — SIGNIFICANT CHANGE UP (ref 13–44)
MAGNESIUM SERPL-MCNC: 1.9 MG/DL — SIGNIFICANT CHANGE UP (ref 1.6–2.6)
MCHC RBC-ENTMCNC: 26.2 PG — LOW (ref 27–34)
MCHC RBC-ENTMCNC: 31.4 GM/DL — LOW (ref 32–36)
MCV RBC AUTO: 83.5 FL — SIGNIFICANT CHANGE UP (ref 80–100)
MONOCYTES # BLD AUTO: 0.45 K/UL — SIGNIFICANT CHANGE UP (ref 0–0.9)
MONOCYTES NFR BLD AUTO: 5.1 % — SIGNIFICANT CHANGE UP (ref 2–14)
NEUTROPHILS # BLD AUTO: 6.44 K/UL — SIGNIFICANT CHANGE UP (ref 1.8–7.4)
NEUTROPHILS NFR BLD AUTO: 73.6 % — SIGNIFICANT CHANGE UP (ref 43–77)
NRBC # BLD: 0 /100 WBCS — SIGNIFICANT CHANGE UP
NRBC # FLD: 0 K/UL — SIGNIFICANT CHANGE UP
PHOSPHATE SERPL-MCNC: 2.5 MG/DL — SIGNIFICANT CHANGE UP (ref 2.5–4.5)
PLATELET # BLD AUTO: 254 K/UL — SIGNIFICANT CHANGE UP (ref 150–400)
POTASSIUM SERPL-MCNC: 4.8 MMOL/L — SIGNIFICANT CHANGE UP (ref 3.5–5.3)
POTASSIUM SERPL-SCNC: 4.8 MMOL/L — SIGNIFICANT CHANGE UP (ref 3.5–5.3)
PROT SERPL-MCNC: 6.8 G/DL — SIGNIFICANT CHANGE UP (ref 6–8.3)
RBC # BLD: 4.43 M/UL — SIGNIFICANT CHANGE UP (ref 3.8–5.2)
RBC # FLD: 13.4 % — SIGNIFICANT CHANGE UP (ref 10.3–14.5)
SODIUM SERPL-SCNC: 140 MMOL/L — SIGNIFICANT CHANGE UP (ref 135–145)
TROPONIN T, HIGH SENSITIVITY RESULT: 12 NG/L — SIGNIFICANT CHANGE UP
WBC # BLD: 8.77 K/UL — SIGNIFICANT CHANGE UP (ref 3.8–10.5)
WBC # FLD AUTO: 8.77 K/UL — SIGNIFICANT CHANGE UP (ref 3.8–10.5)

## 2022-05-29 PROCEDURE — 70450 CT HEAD/BRAIN W/O DYE: CPT | Mod: 26,MA

## 2022-05-29 PROCEDURE — 93010 ELECTROCARDIOGRAM REPORT: CPT

## 2022-05-29 PROCEDURE — 72125 CT NECK SPINE W/O DYE: CPT | Mod: 26,MA

## 2022-05-29 PROCEDURE — 99285 EMERGENCY DEPT VISIT HI MDM: CPT | Mod: 25

## 2022-05-29 PROCEDURE — 76705 ECHO EXAM OF ABDOMEN: CPT | Mod: 26

## 2022-05-29 PROCEDURE — 71046 X-RAY EXAM CHEST 2 VIEWS: CPT | Mod: 26

## 2022-05-29 RX ORDER — SODIUM CHLORIDE 9 MG/ML
1000 INJECTION, SOLUTION INTRAVENOUS ONCE
Refills: 0 | Status: COMPLETED | OUTPATIENT
Start: 2022-05-29 | End: 2022-05-29

## 2022-05-29 RX ADMIN — SODIUM CHLORIDE 1000 MILLILITER(S): 9 INJECTION, SOLUTION INTRAVENOUS at 08:55

## 2022-05-29 NOTE — ED PROVIDER NOTE - PATIENT PORTAL LINK FT
You can access the FollowMyHealth Patient Portal offered by [facility] by registering at the following website: [email or web address]. By joining [employer]’s FollowMyHealth portal, you will also be able to view your health information using other applications (apps) compatible with our system.

## 2022-05-29 NOTE — ED PROVIDER NOTE - PHYSICAL EXAMINATION
gen: well appearing  Mentation: AAO x 3  Head: NCAT  psych: mood appropriate  ENT: airway patent  Eyes: conjunctivae clear bilaterally  Cardio: RRR, no m/r/g  Resp: normal BS b/l  GI: mild epigastric tenderness  : no CVA tenderness  Neuro: sensation and motor function intact, CN 2-12 intact  MSK: normal movement of all extremities  Lymph/Vasc: no LE edema

## 2022-05-29 NOTE — ED ADULT NURSE NOTE - CHIEF COMPLAINT QUOTE
Pt arrives S/P unwitnessed syncopal episode at 0500. No signs of trauma or injury at this time. Pt C/O PATTERSON. Denies vision changes, CP, denies blood thinners, no neuro deficits. PMHx vertigo, HTN, syncopal episodes in past with negative findings.

## 2022-05-29 NOTE — ED PROVIDER NOTE - CLINICAL SUMMARY MEDICAL DECISION MAKING FREE TEXT BOX
FRANCOIS Bolivar. PGY-3: 77 y/o F presenting with syncope prior to arrival, likely vasovagal with prodromal symptoms, now improved. Will check labs, EKG, orthostatics. CT head to eval for ICH. Mild epigastric tenderness, will r/o pancreatitis and GB etiology. Discussed with family, had recent admission for syncope w/u which was negative, prefer to f/u with PCP

## 2022-05-29 NOTE — ED PROVIDER NOTE - NSFOLLOWUPINSTRUCTIONS_ED_ALL_ED_FT
Syncope    Syncope is when you temporarily lose consciousness, also called fainting or passing out. It is caused by a sudden decrease in blood flow to the brain. Even though most causes of syncope are not dangerous, syncope can possibly be a sign of a serious medical problem. Signs that you may be about to faint include feeling dizzy, lightheaded, nausea, visual changes, or cold/clammy skin. Do not drive, operate heavy machinery, or play sports until your health care provider says it is okay.    SEEK IMMEDIATE MEDICAL CARE IF YOU HAVE ANY OF THE FOLLOWING SYMPTOMS: severe headache, pain in your chest/abdomen/back, bleeding from your mouth or rectum, palpitations, shortness of breath, pain with breathing, seizure, confusion, or trouble walking.    Please speak to your doctor about adjusting your medications if you are feeling any side effects.

## 2022-05-29 NOTE — ED ADULT NURSE NOTE - NSIMPLEMENTINTERV_GEN_ALL_ED
Implemented All Fall Risk Interventions:  [place] to call system. Call bell, personal items and telephone within reach. Instruct patient to call for assistance. Room bathroom lighting operational. Non-slip footwear when patient is off stretcher. Physically safe environment: no spills, clutter or unnecessary equipment. Stretcher in lowest position, wheels locked, appropriate side rails in place. Provide visual cue, wrist band, yellow gown, etc. Monitor gait and stability. Monitor for mental status changes and reorient to person, place, and time. Review medications for side effects contributing to fall risk. Reinforce activity limits and safety measures with patient and family.

## 2022-05-29 NOTE — ED ADULT TRIAGE NOTE - CHIEF COMPLAINT QUOTE
Pt arrives S/P unwitnessed syncopal episode at 0500. No signs of trauma or injury at this time. Pt C/O PATTERSON. Denies vision changes, denies blood thinners, no neuro deficits. PMHx vertigo, HTN, syncopal episodes in past with negative findings.  Pt arrives S/P unwitnessed syncopal episode at 0500. No signs of trauma or injury at this time. Pt C/O PATTERSON. Denies vision changes, CP, denies blood thinners, no neuro deficits. PMHx vertigo, HTN, syncopal episodes in past with negative findings.

## 2022-05-29 NOTE — ED PROVIDER NOTE - OBJECTIVE STATEMENT
75 y/o F with PMH of HTN, HLD, vertigo, TIA presenting after syncopal episode. Patient states she woke up this AM and felt dizzy, unsteady on feet. Went to bathroom and felt feeling of warmth over whole body and lost consciousness. Patient states she fell, +head strike. Was found on floor by her son, woke up back to baseline. Patient denies any preceding cp, sob, numbness/weakness.  Patient states she was started on 0.2 mg clonidine yesterday and was feeling unwell the entire day.

## 2022-05-29 NOTE — ED PROVIDER NOTE - ATTENDING CONTRIBUTION TO CARE
76F syncope.  Pt has chronic recurrent syncope.  Recently started on clonidine 0.2mg BID, since yest felt dizzy.  Also vertiginous (also chronic) yesterday, yest felt warm all over and passed out hit head.  Pt rec'd meclizine prior to arrival with improvement of sx.  Pt recently was admitted for similar and had echo, has close follow up.  EKG SR at 60 no kasey no std no twi.   qtc 446   Likely vasovagal in setting of new BP med, would decrease it to 0.1mg BID or d/c it for now, follow up with PMD.    VS:  unremarkable    GEN - NAD;   malaise;   A+O x3   HEAD - NC/AT   except mild occipital ttp no deformity or scalp laceration  ENT - PEERL, EOMI, mucous membranes  dry , no discharge      NECK: Neck supple, non-tender without lymphadenopathy, no masses, no JVD  PULM - CTA b/l,  symmetric breath sounds  COR -  normal heart sounds    ABD - , ND, epig ttp alone, soft,  BACK - no CVA tenderness, nontender spine     EXTREMS - no edema, no deformity, warm and well perfused    SKIN - no rash    or bruising      NEUROLOGIC - alert, face symmetric, speech fluent, sensation nl, motor no focal deficit.

## 2022-05-29 NOTE — ED ADULT NURSE NOTE - OBJECTIVE STATEMENT
77 y/o female, a&ox4, received to rm 2 with c/o syncopal episode. Pt reports falling earlier this morning, hitting her head and LOC, skin intact, no signs of redness, ecchymosis, or bleeding noted to head. PMH of HTN, daughter endorses that she has fell multiple times since starting HTN meds. Respirations are even and unlabored, no signs of respiratory distress. 12 lead ECG completed, NSR on cardiac monitor. 20G IV placed to left AC, labs collected and sent off.

## 2022-07-06 NOTE — H&P ADULT - NEGATIVE IMMUNOLOGICAL SYMPTOMS
Medicare Wellness Visit  Plan for Preventive Care    A good way for you to stay healthy is to use preventive care. Medicare covers many services that can help you stay healthy. * The goal of these services is to find any health problems as quickly as possible. Finding problems early can help make them easier to treat. Your personal plan below lists the services you may need and when they are due. Health Maintenance Summary       Pneumococcal Vaccine 65+ (1 - PCV)  Overdue - never done    DM/CKD Microalbumin (Yearly)  Overdue - never done    Colonoscopy Risk (Every 5 Years)  Overdue since 9/17/2018    COVID-19 Vaccine (4 - Booster for Moderna series)  Overdue since 12/3/2021    Medicare Advantage- Medicare Wellness Visit (Yearly - January to December)  Overdue since 1/1/2022    DTaP/Tdap/Td Vaccine (1 - Tdap)  Postponed until 7/19/2027    Shingles Vaccine (1 of 2)  Postponed until 10/20/2030    Influenza Vaccine (1)  Next due on 9/1/2022    DM/CKD GFR (Yearly)  Next due on 6/30/2023    Depression Screening (Yearly)  Next due on 7/6/2023    Osteoporosis Screening   Completed    Hepatitis B Vaccine   Aged Out    Meningococcal Vaccine   Aged Out    HPV Vaccine   Aged Out             Preventive Care for Women and Men    Heart Screenings (Cardiovascular):  Blood tests are used to check your cholesterol, lipid and triglyceride levels. High levels can increase your risk for heart disease and stroke. High levels can be treated with medications, diet and exercise. Lowering your levels can help keep your heart and blood vessels healthy. Your provider will order these tests if they are needed. An ultrasound is done to see if you have an abdominal aortic aneurysm (AAA). This is an enlargement of one of the main blood vessels that delivers blood to the body. In the [facility], 9,000 deaths are caused by AAA.   You may not even know you have this problem and as many as 1 in 3 people will have a serious problem if it is not treated. Early diagnosis allows for more effective treatment and cure. If you have a family history of AAA or are a male age 70-76 who has smoked, you are at higher risk of an AAA. Your provider can order this test, if needed. Colorectal Screening: There are many tests that are used to check for cancer of your colon and rectum. You and your provider should discuss what test is best for you and when to have it done. Options include:  Screening Colonoscopy: exam of the entire colon, seen through a flexible lighted tube. Flexible Sigmoidoscopy: exam of the last third (sigmoid portion) of the colon and rectum, seen through a flexible lighted tube. Cologuard DNA stool test: a sample of your stool is used to screen for cancer and unseen blood in your stool. Fecal Occult Blood Test: a sample of your stool is studied to find any unseen blood    Flu Shot:  An immunization that helps to prevent influenza (the flu). You should get this every year. The best time to get the shot is in the fall. Pneumococcal Shot:  Vaccines are available that can help prevent pneumococcal disease, which is any type of infection caused by Streptococcus pneumoniae bacteria. Their use can prevent some cases of pneumonia, meningitis, and sepsis. There are two types of pneumococcal vaccines:   Conjugate vaccines (PCV-13 or Prevnar 13Â®) - helps protect against the 13 types of pneumococcal bacteria that are the most common causes of serious infections in children and adults. Polysaccharide vaccine (PPSV23 or Ooaynrsgm04Â®) - helps protect against 23 types of pneumococcal bacteria for patients who are recommended to get it. These vaccines should be given at least 12 months apart. A booster is usually not needed. Hepatitis B Shot:  An immunization that helps to protect people from getting Hepatitis B. Hepatitis B is a virus that spreads through contact with infected blood or body fluids.  Many people with the virus do not have symptoms. The virus can lead to serious problems, such as liver disease. Some people are at higher risk than others. Your doctor will tell you if you need this shot. Diabetes Screening:  A test to measure sugar (glucose) in your blood is called a fasting blood sugar. Fasting means you cannot have food or drink for at least 8 hours before the test. This test can detect diabetes long before you may notice symptoms. Glaucoma Screening:  Glaucoma screening is performed by your eye doctor. The test measures the fluid pressure inside your eyes to determine if you have glaucoma. Hepatitis C Screening:  A blood test to see if you have the hepatitis C virus. Hepatitis C attacks the liver and is a major cause of chronic liver disease. Medicare will cover a single screening for all adults born between [facility], or high risk patients (people who have injected illegal drugs or people who have had blood transfusions). High risk patients who continue to inject illegal drugs can be screened for Hepatitis C every year. Smoking and Tobacco-Use Cessation Counseling:  Tobacco is the single greatest cause of disease and early death in our country today. Medication and counseling together can increase a personâs chance of quitting for good. Medicare covers two quitting attempts per year, with four counseling sessions per attempt (eight sessions in a 12 month period)    Preventive Screening tests for Women    Screening Mammograms and Breast Exams: An x-ray of your breasts to check for breast cancer before you or your doctor may be able to feel it. If breast cancer is found early it can usually be treated with success. Pelvic Exams and Pap Tests: An exam to check for cervical and vaginal cancer. A Pap test is a lab test in which cells are taken from your cervix and sent to the lab to look for signs of cervical cancer. If cancer of the cervix is found early, chances for a cure are good.  Testing can generally end at age 72, or if a woman has a hysterectomy for a benign condition. Your provider may recommend more frequent testing if certain abnormal results are found. Bone Mass Measurements:  A painless x-ray of your bone density to see if you are at risk for a broken bone. Bone density refers to the thickness of bones or how tightly the bone tissue is packed. Preventive Screening tests for Men    Prostate Screening:  Should you have a prostate cancer test (PSA)? It is up to you to decide if you want a prostate cancer test. Talk to your clinician to find out if the test is right for you. Things for you to consider and talk about should include:  Benefits and harms of the test  Your family history  How your race/ethnicity may influence the test  If the test may impact other medical conditions you have  Your values on screenings and treatments    *Medicare pays for many preventive services to keep you healthy. For some of these services, you might have to pay a deductible, coinsurance, and / or copayment. The amounts vary depending on the type of services you need and the kind of Medicare health plan you have.     For further details on screenings offered by Medicare please visit: [email or web address] no persistent infections/no recurring pneumonia/no recurring infections

## 2022-08-02 ENCOUNTER — APPOINTMENT (OUTPATIENT)
Dept: VASCULAR SURGERY | Facility: CLINIC | Age: 77
End: 2022-08-02

## 2022-11-06 ENCOUNTER — INPATIENT (INPATIENT)
Facility: HOSPITAL | Age: 77
LOS: 4 days | Discharge: HOME HEALTH | End: 2022-11-11
Attending: INTERNAL MEDICINE | Admitting: INTERNAL MEDICINE

## 2022-11-06 VITALS
RESPIRATION RATE: 18 BRPM | HEART RATE: 75 BPM | SYSTOLIC BLOOD PRESSURE: 192 MMHG | DIASTOLIC BLOOD PRESSURE: 87 MMHG | TEMPERATURE: 97 F | OXYGEN SATURATION: 100 %

## 2022-11-06 DIAGNOSIS — Z95.810 PRESENCE OF AUTOMATIC (IMPLANTABLE) CARDIAC DEFIBRILLATOR: Chronic | ICD-10-CM

## 2022-11-06 DIAGNOSIS — Z98.51 TUBAL LIGATION STATUS: Chronic | ICD-10-CM

## 2022-11-06 DIAGNOSIS — Z98.89 OTHER SPECIFIED POSTPROCEDURAL STATES: Chronic | ICD-10-CM

## 2022-11-06 DIAGNOSIS — Z95.5 PRESENCE OF CORONARY ANGIOPLASTY IMPLANT AND GRAFT: Chronic | ICD-10-CM

## 2022-11-06 LAB
ALBUMIN SERPL ELPH-MCNC: 4 G/DL — SIGNIFICANT CHANGE UP (ref 3.3–5)
ALP SERPL-CCNC: 79 U/L — SIGNIFICANT CHANGE UP (ref 40–120)
ALT FLD-CCNC: 16 U/L — SIGNIFICANT CHANGE UP (ref 4–33)
ANION GAP SERPL CALC-SCNC: 12 MMOL/L — SIGNIFICANT CHANGE UP (ref 7–14)
APPEARANCE UR: CLEAR — SIGNIFICANT CHANGE UP
APTT BLD: 30.8 SEC — SIGNIFICANT CHANGE UP (ref 27–36.3)
AST SERPL-CCNC: 22 U/L — SIGNIFICANT CHANGE UP (ref 4–32)
BACTERIA # UR AUTO: NEGATIVE — SIGNIFICANT CHANGE UP
BASOPHILS # BLD AUTO: 0.03 K/UL — SIGNIFICANT CHANGE UP (ref 0–0.2)
BASOPHILS NFR BLD AUTO: 0.3 % — SIGNIFICANT CHANGE UP (ref 0–2)
BILIRUB SERPL-MCNC: 0.8 MG/DL — SIGNIFICANT CHANGE UP (ref 0.2–1.2)
BILIRUB UR-MCNC: NEGATIVE — SIGNIFICANT CHANGE UP
BUN SERPL-MCNC: 17 MG/DL — SIGNIFICANT CHANGE UP (ref 7–23)
CALCIUM SERPL-MCNC: 9.7 MG/DL — SIGNIFICANT CHANGE UP (ref 8.4–10.5)
CHLORIDE SERPL-SCNC: 89 MMOL/L — LOW (ref 98–107)
CO2 SERPL-SCNC: 28 MMOL/L — SIGNIFICANT CHANGE UP (ref 22–31)
COLOR SPEC: COLORLESS — SIGNIFICANT CHANGE UP
CREAT SERPL-MCNC: 0.9 MG/DL — SIGNIFICANT CHANGE UP (ref 0.5–1.3)
DIFF PNL FLD: NEGATIVE — SIGNIFICANT CHANGE UP
EGFR: 66 ML/MIN/1.73M2 — SIGNIFICANT CHANGE UP
EOSINOPHIL # BLD AUTO: 0.04 K/UL — SIGNIFICANT CHANGE UP (ref 0–0.5)
EOSINOPHIL NFR BLD AUTO: 0.4 % — SIGNIFICANT CHANGE UP (ref 0–6)
EPI CELLS # UR: 2 /HPF — SIGNIFICANT CHANGE UP (ref 0–5)
FLUAV AG NPH QL: SIGNIFICANT CHANGE UP
FLUBV AG NPH QL: SIGNIFICANT CHANGE UP
GLUCOSE SERPL-MCNC: 128 MG/DL — HIGH (ref 70–99)
GLUCOSE UR QL: NEGATIVE — SIGNIFICANT CHANGE UP
HCT VFR BLD CALC: 34.2 % — LOW (ref 34.5–45)
HGB BLD-MCNC: 11.5 G/DL — SIGNIFICANT CHANGE UP (ref 11.5–15.5)
HYALINE CASTS # UR AUTO: 0 /LPF — SIGNIFICANT CHANGE UP (ref 0–7)
IANC: 6.89 K/UL — SIGNIFICANT CHANGE UP (ref 1.8–7.4)
IMM GRANULOCYTES NFR BLD AUTO: 0.5 % — SIGNIFICANT CHANGE UP (ref 0–0.9)
INR BLD: 1.04 RATIO — SIGNIFICANT CHANGE UP (ref 0.88–1.16)
KETONES UR-MCNC: NEGATIVE — SIGNIFICANT CHANGE UP
LEUKOCYTE ESTERASE UR-ACNC: ABNORMAL
LYMPHOCYTES # BLD AUTO: 1.71 K/UL — SIGNIFICANT CHANGE UP (ref 1–3.3)
LYMPHOCYTES # BLD AUTO: 18.7 % — SIGNIFICANT CHANGE UP (ref 13–44)
MAGNESIUM SERPL-MCNC: 1.5 MG/DL — LOW (ref 1.6–2.6)
MCHC RBC-ENTMCNC: 26.5 PG — LOW (ref 27–34)
MCHC RBC-ENTMCNC: 33.6 GM/DL — SIGNIFICANT CHANGE UP (ref 32–36)
MCV RBC AUTO: 78.8 FL — LOW (ref 80–100)
MONOCYTES # BLD AUTO: 0.41 K/UL — SIGNIFICANT CHANGE UP (ref 0–0.9)
MONOCYTES NFR BLD AUTO: 4.5 % — SIGNIFICANT CHANGE UP (ref 2–14)
NEUTROPHILS # BLD AUTO: 6.89 K/UL — SIGNIFICANT CHANGE UP (ref 1.8–7.4)
NEUTROPHILS NFR BLD AUTO: 75.6 % — SIGNIFICANT CHANGE UP (ref 43–77)
NITRITE UR-MCNC: NEGATIVE — SIGNIFICANT CHANGE UP
NRBC # BLD: 0 /100 WBCS — SIGNIFICANT CHANGE UP (ref 0–0)
NRBC # FLD: 0 K/UL — SIGNIFICANT CHANGE UP (ref 0–0)
NT-PROBNP SERPL-SCNC: 577 PG/ML — HIGH
PH UR: 7.5 — SIGNIFICANT CHANGE UP (ref 5–8)
PLATELET # BLD AUTO: 284 K/UL — SIGNIFICANT CHANGE UP (ref 150–400)
POTASSIUM SERPL-MCNC: 3.2 MMOL/L — LOW (ref 3.5–5.3)
POTASSIUM SERPL-SCNC: 3.2 MMOL/L — LOW (ref 3.5–5.3)
PROT SERPL-MCNC: 7 G/DL — SIGNIFICANT CHANGE UP (ref 6–8.3)
PROT UR-MCNC: NEGATIVE — SIGNIFICANT CHANGE UP
PROTHROM AB SERPL-ACNC: 12.1 SEC — SIGNIFICANT CHANGE UP (ref 10.5–13.4)
RBC # BLD: 4.34 M/UL — SIGNIFICANT CHANGE UP (ref 3.8–5.2)
RBC # FLD: 12.9 % — SIGNIFICANT CHANGE UP (ref 10.3–14.5)
RBC CASTS # UR COMP ASSIST: 1 /HPF — SIGNIFICANT CHANGE UP (ref 0–4)
RSV RNA NPH QL NAA+NON-PROBE: SIGNIFICANT CHANGE UP
SARS-COV-2 RNA SPEC QL NAA+PROBE: SIGNIFICANT CHANGE UP
SODIUM SERPL-SCNC: 129 MMOL/L — LOW (ref 135–145)
SP GR SPEC: 1 — LOW (ref 1.01–1.05)
TROPONIN T, HIGH SENSITIVITY RESULT: 13 NG/L — SIGNIFICANT CHANGE UP
TROPONIN T, HIGH SENSITIVITY RESULT: 9 NG/L — SIGNIFICANT CHANGE UP
UROBILINOGEN FLD QL: SIGNIFICANT CHANGE UP
WBC # BLD: 9.13 K/UL — SIGNIFICANT CHANGE UP (ref 3.8–10.5)
WBC # FLD AUTO: 9.13 K/UL — SIGNIFICANT CHANGE UP (ref 3.8–10.5)
WBC UR QL: 2 /HPF — SIGNIFICANT CHANGE UP (ref 0–5)

## 2022-11-06 PROCEDURE — 71045 X-RAY EXAM CHEST 1 VIEW: CPT | Mod: 26

## 2022-11-06 RX ORDER — SODIUM CHLORIDE 9 MG/ML
500 INJECTION INTRAMUSCULAR; INTRAVENOUS; SUBCUTANEOUS ONCE
Refills: 0 | Status: COMPLETED | OUTPATIENT
Start: 2022-11-06 | End: 2022-11-06

## 2022-11-06 RX ORDER — AMLODIPINE BESYLATE 2.5 MG/1
5 TABLET ORAL DAILY
Refills: 0 | Status: DISCONTINUED | OUTPATIENT
Start: 2022-11-06 | End: 2022-11-07

## 2022-11-06 RX ORDER — POTASSIUM CHLORIDE 20 MEQ
40 PACKET (EA) ORAL ONCE
Refills: 0 | Status: COMPLETED | OUTPATIENT
Start: 2022-11-06 | End: 2022-11-06

## 2022-11-06 RX ORDER — MECLIZINE HCL 12.5 MG
25 TABLET ORAL EVERY 8 HOURS
Refills: 0 | Status: DISCONTINUED | OUTPATIENT
Start: 2022-11-06 | End: 2022-11-11

## 2022-11-06 RX ORDER — ATORVASTATIN CALCIUM 80 MG/1
10 TABLET, FILM COATED ORAL AT BEDTIME
Refills: 0 | Status: DISCONTINUED | OUTPATIENT
Start: 2022-11-06 | End: 2022-11-07

## 2022-11-06 RX ORDER — MAGNESIUM SULFATE 500 MG/ML
1 VIAL (ML) INJECTION ONCE
Refills: 0 | Status: COMPLETED | OUTPATIENT
Start: 2022-11-06 | End: 2022-11-06

## 2022-11-06 RX ORDER — LISINOPRIL 2.5 MG/1
40 TABLET ORAL DAILY
Refills: 0 | Status: DISCONTINUED | OUTPATIENT
Start: 2022-11-06 | End: 2022-11-07

## 2022-11-06 RX ORDER — ASPIRIN/CALCIUM CARB/MAGNESIUM 324 MG
81 TABLET ORAL DAILY
Refills: 0 | Status: DISCONTINUED | OUTPATIENT
Start: 2022-11-06 | End: 2022-11-11

## 2022-11-06 RX ADMIN — ATORVASTATIN CALCIUM 10 MILLIGRAM(S): 80 TABLET, FILM COATED ORAL at 22:20

## 2022-11-06 RX ADMIN — SODIUM CHLORIDE 500 MILLILITER(S): 9 INJECTION INTRAMUSCULAR; INTRAVENOUS; SUBCUTANEOUS at 18:38

## 2022-11-06 RX ADMIN — Medication 40 MILLIEQUIVALENT(S): at 18:38

## 2022-11-06 RX ADMIN — Medication 100 GRAM(S): at 18:38

## 2022-11-06 NOTE — ED CDU PROVIDER INITIAL DAY NOTE - OBJECTIVE STATEMENT
Patient is a 77 y.o female with PMHx of CAD w/ PCI x 3 (on asa), ILR, Hx of CVA, htn, hld, vertigo who presents to ED c/o dizziness. Pt states she was at home and was cooking and suddenly felt very sweaty and lightheaded. States she went upstairs and then felt nauseous and vomited few times. Pt admits she took 2 meclizine at home and her BP meds which provided some relief of symptoms. States sx did not feel like her usual vertigo. Admits to some epigastric discomfort. Denies sob, recent travel, numbness or tingling, weakness, pleuritic pain, trauma/falls, neck pain, fevers, chills, recent URI, urinary complaints or any other complaints.  Pt seen and worked up in ED; Trop 9, , K+ 3.2, Mag 1.5 (both repleted by ED team). CXR normal. Pt transferred to CDU for; stress, echo, tele, CT head pending,  vitals q4 and frequent re-evals.

## 2022-11-06 NOTE — ED ADULT TRIAGE NOTE - CHIEF COMPLAINT QUOTE
Pt with HX of HTN took medication earlier,  Pt. took lopressor, lisinopril and coreg earlier with  meclizine for dizziness which has now subsided . Pt denies chest pain and sob.

## 2022-11-06 NOTE — ED ADULT NURSE NOTE - OBJECTIVE STATEMENT
78 y/o F arrives to E.D. rm 20 s/p episode of diaphoresis, lightheadedness, N/V lasting katherine 20 min. PMH: CVA, MI, x2 stents (ASA), HTN, HLD, vertigo. A&Ox4, ambulatory, neg SOB, currently denies CP, neg N/V/D, denies recent illness. L 20g IV placed to AC. NSR on tele. Call bell in reach.

## 2022-11-06 NOTE — ED CDU PROVIDER INITIAL DAY NOTE - PHYSICAL EXAMINATION
Vital signs reviewed.   CONSTITUTIONAL: Well-appearing; well-nourished; in no apparent distress. Non-toxic appearing.   HEAD: Normocephalic, atraumatic.  EYES: PERRL, EOM intact, conjunctiva and sclera WNL.  ENT: normal nose; no rhinorrhea;   NECK/LYMPH: Supple; non-tender  CARD: Normal S1, S2; no murmurs  RESP: Normal chest excursion with respiration; breath sounds clear and equal bilaterally; no wheezes, rhonchi, or rales.  ABD/GI: soft, non-distended; mild epigastric ttp. Negative acharya sign.   EXT/MS: moves all extremities;   SKIN: Normal for age and race;   NEURO: Awake, alert, oriented x 3, no gross deficits, CN II-XII grossly intact, no motor or sensory deficit noted. No drift. No facial droop. No slurred speech. 5/5 strength UE/LE b/l.   PSYCH: Normal mood; appropriate affect.

## 2022-11-06 NOTE — ED CDU PROVIDER INITIAL DAY NOTE - ATTENDING APP SHARED VISIT CONTRIBUTION OF CARE
77 y.o female with PMHx of CAD w/ PCI x 3 (on asa), ILR, Hx of CVA, htn, hld, vertigo who presents to ED c/o dizziness. Pt seen and worked up in ED; Trop 9, , K+ 3.2, Mag 1.5 (both repleted by ED team). CXR normal. Pt transferred to CDU for; stress, echo, tele, CT head pending,  vitals q4 and frequent re-evals.

## 2022-11-06 NOTE — ED ADULT NURSE NOTE - NSIMPLEMENTINTERV_GEN_ALL_ED
Implemented All Fall Risk Interventions:  Midnight to call system. Call bell, personal items and telephone within reach. Instruct patient to call for assistance. Room bathroom lighting operational. Non-slip footwear when patient is off stretcher. Physically safe environment: no spills, clutter or unnecessary equipment. Stretcher in lowest position, wheels locked, appropriate side rails in place. Provide visual cue, wrist band, yellow gown, etc. Monitor gait and stability. Monitor for mental status changes and reorient to person, place, and time. Review medications for side effects contributing to fall risk. Reinforce activity limits and safety measures with patient and family.

## 2022-11-06 NOTE — ED PROVIDER NOTE - NSICDXPASTMEDICALHX_GEN_ALL_CORE_FT
PAST MEDICAL HISTORY:  CAD (coronary artery disease) stent x2    Cerebrovascular accident (CVA)     H/O myocardial ischemia     HLD (hyperlipidemia)     HTN (hypertension)

## 2022-11-06 NOTE — ED PROVIDER NOTE - OBJECTIVE STATEMENT
78 y/o F, PMH of HTN, HLD, CVA, MI s/p stents x2, and Vertigo, presents to ED c/o dizziness/lightheadedness a/w nausea/vomiting. Pt reports today was cooking and cleaning, suddenly felt diaphoretic, lightheaded/dizzy, and was faint. Denies LOC. Pt states felt very different from her usual vertigo symptoms. Pt took her BP meds and meclizine, and symptoms eventually self-resolved. Pt's cardiologist is Dr. Mejia; last stress/echo was ~1yr ago. Pt denies CP, SOB, abd pain, diarrhea, weakness/numbness, urinary sx, Le edema, or any other symptoms at this time.

## 2022-11-06 NOTE — ED PROVIDER NOTE - ATTENDING CONTRIBUTION TO CARE
77-year-old female history of CAD with stents CVA hypertension vertigo presents with near syncope earlier today patient felt lightheaded and dizzy became diaphoretic and was nauseous and nearly syncopized but did not have LOC did not have any chest pain took her blood pressure medication and meclizine and now feels improved patient states it did not feel like her vertigo patient well-appearing vitals within normal limits we will check labs [place] likely admit to CDU for echo and will discuss with her cardiologist Dr. Mejia

## 2022-11-06 NOTE — ED ADULT NURSE NOTE - NSSUHOSCREENINGYN_ED_ALL_ED
Chief Complaint   Patient presents with     DANILO Caba     Yes - the patient is able to be screened

## 2022-11-06 NOTE — ED PROVIDER NOTE - NS ED ROS FT
Gen: Denies fever, weight loss  HEENT: Denies vision changes, ear pain, epistaxis, sore throat  CV: Denies chest pain, palpitations; +diaphoresis  Skin: Denies rash, erythema, color changes  Resp: Denies SOB, cough  Endo: Denies sensitivity to heat, cold, increased urination  GI: Denies abdominal pain, constipation, diarrhea; +nausea, +vomiting  Msk: Denies back pain, LE swelling, extremity pain  : Denies dysuria, increased frequency  Neuro: Denies LOC, weakness, numbness, tingling; +dizziness, +near-syncope  Psych: Denies hx of psych, hallucinations  ROS statement: all other ROS negative except as per HPI

## 2022-11-06 NOTE — ED PROVIDER NOTE - CLINICAL SUMMARY MEDICAL DECISION MAKING FREE TEXT BOX
78 y/o F, PMH of HTN, HLD, CVA, MI s/p stents x2, and Vertigo, presents to ED c/o dizziness/lightheadedness a/w nausea/vomiting. Pt states symptoms not similar to usual vertigo. Denies CP, SOB, LE edema. Last stress/echo ~1yr ago.  EKG shows NSR w/o ischemic changes.  Plan to assess for ACS. Will send basic labs, trops, bnp, and CXR.  Pt will likely require CDU vs. admission for echo.

## 2022-11-06 NOTE — ED CDU PROVIDER INITIAL DAY NOTE - DETAILS
Patient is a 77 y.o female with PMHx of CAD w/ PCI x 3 (on asa), ILR, Hx of CVA, htn, hld, vertigo who presents to ED c/o dizziness. Pt seen and worked up in ED; Trop 9, , K+ 3.2, Mag 1.5 (both repleted by ED team). CXR normal. Pt transferred to CDU for; stress, echo, tele, CT head pending,  vitals q4 and frequent re-evals.

## 2022-11-07 DIAGNOSIS — Z29.9 ENCOUNTER FOR PROPHYLACTIC MEASURES, UNSPECIFIED: ICD-10-CM

## 2022-11-07 DIAGNOSIS — R07.89 OTHER CHEST PAIN: ICD-10-CM

## 2022-11-07 DIAGNOSIS — R42 DIZZINESS AND GIDDINESS: ICD-10-CM

## 2022-11-07 LAB
ALBUMIN SERPL ELPH-MCNC: 3.9 G/DL — SIGNIFICANT CHANGE UP (ref 3.3–5)
ALP SERPL-CCNC: 78 U/L — SIGNIFICANT CHANGE UP (ref 40–120)
ALT FLD-CCNC: 14 U/L — SIGNIFICANT CHANGE UP (ref 4–33)
ANION GAP SERPL CALC-SCNC: 13 MMOL/L — SIGNIFICANT CHANGE UP (ref 7–14)
AST SERPL-CCNC: 16 U/L — SIGNIFICANT CHANGE UP (ref 4–32)
BILIRUB SERPL-MCNC: 1 MG/DL — SIGNIFICANT CHANGE UP (ref 0.2–1.2)
BUN SERPL-MCNC: 14 MG/DL — SIGNIFICANT CHANGE UP (ref 7–23)
CALCIUM SERPL-MCNC: 9.8 MG/DL — SIGNIFICANT CHANGE UP (ref 8.4–10.5)
CHLORIDE SERPL-SCNC: 98 MMOL/L — SIGNIFICANT CHANGE UP (ref 98–107)
CO2 SERPL-SCNC: 25 MMOL/L — SIGNIFICANT CHANGE UP (ref 22–31)
CREAT SERPL-MCNC: 0.95 MG/DL — SIGNIFICANT CHANGE UP (ref 0.5–1.3)
CULTURE RESULTS: SIGNIFICANT CHANGE UP
EGFR: 62 ML/MIN/1.73M2 — SIGNIFICANT CHANGE UP
GLUCOSE SERPL-MCNC: 104 MG/DL — HIGH (ref 70–99)
MAGNESIUM SERPL-MCNC: 2.1 MG/DL — SIGNIFICANT CHANGE UP (ref 1.6–2.6)
NT-PROBNP SERPL-SCNC: 492 PG/ML — HIGH
POTASSIUM SERPL-MCNC: 4.3 MMOL/L — SIGNIFICANT CHANGE UP (ref 3.5–5.3)
POTASSIUM SERPL-SCNC: 4.3 MMOL/L — SIGNIFICANT CHANGE UP (ref 3.5–5.3)
PROT SERPL-MCNC: 6.7 G/DL — SIGNIFICANT CHANGE UP (ref 6–8.3)
SODIUM SERPL-SCNC: 136 MMOL/L — SIGNIFICANT CHANGE UP (ref 135–145)
SPECIMEN SOURCE: SIGNIFICANT CHANGE UP

## 2022-11-07 PROCEDURE — 93306 TTE W/DOPPLER COMPLETE: CPT | Mod: 26

## 2022-11-07 PROCEDURE — 99223 1ST HOSP IP/OBS HIGH 75: CPT

## 2022-11-07 PROCEDURE — G1004: CPT

## 2022-11-07 PROCEDURE — 70450 CT HEAD/BRAIN W/O DYE: CPT | Mod: 26,MG

## 2022-11-07 RX ORDER — HEPARIN SODIUM 5000 [USP'U]/ML
5000 INJECTION INTRAVENOUS; SUBCUTANEOUS EVERY 8 HOURS
Refills: 0 | Status: DISCONTINUED | OUTPATIENT
Start: 2022-11-07 | End: 2022-11-11

## 2022-11-07 RX ORDER — METOPROLOL TARTRATE 50 MG
1 TABLET ORAL
Qty: 0 | Refills: 0 | DISCHARGE

## 2022-11-07 RX ORDER — CARVEDILOL PHOSPHATE 80 MG/1
6.25 CAPSULE, EXTENDED RELEASE ORAL EVERY 12 HOURS
Refills: 0 | Status: DISCONTINUED | OUTPATIENT
Start: 2022-11-07 | End: 2022-11-11

## 2022-11-07 RX ORDER — ATORVASTATIN CALCIUM 80 MG/1
20 TABLET, FILM COATED ORAL AT BEDTIME
Refills: 0 | Status: DISCONTINUED | OUTPATIENT
Start: 2022-11-07 | End: 2022-11-11

## 2022-11-07 RX ORDER — MECLIZINE HCL 12.5 MG
1 TABLET ORAL
Qty: 0 | Refills: 0 | DISCHARGE

## 2022-11-07 RX ORDER — CHLORTHALIDONE 50 MG
1 TABLET ORAL
Qty: 0 | Refills: 0 | DISCHARGE

## 2022-11-07 RX ORDER — AMLODIPINE BESYLATE 2.5 MG/1
2.5 TABLET ORAL DAILY
Refills: 0 | Status: DISCONTINUED | OUTPATIENT
Start: 2022-11-07 | End: 2022-11-11

## 2022-11-07 RX ORDER — ASPIRIN/CALCIUM CARB/MAGNESIUM 324 MG
1 TABLET ORAL
Qty: 0 | Refills: 0 | DISCHARGE

## 2022-11-07 RX ORDER — CHLORTHALIDONE 50 MG
0 TABLET ORAL
Qty: 0 | Refills: 0 | DISCHARGE

## 2022-11-07 RX ADMIN — Medication 81 MILLIGRAM(S): at 13:41

## 2022-11-07 RX ADMIN — AMLODIPINE BESYLATE 5 MILLIGRAM(S): 2.5 TABLET ORAL at 06:07

## 2022-11-07 RX ADMIN — LISINOPRIL 40 MILLIGRAM(S): 2.5 TABLET ORAL at 06:08

## 2022-11-07 RX ADMIN — ATORVASTATIN CALCIUM 10 MILLIGRAM(S): 80 TABLET, FILM COATED ORAL at 21:23

## 2022-11-07 NOTE — H&P ADULT - NSHPPHYSICALEXAM_GEN_ALL_CORE
Vital Signs Last 24 Hrs  T(C): 36.7 (07 Nov 2022 17:59), Max: 36.7 (07 Nov 2022 05:08)  T(F): 98 (07 Nov 2022 17:59), Max: 98 (07 Nov 2022 05:08)  HR: 76 (07 Nov 2022 18:15) (58 - 80)  BP: 155/71 (07 Nov 2022 18:15) (135/71 - 170/75)  BP(mean): --  RR: 19 (07 Nov 2022 17:59) (14 - 19)  SpO2: 98% (07 Nov 2022 17:59) (98% - 100%)    Parameters below as of 07 Nov 2022 17:59  Patient On (Oxygen Delivery Method): room air

## 2022-11-07 NOTE — ED CDU PROVIDER SUBSEQUENT DAY NOTE - ATTENDING APP SHARED VISIT CONTRIBUTION OF CARE
I have personally performed a face to face medical and diagnostic evaluation of the patient. I have discussed with and reviewed the ACP's note and agree with the History, ROS, Physical Exam and MDM unless otherwise indicated. A brief summary of my personal evaluation and impression can be found below.     77 y.o female with PMHx of CAD w/ PCI x 3 (on asa), ILR, Hx of CVA, htn, hld, vertigo who presents to ED c/o dizziness. Pt seen and worked up in ED; Trop 9, , K+ 3.2, Mag 1.5 (both repleted by ED team). CXR normal. Pt transferred to CDU for; stress, echo, tele, vitals q4 and frequent re-evals. CT head done overnight - no actionable findings. Pt sent for echo this am - results pending. Will be seen by tele doctor team. Reassess to dispo. Margret Moore, ED Attending

## 2022-11-07 NOTE — H&P ADULT - NSHPPOADEEPVENOUSTHROMB_GEN_A_CORE
Recommend full liquid to soft diet for few days.  Okay to give him tapering dose of prednisone, starting at 40 mg.     no

## 2022-11-07 NOTE — CONSULT NOTE ADULT - SUBJECTIVE AND OBJECTIVE BOX
Cardiovascular Disease Initial Evaluation  Date of service: 11-07-22 @ 12:04     CHIEF COMPLAINT:  Chest pain    HISTORY OF PRESENT ILLNESS: 78 y/o F, PMH of HTN, HLD, CVA, MI s/p stents x2, and Vertigo, presents to ED c/o dizziness/lightheadedness a/w nausea/vomiting. Pt reports today was cooking and cleaning, suddenly felt diaphoretic, lightheaded/dizzy, and was faint. Denies LOC. Pt states felt very different from her usual vertigo symptoms. Pt took her BP meds and meclizine, and symptoms eventually self-resolved. Pt's cardiologist is Dr. Mejia; last stress/echo was ~1yr ago. Pt denies CP, SOB, abd pain, diarrhea, weakness/numbness, urinary sx, Le edema, or any other symptoms at this time. Pt found to be hypertensive on exam which improved with IV Hydralazine. Pt currently chest pain free.       Allergies    No Known Allergies    Intolerances    	    MEDICATIONS:  amLODIPine   Tablet 5 milliGRAM(s) Oral daily  aspirin enteric coated 81 milliGRAM(s) Oral daily  lisinopril 40 milliGRAM(s) Oral daily        meclizine 25 milliGRAM(s) Oral every 8 hours PRN      atorvastatin 10 milliGRAM(s) Oral at bedtime        PAST MEDICAL & SURGICAL HISTORY:  HTN (hypertension)      HLD (hyperlipidemia)      Cerebrovascular accident (CVA)      H/O myocardial ischemia      CAD (coronary artery disease)  stent x2          FAMILY HISTORY:      SOCIAL HISTORY:    The patient is a nonsmoker       REVIEW OF SYSTEMS:  See HPI, otherwise complete 14 point review of systems negative    [x ] All others negative	  [ ] Unable to obtain    PHYSICAL EXAM:  T(C): 36.7 (11-07-22 @ 09:40), Max: 36.8 (11-06-22 @ 19:03)  HR: 70 (11-07-22 @ 09:40) (58 - 75)  BP: 148/64 (11-07-22 @ 09:40) (135/71 - 192/87)  RR: 18 (11-07-22 @ 09:40) (14 - 18)  SpO2: 100% (11-07-22 @ 09:40) (100% - 100%)  Wt(kg): --  I&O's Summary      Appearance: No Acute Distress; resting comfortably  HEENT:  Normal oral mucosa, PERRL, EOMI	  Cardiovascular: Normal S1 S2, No JVD, No murmurs/rubs/gallops  Respiratory: Normal respiratory effort; Lungs clear to auscultation bilaterally  Gastrointestinal:  Soft, Non-tender, + BS	  Skin: No rashes, No ecchymoses, No cyanosis	  Neurologic: Non-focal; no weakness  Extremities: No clubbing, cyanosis or edema  Vascular: Peripheral pulses palpable 2+ bilaterally  Psychiatry: A & O x 3, Mood & affect appropriate    Laboratory Data:	 	    CBC Full  -  ( 06 Nov 2022 15:49 )  WBC Count : 9.13 K/uL  Hemoglobin : 11.5 g/dL  Hematocrit : 34.2 %  Platelet Count - Automated : 284 K/uL  Mean Cell Volume : 78.8 fL  Mean Cell Hemoglobin : 26.5 pg  Mean Cell Hemoglobin Concentration : 33.6 gm/dL  Auto Neutrophil # : 6.89 K/uL  Auto Lymphocyte # : 1.71 K/uL  Auto Monocyte # : 0.41 K/uL  Auto Eosinophil # : 0.04 K/uL  Auto Basophil # : 0.03 K/uL  Auto Neutrophil % : 75.6 %  Auto Lymphocyte % : 18.7 %  Auto Monocyte % : 4.5 %  Auto Eosinophil % : 0.4 %  Auto Basophil % : 0.3 %    11-07    136  |  98  |  14  ----------------------------<  104<H>  4.3   |  25  |  0.95  11-06    129<L>  |  89<L>  |  17  ----------------------------<  128<H>  3.2<L>   |  28  |  0.90    Ca    9.8      07 Nov 2022 05:25  Ca    9.7      06 Nov 2022 15:49  Mg     2.10     11-07  Mg     1.50     11-06    TPro  6.7  /  Alb  3.9  /  TBili  1.0  /  DBili  x   /  AST  16  /  ALT  14  /  AlkPhos  78  11-07  TPro  7.0  /  Alb  4.0  /  TBili  0.8  /  DBili  x   /  AST  22  /  ALT  16  /  AlkPhos  79  11-06      proBNP: Serum Pro-Brain Natriuretic Peptide: 492 pg/mL (11-07 @ 05:25)  Serum Pro-Brain Natriuretic Peptide: 577 pg/mL (11-06 @ 15:49)    Lipid Profile:   HgA1c:   TSH:       CARDIAC MARKERS: Trop T 9-12            Interpretation of Telemetry: 	    ECG:  	  RADIOLOGY:  OTHER: 	    PREVIOUS DIAGNOSTIC TESTING:    [x ] Echocardiogram: 11/7/2022  - Normal LV systolic function  [ ] Catheterization:  [ ] Stress Test:  	    Assessment:    78 y/o F, PMH of HTN, HLD, CVA, MI s/p stents x2, and Vertigo, presents to ED c/o dizziness/lightheadedness a/w nausea/vomiting.     Plan of Care:    #Atypical chest pain  - ACS ruled out  - EKG shows sinus rhythm  - TTE shows normal LV systolic function  - NST pending. If normal Pt may be discharged and follow up with Dr Jorge Mejia for further management    #HTN  - BP uncontrolled   - Increased Norvasc to 10mg, Continue ACE    #CAD   - S/p PCI to unknown vessel   - Continue ASA and Statin    #HLD  - Statin as ordered    #ACP (advance care planning)-  Advanced care planning was discussed with the patient.  Risks, benefits and alternatives of medical treatment and procedures were discussed in detail and all questions were answered. 30 additional minutes spent addressing advance care plans.        72 minutes spent on total encounter; more than 50% of the visit was spent counseling and/or coordinating care by the attending physician.   	  Neo Ramirez D.O.   Cardiovascular Diseases  [phone]     Cardiovascular Disease Initial Evaluation  Date of service: 11-07-22 @ 12:04     CHIEF COMPLAINT:  Chest pain    HISTORY OF PRESENT ILLNESS: 78 y/o F, PMH of HTN, HLD, CVA, MI s/p stents x2, and Vertigo, presents to ED c/o dizziness/lightheadedness a/w nausea/vomiting. Pt reports today was cooking and cleaning, suddenly felt diaphoretic, lightheaded/dizzy, and was faint. Denies LOC. Pt states felt very different from her usual vertigo symptoms. Pt took her BP meds and meclizine, and symptoms eventually self-resolved. Pt's cardiologist is Dr. Mejia; last stress/echo was ~1yr ago. Pt denies CP, SOB, abd pain, diarrhea, weakness/numbness, urinary sx, Le edema, or any other symptoms at this time. Pt found to be hypertensive on exam which improved on its own. AS per daughter, pt suffers from white coat syndrome.  Pt currently chest pain free.       Allergies    No Known Allergies    Intolerances    	    MEDICATIONS:  amLODIPine   Tablet 5 milliGRAM(s) Oral daily  aspirin enteric coated 81 milliGRAM(s) Oral daily  lisinopril 40 milliGRAM(s) Oral daily        meclizine 25 milliGRAM(s) Oral every 8 hours PRN      atorvastatin 10 milliGRAM(s) Oral at bedtime        PAST MEDICAL & SURGICAL HISTORY:  HTN (hypertension)      HLD (hyperlipidemia)      Cerebrovascular accident (CVA)      H/O myocardial ischemia      CAD (coronary artery disease)  stent x2          FAMILY HISTORY:      SOCIAL HISTORY:    The patient is a nonsmoker       REVIEW OF SYSTEMS:  See HPI, otherwise complete 14 point review of systems negative    [x ] All others negative	  [ ] Unable to obtain    PHYSICAL EXAM:  T(C): 36.7 (11-07-22 @ 09:40), Max: 36.8 (11-06-22 @ 19:03)  HR: 70 (11-07-22 @ 09:40) (58 - 75)  BP: 148/64 (11-07-22 @ 09:40) (135/71 - 192/87)  RR: 18 (11-07-22 @ 09:40) (14 - 18)  SpO2: 100% (11-07-22 @ 09:40) (100% - 100%)  Wt(kg): --  I&O's Summary      Appearance: No Acute Distress; resting comfortably  HEENT:  Normal oral mucosa, PERRL, EOMI	  Cardiovascular: Normal S1 S2, No JVD, No murmurs/rubs/gallops  Respiratory: Normal respiratory effort; Lungs clear to auscultation bilaterally  Gastrointestinal:  Soft, Non-tender, + BS	  Skin: No rashes, No ecchymoses, No cyanosis	  Neurologic: Non-focal; no weakness  Extremities: No clubbing, cyanosis or edema  Vascular: Peripheral pulses palpable 2+ bilaterally  Psychiatry: A & O x 3, Mood & affect appropriate    Laboratory Data:	 	    CBC Full  -  ( 06 Nov 2022 15:49 )  WBC Count : 9.13 K/uL  Hemoglobin : 11.5 g/dL  Hematocrit : 34.2 %  Platelet Count - Automated : 284 K/uL  Mean Cell Volume : 78.8 fL  Mean Cell Hemoglobin : 26.5 pg  Mean Cell Hemoglobin Concentration : 33.6 gm/dL  Auto Neutrophil # : 6.89 K/uL  Auto Lymphocyte # : 1.71 K/uL  Auto Monocyte # : 0.41 K/uL  Auto Eosinophil # : 0.04 K/uL  Auto Basophil # : 0.03 K/uL  Auto Neutrophil % : 75.6 %  Auto Lymphocyte % : 18.7 %  Auto Monocyte % : 4.5 %  Auto Eosinophil % : 0.4 %  Auto Basophil % : 0.3 %    11-07    136  |  98  |  14  ----------------------------<  104<H>  4.3   |  25  |  0.95  11-06    129<L>  |  89<L>  |  17  ----------------------------<  128<H>  3.2<L>   |  28  |  0.90    Ca    9.8      07 Nov 2022 05:25  Ca    9.7      06 Nov 2022 15:49  Mg     2.10     11-07  Mg     1.50     11-06    TPro  6.7  /  Alb  3.9  /  TBili  1.0  /  DBili  x   /  AST  16  /  ALT  14  /  AlkPhos  78  11-07  TPro  7.0  /  Alb  4.0  /  TBili  0.8  /  DBili  x   /  AST  22  /  ALT  16  /  AlkPhos  79  11-06      proBNP: Serum Pro-Brain Natriuretic Peptide: 492 pg/mL (11-07 @ 05:25)  Serum Pro-Brain Natriuretic Peptide: 577 pg/mL (11-06 @ 15:49)    Lipid Profile:   HgA1c:   TSH:       CARDIAC MARKERS: Trop T 9-12            Interpretation of Telemetry: 	    ECG:  	  RADIOLOGY:  OTHER: 	    PREVIOUS DIAGNOSTIC TESTING:    [x ] Echocardiogram: 11/7/2022  - Normal LV systolic function  [ ] Catheterization:  [ ] Stress Test:  	    Assessment:    78 y/o F, PMH of HTN, HLD, CVA, MI s/p stents x2, and Vertigo, presents to ED c/o dizziness/lightheadedness a/w nausea/vomiting.     Plan of Care:    #Atypical chest pain  - ACS ruled out  - EKG shows sinus rhythm  - TTE shows normal LV systolic function  - NST pending. If normal Pt may be discharged and follow up with Dr Jorge Mejia for further management    #HTN  - Bp acceptable  - Pt has history of white coat syndrome  - Will continue current regiment    #CAD   - S/p PCI to unknown vessel   - Continue ASA and Statin    #HLD  - Statin as ordered    #ACP (advance care planning)-  Advanced care planning was discussed with the patient.  Risks, benefits and alternatives of medical treatment and procedures were discussed in detail and all questions were answered. 30 additional minutes spent addressing advance care plans.        72 minutes spent on total encounter; more than 50% of the visit was spent counseling and/or coordinating care by the attending physician.   	  Neo Ramirez D.O.   Cardiovascular Diseases  [phone]

## 2022-11-07 NOTE — H&P ADULT - NSICDXPASTMEDICALHX_GEN_ALL_CORE_FT
PAST MEDICAL HISTORY:  Borderline diabetes mellitus Controlled with diet    CAD (coronary artery disease)     CAD (coronary artery disease) stent x2    Cerebrovascular accident (CVA)     Dyslipidemia     H/O heart artery stent     H/O myocardial ischemia     History of MI (myocardial infarction)     HLD (hyperlipidemia)     HTN (hypertension)     HTN (hypertension), benign     Legally blind in left eye, as defined in [place] 20/400 left eye    Migraines Developed at 10 years of age  Last episode 2 years ago    Sciatica     Vertigo

## 2022-11-07 NOTE — ED CDU PROVIDER SUBSEQUENT DAY NOTE - PROGRESS NOTE DETAILS
received sign out from [facility] this morning. pt feeling well. blood pressures stable. awaiting stress test labs. spoke with patient's cardiologist Dr. Mejia, Dr. Ramirez is covering and saw pt, suggesting to dc home if stress test normal. per Dr. Alexander, stress test results abnormal possible ischemia. spoke with Dr. Ramirez plan to admit for cath tomorrow.

## 2022-11-07 NOTE — ED CDU PROVIDER DISPOSITION NOTE - CLINICAL COURSE
78 y/o female with pmhx of HTN, HLD, CVA, CAD with stents, vertigo presents to ED c/o dizziness and n.v with diaphoresis. sent to cdu for r/o acs. stress test abnormal plan to admit for cath tomorrow. echo wnl. cards following.

## 2022-11-07 NOTE — H&P ADULT - NSHPLABSRESULTS_GEN_ALL_CORE
.    -personally interpreted EKG: pending       -personally reviewed labs:                        11.5   9.13  )-----------( 284      ( 06 Nov 2022 15:49 )             34.2   11-07    136  |  98  |  14  ----------------------------<  104<H>  4.3   |  25  |  0.95    Ca    9.8      07 Nov 2022 05:25  Mg     2.10     11-07  TPro  6.7  /  Alb  3.9  /  TBili  1.0  /  DBili  x   /  AST  16  /  ALT  14  /  AlkPhos  78  11-07      -personally reviewed: NST, dated Nov 7, 2022    IMPRESSIONS: Abnormal Study  * Myocardial Perfusion SPECT results are abnormal.  * Review of raw data shows: The study is of good technical  quality.  * The left ventricle was normal in size. There is a small,  moderate to severe defect in basal septal wall that is  reversible, suggestive of ischemia. Transient ischemic  dilation of the LV was noted with a ratio of: 1.33  * Rest gated wall motion analysis was performed (LVEF = 54  %;LVEDV = 43 ml.), revealing normal  LV function.   Post-stress gated wall motion analysis was performed (LVEF  = 45 %;LVEDV = 52 ml.), revealing mild overall   hypokinesis. There was basal to mid septal hypokinesis on  post-stress, but not rest gated images. The remaining  segments and RV contracted well.  The LV time activity  curve suggested diastolic dysfunction on post-stress, but  not on rest gated images.   * NOTE presence of TID suggests more extensive  subendocardial ischemia.   * ECG Changes: ST Depression: 1.5 mm down sloping in leads  II , III, aVF, V4, V5 started at 01:50 min of infusion at  HR of 113 and persisted 05:50 min into post infusion.  Based upon the above findings, coronary angiography  suggested.    -personally reviewed: TTE EF, 62%    1. Calcified trileaflet aortic valve with normal opening.  2. Normal left ventricular internal dimensions and wall  thicknesses.  3. Normal left ventricular systolic function. No segmental  wall motion abnormalities.  4. Normal right ventricular size and function. .    -personally interpreted EKG: NSR, 73bpm, qtc 482, no acute Tw or ST, no PACs or PVCs      -personally reviewed labs:                        11.5   9.13  )-----------( 284      ( 06 Nov 2022 15:49 )             34.2   11-07    136  |  98  |  14  ----------------------------<  104<H>  4.3   |  25  |  0.95    Ca    9.8      07 Nov 2022 05:25  Mg     2.10     11-07  TPro  6.7  /  Alb  3.9  /  TBili  1.0  /  DBili  x   /  AST  16  /  ALT  14  /  AlkPhos  78  11-07      -personally reviewed: NST, dated Nov 7, 2022    IMPRESSIONS: Abnormal Study  * Myocardial Perfusion SPECT results are abnormal.  * Review of raw data shows: The study is of good technical  quality.  * The left ventricle was normal in size. There is a small,  moderate to severe defect in basal septal wall that is  reversible, suggestive of ischemia. Transient ischemic  dilation of the LV was noted with a ratio of: 1.33  * Rest gated wall motion analysis was performed (LVEF = 54  %;LVEDV = 43 ml.), revealing normal  LV function.   Post-stress gated wall motion analysis was performed (LVEF  = 45 %;LVEDV = 52 ml.), revealing mild overall   hypokinesis. There was basal to mid septal hypokinesis on  post-stress, but not rest gated images. The remaining  segments and RV contracted well.  The LV time activity  curve suggested diastolic dysfunction on post-stress, but  not on rest gated images.   * NOTE presence of TID suggests more extensive  subendocardial ischemia.   * ECG Changes: ST Depression: 1.5 mm down sloping in leads  II , III, aVF, V4, V5 started at 01:50 min of infusion at  HR of 113 and persisted 05:50 min into post infusion.  Based upon the above findings, coronary angiography  suggested.    -personally reviewed: TTE EF, 62%    1. Calcified trileaflet aortic valve with normal opening.  2. Normal left ventricular internal dimensions and wall  thicknesses.  3. Normal left ventricular systolic function. No segmental  wall motion abnormalities.  4. Normal right ventricular size and function. .    -personally interpreted EKG: NSR, 73bpm, qtc 482, no acute Tw or ST, no PACs or PVCs    -personally interpreted CXR: clear lungs, ILR in place, no pleural effusions    -personally reviewed labs:                        11.5   9.13  )-----------( 284      ( 06 Nov 2022 15:49 )             34.2   11-07    136  |  98  |  14  ----------------------------<  104<H>  4.3   |  25  |  0.95    Ca    9.8      07 Nov 2022 05:25  Mg     2.10     11-07  TPro  6.7  /  Alb  3.9  /  TBili  1.0  /  DBili  x   /  AST  16  /  ALT  14  /  AlkPhos  78  11-07      -personally reviewed: NST, dated Nov 7, 2022    IMPRESSIONS: Abnormal Study  * Myocardial Perfusion SPECT results are abnormal.  * Review of raw data shows: The study is of good technical  quality.  * The left ventricle was normal in size. There is a small,  moderate to severe defect in basal septal wall that is  reversible, suggestive of ischemia. Transient ischemic  dilation of the LV was noted with a ratio of: 1.33  * Rest gated wall motion analysis was performed (LVEF = 54  %;LVEDV = 43 ml.), revealing normal  LV function.   Post-stress gated wall motion analysis was performed (LVEF  = 45 %;LVEDV = 52 ml.), revealing mild overall   hypokinesis. There was basal to mid septal hypokinesis on  post-stress, but not rest gated images. The remaining  segments and RV contracted well.  The LV time activity  curve suggested diastolic dysfunction on post-stress, but  not on rest gated images.   * NOTE presence of TID suggests more extensive  subendocardial ischemia.   * ECG Changes: ST Depression: 1.5 mm down sloping in leads  II , III, aVF, V4, V5 started at 01:50 min of infusion at  HR of 113 and persisted 05:50 min into post infusion.  Based upon the above findings, coronary angiography  suggested.    -personally reviewed: TTE EF, 62%    1. Calcified trileaflet aortic valve with normal opening.  2. Normal left ventricular internal dimensions and wall  thicknesses.  3. Normal left ventricular systolic function. No segmental  wall motion abnormalities.  4. Normal right ventricular size and function.

## 2022-11-07 NOTE — ED CDU PROVIDER SUBSEQUENT DAY NOTE - HISTORY
Patient is a 77 y.o female with PMHx of CAD w/ PCI x 3 (on asa), ILR, Hx of CVA, htn, hld, vertigo who presents to ED c/o dizziness. Pt seen and worked up in ED; Trop 9, , K+ 3.2, Mag 1.5 (both repleted by ED team). CXR normal. Pt transferred to CDU for; stress, echo, tele, CT head pending,  vitals q4 and frequent re-evals.    In interim- Pt resting comfortably, vitals stable. CT head normal. No acute events overnight. Pt pending Echo and stress in Am.

## 2022-11-07 NOTE — H&P ADULT - PROBLEM SELECTOR PLAN 1
NST performed on 11/7/2022 extensive subendocardial ischemia;  Cardiology consulted, Dr. Ramirez CAD c/b MI - s/p stents x2  NST performed on this admission, 11/7/2022, significant for extensive subendocardial ischemia;  EKG wnl, low suspicion of ongoing acute ischemia  Telemetry  Cardiology consulted, Dr. Ramirez  Per CDU update, cardiology planning for cardiac cath in AM  NPO after MN  IV hydration  Hold ACEI  c/w ASA, Statin   c/w Carvedilol   TTE performed  TSH, Lipid panel, A1c

## 2022-11-07 NOTE — H&P ADULT - HISTORY OF PRESENT ILLNESS
76yo Female, MHx of HTN, HLD, CVA, CAD c/b MI - s/p stents x2, vertigo c/o dizziness and lightheadedness with associated nausea and an episode of nbnb vomiting. Pt reports that while cooking and cleaning, suddenly felt diaphoretic, lightheaded and dizzy and almost fainted. Does not report LOC. States that the symptoms felt different from her typical symptoms of vertigo. Pt rested, and took her medications for HTN and meclizine for vertigo, and the symptoms improved. Otherwise reports no CP, SOB, palpitations, abd pain, diarrhea, weakness, leg swelling.    ED course: admitted to CDU for NST; Cardiology consulted;  76yo Female, MHx of HTN, HLD, CVA, CAD c/b MI - s/p stents x2, vertigo c/o dizziness and lightheadedness with associated nausea and an episode of nbnb vomiting. Pt reports that while cooking and cleaning, suddenly felt diaphoretic, lightheaded and dizzy and almost fainted. Felt also mild pressure-like sensation in the chest lasting appx 3-5 min. Does not report LOC. States that the symptoms felt different from her typical symptoms of vertigo. Pt rested, and took her medications for HTN and meclizine for vertigo, and the symptoms improved. Otherwise reports no CP, SOB, palpitations, abd pain, diarrhea, weakness, leg swelling.    ED course: admitted to CDU for NST; Cardiology consulted;

## 2022-11-07 NOTE — ED ADULT NURSE REASSESSMENT NOTE - NS ED NURSE REASSESS COMMENT FT1
Pt a&ox4, sinus rhythm on cardiac monitor, denies CP, SOB, or dyspnea at this time. Respirations are even and unlabored, no signs of respiratory distress.

## 2022-11-07 NOTE — H&P ADULT - NSHPSOCIALHISTORY_GEN_ALL_CORE
Reports no h/o smoking, drinking,  illicit substance use   Lives at home with son   Was not formally employed   Independent with [employer]

## 2022-11-07 NOTE — H&P ADULT - ASSESSMENT
78yo Female, MHx of HTN, HLD, CVA, CAD c/b MI - s/p stents x2 a/w presyncope and atypical CP, found to have abnormal NST; Plan for cardiac cath in AM;

## 2022-11-07 NOTE — ED CDU PROVIDER DISPOSITION NOTE - ATTENDING APP SHARED VISIT CONTRIBUTION OF CARE
I have personally performed a face to face medical and diagnostic evaluation of the patient. I have discussed with and reviewed the ACP's note and agree with the History, ROS, Physical Exam and MDM unless otherwise indicated. A brief summary of my personal evaluation and impression can be found below.     76 y/o female with pmhx of HTN, HLD, CVA, CAD with stents, vertigo presents to ED c/o dizziness and n.v with diaphoresis. sent to cdu for r/o acs. stress test abnormal plan to admit for cath tomorrow. echo wnl. cards following. Pt clinically stable. Margret Moore, ED Attending

## 2022-11-08 DIAGNOSIS — I10 ESSENTIAL (PRIMARY) HYPERTENSION: ICD-10-CM

## 2022-11-08 DIAGNOSIS — R55 SYNCOPE AND COLLAPSE: ICD-10-CM

## 2022-11-08 DIAGNOSIS — R42 DIZZINESS AND GIDDINESS: ICD-10-CM

## 2022-11-08 LAB
A1C WITH ESTIMATED AVERAGE GLUCOSE RESULT: 6.9 % — HIGH (ref 4–5.6)
ANION GAP SERPL CALC-SCNC: 11 MMOL/L — SIGNIFICANT CHANGE UP (ref 7–14)
BASOPHILS # BLD AUTO: 0.05 K/UL — SIGNIFICANT CHANGE UP (ref 0–0.2)
BASOPHILS NFR BLD AUTO: 0.6 % — SIGNIFICANT CHANGE UP (ref 0–2)
BUN SERPL-MCNC: 14 MG/DL — SIGNIFICANT CHANGE UP (ref 7–23)
CALCIUM SERPL-MCNC: 9.5 MG/DL — SIGNIFICANT CHANGE UP (ref 8.4–10.5)
CHLORIDE SERPL-SCNC: 96 MMOL/L — LOW (ref 98–107)
CHOLEST SERPL-MCNC: 140 MG/DL — SIGNIFICANT CHANGE UP
CO2 SERPL-SCNC: 27 MMOL/L — SIGNIFICANT CHANGE UP (ref 22–31)
CREAT SERPL-MCNC: 0.91 MG/DL — SIGNIFICANT CHANGE UP (ref 0.5–1.3)
EGFR: 65 ML/MIN/1.73M2 — SIGNIFICANT CHANGE UP
EOSINOPHIL # BLD AUTO: 0.08 K/UL — SIGNIFICANT CHANGE UP (ref 0–0.5)
EOSINOPHIL NFR BLD AUTO: 1 % — SIGNIFICANT CHANGE UP (ref 0–6)
ESTIMATED AVERAGE GLUCOSE: 151 — SIGNIFICANT CHANGE UP
GLUCOSE SERPL-MCNC: 124 MG/DL — HIGH (ref 70–99)
HCT VFR BLD CALC: 36 % — SIGNIFICANT CHANGE UP (ref 34.5–45)
HDLC SERPL-MCNC: 36 MG/DL — LOW
HGB BLD-MCNC: 11.9 G/DL — SIGNIFICANT CHANGE UP (ref 11.5–15.5)
IANC: 5.01 K/UL — SIGNIFICANT CHANGE UP (ref 1.8–7.4)
IMM GRANULOCYTES NFR BLD AUTO: 0.3 % — SIGNIFICANT CHANGE UP (ref 0–0.9)
LIPID PNL WITH DIRECT LDL SERPL: 81 MG/DL — SIGNIFICANT CHANGE UP
LYMPHOCYTES # BLD AUTO: 2.21 K/UL — SIGNIFICANT CHANGE UP (ref 1–3.3)
LYMPHOCYTES # BLD AUTO: 27.8 % — SIGNIFICANT CHANGE UP (ref 13–44)
MAGNESIUM SERPL-MCNC: 1.7 MG/DL — SIGNIFICANT CHANGE UP (ref 1.6–2.6)
MCHC RBC-ENTMCNC: 27 PG — SIGNIFICANT CHANGE UP (ref 27–34)
MCHC RBC-ENTMCNC: 33.1 GM/DL — SIGNIFICANT CHANGE UP (ref 32–36)
MCV RBC AUTO: 81.8 FL — SIGNIFICANT CHANGE UP (ref 80–100)
MONOCYTES # BLD AUTO: 0.57 K/UL — SIGNIFICANT CHANGE UP (ref 0–0.9)
MONOCYTES NFR BLD AUTO: 7.2 % — SIGNIFICANT CHANGE UP (ref 2–14)
NEUTROPHILS # BLD AUTO: 5.01 K/UL — SIGNIFICANT CHANGE UP (ref 1.8–7.4)
NEUTROPHILS NFR BLD AUTO: 63.1 % — SIGNIFICANT CHANGE UP (ref 43–77)
NON HDL CHOLESTEROL: 104 MG/DL — SIGNIFICANT CHANGE UP
NRBC # BLD: 0 /100 WBCS — SIGNIFICANT CHANGE UP (ref 0–0)
NRBC # FLD: 0 K/UL — SIGNIFICANT CHANGE UP (ref 0–0)
PHOSPHATE SERPL-MCNC: 2.3 MG/DL — LOW (ref 2.5–4.5)
PLATELET # BLD AUTO: 304 K/UL — SIGNIFICANT CHANGE UP (ref 150–400)
POTASSIUM SERPL-MCNC: 3.9 MMOL/L — SIGNIFICANT CHANGE UP (ref 3.5–5.3)
POTASSIUM SERPL-SCNC: 3.9 MMOL/L — SIGNIFICANT CHANGE UP (ref 3.5–5.3)
RBC # BLD: 4.4 M/UL — SIGNIFICANT CHANGE UP (ref 3.8–5.2)
RBC # FLD: 13.3 % — SIGNIFICANT CHANGE UP (ref 10.3–14.5)
SODIUM SERPL-SCNC: 134 MMOL/L — LOW (ref 135–145)
TRIGL SERPL-MCNC: 117 MG/DL — SIGNIFICANT CHANGE UP
TSH SERPL-MCNC: 3.2 UIU/ML — SIGNIFICANT CHANGE UP (ref 0.27–4.2)
WBC # BLD: 7.94 K/UL — SIGNIFICANT CHANGE UP (ref 3.8–10.5)
WBC # FLD AUTO: 7.94 K/UL — SIGNIFICANT CHANGE UP (ref 3.8–10.5)

## 2022-11-08 RX ORDER — HYDRALAZINE HCL 50 MG
2.5 TABLET ORAL ONCE
Refills: 0 | Status: COMPLETED | OUTPATIENT
Start: 2022-11-08 | End: 2022-11-08

## 2022-11-08 RX ADMIN — CARVEDILOL PHOSPHATE 6.25 MILLIGRAM(S): 80 CAPSULE, EXTENDED RELEASE ORAL at 00:17

## 2022-11-08 RX ADMIN — HEPARIN SODIUM 5000 UNIT(S): 5000 INJECTION INTRAVENOUS; SUBCUTANEOUS at 05:40

## 2022-11-08 RX ADMIN — HEPARIN SODIUM 5000 UNIT(S): 5000 INJECTION INTRAVENOUS; SUBCUTANEOUS at 21:20

## 2022-11-08 RX ADMIN — Medication 50 MILLIGRAM(S): at 23:17

## 2022-11-08 RX ADMIN — CARVEDILOL PHOSPHATE 6.25 MILLIGRAM(S): 80 CAPSULE, EXTENDED RELEASE ORAL at 21:19

## 2022-11-08 RX ADMIN — HEPARIN SODIUM 5000 UNIT(S): 5000 INJECTION INTRAVENOUS; SUBCUTANEOUS at 14:23

## 2022-11-08 RX ADMIN — ATORVASTATIN CALCIUM 20 MILLIGRAM(S): 80 TABLET, FILM COATED ORAL at 21:20

## 2022-11-08 RX ADMIN — Medication 2.5 MILLIGRAM(S): at 21:19

## 2022-11-08 RX ADMIN — AMLODIPINE BESYLATE 2.5 MILLIGRAM(S): 2.5 TABLET ORAL at 05:40

## 2022-11-08 RX ADMIN — CARVEDILOL PHOSPHATE 6.25 MILLIGRAM(S): 80 CAPSULE, EXTENDED RELEASE ORAL at 11:50

## 2022-11-08 NOTE — CONSULT NOTE ADULT - ASSESSMENT
78yo Female, MHx of HTN, HLD, CVA, CAD c/b MI - s/p stents x2, vertigo c/o dizziness and lightheadedness with associated nausea and an episode of nbnb vomiting. Pt reports that while cooking and cleaning, suddenly felt diaphoretic, lightheaded and dizzy and almost fainted. Felt also mild pressure-like sensation in the chest lasting appx 3-5 min. Does not report LOC. States that the symptoms felt different from her typical symptoms of vertigo. Pt rested, and took her medications for HTN and meclizine for vertigo, and the symptoms improved. Otherwise reports no CP, SOB, palpitations, abd pain, diarrhea, weakness, leg swelling. I feel fine now and awaiting cardiac cath.     78yo Female, MHx of HTN, HLD, CVA, CAD c/b MI - s/p stents x2 a/w presyncope and atypical CP, found to have abnormal NST; Plan for cardiac cath in AM;      Problem/Plan - 1:  ·  Problem: CAD c/b MI - s/p stents x2 with Atypical chest pain.   ·  Plan: Now symptom free.   NST performed on this admission, 11/7/2022, significant for extensive subendocardial ischemia;  Awaiting Cardiac cath.      Problem/Plan - 2:  ·  Problem: Near syncope.   ·  Plan: Telemetry  Plan as above  S/P ILR interrogation.     Problem/Plan - 3:  ·  Problem: Benign essential HTN.   ·  Plan: Pt has a component of white coat HTN  c/w Carvedilol   c/w Amlodipine   Monitor BP closely  VS q4h.     Problem/Plan - 4:  ·  Problem: Vertigo.   ·  Plan: Resolved.  CT head No acute intracranial hemorrhage or mass effect.  c/w Meclizine PRN  Fall precautions.     Problem/Plan - 5:  ·  Problem: DVT prophylaxis.   ·  Plan: Heparin sq TID pending cardiac cath in AM.

## 2022-11-08 NOTE — CHART NOTE - NSCHARTNOTEFT_GEN_A_CORE
Patient with ILR - implanted 5 years ago, at this point the old model's battery would have  and is no longer recording  confirmed with EP    Fay Flores PA-C  [id number]

## 2022-11-08 NOTE — CONSULT NOTE ADULT - SUBJECTIVE AND OBJECTIVE BOX
Patient is a 77y old  Female who presents with a chief complaint of Lightheadedness       HPI:  76yo Female, MHx of HTN, HLD, CVA, CAD c/b MI - s/p stents x2, vertigo c/o dizziness and lightheadedness with associated nausea and an episode of nbnb vomiting. Pt reports that while cooking and cleaning, suddenly felt diaphoretic, lightheaded and dizzy and almost fainted. Felt also mild pressure-like sensation in the chest lasting appx 3-5 min. Does not report LOC. States that the symptoms felt different from her typical symptoms of vertigo. Pt rested, and took her medications for HTN and meclizine for vertigo, and the symptoms improved. Otherwise reports no CP, SOB, palpitations, abd pain, diarrhea, weakness, leg swelling. I feel fine now and awaiting cardiac cath.     PAST MEDICAL & SURGICAL HISTORY:  Dyslipidemia      HTN (hypertension), benign      CAD (coronary artery disease)      History of MI (myocardial infarction)      H/O heart artery stent      Vertigo      Migraines  Developed at 10 years of age  Last episode 2 years ago      Borderline diabetes mellitus  Controlled with diet      Legally blind in left eye, as defined in [place]  20/400 left eye      Sciatica      HTN (hypertension)      HLD (hyperlipidemia)      Cerebrovascular accident (CVA)      H/O myocardial ischemia      CAD (coronary artery disease)  stent x2      No Past Surgical History      History of tonsillectomy      History of tubal ligation  35 years ago, no complications as per patient.      S/P LASIK surgery  Right eye, no complications as per patient.      History of coronary artery stent placement  3 stents (Last in )  Same admission as past myocardial infarction      S/P ICD (internal cardiac defibrillator) procedure  loop recorder          Social History: No smoking etc.     FAMILY HISTORY:  Family history of MI (myocardial infarction) (Child)  Father ( at 75)  Mother ( at 87)  Son (  at 35)    Family history of hypertension (Child, Sibling)  Daughters, Sisters    Family history of hypercholesterolemia (Child, Sibling)    Family history of brain tumor (Sibling)        Allergies    fish (Unknown)  iodine (Anaphylaxis)  No Known Drug Allergies    Intolerances    labetalol (Other)      REVIEW OF SYSTEMS:    CONSTITUTIONAL: No fever, weight loss, or fatigue  EYES: No eye pain, visual disturbances, or discharge  RESPIRATORY: No cough, wheezing, chills or hemoptysis; No shortness of breath  CARDIOVASCULAR: No chest pain, palpitations, dizziness, or leg swelling  GASTROINTESTINAL: No abdominal or epigastric pain. No nausea, vomiting, or hematemesis; No diarrhea or constipation. No melena or hematochezia.  GENITOURINARY: No dysuria, frequency, hematuria, or incontinence  NEUROLOGICAL: No headaches, memory loss, loss of strength, numbness, or tremors        MEDICATIONS  (STANDING):  amLODIPine   Tablet 2.5 milliGRAM(s) Oral daily  aspirin enteric coated 81 milliGRAM(s) Oral daily  atorvastatin 20 milliGRAM(s) Oral at bedtime  carvedilol 6.25 milliGRAM(s) Oral every 12 hours  heparin   Injectable 5000 Unit(s) SubCutaneous every 8 hours  predniSONE   Tablet 50 milliGRAM(s) Oral every 6 hours    MEDICATIONS  (PRN):  meclizine 25 milliGRAM(s) Oral every 8 hours PRN Dizziness      Vital Signs Last 24 Hrs  T(C): 36.4 (2022 17:35), Max: 36.5 (2022 21:44)  T(F): 97.6 (2022 17:35), Max: 97.7 (2022 21:44)  HR: 71 (2022 17:35) (65 - 79)  BP: 155/74 (2022 17:35) (114/63 - 178/83)  BP(mean): --  RR: 17 (2022 17:35) (17 - 19)  SpO2: 100% (2022 17:35) (100% - 100%)    Parameters below as of 2022 17:35  Patient On (Oxygen Delivery Method): room air        PHYSICAL EXAM:    GENERAL: NAD, well-groomed, well-developed  HEAD:  Atraumatic, Normocephalic  EYES: EOMI, PERRLA, conjunctiva and sclera clear  ENMT: No tonsillar erythema, exudates, or enlargement; Moist mucous membranes, Good dentition, No lesions  NECK: Supple, No JVD, Normal thyroid  NERVOUS SYSTEM:  Alert & Oriented X3, No new  focal sign .  CHEST/LUNG: Air entry good bilaterally; No rales, rhonchi, wheezing, or rubs  HEART: Regular rate and rhythm; No murmurs, rubs, or gallops  ABDOMEN: Soft, Nontender, Nondistended; Bowel sounds present  EXTREMITIES:  2+ Peripheral Pulses, No clubbing, cyanosis, or edema  LYMPH: No lymphadenopathy noted  SKIN: No rashes or lesions    LABS:                        11.9   7.94  )-----------( 304      ( 2022 06:05 )             36.0     11-08    134<L>  |  96<L>  |  14  ----------------------------<  124<H>  3.9   |  27  |  0.91    Ca    9.5      2022 06:05  Phos  2.3     11-08  Mg     1.70     -08    TPro  6.7  /  Alb  3.9  /  TBili  1.0  /  DBili  x   /  AST  16  /  ALT  14  /  AlkPhos  78  11-07      Urinalysis Basic - ( 2022 22:16 )    Color: Colorless / Appearance: Clear / S.005 / pH: x  Gluc: x / Ketone: Negative  / Bili: Negative / Urobili: <2 mg/dL   Blood: x / Protein: Negative / Nitrite: Negative   Leuk Esterase: Small / RBC: 1 /HPF / WBC 2 /HPF   Sq Epi: x / Non Sq Epi: 2 /HPF / Bacteria: Negative          RADIOLOGY & ADDITIONAL STUDIES:

## 2022-11-09 LAB
ANION GAP SERPL CALC-SCNC: 16 MMOL/L — HIGH (ref 7–14)
ANION GAP SERPL CALC-SCNC: 17 MMOL/L — HIGH (ref 7–14)
BASOPHILS # BLD AUTO: 0.02 K/UL — SIGNIFICANT CHANGE UP (ref 0–0.2)
BASOPHILS NFR BLD AUTO: 0.1 % — SIGNIFICANT CHANGE UP (ref 0–2)
BUN SERPL-MCNC: 17 MG/DL — SIGNIFICANT CHANGE UP (ref 7–23)
BUN SERPL-MCNC: 18 MG/DL — SIGNIFICANT CHANGE UP (ref 7–23)
CALCIUM SERPL-MCNC: 10 MG/DL — SIGNIFICANT CHANGE UP (ref 8.4–10.5)
CALCIUM SERPL-MCNC: 9.4 MG/DL — SIGNIFICANT CHANGE UP (ref 8.4–10.5)
CHLORIDE SERPL-SCNC: 92 MMOL/L — LOW (ref 98–107)
CHLORIDE SERPL-SCNC: 96 MMOL/L — LOW (ref 98–107)
CO2 SERPL-SCNC: 18 MMOL/L — LOW (ref 22–31)
CO2 SERPL-SCNC: 21 MMOL/L — LOW (ref 22–31)
CREAT SERPL-MCNC: 0.9 MG/DL — SIGNIFICANT CHANGE UP (ref 0.5–1.3)
CREAT SERPL-MCNC: 0.92 MG/DL — SIGNIFICANT CHANGE UP (ref 0.5–1.3)
EGFR: 65 ML/MIN/1.73M2 — SIGNIFICANT CHANGE UP
EGFR: 66 ML/MIN/1.73M2 — SIGNIFICANT CHANGE UP
EOSINOPHIL # BLD AUTO: 0 K/UL — SIGNIFICANT CHANGE UP (ref 0–0.5)
EOSINOPHIL NFR BLD AUTO: 0 % — SIGNIFICANT CHANGE UP (ref 0–6)
GLUCOSE SERPL-MCNC: 189 MG/DL — HIGH (ref 70–99)
GLUCOSE SERPL-MCNC: 194 MG/DL — HIGH (ref 70–99)
HCT VFR BLD CALC: 32.4 % — LOW (ref 34.5–45)
HCT VFR BLD CALC: 37 % — SIGNIFICANT CHANGE UP (ref 34.5–45)
HGB BLD-MCNC: 10.9 G/DL — LOW (ref 11.5–15.5)
HGB BLD-MCNC: 12.4 G/DL — SIGNIFICANT CHANGE UP (ref 11.5–15.5)
IANC: 16.6 K/UL — HIGH (ref 1.8–7.4)
IMM GRANULOCYTES NFR BLD AUTO: 0.6 % — SIGNIFICANT CHANGE UP (ref 0–0.9)
LYMPHOCYTES # BLD AUTO: 0.91 K/UL — LOW (ref 1–3.3)
LYMPHOCYTES # BLD AUTO: 5 % — LOW (ref 13–44)
MAGNESIUM SERPL-MCNC: 1.7 MG/DL — SIGNIFICANT CHANGE UP (ref 1.6–2.6)
MCHC RBC-ENTMCNC: 26.8 PG — LOW (ref 27–34)
MCHC RBC-ENTMCNC: 26.8 PG — LOW (ref 27–34)
MCHC RBC-ENTMCNC: 33.5 GM/DL — SIGNIFICANT CHANGE UP (ref 32–36)
MCHC RBC-ENTMCNC: 33.6 GM/DL — SIGNIFICANT CHANGE UP (ref 32–36)
MCV RBC AUTO: 79.6 FL — LOW (ref 80–100)
MCV RBC AUTO: 80.1 FL — SIGNIFICANT CHANGE UP (ref 80–100)
MONOCYTES # BLD AUTO: 0.48 K/UL — SIGNIFICANT CHANGE UP (ref 0–0.9)
MONOCYTES NFR BLD AUTO: 2.6 % — SIGNIFICANT CHANGE UP (ref 2–14)
NEUTROPHILS # BLD AUTO: 16.6 K/UL — HIGH (ref 1.8–7.4)
NEUTROPHILS NFR BLD AUTO: 91.7 % — HIGH (ref 43–77)
NRBC # BLD: 0 /100 WBCS — SIGNIFICANT CHANGE UP (ref 0–0)
NRBC # BLD: 0 /100 WBCS — SIGNIFICANT CHANGE UP (ref 0–0)
NRBC # FLD: 0 K/UL — SIGNIFICANT CHANGE UP (ref 0–0)
NRBC # FLD: 0 K/UL — SIGNIFICANT CHANGE UP (ref 0–0)
PHOSPHATE SERPL-MCNC: 2.6 MG/DL — SIGNIFICANT CHANGE UP (ref 2.5–4.5)
PLATELET # BLD AUTO: 301 K/UL — SIGNIFICANT CHANGE UP (ref 150–400)
PLATELET # BLD AUTO: 324 K/UL — SIGNIFICANT CHANGE UP (ref 150–400)
POTASSIUM SERPL-MCNC: 3.2 MMOL/L — LOW (ref 3.5–5.3)
POTASSIUM SERPL-MCNC: 3.6 MMOL/L — SIGNIFICANT CHANGE UP (ref 3.5–5.3)
POTASSIUM SERPL-SCNC: 3.2 MMOL/L — LOW (ref 3.5–5.3)
POTASSIUM SERPL-SCNC: 3.6 MMOL/L — SIGNIFICANT CHANGE UP (ref 3.5–5.3)
RBC # BLD: 4.07 M/UL — SIGNIFICANT CHANGE UP (ref 3.8–5.2)
RBC # BLD: 4.62 M/UL — SIGNIFICANT CHANGE UP (ref 3.8–5.2)
RBC # FLD: 13.2 % — SIGNIFICANT CHANGE UP (ref 10.3–14.5)
RBC # FLD: 13.4 % — SIGNIFICANT CHANGE UP (ref 10.3–14.5)
SODIUM SERPL-SCNC: 129 MMOL/L — LOW (ref 135–145)
SODIUM SERPL-SCNC: 131 MMOL/L — LOW (ref 135–145)
SODIUM UR-SCNC: 73 MMOL/L — SIGNIFICANT CHANGE UP
WBC # BLD: 18.12 K/UL — HIGH (ref 3.8–10.5)
WBC # BLD: 6.75 K/UL — SIGNIFICANT CHANGE UP (ref 3.8–10.5)
WBC # FLD AUTO: 18.12 K/UL — HIGH (ref 3.8–10.5)
WBC # FLD AUTO: 6.75 K/UL — SIGNIFICANT CHANGE UP (ref 3.8–10.5)

## 2022-11-09 PROCEDURE — 93010 ELECTROCARDIOGRAM REPORT: CPT

## 2022-11-09 RX ORDER — SODIUM CHLORIDE 9 MG/ML
75 INJECTION INTRAMUSCULAR; INTRAVENOUS; SUBCUTANEOUS ONCE
Refills: 0 | Status: DISCONTINUED | OUTPATIENT
Start: 2022-11-09 | End: 2022-11-09

## 2022-11-09 RX ORDER — SODIUM CHLORIDE 9 MG/ML
500 INJECTION INTRAMUSCULAR; INTRAVENOUS; SUBCUTANEOUS
Refills: 0 | Status: DISCONTINUED | OUTPATIENT
Start: 2022-11-09 | End: 2022-11-09

## 2022-11-09 RX ORDER — FENTANYL CITRATE 50 UG/ML
25 INJECTION INTRAVENOUS ONCE
Refills: 0 | Status: DISCONTINUED | OUTPATIENT
Start: 2022-11-09 | End: 2022-11-09

## 2022-11-09 RX ORDER — SODIUM CHLORIDE 9 MG/ML
500 INJECTION INTRAMUSCULAR; INTRAVENOUS; SUBCUTANEOUS ONCE
Refills: 0 | Status: DISCONTINUED | OUTPATIENT
Start: 2022-11-09 | End: 2022-11-09

## 2022-11-09 RX ORDER — SODIUM CHLORIDE 9 MG/ML
250 INJECTION INTRAMUSCULAR; INTRAVENOUS; SUBCUTANEOUS ONCE
Refills: 0 | Status: COMPLETED | OUTPATIENT
Start: 2022-11-09 | End: 2022-11-09

## 2022-11-09 RX ORDER — TICAGRELOR 90 MG/1
1 TABLET ORAL
Qty: 60 | Refills: 0
Start: 2022-11-09 | End: 2022-12-08

## 2022-11-09 RX ORDER — TICAGRELOR 90 MG/1
90 TABLET ORAL EVERY 12 HOURS
Refills: 0 | Status: DISCONTINUED | OUTPATIENT
Start: 2022-11-10 | End: 2022-11-11

## 2022-11-09 RX ADMIN — Medication 1 PATCH: at 18:05

## 2022-11-09 RX ADMIN — CARVEDILOL PHOSPHATE 6.25 MILLIGRAM(S): 80 CAPSULE, EXTENDED RELEASE ORAL at 17:34

## 2022-11-09 RX ADMIN — Medication 1 PATCH: at 15:30

## 2022-11-09 RX ADMIN — Medication 1 PATCH: at 16:00

## 2022-11-09 RX ADMIN — Medication 1 PATCH: at 15:57

## 2022-11-09 RX ADMIN — SODIUM CHLORIDE 75 MILLILITER(S): 9 INJECTION INTRAMUSCULAR; INTRAVENOUS; SUBCUTANEOUS at 15:45

## 2022-11-09 RX ADMIN — Medication 25 MILLIGRAM(S): at 19:39

## 2022-11-09 RX ADMIN — CARVEDILOL PHOSPHATE 6.25 MILLIGRAM(S): 80 CAPSULE, EXTENDED RELEASE ORAL at 05:00

## 2022-11-09 RX ADMIN — Medication 81 MILLIGRAM(S): at 10:14

## 2022-11-09 RX ADMIN — Medication 1 PATCH: at 19:45

## 2022-11-09 RX ADMIN — Medication 1 PATCH: at 11:31

## 2022-11-09 RX ADMIN — Medication 50 MILLIGRAM(S): at 05:00

## 2022-11-09 RX ADMIN — Medication 50 MILLIGRAM(S): at 10:12

## 2022-11-09 RX ADMIN — SODIUM CHLORIDE 75 MILLILITER(S): 9 INJECTION INTRAMUSCULAR; INTRAVENOUS; SUBCUTANEOUS at 11:13

## 2022-11-09 RX ADMIN — SODIUM CHLORIDE 500 MILLILITER(S): 9 INJECTION INTRAMUSCULAR; INTRAVENOUS; SUBCUTANEOUS at 20:03

## 2022-11-09 RX ADMIN — FENTANYL CITRATE 25 MICROGRAM(S): 50 INJECTION INTRAVENOUS at 15:52

## 2022-11-09 RX ADMIN — AMLODIPINE BESYLATE 2.5 MILLIGRAM(S): 2.5 TABLET ORAL at 05:00

## 2022-11-09 RX ADMIN — SODIUM CHLORIDE 75 MILLILITER(S): 9 INJECTION INTRAMUSCULAR; INTRAVENOUS; SUBCUTANEOUS at 23:35

## 2022-11-09 RX ADMIN — ATORVASTATIN CALCIUM 20 MILLIGRAM(S): 80 TABLET, FILM COATED ORAL at 22:58

## 2022-11-09 NOTE — CONSULT NOTE ADULT - SUBJECTIVE AND OBJECTIVE BOX
[facility] [place] [facility]   MD JORDAN LOVING MD KRISTINE SOLTANPOUR, DO ANGELA WONG, PA        TEL:  OFFICE: [phone]  From 5pm-7am answering service [phone]    --- INITIAL RENAL CONSULT NOTE ---date of service 22 @ 15:04    HPI:    *****INcomplete  76yo Female, MHx of HTN, HLD, CVA, CAD c/b MI - s/p stents x2, vertigo c/o dizziness and lightheadedness with associated nausea and an episode of nbnb vomiting. Pt reports that while cooking and cleaning, suddenly felt diaphoretic, lightheaded and dizzy and almost fainted. Felt also mild pressure-like sensation in the chest lasting appx 3-5 min. Otherwise reports no CP, SOB, palpitations, abd pain, diarrhea, weakness, leg swelling.  nephrology consulted for hyponatremia        Allergies:  fish (Unknown)  iodine (Anaphylaxis)  labetalol (Other)  No Known Drug Allergies      PAST MEDICAL & SURGICAL HISTORY:  Dyslipidemia      HTN (hypertension), benign      CAD (coronary artery disease)      History of MI (myocardial infarction)      H/O heart artery stent      Vertigo      Migraines  Developed at 10 years of age  Last episode 2 years ago      Borderline diabetes mellitus  Controlled with diet      Legally blind in left eye, as defined in [place]  20/400 left eye      Sciatica      HTN (hypertension)      HLD (hyperlipidemia)      Cerebrovascular accident (CVA)      H/O myocardial ischemia      CAD (coronary artery disease)  stent x2      No Past Surgical History      History of tonsillectomy      History of tubal ligation  35 years ago, no complications as per patient.      S/P LASIK surgery  Right eye, no complications as per patient.      History of coronary artery stent placement  3 stents (Last in )  Same admission as past myocardial infarction      S/P ICD (internal cardiac defibrillator) procedure  loop recorder          Home Medications Reviewed    Hospital Medications:   MEDICATIONS  (STANDING):  amLODIPine   Tablet 2.5 milliGRAM(s) Oral daily  aspirin enteric coated 81 milliGRAM(s) Oral daily  atorvastatin 20 milliGRAM(s) Oral at bedtime  carvedilol 6.25 milliGRAM(s) Oral every 12 hours  cloNIDine Patch 0.1 mG/24Hr(s) 1 patch Transdermal every 7 days  heparin   Injectable 5000 Unit(s) SubCutaneous every 8 hours  sodium chloride 0.9%. 500 milliLiter(s) (75 mL/Hr) IV Continuous <Continuous>      SOCIAL HISTORY:  Denies ETOh, Smoking,     FAMILY HISTORY:  Family history of MI (myocardial infarction) (Child)  Father ( at 75)  Mother ( at 87)  Son (  at 35)    Family history of hypertension (Child, Sibling)  Daughters, Sisters    Family history of hypercholesterolemia (Child, Sibling)    Family history of brain tumor (Sibling)        REVIEW OF SYSTEMS:  CONSTITUTIONAL: No weakness, fevers or chills  EYES/ENT: No visual changes;  No vertigo or throat pain   NECK: No pain or stiffness  RESPIRATORY: No cough, wheezing, hemoptysis; No shortness of breath  CARDIOVASCULAR: No chest pain or palpitations.  GASTROINTESTINAL: No abdominal or epigastric pain. No nausea, vomiting, or hematemesis; No diarrhea or constipation. No melena or hematochezia.  GENITOURINARY: No dysuria, frequency, foamy urine, urinary urgency, incontinence or hematuria  NEUROLOGICAL: No numbness or weakness  SKIN: No itching, burning, rashes, or lesions   VASCULAR: No bilateral lower extremity edema.   All other review of systems is negative unless indicated above.    VITALS:  T(F): 98.1 (22 @ 08:16), Max: 98.1 (22 @ 08:16)  HR: 102 (22:16)  BP: 160/99 (22 @ 08:16)  RR: 18 (22 08:16)  SpO2: 100% (22 08:16)  Wt(kg): --    Height (cm): 147.3 (:16)  Weight (kg): 51.8 (:16)  BMI (kg/m2): 23.9 (:16)  BSA (m2): 1.44 (:16)    PHYSICAL EXAM:  General: NAD  HEENT: anicteric sclera, oropharynx clear, MMM  Neck: No JVD  Respiratory: CTAB, no wheezes, rales or rhonchi  Cardiovascular: S1, S2, RRR  Gastrointestinal: BS+, soft, NT/ND  Extremities: No cyanosis or clubbing. No peripheral edema  Neurological: A/O x 3, no focal deficits  Psychiatric: Normal mood, normal affect  : No CVA tenderness. No polk.   Skin: No rashes    LABS:      129<L>  |  92<L>  |  17  ----------------------------<  194<H>  3.6   |  21<L>  |  0.92    Ca    10.0      2022 05:10  Phos  2.6       Mg     1.70           Creatinine Trend: 0.92 <--, 0.91 <--, 0.95 <--, 0.90 <--                        12.4   6.75  )-----------( 324      ( 2022 05:10 )             37.0     Urine Studies:  Urinalysis Basic - ( 2022 22:16 )    Color: Colorless / Appearance: Clear / S.005 / pH:   Gluc:  / Ketone: Negative  / Bili: Negative / Urobili: <2 mg/dL   Blood:  / Protein: Negative / Nitrite: Negative   Leuk Esterase: Small / RBC: 1 /HPF / WBC 2 /HPF   Sq Epi:  / Non Sq Epi: 2 /HPF / Bacteria: Negative          RADIOLOGY & ADDITIONAL STUDIES:                 [facility]   MD JORDAN LOVING MD KRISTINE SOLTANPOUR, DO ANGELA WONG, PA        TEL:  OFFICE: [phone]  From 5pm-7am answering service [phone]    --- INITIAL RENAL CONSULT NOTE ---date of service 22 @ 15:04    HPI:     78yo Female, MHx of HTN, HLD, CVA, CAD c/b MI - s/p stents x2, vertigo c/o dizziness and lightheadedness with associated nausea and an episode of nbnb vomiting. Pt reports that while cooking and cleaning, suddenly felt diaphoretic, lightheaded and dizzy and almost fainted. Felt also mild pressure-like sensation in the chest lasting appx 3-5 min. Otherwise reports no CP, SOB, palpitations, abd pain, diarrhea, weakness, leg swelling.  nephrology consulted for hyponatremia. pt seen at bedside, currently denied dizziness, sob or cp        Allergies:  fish (Unknown)  iodine (Anaphylaxis)  labetalol (Other)  No Known Drug Allergies      PAST MEDICAL & SURGICAL HISTORY:  Dyslipidemia      HTN (hypertension), benign      CAD (coronary artery disease)      History of MI (myocardial infarction)      H/O heart artery stent      Vertigo      Migraines  Developed at 10 years of age  Last episode 2 years ago      Borderline diabetes mellitus  Controlled with diet      Legally blind in left eye, as defined in [place]  20/400 left eye      Sciatica      HTN (hypertension)      HLD (hyperlipidemia)      Cerebrovascular accident (CVA)      H/O myocardial ischemia      CAD (coronary artery disease)  stent x2      No Past Surgical History      History of tonsillectomy      History of tubal ligation  35 years ago, no complications as per patient.      S/P LASIK surgery  Right eye, no complications as per patient.      History of coronary artery stent placement  3 stents (Last in )  Same admission as past myocardial infarction      S/P ICD (internal cardiac defibrillator) procedure  loop recorder          Home Medications Reviewed    Hospital Medications:   MEDICATIONS  (STANDING):  amLODIPine   Tablet 2.5 milliGRAM(s) Oral daily  aspirin enteric coated 81 milliGRAM(s) Oral daily  atorvastatin 20 milliGRAM(s) Oral at bedtime  carvedilol 6.25 milliGRAM(s) Oral every 12 hours  cloNIDine Patch 0.1 mG/24Hr(s) 1 patch Transdermal every 7 days  heparin   Injectable 5000 Unit(s) SubCutaneous every 8 hours  sodium chloride 0.9%. 500 milliLiter(s) (75 mL/Hr) IV Continuous <Continuous>      SOCIAL HISTORY:  Denies ETOh, Smoking,     FAMILY HISTORY:  Family history of MI (myocardial infarction) (Child)  Father ( at 75)  Mother ( at 87)  Son (  at 35)    Family history of hypertension (Child, Sibling)  Daughters, Sisters    Family history of hypercholesterolemia (Child, Sibling)    Family history of brain tumor (Sibling)        REVIEW OF SYSTEMS:  CONSTITUTIONAL: No weakness, fevers or chills  EYES/ENT: No visual changes;  No vertigo or throat pain   NECK: No pain or stiffness  RESPIRATORY: No cough, wheezing, hemoptysis; No shortness of breath  CARDIOVASCULAR: No chest pain or palpitations.  GASTROINTESTINAL: No abdominal or epigastric pain. No nausea, vomiting, or hematemesis; No diarrhea or constipation. No melena or hematochezia.  GENITOURINARY: No dysuria, frequency, foamy urine, urinary urgency, incontinence or hematuria  NEUROLOGICAL: No numbness or weakness  SKIN: No itching, burning, rashes, or lesions   VASCULAR: No bilateral lower extremity edema.   All other review of systems is negative unless indicated above.    VITALS:  T(F): 98.1 (22 @ 08:16), Max: 98.1 (22 @ 08:16)  HR: 102 (22:16)  BP: 160/99 (22 @ 08:16)  RR: 18 (22 08:16)  SpO2: 100% (22 08:16)  Wt(kg): --    Height (cm): 147.3 (:16)  Weight (kg): 51.8 (:16)  BMI (kg/m2): 23.9 (:16)  BSA (m2): 1.44 (:16)    PHYSICAL EXAM:  General: NAD  HEENT: anicteric sclera, oropharynx clear, MMM  Neck: No JVD  Respiratory: CTAB, no wheezes, rales or rhonchi  Cardiovascular: S1, S2, tachy  Gastrointestinal: BS+, soft, NT/ND  Extremities: No cyanosis or clubbing. No peripheral edema  Neurological: A/O x 3, no focal deficits  Psychiatric: Normal mood, normal affect  : No CVA tenderness. No polk.   Skin: No rashes    LABS:      129<L>  |  92<L>  |  17  ----------------------------<  194<H>  3.6   |  21<L>  |  0.92    Ca    10.0      2022 05:10  Phos  2.6       Mg     1.70           Creatinine Trend: 0.92 <--, 0.91 <--, 0.95 <--, 0.90 <--                        12.4   6.75  )-----------( 324      ( 2022 05:10 )             37.0     Urine Studies:  Urinalysis Basic - ( 2022 22:16 )    Color: Colorless / Appearance: Clear / S.005 / pH:   Gluc:  / Ketone: Negative  / Bili: Negative / Urobili: <2 mg/dL   Blood:  / Protein: Negative / Nitrite: Negative   Leuk Esterase: Small / RBC: 1 /HPF / WBC 2 /HPF   Sq Epi:  / Non Sq Epi: 2 /HPF / Bacteria: Negative          RADIOLOGY & ADDITIONAL STUDIES:

## 2022-11-09 NOTE — PROVIDER CONTACT NOTE (MEDICATION) - RECOMMENDATIONS
As per [employer], Brilinta is covered with a $47 copay, which patient is agreeable to pay. As per protocol, Brilinta left as test script. Primary team to fill script on discharge.

## 2022-11-09 NOTE — CHART NOTE - NSCHARTNOTESELECT_GEN_ALL_CORE
Event Note
groin check/Event Note
Dizzy/ Hypotensive/Event Note
Event Note
Right groin site check/Event Note

## 2022-11-09 NOTE — CHART NOTE - NSCHARTNOTEFT_GEN_A_CORE
Right groin site checked @ 11:40pm.  No bruit, Tegaderm dressing clean, dry, intact no hematoma or bleeding.  RN aware to closely monitor for bleeding or hematoma.

## 2022-11-09 NOTE — CHART NOTE - NSCHARTNOTEFT_GEN_A_CORE
78 y/o Female, MHx of HTN, HLD, CVA, CAD c/b MI - s/p stents x2 a/w presyncope and atypical CP, found to have abnormal NST s/p cardiac cath 11/9. Upon patient returning from cath she was given dinner. Upon eating she started feeling nauseous. As per patient she walked briskly to the bathroom and started to feel lightheaded, sweaty. Vital signs revealed hypotension SBP: 70s on repeat within minutes SBP in 99. Upon chart review noted patient resumed on clonidine. Patch was placed s/p cath ~5 PM. Patient admits to resolution of symptoms while provider bedside. Denies chest pain, palpitations, blurred vision,headache or any other symptoms of concern at this time.     PHYSICAL EXAM:  GENERAL: NAD, well-developed  HEAD:  Atraumatic, Normocephalic  EYES: EOMI, PERRLA, conjunctiva and sclera clear  NECK: Supple, No JVD  CHEST/LUNG: Clear to auscultation bilaterally; No wheeze/rhonchi/rale  HEART: Regular rate and rhythm; No murmurs, rubs, or gallops  ABDOMEN: Soft, Nontender, Nondistended; Bowel sounds present  EXTREMITIES:  2+ Peripheral Pulses, No clubbing, cyanosis, or edema  PSYCH: AAOx3  NEUROLOGY: non-focal, CN 2-12 intact   SKIN: No rashes or lesions    Plan:  -Symptomatic hypotension likely 2/2 to rapid shift in BP 2/2 medications administered in cath lab  -Clonidine patch removed by RN prior to arrival at bedside   -250 cc bolus of NS ordered and subsequently stopped due to BP improving    -Case discussed with  who agreed SBP- 170 tolerable in this patient   -Clonidine discontinued  -Will give IV pushes of hydralazine as needed   -Amlodipine may be uptitrated as needed   -Vital signs to be reassessed q 4 hours  -Fall precautions maintained  -Patient instructed to limit sudden changes in position  -Orthostatics ordered for AM   -Will continue to monitor closely 78 y/o Female, MHx of HTN, HLD, CVA, CAD c/b MI - s/p stents x2 a/w presyncope and atypical CP, found to have abnormal NST s/p cardiac cath 11/9. Upon patient returning from cath she was given dinner. Upon eating she started feeling nauseous. As per patient she walked briskly to the bathroom and started to feel lightheaded, sweaty. Vital signs revealed hypotension SBP: 70s on repeat within minutes SBP in 99. Upon chart review noted patient resumed on clonidine. Patch was placed s/p cath ~5 PM. Patient admits to resolution of symptoms while provider bedside. Denies chest pain, palpitations, blurred vision, headache or any other symptoms of concern at this time.     PHYSICAL EXAM:  GENERAL: NAD, well-developed  HEAD:  Atraumatic, Normocephalic  EYES: EOMI, PERRLA, conjunctiva and sclera clear  NECK: Supple, No JVD  CHEST/LUNG: Clear to auscultation bilaterally; No wheeze/rhonchi/rale  HEART: Regular rate and rhythm; No murmurs, rubs, or gallops  ABDOMEN: Soft, Nontender, Nondistended; Bowel sounds present  EXTREMITIES:  2+ Peripheral Pulses, No clubbing, cyanosis, or edema  PSYCH: AAOx3  NEUROLOGY: non-focal, CN 2-12 intact   SKIN: No rashes or lesions    Plan:  -Symptomatic hypotension likely 2/2 to rapid shift in BP 2/2 medications administered in cath lab  -Clonidine patch removed by RN prior to arrival at bedside   -250 cc bolus of NS ordered and subsequently stopped due to BP improving    -Case discussed with  who agreed SBP- 170 tolerable in this patient   -Clonidine discontinued  -Will give IV pushes of hydralazine as needed   -Amlodipine may be uptitrated as needed   -Vital signs to be reassessed q 4 hours  -Fall precautions maintained  -Patient instructed to limit sudden changes in position  -Orthostatics ordered for AM   -Hyponatremia mildly improved from 129-->131 discussed with ; will continue to monitor   -Will continue to monitor closely 76 y/o Female, MHx of HTN, HLD, CVA, CAD c/b MI - s/p stents x2 a/w presyncope and atypical CP, found to have abnormal NST s/p cardiac cath 11/9. Upon patient returning from cath she was given dinner. Upon eating she started feeling nauseous. As per patient she walked briskly to the bathroom and started to feel lightheaded, sweaty. Vital signs revealed hypotension SBP: 70s on repeat within minutes SBP in 99. Upon chart review noted patient resumed on clonidine. Patch was placed s/p cath ~5 PM. Patient admits to resolution of symptoms while provider bedside. Denies chest pain, palpitations, blurred vision, headache or any other symptoms of concern at this time.     PHYSICAL EXAM:  GENERAL: NAD, well-developed  HEAD:  Atraumatic, Normocephalic  EYES: EOMI, PERRLA, conjunctiva and sclera clear  NECK: Supple, No JVD  CHEST/LUNG: Clear to auscultation bilaterally; No wheeze/rhonchi/rale  HEART: Regular rate and rhythm; No murmurs, rubs, or gallops  ABDOMEN: Soft, Nontender, Nondistended; Bowel sounds present  EXTREMITIES:  2+ Peripheral Pulses, No clubbing, cyanosis, or edema  PSYCH: AAOx3  NEUROLOGY: non-focal, CN 2-12 intact   SKIN: No rashes or lesions    Plan:  -Symptomatic hypotension likely 2/2 to rapid shift in BP 2/2 medications administered in cath lab  -Clonidine patch removed by RN prior to arrival at bedside   -250 cc bolus of NS ordered and subsequently stopped due to BP improving    -Case discussed with  who agreed SBP- 170 tolerable in this patient   -Clonidine discontinued  -Will give IV pushes of hydralazine as needed   -Amlodipine may be uptitrated as needed   -Vital signs to be reassessed q 4 hours  -Fall precautions maintained  -Patient instructed to limit sudden changes in position  -Orthostatics ordered for AM   -Hyponatremia mildly improved from 129-->131 discussed with ; will continue to monitor and follow up urine studies   -Will continue to monitor closely 76 y/o Female, MHx of HTN, HLD, CVA, CAD c/b MI - s/p stents x2 a/w presyncope and atypical CP, found to have abnormal NST s/p cardiac cath 11/9. Upon patient returning from cath she was given dinner. Upon eating she started feeling nauseous. As per patient she walked briskly to the bathroom and started to feel lightheaded, sweaty. Vital signs revealed hypotension SBP: 70s on repeat within minutes SBP in 99. Upon chart review noted patient resumed on clonidine. Patch was placed s/p cath ~5 PM. Patient admits to resolution of symptoms while provider bedside. Denies chest pain, palpitations, blurred vision, headache or any other symptoms of concern at this time.     PHYSICAL EXAM:  GENERAL: NAD, well-developed  HEAD:  Atraumatic, Normocephalic  EYES: EOMI, PERRLA, conjunctiva and sclera clear  NECK: Supple, No JVD  CHEST/LUNG: Clear to auscultation bilaterally; No wheeze/rhonchi/rale  HEART: Regular rate and rhythm; No murmurs, rubs, or gallops  ABDOMEN: Soft, Nontender, Nondistended; Bowel sounds present  EXTREMITIES:  2+ Peripheral Pulses, No clubbing, cyanosis, or edema  PSYCH: AAOx3  NEUROLOGY: non-focal, CN 2-12 intact   SKIN: No rashes or lesions    Plan:  -Symptomatic hypotension likely 2/2 to rapid shift in BP 2/2 medications administered in cath lab  -Clonidine patch removed by RN prior to arrival at bedside   -250 cc bolus of NS ordered and subsequently stopped due to BP improving    -Case discussed with  who agreed SBP- 170 tolerable in this patient   -Clonidine discontinued  -Will give IV pushes of hydralazine as needed   -Amlodipine may be uptitrated as needed   -Vital signs to be reassessed q 4 hours  -Fall precautions maintained  -Patient instructed to limit sudden changes in position  -Orthostatics ordered for AM   -Hyponatremia mildly improved from 129-->131 discussed with ; will continue to monitor and follow up urine studies   -Hgb drop noted; CCU contacted to evaluate site also agreed stable; will continue to monitor and trend Hgb  -Will continue to monitor closely

## 2022-11-09 NOTE — CHART NOTE - NSCHARTNOTEFT_GEN_A_CORE
Post procedure, pt noted to have a small hematoma at the right femoral access site. Manual pressure held for approximately 10 minutes and hematoma pressed out. Site now soft with no active bleeding noted. Fentanyl 25mg IVP given. Will monitor.

## 2022-11-09 NOTE — CHART NOTE - NSCHARTNOTEFT_GEN_A_CORE
Pt is s/p cardiac cath via right femoral access.  Patient denies pain, numbness, tingling, CP, SOB.     T(C): 36.8 (11-09-22 @ 20:22), Max: 36.9 (11-09-22 @ 19:45)  HR: 80 (11-09-22 @ 20:22) (63 - 102)  BP: 172/72 (11-09-22 @ 20:22) (75/43 - 172/72)  RR: 18 (11-09-22 @ 20:22) (17 - 19)  SpO2: 100% (11-09-22 @ 20:22) (99% - 100%)  VSS.     Site is stable with no hematoma, active bleed or swelling. Bruising noted around site however soft to touch.   Dressing is clean/dry/intact.   DP pulse is palpable.   no femoral bruit    Will continue to monitor. Advised RN to continue to monitor site and notify me if any changes noted.

## 2022-11-09 NOTE — CONSULT NOTE ADULT - ASSESSMENT
78yo Female, MHx of HTN, HLD, CVA, CAD c/b MI - s/p stents x2, vertigo c/o dizziness and lightheadedness with associated nausea and an episode of nbnb vomiting. nephrology consulted for hyponatremia    HYponatremia  likely acute asymptoatic   ? etiology low SG in urine likely polydipsia  check urine cr, na  check TSH cortisol level in the am  repeat bmp  free water restriction <1L/day  monitor    htn  acceptable  resume home meds  monitor   76yo Female, MHx of HTN, HLD, CVA, CAD c/b MI - s/p stents x2, vertigo c/o dizziness and lightheadedness with associated nausea and an episode of nbnb vomiting. nephrology consulted for hyponatremia    HYponatremia  likely acute asymptoatic   ? etiology low SG in urine likely polydipsia  check urine cr, na  check TSH cortisol level in the am  given 500cc NS  repeat bmp  free water restriction <1L/day  monitor    htn  acceptable  resume home meds  monitor

## 2022-11-09 NOTE — PROVIDER CONTACT NOTE (MEDICATION) - ASSESSMENT
Pt is s/p LHC with SAMEER to LAD. Pt loaded with Brilinta in the cath lab. Brilinta sent to [employer] to check for coverage.

## 2022-11-10 ENCOUNTER — TRANSCRIPTION ENCOUNTER (OUTPATIENT)
Age: 77
End: 2022-11-10

## 2022-11-10 LAB
ANION GAP SERPL CALC-SCNC: 12 MMOL/L — SIGNIFICANT CHANGE UP (ref 7–14)
BUN SERPL-MCNC: 20 MG/DL — SIGNIFICANT CHANGE UP (ref 7–23)
CALCIUM SERPL-MCNC: 9.8 MG/DL — SIGNIFICANT CHANGE UP (ref 8.4–10.5)
CHLORIDE SERPL-SCNC: 97 MMOL/L — LOW (ref 98–107)
CO2 SERPL-SCNC: 22 MMOL/L — SIGNIFICANT CHANGE UP (ref 22–31)
CORTIS AM PEAK SERPL-MCNC: 1.7 UG/DL — LOW (ref 6–18.4)
CREAT SERPL-MCNC: 0.9 MG/DL — SIGNIFICANT CHANGE UP (ref 0.5–1.3)
EGFR: 66 ML/MIN/1.73M2 — SIGNIFICANT CHANGE UP
GLUCOSE SERPL-MCNC: 253 MG/DL — HIGH (ref 70–99)
HCT VFR BLD CALC: 34 % — LOW (ref 34.5–45)
HGB BLD-MCNC: 11.4 G/DL — LOW (ref 11.5–15.5)
MCHC RBC-ENTMCNC: 27 PG — SIGNIFICANT CHANGE UP (ref 27–34)
MCHC RBC-ENTMCNC: 33.5 GM/DL — SIGNIFICANT CHANGE UP (ref 32–36)
MCV RBC AUTO: 80.6 FL — SIGNIFICANT CHANGE UP (ref 80–100)
NRBC # BLD: 0 /100 WBCS — SIGNIFICANT CHANGE UP (ref 0–0)
NRBC # FLD: 0 K/UL — SIGNIFICANT CHANGE UP (ref 0–0)
OSMOLALITY UR: 567 MOSM/KG — SIGNIFICANT CHANGE UP (ref 50–1200)
PLATELET # BLD AUTO: 336 K/UL — SIGNIFICANT CHANGE UP (ref 150–400)
POTASSIUM SERPL-MCNC: 4.2 MMOL/L — SIGNIFICANT CHANGE UP (ref 3.5–5.3)
POTASSIUM SERPL-SCNC: 4.2 MMOL/L — SIGNIFICANT CHANGE UP (ref 3.5–5.3)
RBC # BLD: 4.22 M/UL — SIGNIFICANT CHANGE UP (ref 3.8–5.2)
RBC # FLD: 13.8 % — SIGNIFICANT CHANGE UP (ref 10.3–14.5)
SODIUM SERPL-SCNC: 131 MMOL/L — LOW (ref 135–145)
WBC # BLD: 17.35 K/UL — HIGH (ref 3.8–10.5)
WBC # FLD AUTO: 17.35 K/UL — HIGH (ref 3.8–10.5)

## 2022-11-10 RX ORDER — COSYNTROPIN 0.25 MG/ML
0.25 INJECTION, SOLUTION INTRAVENOUS ONCE
Refills: 0 | Status: DISCONTINUED | OUTPATIENT
Start: 2022-11-10 | End: 2022-11-10

## 2022-11-10 RX ORDER — POTASSIUM CHLORIDE 20 MEQ
40 PACKET (EA) ORAL ONCE
Refills: 0 | Status: COMPLETED | OUTPATIENT
Start: 2022-11-10 | End: 2022-11-10

## 2022-11-10 RX ORDER — COSYNTROPIN 0.25 MG/ML
0.25 INJECTION, SOLUTION INTRAVENOUS ONCE
Refills: 0 | Status: COMPLETED | OUTPATIENT
Start: 2022-11-10 | End: 2022-11-10

## 2022-11-10 RX ADMIN — HEPARIN SODIUM 5000 UNIT(S): 5000 INJECTION INTRAVENOUS; SUBCUTANEOUS at 14:13

## 2022-11-10 RX ADMIN — CARVEDILOL PHOSPHATE 6.25 MILLIGRAM(S): 80 CAPSULE, EXTENDED RELEASE ORAL at 17:50

## 2022-11-10 RX ADMIN — Medication 81 MILLIGRAM(S): at 14:13

## 2022-11-10 RX ADMIN — ATORVASTATIN CALCIUM 20 MILLIGRAM(S): 80 TABLET, FILM COATED ORAL at 21:30

## 2022-11-10 RX ADMIN — HEPARIN SODIUM 5000 UNIT(S): 5000 INJECTION INTRAVENOUS; SUBCUTANEOUS at 05:13

## 2022-11-10 RX ADMIN — Medication 1 PATCH: at 12:20

## 2022-11-10 RX ADMIN — TICAGRELOR 90 MILLIGRAM(S): 90 TABLET ORAL at 17:51

## 2022-11-10 RX ADMIN — COSYNTROPIN 0.25 MILLIGRAM(S): 0.25 INJECTION, SOLUTION INTRAVENOUS at 17:50

## 2022-11-10 RX ADMIN — Medication 1 PATCH: at 18:13

## 2022-11-10 RX ADMIN — AMLODIPINE BESYLATE 2.5 MILLIGRAM(S): 2.5 TABLET ORAL at 05:11

## 2022-11-10 RX ADMIN — HEPARIN SODIUM 5000 UNIT(S): 5000 INJECTION INTRAVENOUS; SUBCUTANEOUS at 21:31

## 2022-11-10 RX ADMIN — CARVEDILOL PHOSPHATE 6.25 MILLIGRAM(S): 80 CAPSULE, EXTENDED RELEASE ORAL at 05:12

## 2022-11-10 RX ADMIN — Medication 40 MILLIEQUIVALENT(S): at 05:10

## 2022-11-10 RX ADMIN — TICAGRELOR 90 MILLIGRAM(S): 90 TABLET ORAL at 05:10

## 2022-11-10 NOTE — DISCHARGE NOTE PROVIDER - PROVIDER TOKENS
PROVIDER:[TOKEN:[[id number]],FOLLOWUP:[1 week]],FREE:[LAST:[PCP],PHONE:[(   )    -],FAX:[(   )    -]]

## 2022-11-10 NOTE — DISCHARGE NOTE NURSING/CASE MANAGEMENT/SOCIAL WORK - PATIENT PORTAL LINK FT
You can access the FollowMyHealth Patient Portal offered by [facility] by registering at the following website: [email or web address]. By joining [email or web address]’s FollowMyHealth portal, you will also be able to view your health information using other applications (apps) compatible with our system.

## 2022-11-10 NOTE — DISCHARGE NOTE PROVIDER - NSDCMRMEDTOKEN_GEN_ALL_CORE_FT
amLODIPine 2.5 mg oral tablet: 1 tab(s) orally once a day  Aspirin Enteric Coated 81 mg oral delayed release tablet: 1 tab(s) orally once a day  atorvastatin 20 mg oral tablet: 1 tab(s) orally once a day  Brilinta (ticagrelor) 90 mg oral tablet: 1 tab(s) orally 2 times a day   CARVEDILOL 6.25 MG TABLET: TAKE 1 TABLET TWICE A DAY BY ORAL ROUTE AFTER MEALS.  CHLORTHALID 25MG TAB: 1 tab(s) orally once a day  CLONIDINE 0.1 MG/DAY PATCH: APPLY 1 PATCH EVERY WEEK BY TRANSDERMAL ROUTE FOR 30 DAYS.  meclizine 25 mg oral tablet: 1 tab(s) orally 3 times a day, As Needed vertigo   amLODIPine 5 mg oral tablet: 1 tab(s) orally once a day  Aspirin Enteric Coated 81 mg oral delayed release tablet: 1 tab(s) orally once a day  atorvastatin 20 mg oral tablet: 2 tab(s) orally once a day at bedtime  CARVEDILOL 6.25 MG TABLET: TAKE 1 TABLET TWICE A DAY BY ORAL ROUTE AFTER MEALS.  CLONIDINE 0.1 MG/DAY PATCH: APPLY 1 PATCH EVERY WEEK BY TRANSDERMAL ROUTE FOR 30 DAYS.  meclizine 25 mg oral tablet: 1 tab(s) orally 3 times a day, As Needed vertigo  ticagrelor 90 mg oral tablet: 1 tab(s) orally every 12 hours

## 2022-11-10 NOTE — DISCHARGE NOTE PROVIDER - CARE PROVIDER_API CALL
Jorge Mejia  CARDIOVASCULAR DISEASE  [address] [place] [address]  Phone: [phone]  Fax: [phone]  Follow Up Time: 1 week    PCP,   Phone: (   )    -  Fax: (   )    -  Follow Up Time:

## 2022-11-10 NOTE — DISCHARGE NOTE PROVIDER - NSDCCPCAREPLAN_GEN_ALL_CORE_FT
PRINCIPAL DISCHARGE DIAGNOSIS  Diagnosis: CAD (coronary artery disease)  Assessment and Plan of Treatment: You had a STENT placed to LAD artery. Continue aspirin and Birlinta, do not stop unless instructed by your physician.  Continue low salt, fat, cholesterol and carbohydrate diet. Follow up with cardiologist and primary care physician's routine appointment.      SECONDARY DISCHARGE DIAGNOSES  Diagnosis: Benign essential HTN  Assessment and Plan of Treatment: Continue blood pressure medication regimen as directed. Monitor for any visual changes, headaches or dizziness.  Monitor blood pressure regularly.  Follow up with your primary care provider for further management for high blood pressure.    Diagnosis: Chest pain  Assessment and Plan of Treatment: No heavy lifting for one week. No strenuous activity for 3 weeks. Monitor site of procedure and notify your doctor for any redness, swelling, discharge/bleeding. No driving for 24 hours. You may shower but no baths or swimming for one week. If chest pain, shortness of breath, or dizziness please return to the emergency room.        PRINCIPAL DISCHARGE DIAGNOSIS  Diagnosis: CAD (coronary artery disease)  Assessment and Plan of Treatment: You had a STENT placed to LAD artery. Continue aspirin and Birlinta, do not stop unless instructed by your physician.  Continue low salt, fat, cholesterol and carbohydrate diet. Follow up with cardiologist and primary care physician's routine appointment.  - Pt to follow up with Dr. Jorge Mejia in 1-2 weeks for further management.      SECONDARY DISCHARGE DIAGNOSES  Diagnosis: DM (diabetes mellitus)  Assessment and Plan of Treatment: Monitor blood glucose levels throughout the day before meals and at bedtime. Record blood sugars and bring to outpatient providers appointment in order to be reviewed by your doctor for management modifications. If your sugars are more than 400 or less than 70 you should contact your PCP immediately. Monitor for signs/symptoms of low blood glucose, such as, dizziness, altered mental status, or cool/clammy skin. In addition, monitor for signs/symptoms of high blood glucose, such as, feeling hot, dry, fatigued, or with increased thirst/urination. Make regular podiatry appointments in order to have feet checked for wounds and uncontrolled toe nail growth to prevent infections, as well as, appointments with an ophthalmologist to monitor your vision.    Diagnosis: Benign essential HTN  Assessment and Plan of Treatment: Continue blood pressure medication regimen as directed. Monitor for any visual changes, headaches or dizziness.  Monitor blood pressure regularly.  Follow up with your primary care provider for further management for high blood pressure.    Diagnosis: Chest pain  Assessment and Plan of Treatment: No heavy lifting for one week. No strenuous activity for 3 weeks. Monitor site of procedure and notify your doctor for any redness, swelling, discharge/bleeding. No driving for 24 hours. You may shower but no baths or swimming for one week. If chest pain, shortness of breath, or dizziness please return to the emergency room.

## 2022-11-10 NOTE — DISCHARGE NOTE PROVIDER - HOSPITAL COURSE
76yo Female, MHx of HTN, HLD, CVA, CAD c/b MI - s/p stents x2, vertigo c/o dizziness and lightheadedness with associated nausea and an episode of nbnb vomiting. Pt reports that while cooking and cleaning, suddenly felt diaphoretic, lightheaded and dizzy and almost fainted. Felt also mild pressure-like sensation in the chest lasting appx 3-5 min. Does not report LOC. States that the symptoms felt different from her typical symptoms of vertigo. Pt rested, and took her medications for HTN and meclizine for vertigo, and the symptoms improved. Otherwise reports no CP, SOB, palpitations, abd pain, diarrhea, weakness, leg swelling.     #CAD with Atypical chest pain.   - NST abnormal with ischemia in the basal septal wall with hypokinesis and TID with LVEF 54%  - S/P  Cardiac cath with SAMEER  mid LAD  - TTE shows normal LV systolic function  - C/w GDMT: DAPT ,BB and Statin.     #Near syncope.   -S/P ILR interrogation, no events     Benign essential HTN.   -Pt has a component of white coat HTN  c/w Carvedilol   c/w Amlodipine, increase to 5 mg Daily     # Vertigo.   -CT head No acute intracranial hemorrhage or mass effect.  -c/w Meclizine PRN  -Fall precautions.    Dispo : DC planning pending above.     Patient seen and evaluated. Reviewed discharge medications with patient and attending. All new medications requiring new prescriptions were sent to the pharmacy of patient's choice. Reviewed need for prescription for previous home medications and new prescriptions sent if requested. Medically cleared/stable for discharge as per Dr. Jolly with appropriate follow up. Patient understands and agrees with plan of care.       #Atypical chest pain  - ACS ruled out  - EKG shows sinus rhythm    - Continue DAPT, Statin and BB  - Pt to follow up with Dr. Jorge Mejia in 1-2 weeks for further management.          May get DC home on Metformin 500mg PO BID. FU 3 months  Patient counseled for compliance with consistent low carb diet.     78yo Female, MHx of HTN, HLD, CVA, CAD c/b MI - s/p stents x2, vertigo c/o dizziness and lightheadedness with associated nausea and an episode of nbnb vomiting. Pt reports that while cooking and cleaning, suddenly felt diaphoretic, lightheaded and dizzy and almost fainted. Felt also mild pressure-like sensation in the chest lasting appx 3-5 min. Does not report LOC. States that the symptoms felt different from her typical symptoms of vertigo. Pt rested, and took her medications for HTN and meclizine for vertigo, and the symptoms improved. Otherwise reports no CP, SOB, palpitations, abd pain, diarrhea, weakness, leg swelling.     #CAD with Atypical chest pain.   - NST abnormal with ischemia in the basal septal wall with hypokinesis and TID with LVEF 54%  - S/P  Cardiac cath with SAMEER  mid LAD  - TTE shows normal LV systolic function  - C/w GDMT: DAPT ,BB and Statin.     #Near syncope.   -S/P ILR interrogation, no events     Benign essential HTN.   -Pt has a component of white coat HTN  c/w Carvedilol   c/w Amlodipine, increase to 5 mg Daily     # Vertigo.   -CT head No acute intracranial hemorrhage or mass effect.  -c/w Meclizine PRN  -Fall precautions.    Dispo : DC planning pending above.     Patient seen and evaluated. Reviewed discharge medications with patient and attending. All new medications requiring new prescriptions were sent to the pharmacy of patient's choice. Reviewed need for prescription for previous home medications and new prescriptions sent if requested. Medically cleared/stable for discharge as per Dr. Jolly with appropriate follow up. Patient understands and agrees with plan of care.

## 2022-11-11 VITALS
DIASTOLIC BLOOD PRESSURE: 88 MMHG | TEMPERATURE: 98 F | SYSTOLIC BLOOD PRESSURE: 145 MMHG | OXYGEN SATURATION: 97 % | HEART RATE: 86 BPM | RESPIRATION RATE: 18 BRPM

## 2022-11-11 DIAGNOSIS — E11.9 TYPE 2 DIABETES MELLITUS WITHOUT COMPLICATIONS: ICD-10-CM

## 2022-11-11 DIAGNOSIS — I25.10 ATHEROSCLEROTIC HEART DISEASE OF NATIVE CORONARY ARTERY WITHOUT ANGINA PECTORIS: ICD-10-CM

## 2022-11-11 LAB
ANION GAP SERPL CALC-SCNC: 12 MMOL/L — SIGNIFICANT CHANGE UP (ref 7–14)
BUN SERPL-MCNC: 18 MG/DL — SIGNIFICANT CHANGE UP (ref 7–23)
CALCIUM SERPL-MCNC: 9.5 MG/DL — SIGNIFICANT CHANGE UP (ref 8.4–10.5)
CHLORIDE SERPL-SCNC: 98 MMOL/L — SIGNIFICANT CHANGE UP (ref 98–107)
CO2 SERPL-SCNC: 24 MMOL/L — SIGNIFICANT CHANGE UP (ref 22–31)
CORTIS SP2 SERPL-MCNC: 20.6 UG/DL — SIGNIFICANT CHANGE UP
CREAT SERPL-MCNC: 0.89 MG/DL — SIGNIFICANT CHANGE UP (ref 0.5–1.3)
EGFR: 67 ML/MIN/1.73M2 — SIGNIFICANT CHANGE UP
GLUCOSE SERPL-MCNC: 116 MG/DL — HIGH (ref 70–99)
HCT VFR BLD CALC: 32.9 % — LOW (ref 34.5–45)
HGB BLD-MCNC: 11 G/DL — LOW (ref 11.5–15.5)
MCHC RBC-ENTMCNC: 27.1 PG — SIGNIFICANT CHANGE UP (ref 27–34)
MCHC RBC-ENTMCNC: 33.4 GM/DL — SIGNIFICANT CHANGE UP (ref 32–36)
MCV RBC AUTO: 81 FL — SIGNIFICANT CHANGE UP (ref 80–100)
NRBC # BLD: 0 /100 WBCS — SIGNIFICANT CHANGE UP (ref 0–0)
NRBC # FLD: 0 K/UL — SIGNIFICANT CHANGE UP (ref 0–0)
PLATELET # BLD AUTO: 287 K/UL — SIGNIFICANT CHANGE UP (ref 150–400)
POTASSIUM SERPL-MCNC: 3.2 MMOL/L — LOW (ref 3.5–5.3)
POTASSIUM SERPL-SCNC: 3.2 MMOL/L — LOW (ref 3.5–5.3)
RBC # BLD: 4.06 M/UL — SIGNIFICANT CHANGE UP (ref 3.8–5.2)
RBC # FLD: 13.9 % — SIGNIFICANT CHANGE UP (ref 10.3–14.5)
SODIUM SERPL-SCNC: 134 MMOL/L — LOW (ref 135–145)
WBC # BLD: 10.67 K/UL — HIGH (ref 3.8–10.5)
WBC # FLD AUTO: 10.67 K/UL — HIGH (ref 3.8–10.5)

## 2022-11-11 RX ORDER — AMLODIPINE BESYLATE 2.5 MG/1
1 TABLET ORAL
Qty: 0 | Refills: 0 | DISCHARGE

## 2022-11-11 RX ORDER — AMLODIPINE BESYLATE 2.5 MG/1
1 TABLET ORAL
Qty: 30 | Refills: 0
Start: 2022-11-11 | End: 2022-12-10

## 2022-11-11 RX ORDER — AMLODIPINE BESYLATE 2.5 MG/1
2.5 TABLET ORAL ONCE
Refills: 0 | Status: COMPLETED | OUTPATIENT
Start: 2022-11-11 | End: 2022-11-11

## 2022-11-11 RX ORDER — AMLODIPINE BESYLATE 2.5 MG/1
5 TABLET ORAL DAILY
Refills: 0 | Status: DISCONTINUED | OUTPATIENT
Start: 2022-11-12 | End: 2022-11-11

## 2022-11-11 RX ORDER — POTASSIUM CHLORIDE 20 MEQ
40 PACKET (EA) ORAL EVERY 4 HOURS
Refills: 0 | Status: DISCONTINUED | OUTPATIENT
Start: 2022-11-11 | End: 2022-11-11

## 2022-11-11 RX ORDER — TICAGRELOR 90 MG/1
1 TABLET ORAL
Qty: 60 | Refills: 0
Start: 2022-11-11 | End: 2022-12-10

## 2022-11-11 RX ORDER — TICAGRELOR 90 MG/1
1 TABLET ORAL
Qty: 0 | Refills: 0 | DISCHARGE
Start: 2022-11-11

## 2022-11-11 RX ORDER — ATORVASTATIN CALCIUM 80 MG/1
1 TABLET, FILM COATED ORAL
Qty: 0 | Refills: 0 | DISCHARGE

## 2022-11-11 RX ORDER — CHLORTHALIDONE 50 MG
1 TABLET ORAL
Qty: 0 | Refills: 0 | DISCHARGE

## 2022-11-11 RX ORDER — ATORVASTATIN CALCIUM 80 MG/1
2 TABLET, FILM COATED ORAL
Qty: 60 | Refills: 0
Start: 2022-11-11 | End: 2022-12-10

## 2022-11-11 RX ADMIN — AMLODIPINE BESYLATE 2.5 MILLIGRAM(S): 2.5 TABLET ORAL at 04:27

## 2022-11-11 RX ADMIN — CARVEDILOL PHOSPHATE 6.25 MILLIGRAM(S): 80 CAPSULE, EXTENDED RELEASE ORAL at 04:27

## 2022-11-11 RX ADMIN — AMLODIPINE BESYLATE 2.5 MILLIGRAM(S): 2.5 TABLET ORAL at 09:09

## 2022-11-11 RX ADMIN — Medication 1 PATCH: at 08:10

## 2022-11-11 RX ADMIN — TICAGRELOR 90 MILLIGRAM(S): 90 TABLET ORAL at 04:27

## 2022-11-11 RX ADMIN — Medication 40 MILLIEQUIVALENT(S): at 09:09

## 2022-11-11 RX ADMIN — HEPARIN SODIUM 5000 UNIT(S): 5000 INJECTION INTRAVENOUS; SUBCUTANEOUS at 04:27

## 2022-11-11 NOTE — PROGRESS NOTE ADULT - SUBJECTIVE AND OBJECTIVE BOX
Cardiovascular Disease Progress Note  Date of service: 11-08-22 @ 09:11    Overnight events: No acute events overnight.  Ms. Carrasco denies chest pain or SOB.   Otherwise review of systems negative    Objective Findings:  T(C): 36.4 (11-08-22 @ 05:40), Max: 36.7 (11-07-22 @ 09:40)  HR: 65 (11-08-22 @ 05:40) (65 - 80)  BP: 178/83 (11-08-22 @ 05:40) (115/64 - 178/83)  RR: 19 (11-08-22 @ 05:40) (18 - 19)  SpO2: 100% (11-08-22 @ 05:40) (98% - 100%)  Wt(kg): --  Daily     Daily       Physical Exam:  Gen: NAD; Patient resting comfortably  HEENT: EOMI, Normocephalic/ atraumatic  CV: RRR, normal S1 + S2, no m/r/g  Lungs:  Normal respiratory effort; clear to auscultation bilaterally  Abd: soft, non-tender; bowel sounds present  Ext: No edema; warm and well perfused    Telemetry: Sinus     Laboratory Data:                        11.9   7.94  )-----------( 304      ( 08 Nov 2022 06:05 )             36.0     11-08    134<L>  |  96<L>  |  14  ----------------------------<  124<H>  3.9   |  27  |  0.91    Ca    9.5      08 Nov 2022 06:05  Phos  2.3     11-08  Mg     1.70     11-08    TPro  6.7  /  Alb  3.9  /  TBili  1.0  /  DBili  x   /  AST  16  /  ALT  14  /  AlkPhos  78  11-07    PT/INR - ( 06 Nov 2022 15:49 )   PT: 12.1 sec;   INR: 1.04 ratio         PTT - ( 06 Nov 2022 15:49 )  PTT:30.8 sec          Inpatient Medications:  MEDICATIONS  (STANDING):  amLODIPine   Tablet 2.5 milliGRAM(s) Oral daily  aspirin enteric coated 81 milliGRAM(s) Oral daily  atorvastatin 20 milliGRAM(s) Oral at bedtime  carvedilol 6.25 milliGRAM(s) Oral every 12 hours  heparin   Injectable 5000 Unit(s) SubCutaneous every 8 hours      Assessment:  78 y/o F, PMH of HTN, HLD, CVA, MI s/p stents x2, and Vertigo, presents to ED c/o dizziness/lightheadedness a/w nausea/vomiting.     Plan of Care:    #Atypical chest pain  - ACS ruled out  - EKG shows sinus rhythm  - TTE shows normal LV systolic function  - NST abnormal with ischemia in the basal septal wall with hypokinesis and TID with LVEF 54%  -Pt to undergo cardiac cath today with Dr. Jorge Mejia. Pt agreeable  - Continue ASA, Statin and BB    #HTN  - Bp acceptable  - Pt has history of white coat syndrome  - Will continue current regimen    #CAD   - S/p PCI to unknown vessel   - Continue ASA and Statin and BB    #HLD  - Statin as ordered    #Near sycnope  - ILR interrogation              Over 25 minutes spent on total encounter; more than 50% of the visit was spent counseling and/or coordinating care by the attending physician.      Neo Ramirez D.O.   Cardiovascular Disease  [phone]
Cardiovascular Disease Progress Note  Date of service: 11-09-22 @ 14:10    Overnight events: No acute events overnight.  Pt denies chest pain or SOB.   Otherwise review of systems negative    Objective Findings:  T(C): 36.7 (11-09-22 @ 08:16), Max: 36.7 (11-09-22 @ 00:24)  HR: 102 (11-09-22 @ 08:16) (71 - 102)  BP: 160/99 (11-09-22 @ 08:16) (131/67 - 175/85)  RR: 18 (11-09-22 @ 08:16) (17 - 18)  SpO2: 100% (11-09-22 @ 08:16) (99% - 100%)  Wt(kg): --  Daily Height in cm: 147.32 (09 Nov 2022 08:16)    Daily       Physical Exam:  Gen: NAD; Patient resting comfortably  HEENT: EOMI, Normocephalic/ atraumatic  CV: RRR, normal S1 + S2, no m/r/g  Lungs:  Normal respiratory effort; clear to auscultation bilaterally  Abd: soft, non-tender; bowel sounds present  Ext: No edema; warm and well perfused    Telemetry: Sinus     Laboratory Data:                        12.4   6.75  )-----------( 324      ( 09 Nov 2022 05:10 )             37.0     11-09    129<L>  |  92<L>  |  17  ----------------------------<  194<H>  3.6   |  21<L>  |  0.92    Ca    10.0      09 Nov 2022 05:10  Phos  2.6     11-09  Mg     1.70     11-09                Inpatient Medications:  MEDICATIONS  (STANDING):  amLODIPine   Tablet 2.5 milliGRAM(s) Oral daily  aspirin enteric coated 81 milliGRAM(s) Oral daily  atorvastatin 20 milliGRAM(s) Oral at bedtime  carvedilol 6.25 milliGRAM(s) Oral every 12 hours  cloNIDine Patch 0.1 mG/24Hr(s) 1 patch Transdermal every 7 days  heparin   Injectable 5000 Unit(s) SubCutaneous every 8 hours  sodium chloride 0.9%. 500 milliLiter(s) (75 mL/Hr) IV Continuous <Continuous>      Assessment:  76 y/o F, PMH of HTN, HLD, CVA, MI s/p stents x2, and Vertigo, presents to ED c/o dizziness/lightheadedness a/w nausea/vomiting.     Plan of Care:    #Atypical chest pain  - ACS ruled out  - EKG shows sinus rhythm  - TTE shows normal LV systolic function  - NST abnormal with ischemia in the basal septal wall with hypokinesis and TID with LVEF 54%  -Pt to undergo cardiac cath today with Dr. Jorge Mejia.   - Will required prednisone ppx for iodine allergy  - Continue ASA, Statin and BB    #HTN  - Bp acceptable  - Pt has history of white coat syndrome  - Will continue current regimen    #CAD   - S/p PCI to unknown vessel   - Continue ASA and Statin and BB    #HLD  - Statin as ordered          Plan of Care:          Over 25 minutes spent on total encounter; more than 50% of the visit was spent counseling and/or coordinating care by the attending physician.      Neo Ramirez D.O.   Cardiovascular Disease  [phone]
Cardiovascular Disease Progress Note  Date of service: 11-10-22 @ 07:44    Overnight events: Pt is in no distress. Denies chest pain or SOB. Overnight, pt was dizzy and found to be hypotensive. Pt responded well to IV fluids.   Otherwise review of systems negative    Objective Findings:  T(C): 36.6 (11-10-22 @ 04:20), Max: 36.9 (11-09-22 @ 19:45)  HR: 83 (11-10-22 @ 04:20) (63 - 102)  BP: 137/74 (11-10-22 @ 04:20) (75/43 - 172/72)  RR: 18 (11-10-22 @ 04:20) (18 - 19)  SpO2: 99% (11-10-22 @ 04:20) (99% - 100%)  Wt(kg): --  Daily Height in cm: 147.32 (09 Nov 2022 08:16)    Daily       Physical Exam:  Gen: NAD; Patient resting comfortably  HEENT: EOMI, Normocephalic/ atraumatic  CV: RRR, normal S1 + S2, no m/r/g  Lungs:  Normal respiratory effort; clear to auscultation bilaterally  Abd: soft, non-tender; bowel sounds present  Ext: No edema; warm and well perfused, R groin access is soft with no evidence of hematoma. Some ecchymosis around site.     Telemetry: Sinus    Laboratory Data:                        10.9   18.12 )-----------( 301      ( 09 Nov 2022 22:47 )             32.4     11-09    131<L>  |  96<L>  |  18  ----------------------------<  189<H>  3.2<L>   |  18<L>  |  0.90    Ca    9.4      09 Nov 2022 22:47  Phos  2.6     11-09  Mg     1.70     11-09                Inpatient Medications:  MEDICATIONS  (STANDING):  amLODIPine   Tablet 2.5 milliGRAM(s) Oral daily  aspirin enteric coated 81 milliGRAM(s) Oral daily  atorvastatin 20 milliGRAM(s) Oral at bedtime  carvedilol 6.25 milliGRAM(s) Oral every 12 hours  heparin   Injectable 5000 Unit(s) SubCutaneous every 8 hours  ticagrelor 90 milliGRAM(s) Oral every 12 hours      Assessment: 78 y/o F, PMH of HTN, HLD, CVA, MI s/p stents x2, and Vertigo, presents to ED c/o dizziness/lightheadedness a/w nausea/vomiting.     Plan of Care:    #Atypical chest pain  - ACS ruled out  - EKG shows sinus rhythm  - TTE shows normal LV systolic function  - NST abnormal with ischemia in the basal septal wall with hypokinesis and TID with LVEF 54%  - S/p LHC with PCI to LAD  - Continue DAPT, Statin and BB  - Pt to follow up with Dr. Jorge Mejia in 1-2 weeks for further management.    #HTN  - Bp is labile.   - Pt has history of white coat syndrome and very labile hypertension that is sensitive to medication changes.   - Recommend to continue her Norvasc and BB for now.     #HLD  - Statin as ordered      #ACP (advance care planning)-  Advanced care planning was discussed with the patient.  Risks, benefits and alternatives of medical treatment and procedures were discussed in detail and all questions were answered. 30 additional minutes spent addressing advance care plans.          Over 25 minutes spent on total encounter; more than 50% of the visit was spent counseling and/or coordinating care by the attending physician.      Neo Ramirez D.O.   Cardiovascular Disease  [phone]
Cardiovascular Disease Progress Note  Date of service: 11-11-22 @ 09:03    Overnight events: No acute events overnight.  Pt denies chest pain or SOB. Eating breakfast.   Otherwise review of systems negative    Objective Findings:  T(C): 36.7 (11-11-22 @ 08:14), Max: 36.9 (11-10-22 @ 11:41)  HR: 87 (11-11-22 @ 08:14) (77 - 95)  BP: 155/80 (11-11-22 @ 08:14) (132/78 - 199/105)  RR: 18 (11-11-22 @ 08:14) (18 - 18)  SpO2: 100% (11-11-22 @ 08:14) (97% - 100%)  Wt(kg): --  Daily     Daily       Physical Exam:  Gen: NAD; Patient resting comfortably  HEENT: EOMI, Normocephalic/ atraumatic  CV: RRR, normal S1 + S2, no m/r/g  Lungs:  Normal respiratory effort; clear to auscultation bilaterally  Abd: soft, non-tender; bowel sounds present  Ext: No edema; warm and well perfused    Telemetry: Sinus     Laboratory Data:                        11.0   10.67 )-----------( 287      ( 11 Nov 2022 05:55 )             32.9     11-11    134<L>  |  98  |  18  ----------------------------<  116<H>  3.2<L>   |  24  |  0.89    Ca    9.5      11 Nov 2022 05:55                Inpatient Medications:  MEDICATIONS  (STANDING):  amLODIPine   Tablet 2.5 milliGRAM(s) Oral once  aspirin enteric coated 81 milliGRAM(s) Oral daily  atorvastatin 20 milliGRAM(s) Oral at bedtime  carvedilol 6.25 milliGRAM(s) Oral every 12 hours  cloNIDine Patch 0.1 mG/24Hr(s) 1 patch Transdermal every 7 days  heparin   Injectable 5000 Unit(s) SubCutaneous every 8 hours  potassium chloride    Tablet ER 40 milliEquivalent(s) Oral every 4 hours  ticagrelor 90 milliGRAM(s) Oral every 12 hours      Assessment:  76 y/o F, PMH of HTN, HLD, CVA, MI s/p stents x2, and Vertigo, presents to ED c/o dizziness/lightheadedness a/w nausea/vomiting.     Plan of Care:    #Atypical chest pain  - ACS ruled out  - EKG shows sinus rhythm  - TTE shows normal LV systolic function  - NST abnormal with ischemia in the basal septal wall with hypokinesis and TID with LVEF 54%  - S/p LHC with PCI to LAD  - Continue DAPT, Statin and BB  - Pt to follow up with Dr. Jorge Mejia in 1-2 weeks for further management.    #HTN  - Bp is labile.   - Pt has history of white coat syndrome and very labile hypertension that is sensitive to medication changes.   - Recommend to continue her Norvasc and BB for now.     #HLD  - Statin as ordered    - DC planning as per primary team.     Plan of Care:          Over 25 minutes spent on total encounter; more than 50% of the visit was spent counseling and/or coordinating care by the attending physician.      Neo Ramirez D.O.   Cardiovascular Disease  [phone]
Date of Service  : 11-10-22     INTERVAL HPI/OVERNIGHT EVENTS: I feel fine.   Vital Signs Last 24 Hrs  T(C): 36.5 (10 Nov 2022 08:20), Max: 36.9 (09 Nov 2022 19:45)  T(F): 97.7 (10 Nov 2022 08:20), Max: 98.4 (09 Nov 2022 19:45)  HR: 83 (10 Nov 2022 08:20) (63 - 91)  BP: 184/83 (10 Nov 2022 08:20) (75/43 - 184/83)  BP(mean): --  RR: 18 (10 Nov 2022 08:20) (18 - 19)  SpO2: 98% (10 Nov 2022 08:20) (98% - 100%)    Parameters below as of 10 Nov 2022 08:20  Patient On (Oxygen Delivery Method): room air      I&O's Summary    MEDICATIONS  (STANDING):  amLODIPine   Tablet 2.5 milliGRAM(s) Oral daily  aspirin enteric coated 81 milliGRAM(s) Oral daily  atorvastatin 20 milliGRAM(s) Oral at bedtime  carvedilol 6.25 milliGRAM(s) Oral every 12 hours  cloNIDine Patch 0.1 mG/24Hr(s) 1 patch Transdermal every 7 days  heparin   Injectable 5000 Unit(s) SubCutaneous every 8 hours  ticagrelor 90 milliGRAM(s) Oral every 12 hours    MEDICATIONS  (PRN):  meclizine 25 milliGRAM(s) Oral every 8 hours PRN Dizziness    LABS:                        10.9   18.12 )-----------( 301      ( 09 Nov 2022 22:47 )             32.4     11-09    131<L>  |  96<L>  |  18  ----------------------------<  189<H>  3.2<L>   |  18<L>  |  0.90    Ca    9.4      09 Nov 2022 22:47  Phos  2.6     11-09  Mg     1.70     11-09          CAPILLARY BLOOD GLUCOSE              REVIEW OF SYSTEMS:  CONSTITUTIONAL: No fever, weight loss, or fatigue  EYES: No eye pain, visual disturbances, or discharge  ENMT:  No difficulty hearing, tinnitus, vertigo; No sinus or throat pain  NECK: No pain or stiffness  RESPIRATORY: No cough, wheezing, chills or hemoptysis; No shortness of breath  CARDIOVASCULAR: No chest pain, palpitations, dizziness, or leg swelling  GASTROINTESTINAL: No abdominal or epigastric pain. No nausea, vomiting, or hematemesis; No diarrhea or constipation. No melena or hematochezia.  GENITOURINARY: No dysuria, frequency, hematuria, or incontinence  NEUROLOGICAL: No headaches, memory loss, loss of strength, numbness, or tremors      Consultant(s) Notes Reviewed:  [x ] YES  [ ] NO    PHYSICAL EXAM:  GENERAL: NAD, well-groomed, well-developed,not in any distress ,  HEAD:  Atraumatic, Normocephalic  NECK: Supple, No JVD, Normal thyroid  NERVOUS SYSTEM:  Alert & Oriented X3, No focal deficit   CHEST/LUNG: Good air entry bilateral with no  rales, rhonchi, wheezing, or rubs  HEART: Regular rate and rhythm; No murmurs, rubs, or gallops  ABDOMEN: Soft, Nontender, Nondistended; Bowel sounds present  EXTREMITIES:  2+ Peripheral Pulses, No clubbing, cyanosis, or edema    Care Discussed with Consultants/Other Providers [ x] YES  [ ] NO
[facility] [place] [facility]   MD JORDAN LOVING MD KRISTINE SOLTANPOUR, PARK AYALA    TEL:  OFFICE: [phone]  From 5pm-7am Answering Service [phone]    -- RENAL FOLLOW UP NOTE ---Date of Service 11-11-22 @ 14:16    Patient is a 77y old  Female who presents with a chief complaint of Lightheadedness (11 Nov 2022 09:03)      Patient seen and examined at bedside. No chest pain/sob    VITALS:  T(F): 98 (11-11-22 @ 12:18), Max: 98.2 (11-11-22 @ 00:51)  HR: 86 (11-11-22 @ 12:18)  BP: 145/88 (11-11-22 @ 12:18)  RR: 18 (11-11-22 @ 12:18)  SpO2: 97% (11-11-22 @ 12:18)  Wt(kg): --        PHYSICAL EXAM:  General: NAD  Neck: No JVD  Respiratory: CTAB, no wheezes, rales or rhonchi  Cardiovascular: S1, S2, RRR  Gastrointestinal: BS+, soft, NT/ND  Extremities: No peripheral edema    Hospital Medications:   MEDICATIONS  (STANDING):  aspirin enteric coated 81 milliGRAM(s) Oral daily  atorvastatin 20 milliGRAM(s) Oral at bedtime  carvedilol 6.25 milliGRAM(s) Oral every 12 hours  cloNIDine Patch 0.1 mG/24Hr(s) 1 patch Transdermal every 7 days  heparin   Injectable 5000 Unit(s) SubCutaneous every 8 hours  potassium chloride    Tablet ER 40 milliEquivalent(s) Oral every 4 hours  ticagrelor 90 milliGRAM(s) Oral every 12 hours      LABS:  11-11    134<L>  |  98  |  18  ----------------------------<  116<H>  3.2<L>   |  24  |  0.89    Ca    9.5      11 Nov 2022 05:55      Creatinine Trend: 0.89 <--, 0.90 <--, 0.90 <--, 0.92 <--, 0.91 <--, 0.95 <--, 0.90 <--                                11.0   10.67 )-----------( 287      ( 11 Nov 2022 05:55 )             32.9     Urine Studies:  Urinalysis - [11-06-22 @ 22:16]      Color Colorless / Appearance Clear / SG 1.005 / pH 7.5      Gluc Negative / Ketone Negative  / Bili Negative / Urobili <2 mg/dL       Blood Negative / Protein Negative / Leuk Est Small / Nitrite Negative      RBC 1 / WBC 2 / Hyaline 0 / Gran  / Sq Epi  / Non Sq Epi 2 / Bacteria Negative    Urine Sodium 73      [11-09-22 @ 22:51]  Urine Osmolality 567      [11-09-22 @ 22:51]    Iron 46, TIBC 217, %sat 21      [01-12-22 @ 10:48]  Ferritin 78      [01-12-22 @ 10:48]  HbA1c 6.3      [09-17-19 @ 07:18]  TSH 3.20      [11-08-22 @ 06:05]  Lipid: chol 140, , HDL 36, LDL --      [11-08-22 @ 06:05]        RADIOLOGY & ADDITIONAL STUDIES:  
[facility] [place] [facility]   MD JORDAN LOVING MD KRISTINE SOLTANPOUR, PARK AYALA    TEL:  OFFICE: [phone]  From 5pm-7am Answering Service [phone]    -- RENAL FOLLOW UP NOTE ---Date of Service 11-10-22 @ 11:23    Patient is a 77y old  Female who presents with a chief complaint of Lightheadedness (10 Nov 2022 09:26)      Patient seen and examined at bedside. No chest pain/sob    VITALS:  T(F): 97.7 (11-10-22 @ 08:20), Max: 98.4 (11-09-22 @ 19:45)  HR: 83 (11-10-22 @ 08:20)  BP: 184/83 (11-10-22 @ 08:20)  RR: 18 (11-10-22 @ 08:20)  SpO2: 98% (11-10-22 @ 08:20)  Wt(kg): --        PHYSICAL EXAM:  General: NAD  Neck: No JVD  Respiratory: CTAB, no wheezes, rales or rhonchi  Cardiovascular: S1, S2, RRR  Gastrointestinal: BS+, soft, NT/ND  Extremities: No peripheral edema    Hospital Medications:   MEDICATIONS  (STANDING):  amLODIPine   Tablet 2.5 milliGRAM(s) Oral daily  aspirin enteric coated 81 milliGRAM(s) Oral daily  atorvastatin 20 milliGRAM(s) Oral at bedtime  carvedilol 6.25 milliGRAM(s) Oral every 12 hours  cloNIDine Patch 0.1 mG/24Hr(s) 1 patch Transdermal every 7 days  heparin   Injectable 5000 Unit(s) SubCutaneous every 8 hours  ticagrelor 90 milliGRAM(s) Oral every 12 hours      LABS:  11-10    131<L>  |  97<L>  |  20  ----------------------------<  253<H>  4.2   |  22  |  0.90    Ca    9.8      10 Nov 2022 09:45  Phos  2.6     11-09  Mg     1.70     11-09      Creatinine Trend: 0.90 <--, 0.90 <--, 0.92 <--, 0.91 <--, 0.95 <--, 0.90 <--                                11.4   17.35 )-----------( 336      ( 10 Nov 2022 09:45 )             34.0     Urine Studies:  Urinalysis - [11-06-22 @ 22:16]      Color Colorless / Appearance Clear / SG 1.005 / pH 7.5      Gluc Negative / Ketone Negative  / Bili Negative / Urobili <2 mg/dL       Blood Negative / Protein Negative / Leuk Est Small / Nitrite Negative      RBC 1 / WBC 2 / Hyaline 0 / Gran  / Sq Epi  / Non Sq Epi 2 / Bacteria Negative    Urine Sodium 73      [11-09-22 @ 22:51]  Urine Osmolality 567      [11-09-22 @ 22:51]    Iron 46, TIBC 217, %sat 21      [01-12-22 @ 10:48]  Ferritin 78      [01-12-22 @ 10:48]  HbA1c 6.3      [09-17-19 @ 07:18]  TSH 3.20      [11-08-22 @ 06:05]  Lipid: chol 140, , HDL 36, LDL --      [11-08-22 @ 06:05]        RADIOLOGY & ADDITIONAL STUDIES:  
Date of Service  : 11-09-22     INTERVAL HPI/OVERNIGHT EVENTS:  S/P Cath with SAMEER.   Vital Signs Last 24 Hrs  T(C): 36.8 (09 Nov 2022 20:22), Max: 36.9 (09 Nov 2022 19:45)  T(F): 98.3 (09 Nov 2022 20:22), Max: 98.4 (09 Nov 2022 19:45)  HR: 80 (09 Nov 2022 20:22) (63 - 102)  BP: 172/72 (09 Nov 2022 20:22) (75/43 - 172/87)  BP(mean): --  RR: 18 (09 Nov 2022 20:22) (17 - 19)  SpO2: 100% (09 Nov 2022 20:22) (99% - 100%)    Parameters below as of 09 Nov 2022 20:22  Patient On (Oxygen Delivery Method): room air      I&O's Summary    MEDICATIONS  (STANDING):  amLODIPine   Tablet 2.5 milliGRAM(s) Oral daily  aspirin enteric coated 81 milliGRAM(s) Oral daily  atorvastatin 20 milliGRAM(s) Oral at bedtime  carvedilol 6.25 milliGRAM(s) Oral every 12 hours  heparin   Injectable 5000 Unit(s) SubCutaneous every 8 hours  sodium chloride 0.9%. 500 milliLiter(s) (75 mL/Hr) IV Continuous <Continuous>    MEDICATIONS  (PRN):  meclizine 25 milliGRAM(s) Oral every 8 hours PRN Dizziness    LABS:                        12.4   6.75  )-----------( 324      ( 09 Nov 2022 05:10 )             37.0     11-09    129<L>  |  92<L>  |  17  ----------------------------<  194<H>  3.6   |  21<L>  |  0.92    Ca    10.0      09 Nov 2022 05:10  Phos  2.6     11-09  Mg     1.70     11-09          CAPILLARY BLOOD GLUCOSE              REVIEW OF SYSTEMS:  CONSTITUTIONAL: No fever, weight loss, or fatigue  EYES: No eye pain, visual disturbances, or discharge  ENMT:  No difficulty hearing, tinnitus, vertigo; No sinus or throat pain  NECK: No pain or stiffness  RESPIRATORY: No cough, wheezing, chills or hemoptysis; No shortness of breath  CARDIOVASCULAR: No chest pain, palpitations, dizziness, or leg swelling  GASTROINTESTINAL: No abdominal or epigastric pain. No nausea, vomiting, or hematemesis; No diarrhea or constipation. No melena or hematochezia.  GENITOURINARY: No dysuria, frequency, hematuria, or incontinence  NEUROLOGICAL: No headaches, memory loss, loss of strength, numbness, or tremors      Consultant(s) Notes Reviewed:  [x ] YES  [ ] NO    PHYSICAL EXAM:  GENERAL: NAD, well-groomed, well-developed,not in any distress ,  HEAD:  Atraumatic, Normocephalic  NECK: Supple, No JVD, Normal thyroid  NERVOUS SYSTEM:  Alert & Oriented X3, No focal deficit   CHEST/LUNG: Good air entry bilateral with no  rales, rhonchi, wheezing, or rubs  HEART: Regular rate and rhythm; No murmurs, rubs, or gallops  ABDOMEN: Soft, Nontender, Nondistended; Bowel sounds present  EXTREMITIES:  2+ Peripheral Pulses, No clubbing, cyanosis, or edema      Care Discussed with Consultants/Other Providers [ x] YES  [ ] NO

## 2022-11-11 NOTE — CONSULT NOTE ADULT - SUBJECTIVE AND OBJECTIVE BOX
HPI:  78yo Female, MHx of HTN, HLD, CVA, CAD c/b MI - s/p stents x2, vertigo c/o dizziness and lightheadedness with associated nausea and an episode of nbnb vomiting. Pt reports that while cooking and cleaning, suddenly felt diaphoretic, lightheaded and dizzy and almost fainted. Felt also mild pressure-like sensation in the chest lasting appx 3-5 min. Does not report LOC. States that the symptoms felt different from her typical symptoms of vertigo. Pt rested, and took her medications for HTN and meclizine for vertigo, and the symptoms improved. Otherwise reports no CP, SOB, palpitations, abd pain, diarrhea, weakness, leg swelling.    ED course: admitted to [facility]U for NST; Cardiology consulted;  (2022 20:31)  Patient has history of pre-diabetes, no hx of chronic steroid use, had weakness, syncope.  PAST MEDICAL & SURGICAL HISTORY:  Dyslipidemia      HTN (hypertension), benign      CAD (coronary artery disease)      History of MI (myocardial infarction)      H/O heart artery stent      Vertigo      Migraines  Developed at 10 years of age  Last episode 2 years ago      Borderline diabetes mellitus  Controlled with diet      Legally blind in left eye, as defined in [place]  20/400 left eye      Sciatica      HTN (hypertension)      HLD (hyperlipidemia)      Cerebrovascular accident (CVA)      H/O myocardial ischemia      CAD (coronary artery disease)  stent x2      No Past Surgical History      History of tonsillectomy      History of tubal ligation  35 years ago, no complications as per patient.      S/P LASIK surgery  Right eye, no complications as per patient.      History of coronary artery stent placement  3 stents (Last in )  Same admission as past myocardial infarction      S/P ICD (internal cardiac defibrillator) procedure  loop recorder          FAMILY HISTORY:  Family history of MI (myocardial infarction) (Child)  Father ( at 75)  Mother ( at 87)  Son (  at 35)    Family history of hypertension (Child, Sibling)  Daughters, Sisters    Family history of hypercholesterolemia (Child, Sibling)    Family history of brain tumor (Sibling)        Social History:    Outpatient Medications:    MEDICATIONS  (STANDING):  amLODIPine   Tablet 2.5 milliGRAM(s) Oral daily  aspirin enteric coated 81 milliGRAM(s) Oral daily  atorvastatin 20 milliGRAM(s) Oral at bedtime  carvedilol 6.25 milliGRAM(s) Oral every 12 hours  cloNIDine Patch 0.1 mG/24Hr(s) 1 patch Transdermal every 7 days  heparin   Injectable 5000 Unit(s) SubCutaneous every 8 hours  ticagrelor 90 milliGRAM(s) Oral every 12 hours    MEDICATIONS  (PRN):  meclizine 25 milliGRAM(s) Oral every 8 hours PRN Dizziness      Allergies    fish (Unknown)  iodine (Anaphylaxis)  No Known Drug Allergies    Intolerances    labetalol (Other)    Review of Systems:  Constitutional: No fever, no chills  Eyes: No blurry vision  Neuro: No tremors  HEENT: No pain, no neck swelling  Cardiovascular: No chest pain, no palpitations  Respiratory: No SOB, no cough  GI: No nausea, vomiting, abdominal pain  : No dysuria  Skin: no rash  MSK: Has leg swelling.  Psych: no depression  Endocrine: no polyuria, polydipsia    UNABLE TO OBTAIN    ALL OTHER SYSTEMS REVIEWED AND NEGATIVE        PHYSICAL EXAM:  VITALS: T(C): 36.6 (22 @ 04:14)  T(F): 97.9 (22 @ 04:14), Max: 98.4 (11-10-22 @ 11:41)  HR: 87 (22 @ 04:14) (77 - 95)  BP: 140/68 (22 @ 04:14) (132/78 - 199/105)  RR:  (18 - 18)  SpO2:  (97% - 100%)  Wt(kg): --  GENERAL: NAD, well-groomed, well-developed  EYES: No proptosis, no lid lag  HEENT:  Atraumatic, Normocephalic  THYROID: Normal size, no palpable nodules  RESPIRATORY: Clear to auscultation bilaterally; No rales, rhonchi, wheezing  CARDIOVASCULAR: Si S2, No murmurs;  GI: Soft, non distended, normal bowel sounds  SKIN: Dry, intact, No rashes or lesions  MUSCULOSKELETAL: Has BL lower extremity edema.  NEURO:  no tremor, sensation decreased in feet BL,                              11.0   10.67 )-----------( 287      ( 2022 05:55 )             32.9       11-10    131<L>  |  97<L>  |  20  ----------------------------<  253<H>  4.2   |  22  |  0.90    eGFR: 66    Ca    9.8      11-10  Mg     1.70       Phos  2.6             Thyroid Function Tests:   @ 06:05 TSH 3.20 FreeT4 -- T3 -- Anti TPO -- Anti Thyroglobulin Ab -- TSI --           Chol 140 Direct LDL -- LDL calculated 81 HDL 36<L> Trig 117    Radiology:

## 2022-11-11 NOTE — CONSULT NOTE ADULT - ASSESSMENT
Assessment  DMT2: 77y Female with DM T2 with hyperglycemia admitted with syncope,  A1C is 6.9%, blood sugars within acceptable range, no hypoglycemic episode,  eating meals, compliant with low carb diet.  Syncope: Had low BP, hyponatremia, no Hx of steroid use, cosyntropin stim test done, R/O for adrenal insufficiency.  CAD: on medications, monitored, asymptomatic.  HTN: On antihypertensive medications, monitored, asymptomatic.            Sunny Holguin MD  Cell:  [phone]  Office: [phone]

## 2022-11-11 NOTE — PROGRESS NOTE ADULT - ASSESSMENT
76yo Female, MHx of HTN, HLD, CVA, CAD c/b MI - s/p stents x2, vertigo c/o dizziness and lightheadedness with associated nausea and an episode of nbnb vomiting. nephrology consulted for hyponatremia    HYponatremia  likely acute asymptoatic   urine work up suggested SIADH  low cortisol level in the am likely due to prednisone given   Na stable   free water restriction <1L/day  optimize dm control  monitor    htn  fluctuating  resume home meds   monitor  
76yo Female, MHx of HTN, HLD, CVA, CAD c/b MI - s/p stents x2, vertigo c/o dizziness and lightheadedness with associated nausea and an episode of nbnb vomiting. nephrology consulted for hyponatremia    HYponatremia  likely acute asymptoatic   urine work up suggested SIADH  low cortisol level in the am likely due to prednisone given   Na better   free water restriction <1L/day  optimize dm control  monitor    htn  better  resume home meds   monitor  
78yo Female, MHx of HTN, HLD, CVA, CAD c/b MI - s/p stents x2, vertigo c/o dizziness and lightheadedness with associated nausea and an episode of nbnb vomiting. Pt reports that while cooking and cleaning, suddenly felt diaphoretic, lightheaded and dizzy and almost fainted. Felt also mild pressure-like sensation in the chest lasting appx 3-5 min. Does not report LOC. States that the symptoms felt different from her typical symptoms of vertigo. Pt rested, and took her medications for HTN and meclizine for vertigo, and the symptoms improved. Otherwise reports no CP, SOB, palpitations, abd pain, diarrhea, weakness, leg swelling. I feel fine now and awaiting cardiac cath.        Problem/Plan - 1:  ·  Problem: CAD with Atypical chest pain.   ·  Plan: Now symptom free.   NST performed on this admission, 11/7/2022, significant for extensive subendocardial ischemia;  S/P  Cardiac cath with SAMEER  mid LAD   Cards helping.   DAPT ,BB and Statin.      Problem/Plan - 2:  ·  Problem: Near syncope.   ·  Plan: Telemetry  Plan as above  S/P ILR interrogation.     Problem/Plan - 3:  ·  Problem: Benign essential HTN.   ·  Plan: Pt has a component of white coat HTN  c/w Carvedilol   c/w Amlodipine   Monitor BP closely  VS q4h.     Problem/Plan - 4:  ·  Problem: Vertigo.   ·  Plan: Resolved.  CT head No acute intracranial hemorrhage or mass effect.  c/w Meclizine PRN  Fall precautions.     Problem/Plan - 5:  ·  Problem: Hyponatremia .   ·  Plan: Renal helping.      Problem/Plan - 6:  ·  Problem: Hypokalemia .   ·  Plan: Replacing.      Problem/Plan - 7:  ·  Problem: Hypocortisolemia  .   ·  Plan: Endo consulted.       Dispo : DC planning pending above.       
78yo Female, MHx of HTN, HLD, CVA, CAD c/b MI - s/p stents x2, vertigo c/o dizziness and lightheadedness with associated nausea and an episode of nbnb vomiting. Pt reports that while cooking and cleaning, suddenly felt diaphoretic, lightheaded and dizzy and almost fainted. Felt also mild pressure-like sensation in the chest lasting appx 3-5 min. Does not report LOC. States that the symptoms felt different from her typical symptoms of vertigo. Pt rested, and took her medications for HTN and meclizine for vertigo, and the symptoms improved. Otherwise reports no CP, SOB, palpitations, abd pain, diarrhea, weakness, leg swelling. I feel fine now and awaiting cardiac cath.        Problem/Plan - 1:  ·  Problem: CAD with Atypical chest pain.   ·  Plan: Now symptom free.   NST performed on this admission, 11/7/2022, significant for extensive subendocardial ischemia;  S/P  Cardiac cath with SAMEER  mid LAD   Cards helping.   DAPT ,BB and Statin.      Problem/Plan - 2:  ·  Problem: Near syncope.   ·  Plan: Telemetry  Plan as above  S/P ILR interrogation.     Problem/Plan - 3:  ·  Problem: Benign essential HTN.   ·  Plan: Pt has a component of white coat HTN  c/w Carvedilol   c/w Amlodipine   Monitor BP closely  VS q4h.     Problem/Plan - 4:  ·  Problem: Vertigo.   ·  Plan: Resolved.  CT head No acute intracranial hemorrhage or mass effect.  c/w Meclizine PRN  Fall precautions.     Problem/Plan - 5:  ·  Problem: Hyponatremia .   ·  Plan: Renal helping.     Dispo : DC planning.

## 2022-11-11 NOTE — PROGRESS NOTE ADULT - PROVIDER SPECIALTY LIST ADULT
Cardiology
Cardiology
Internal Medicine
Internal Medicine
Nephrology
Cardiology
Cardiology
Nephrology

## 2022-11-14 LAB — CORTIS PRE/P CHAL SERPL-SCNC: SIGNIFICANT CHANGE UP

## 2023-01-10 NOTE — ED PROVIDER NOTE - ENMT NEGATIVE STATEMENT, MLM
8-8-22 8-8-23
Ears: no ear pain and no hearing problems.Nose: no nasal congestion and no nasal drainage.Mouth/Throat: no dysphagia, no hoarseness and no throat pain.Neck: no lumps, no pain, no stiffness and no swollen glands.

## 2023-01-29 ENCOUNTER — INPATIENT (INPATIENT)
Facility: HOSPITAL | Age: 78
LOS: 2 days | Discharge: HOME HEALTH | End: 2023-02-01
Attending: INTERNAL MEDICINE | Admitting: INTERNAL MEDICINE
Payer: MEDICARE

## 2023-01-29 VITALS
HEART RATE: 75 BPM | SYSTOLIC BLOOD PRESSURE: 232 MMHG | OXYGEN SATURATION: 100 % | DIASTOLIC BLOOD PRESSURE: 112 MMHG | RESPIRATION RATE: 16 BRPM | TEMPERATURE: 98 F

## 2023-01-29 DIAGNOSIS — Z95.5 PRESENCE OF CORONARY ANGIOPLASTY IMPLANT AND GRAFT: Chronic | ICD-10-CM

## 2023-01-29 DIAGNOSIS — Z95.810 PRESENCE OF AUTOMATIC (IMPLANTABLE) CARDIAC DEFIBRILLATOR: Chronic | ICD-10-CM

## 2023-01-29 DIAGNOSIS — Z98.51 TUBAL LIGATION STATUS: Chronic | ICD-10-CM

## 2023-01-29 DIAGNOSIS — Z98.89 OTHER SPECIFIED POSTPROCEDURAL STATES: Chronic | ICD-10-CM

## 2023-01-29 PROCEDURE — 62270 DX LMBR SPI PNXR: CPT

## 2023-01-29 PROCEDURE — 99285 EMERGENCY DEPT VISIT HI MDM: CPT | Mod: 25

## 2023-01-29 NOTE — ED ADULT TRIAGE NOTE - CHIEF COMPLAINT QUOTE
Pt w/ hx of cva htn hld MI w/ stents, per Patient she stopped anticoagulant therapy a few weeks ago, c/o progressively worsening headache since 1800 today w/ associated blurry vision, Pt has uncontrolled blood pressures of late.  Dr. Ortiz Gaspar consulted

## 2023-01-30 DIAGNOSIS — R07.9 CHEST PAIN, UNSPECIFIED: ICD-10-CM

## 2023-01-30 DIAGNOSIS — I25.10 ATHEROSCLEROTIC HEART DISEASE OF NATIVE CORONARY ARTERY WITHOUT ANGINA PECTORIS: ICD-10-CM

## 2023-01-30 DIAGNOSIS — E78.5 HYPERLIPIDEMIA, UNSPECIFIED: ICD-10-CM

## 2023-01-30 DIAGNOSIS — G44.89 OTHER HEADACHE SYNDROME: ICD-10-CM

## 2023-01-30 DIAGNOSIS — I67.1 CEREBRAL ANEURYSM, NONRUPTURED: ICD-10-CM

## 2023-01-30 DIAGNOSIS — I16.1 HYPERTENSIVE EMERGENCY: ICD-10-CM

## 2023-01-30 PROBLEM — I10 ESSENTIAL (PRIMARY) HYPERTENSION: Chronic | Status: ACTIVE | Noted: 2022-11-06

## 2023-01-30 PROBLEM — Z86.79 PERSONAL HISTORY OF OTHER DISEASES OF THE CIRCULATORY SYSTEM: Chronic | Status: ACTIVE | Noted: 2022-11-06

## 2023-01-30 PROBLEM — I63.9 CEREBRAL INFARCTION, UNSPECIFIED: Chronic | Status: ACTIVE | Noted: 2022-11-06

## 2023-01-30 LAB
ALBUMIN SERPL ELPH-MCNC: 4.2 G/DL — SIGNIFICANT CHANGE UP (ref 3.3–5)
ALP SERPL-CCNC: 94 U/L — SIGNIFICANT CHANGE UP (ref 40–120)
ALT FLD-CCNC: 12 U/L — SIGNIFICANT CHANGE UP (ref 4–33)
ANION GAP SERPL CALC-SCNC: 11 MMOL/L — SIGNIFICANT CHANGE UP (ref 7–14)
APPEARANCE CSF: CLEAR — SIGNIFICANT CHANGE UP
APPEARANCE CSF: CLEAR — SIGNIFICANT CHANGE UP
APPEARANCE SPUN FLD: COLORLESS — SIGNIFICANT CHANGE UP
APPEARANCE SPUN FLD: COLORLESS — SIGNIFICANT CHANGE UP
APTT BLD: 35 SEC — SIGNIFICANT CHANGE UP (ref 27–36.3)
AST SERPL-CCNC: 18 U/L — SIGNIFICANT CHANGE UP (ref 4–32)
BASOPHILS # BLD AUTO: 0.06 K/UL — SIGNIFICANT CHANGE UP (ref 0–0.2)
BASOPHILS NFR BLD AUTO: 0.7 % — SIGNIFICANT CHANGE UP (ref 0–2)
BILIRUB SERPL-MCNC: 0.8 MG/DL — SIGNIFICANT CHANGE UP (ref 0.2–1.2)
BUN SERPL-MCNC: 15 MG/DL — SIGNIFICANT CHANGE UP (ref 7–23)
CALCIUM SERPL-MCNC: 9.8 MG/DL — SIGNIFICANT CHANGE UP (ref 8.4–10.5)
CHLORIDE SERPL-SCNC: 106 MMOL/L — SIGNIFICANT CHANGE UP (ref 98–107)
CO2 SERPL-SCNC: 25 MMOL/L — SIGNIFICANT CHANGE UP (ref 22–31)
COLOR CSF: COLORLESS — SIGNIFICANT CHANGE UP
COLOR CSF: COLORLESS — SIGNIFICANT CHANGE UP
CREAT SERPL-MCNC: 0.95 MG/DL — SIGNIFICANT CHANGE UP (ref 0.5–1.3)
CSF PCR RESULT: SIGNIFICANT CHANGE UP
EGFR: 62 ML/MIN/1.73M2 — SIGNIFICANT CHANGE UP
EOSINOPHIL # BLD AUTO: 0.14 K/UL — SIGNIFICANT CHANGE UP (ref 0–0.5)
EOSINOPHIL NFR BLD AUTO: 1.6 % — SIGNIFICANT CHANGE UP (ref 0–6)
FLUAV AG NPH QL: SIGNIFICANT CHANGE UP
FLUBV AG NPH QL: SIGNIFICANT CHANGE UP
GLUCOSE CSF-MCNC: 99 MG/DL — HIGH (ref 40–70)
GLUCOSE SERPL-MCNC: 142 MG/DL — HIGH (ref 70–99)
GRAM STN FLD: SIGNIFICANT CHANGE UP
HCT VFR BLD CALC: 36.1 % — SIGNIFICANT CHANGE UP (ref 34.5–45)
HGB BLD-MCNC: 11.3 G/DL — LOW (ref 11.5–15.5)
IANC: 5.7 K/UL — SIGNIFICANT CHANGE UP (ref 1.8–7.4)
IMM GRANULOCYTES NFR BLD AUTO: 0.3 % — SIGNIFICANT CHANGE UP (ref 0–0.9)
INR BLD: 1.09 RATIO — SIGNIFICANT CHANGE UP (ref 0.88–1.16)
LDH CSF L TO P-CCNC: 33 U/L — SIGNIFICANT CHANGE UP
LDH FLD-CCNC: 33 U/L — SIGNIFICANT CHANGE UP
LYMPHOCYTES # BLD AUTO: 2.23 K/UL — SIGNIFICANT CHANGE UP (ref 1–3.3)
LYMPHOCYTES # BLD AUTO: 25.5 % — SIGNIFICANT CHANGE UP (ref 13–44)
LYMPHOCYTES # CSF: 30 % — SIGNIFICANT CHANGE UP
LYMPHOCYTES # CSF: 64 % — SIGNIFICANT CHANGE UP
MCHC RBC-ENTMCNC: 26.4 PG — LOW (ref 27–34)
MCHC RBC-ENTMCNC: 31.3 GM/DL — LOW (ref 32–36)
MCV RBC AUTO: 84.3 FL — SIGNIFICANT CHANGE UP (ref 80–100)
MONOCYTES # BLD AUTO: 0.59 K/UL — SIGNIFICANT CHANGE UP (ref 0–0.9)
MONOCYTES NFR BLD AUTO: 6.7 % — SIGNIFICANT CHANGE UP (ref 2–14)
MONOS+MACROS NFR CSF: 36 % — SIGNIFICANT CHANGE UP
MONOS+MACROS NFR CSF: 36 % — SIGNIFICANT CHANGE UP
NEUTROPHILS # BLD AUTO: 5.7 K/UL — SIGNIFICANT CHANGE UP (ref 1.8–7.4)
NEUTROPHILS # CSF: 0 % — SIGNIFICANT CHANGE UP
NEUTROPHILS # CSF: 34 % — SIGNIFICANT CHANGE UP
NEUTROPHILS NFR BLD AUTO: 65.2 % — SIGNIFICANT CHANGE UP (ref 43–77)
NIGHT BLUE STAIN TISS: SIGNIFICANT CHANGE UP
NRBC # BLD: 0 /100 WBCS — SIGNIFICANT CHANGE UP (ref 0–0)
NRBC # FLD: 0 K/UL — SIGNIFICANT CHANGE UP (ref 0–0)
NRBC NFR CSF: 1 CELLS/UL — SIGNIFICANT CHANGE UP (ref 0–5)
NRBC NFR CSF: 9 CELLS/UL — HIGH (ref 0–5)
PLATELET # BLD AUTO: 311 K/UL — SIGNIFICANT CHANGE UP (ref 150–400)
POTASSIUM SERPL-MCNC: 3.6 MMOL/L — SIGNIFICANT CHANGE UP (ref 3.5–5.3)
POTASSIUM SERPL-SCNC: 3.6 MMOL/L — SIGNIFICANT CHANGE UP (ref 3.5–5.3)
PROT CSF-MCNC: 29 MG/DL — SIGNIFICANT CHANGE UP (ref 15–45)
PROT SERPL-MCNC: 7.1 G/DL — SIGNIFICANT CHANGE UP (ref 6–8.3)
PROTHROM AB SERPL-ACNC: 12.6 SEC — SIGNIFICANT CHANGE UP (ref 10.5–13.4)
RBC # BLD: 4.28 M/UL — SIGNIFICANT CHANGE UP (ref 3.8–5.2)
RBC # CSF: 150 CELLS/UL — HIGH (ref 0–0)
RBC # CSF: 925 CELLS/UL — HIGH (ref 0–0)
RBC # FLD: 13 % — SIGNIFICANT CHANGE UP (ref 10.3–14.5)
RSV RNA NPH QL NAA+NON-PROBE: SIGNIFICANT CHANGE UP
SARS-COV-2 RNA SPEC QL NAA+PROBE: SIGNIFICANT CHANGE UP
SODIUM SERPL-SCNC: 142 MMOL/L — SIGNIFICANT CHANGE UP (ref 135–145)
SPECIMEN SOURCE: SIGNIFICANT CHANGE UP
SPECIMEN SOURCE: SIGNIFICANT CHANGE UP
TOTAL CELLS COUNTED, SPINAL FLUID: 11 CELLS — SIGNIFICANT CHANGE UP
TOTAL CELLS COUNTED, SPINAL FLUID: 50 CELLS — SIGNIFICANT CHANGE UP
TROPONIN T, HIGH SENSITIVITY RESULT: 10 NG/L — SIGNIFICANT CHANGE UP
TROPONIN T, HIGH SENSITIVITY RESULT: 9 NG/L — SIGNIFICANT CHANGE UP
TUBE TYPE: SIGNIFICANT CHANGE UP
TUBE TYPE: SIGNIFICANT CHANGE UP
WBC # BLD: 8.75 K/UL — SIGNIFICANT CHANGE UP (ref 3.8–10.5)
WBC # FLD AUTO: 8.75 K/UL — SIGNIFICANT CHANGE UP (ref 3.8–10.5)

## 2023-01-30 PROCEDURE — 70496 CT ANGIOGRAPHY HEAD: CPT | Mod: 26,MA

## 2023-01-30 PROCEDURE — 71045 X-RAY EXAM CHEST 1 VIEW: CPT | Mod: 26

## 2023-01-30 PROCEDURE — 70498 CT ANGIOGRAPHY NECK: CPT | Mod: 26,MA

## 2023-01-30 RX ORDER — CARVEDILOL PHOSPHATE 80 MG/1
6.25 CAPSULE, EXTENDED RELEASE ORAL EVERY 12 HOURS
Refills: 0 | Status: DISCONTINUED | OUTPATIENT
Start: 2023-01-30 | End: 2023-01-30

## 2023-01-30 RX ORDER — LISINOPRIL 2.5 MG/1
20 TABLET ORAL DAILY
Refills: 0 | Status: DISCONTINUED | OUTPATIENT
Start: 2023-01-30 | End: 2023-01-31

## 2023-01-30 RX ORDER — ACETAMINOPHEN 500 MG
975 TABLET ORAL ONCE
Refills: 0 | Status: COMPLETED | OUTPATIENT
Start: 2023-01-30 | End: 2023-01-30

## 2023-01-30 RX ORDER — LISINOPRIL 2.5 MG/1
1 TABLET ORAL
Qty: 0 | Refills: 0 | DISCHARGE

## 2023-01-30 RX ORDER — SODIUM CHLORIDE 9 MG/ML
500 INJECTION INTRAMUSCULAR; INTRAVENOUS; SUBCUTANEOUS ONCE
Refills: 0 | Status: COMPLETED | OUTPATIENT
Start: 2023-01-30 | End: 2023-01-30

## 2023-01-30 RX ORDER — CARVEDILOL PHOSPHATE 80 MG/1
6.25 CAPSULE, EXTENDED RELEASE ORAL EVERY 12 HOURS
Refills: 0 | Status: DISCONTINUED | OUTPATIENT
Start: 2023-01-30 | End: 2023-01-31

## 2023-01-30 RX ORDER — ATORVASTATIN CALCIUM 80 MG/1
20 TABLET, FILM COATED ORAL AT BEDTIME
Refills: 0 | Status: DISCONTINUED | OUTPATIENT
Start: 2023-01-30 | End: 2023-02-01

## 2023-01-30 RX ORDER — ASPIRIN/CALCIUM CARB/MAGNESIUM 324 MG
81 TABLET ORAL DAILY
Refills: 0 | Status: DISCONTINUED | OUTPATIENT
Start: 2023-01-30 | End: 2023-02-01

## 2023-01-30 RX ORDER — AMLODIPINE BESYLATE 2.5 MG/1
5 TABLET ORAL ONCE
Refills: 0 | Status: COMPLETED | OUTPATIENT
Start: 2023-01-30 | End: 2023-01-30

## 2023-01-30 RX ORDER — DIPHENHYDRAMINE HCL 50 MG
50 CAPSULE ORAL ONCE
Refills: 0 | Status: COMPLETED | OUTPATIENT
Start: 2023-01-30 | End: 2023-01-30

## 2023-01-30 RX ORDER — TICAGRELOR 90 MG/1
60 TABLET ORAL EVERY 12 HOURS
Refills: 0 | Status: DISCONTINUED | OUTPATIENT
Start: 2023-01-30 | End: 2023-02-01

## 2023-01-30 RX ORDER — METOCLOPRAMIDE HCL 10 MG
10 TABLET ORAL ONCE
Refills: 0 | Status: COMPLETED | OUTPATIENT
Start: 2023-01-30 | End: 2023-01-30

## 2023-01-30 RX ADMIN — Medication 81 MILLIGRAM(S): at 17:40

## 2023-01-30 RX ADMIN — SODIUM CHLORIDE 500 MILLILITER(S): 9 INJECTION INTRAMUSCULAR; INTRAVENOUS; SUBCUTANEOUS at 01:29

## 2023-01-30 RX ADMIN — Medication 1 PATCH: at 02:04

## 2023-01-30 RX ADMIN — Medication 10 MILLIGRAM(S): at 00:55

## 2023-01-30 RX ADMIN — Medication 80 MILLIGRAM(S): at 03:39

## 2023-01-30 RX ADMIN — CARVEDILOL PHOSPHATE 6.25 MILLIGRAM(S): 80 CAPSULE, EXTENDED RELEASE ORAL at 17:40

## 2023-01-30 RX ADMIN — AMLODIPINE BESYLATE 5 MILLIGRAM(S): 2.5 TABLET ORAL at 06:45

## 2023-01-30 RX ADMIN — Medication 1 PATCH: at 12:23

## 2023-01-30 RX ADMIN — Medication 50 MILLIGRAM(S): at 06:00

## 2023-01-30 RX ADMIN — TICAGRELOR 60 MILLIGRAM(S): 90 TABLET ORAL at 22:42

## 2023-01-30 RX ADMIN — CARVEDILOL PHOSPHATE 6.25 MILLIGRAM(S): 80 CAPSULE, EXTENDED RELEASE ORAL at 06:46

## 2023-01-30 RX ADMIN — Medication 975 MILLIGRAM(S): at 01:30

## 2023-01-30 RX ADMIN — Medication 975 MILLIGRAM(S): at 09:00

## 2023-01-30 RX ADMIN — ATORVASTATIN CALCIUM 20 MILLIGRAM(S): 80 TABLET, FILM COATED ORAL at 22:42

## 2023-01-30 NOTE — ED ADULT NURSE REASSESSMENT NOTE - NS ED NURSE REASSESS COMMENT FT1
Respirations even and unlabored, no signs/symptoms of acute distress. Patient denies pain and offers no complaints at this time. Safety measures in place and call bell within reach.

## 2023-01-30 NOTE — CONSULT NOTE ADULT - PROBLEM SELECTOR RECOMMENDATION 9
Case to be d/w Dr. Mcallister 1. No acute neurosurgical intervention at this time  2. Outpatient Follow up with Dr. Mcallister   Case to be d/w Dr. Mcallister

## 2023-01-30 NOTE — CONSULT NOTE ADULT - ASSESSMENT
77F w PMHx HTN, HLD, CVA, CAD cb MI s/p 2 stents, L CEA w Dr. Bolton in 2020, who presents with headache and chest pain. 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 NSGY consulted, who recommends outpatient follow up. Vascular surgery consulted for evaluation of whether this aneurysm could be sequelae of CEA.     Plan:   INCOMPLETE    Willie Schmitz MD PGY2   C Team, [id number]  77F w PMHx HTN, HLD, CVA, CAD cb MI s/p 2 stents, L CEA w Dr. Bolton in 2020, who presents with headache and chest pain. 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 NSGY consulted, who recommends outpatient follow up. Vascular surgery consulted for evaluation of whether this aneurysm could be sequelae of CEA. Intracranial ICA aneurysm is incidental finding, and likely unrelated to prior surgical hx of L CEA.     Plan:   -No acute surgical intervention   -Intracranial aneurysm not a known sequela of L CEA and therefore unrelated to her prior Vascular Sx  -F/U OP with NSGY   -Dispo per ED    Discussed with Fellow Dr. Wise on behalf of Attending Surgeon Dr. Hoang Schmitz MD PGY2   C Team, [id number]

## 2023-01-30 NOTE — CONSULT NOTE ADULT - NS PANP OPT1 GEN_ALL_CORE
I independently performed the documented history, exam, and medical decision making.
Malcom Rush (NP)

## 2023-01-30 NOTE — CONSULT NOTE ADULT - SUBJECTIVE AND OBJECTIVE BOX
VASCULAR SURGERY CONSULT NOTE--------------------------------------------------------------------------------------------    Patient is a 77y old  Female who presents with a chief complaint of headache     HPI: 77F w PMHx HTN, HLD, CVA, CAD cb MI s/p 2 stents, L CEA w Dr. Bolton in , who presents with headache and chest pain. Patient reports onset of headache substernal chest pain yesterday afternoon, with subsequent development of headache. Reports headache was on the top of her head, transient in nature. Also reports blurred vision, although states this is chronic. Denies nausea, vomiting, fevers, chills, SOB, lightheadedness, dizziness, recent head strike or loss of consciousness.     In ED, patient is HDS. Labs unremarkable. Troponin wnl. 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 NSGY consulted, who recommends outpatient follow up. Vascular surgery consulted for evaluation of whether this aneurysm could be sequelae of CEA.     PAST MEDICAL & SURGICAL HISTORY:  Dyslipidemia      HTN (hypertension), benign      CAD (coronary artery disease)      History of MI (myocardial infarction)      H/O heart artery stent      Vertigo      Migraines  Developed at 10 years of age  Last episode 2 years ago      Borderline diabetes mellitus  Controlled with diet      Legally blind in left eye, as defined in [place]  20/400 left eye      Sciatica      HTN (hypertension)      HLD (hyperlipidemia)      Cerebrovascular accident (CVA)      H/O myocardial ischemia      CAD (coronary artery disease)  stent x2      No Past Surgical History      History of tonsillectomy      History of tubal ligation  35 years ago, no complications as per patient.      S/P LASIK surgery  Right eye, no complications as per patient.      History of coronary artery stent placement  3 stents (Last in )  Same admission as past myocardial infarction      S/P ICD (internal cardiac defibrillator) procedure  loop recorder        FAMILY HISTORY:  Family history of MI (myocardial infarction) (Child)  Father ( at 75)  Mother ( at 87)  Son (  at 35)    Family history of hypertension (Child, Sibling)  Daughters, Sisters    Family history of hypercholesterolemia (Child, Sibling)    Family history of brain tumor (Sibling)      [] Family history not pertinent as reviewed with the patient and family    ALLERGIES: fish (Unknown)  iodine (Anaphylaxis)  labetalol (Other)  No Known Drug Allergies    CURRENT MEDICATIONS  MEDICATIONS (STANDING): carvedilol 6.25 milliGRAM(s) Oral every 12 hours    MEDICATIONS (PRN):  --------------------------------------------------------------------------------------------    Vitals:   T(C): 36.7 (23 @ 09:00), Max: 36.8 (23 @ 01:12)  HR: 93 (23 @ 09:00) (65 - 93)  BP: 166/88 (23 @ 09:00) (163/76 - 232/112)  RR: 16 (23 @ 09:00) (16 - 18)  SpO2: 97% (23 @ 09:00) (97% - 100%)  CAPILLARY BLOOD GLUCOSE        CAPILLARY BLOOD GLUCOSE      PHYSICAL EXAM:   General: Alert, NAD  Neuro: A+Ox3, CN II-XII intact, no focal deficits   HEENT: NC/AT, no asymmetry, no scleral icterus  Neck: Soft, supple  Cardio: RRR  Resp: Airway patent, unlabored breathing  Thorax: No chest wall tenderness  GI/Abd: Soft, NT/ND, no rebound/guarding, no masses palpated  Vascular: All 4 extremities warm   Skin: Intact, no breakdown  Musculoskeletal: All 4 extremities moving spontaneously, no limitations  --------------------------------------------------------------------------------------------    LABS  CBC ( 00:57)                              11.3<L>                         8.75    )----------------(  311        65.2  % Neutrophils, 25.5  % Lymphocytes, ANC: 5.70                                36.1      BMP ( @ 00:57)             142     |  106     |  15    		Ca++ --      Ca 9.8                ---------------------------------( 142<H>		Mg --                 3.6     |  25      |  0.95  			Ph --        LFTs ( @ 00:57)      TPro 7.1 / Alb 4.2 / TBili 0.8 / DBili -- / AST 18 / ALT 12 / AlkPhos 94    Coags ( @ 10:15)  aPTT 35.0 / INR 1.09 / PT 12.6          --------------------------------------------------------------------------------------------    MICROBIOLOGY      --------------------------------------------------------------------------------------------    IMAGING  < from: CT Angio Neck w/ IV Cont (23 @ 08:53) >    ACC: [id number] EXAM:  CT ANGIO BRAIN (W)AW IC   ORDERED BY: LAYLA MATHEWS     ACC: [id number] EXAM:  CT ANGIO NECK (W)AW IC   ORDERED BY: LAYLA MATHEWS     ACC: [id number] EXAM:  CT BRAIN   ORDERED BY: LAYLA MATHEWS     PROCEDURE DATE:  2023          INTERPRETATION:  CT HEAD, CT ANGIO NECK WITHOUT AND OR WITH IV CONTRAST,   CT ANGIO BRAIN WITHOUT AND OR WITH IV CONTRAST    CLINICAL HISTORY: headache w/blurry vision, htn 200/100s    headache   w/blurry vision, htn    CT HEAD TECHNIQUE:  Noncontrast CT.  Axial acquisition.  Sagittal and coronal reformations.    COMPARISON:  Head CT is compared to prior study 2022    FINDINGS:  HEMORRHAGE:  No evidence of intracranial hemorrhage.  BRAIN PARENCHYMA:  Mild atrophy. Mild periventricular white matter   ischemic changes.  No mass or mass effect.  VENTRICLES / SHIFT:  No hydrocephalus. No midline shift.  EXTRA-AXIAL / BASAL CISTERNS:  No extra-axial mass. Basal cisterns   preserved.  Atherosclerotic calcifications of the cavernous internal  carotid arteries.  CALVARIUM AND EXTRACRANIAL SOFT TISSUES:  No depressed calvarial fracture.  SINUSES, ORBITS, MASTOIDS:  The visualized paranasal sinuses and mastoid   air cells are well aerated.  ----------------------------------------    CTA TECHNIQUE:  CT angiography of the neck and brain was performed during the dynamic   administration of intravenous contrast.  MIP reconstructions were   performed and reviewed. Multiplanar reformations were obtained.  Contrast administered: 90 cc Omipaque 350.  Contrast discarded: 10 cc.    CTA FINDINGS:  NECK  RIGHT CAROTID CIRCULATION:  Evaluation of the right carotid circulation demonstrates no hemodynamic   significant narrowing utilizing a distal reference. Mild to moderate   plaque carotid bulb/bifurcation with less than 50% narrowing utilizing a   distal reference  LEFT CAROTID CIRCULATION:  Evaluation of the left carotid circulation demonstrates no hemodynamic   significant narrowing utilizing a distal reference. Prior left carotid   endarterectomy  VERTEBRAL ARTERIES:  Evaluation of the vertebral arteries reveals no evidence of a vertebral   artery occlusion or dissection. Direct origin of a small left vertebral   artery from the aortic arch.    BRAIN  ANTERIOR CIRCULATION:  Distal internal carotid arteries are patent. Atherosclerotic   calcification of the cavernous segments without significant narrowing. 4   mm aneurysm distal cavernous portion left internal carotid artery   pointing medially.  Anterior cerebral arteries are patent.  Middle cerebral arteries are patent.  POSTERIOR CIRCULATION:  Distal vertebral arteries are patent. A right posterior inferior   cerebellar artery is seen. Distal right vertebral artery is dominant  Basilar artery is patent. Proximal superior cerebellar arteries are patent  Posterior cerebral arteries are patent. Right posterior cerebral artery   arises from the anterior circulation a normal anatomic variant. A large   left posterior communicating artery is present    IMPRESSION:  NO EVIDENCE OF AN ACUTE INTRACRANIAL HEMORRHAGE, MIDLINE SHIFT, OR   HYDROCEPHALUS. MILD ATROPHY WITH MILD PERIVENTRICULAR WHITE MATTER   ISCHEMIC CHANGES  NO HEMODYNAMIC SIGNIFICANT NARROWING WITHIN THE NECK. Prior LEFT CAROTID   ENDARTERECTOMY  NO PROXIMAL LARGE VESSEL OCCLUSION INTRACRANIALLY. 4 MM ANEURYSM DISTAL   CAVERNOUS PORTION LEFT INTERNAL CAROTID ARTERY POINTING MEDIALLY.    --- End of Report ---            JUNAID SOLO MD; Attending Radiologist  This document has been electronically signed. 2023  9:18AM    < end of copied text >

## 2023-01-30 NOTE — CONSULT NOTE ADULT - SUBJECTIVE AND OBJECTIVE BOX
HPI:  78yo Female, PMHx of HTN, HLD, CVA, CAD c/b MI - s/p stents x2, Hx of CEA with vascular sx , p/w vertigo presents with intermittent sub-sternal chest heaviness/pressure, nonradiating that started around 3pm and is intermittent since then lasting seconds at a time today a/w left hand tingling. Pt then checked her BP at home and it was 200/100. Later went upstairs and developed central headache from back to front 6/10 throbbing a/w nausea and seconds of blurry vision b/l. Pt has been coughing regularly for many months now but reports increased dry cough this week. Pt was seen in 2022 for similar symptoms and had a cardiac cath. Denies fevers, chills, sob, abd pain, dysuria, v/c/d.     PAST MEDICAL & SURGICAL HISTORY:  Dyslipidemia  HTN (hypertension), benign  CAD (coronary artery disease)  History of MI (myocardial infarction)  H/O heart artery stent  Vertigo  Migraines  Developed at 10 years of age  Last episode 2 years ago  Borderline diabetes mellitus  Controlled with diet  Legally blind in left eye, as defined in [place]  20/400 left eye  Sciatica  HTN (hypertension)  HLD (hyperlipidemia)  Cerebrovascular accident (CVA)  H/O myocardial ischemia  CAD (coronary artery disease)  stent x2  No Past Surgical History  History of tonsillectomy  History of tubal ligation  35 years ago, no complications as per patient.  S/P LASIK surgery  Right eye, no complications as per patient.  History of coronary artery stent placement  3 stents (Last in )  Same admission as past myocardial infarction  S/P ICD (internal cardiac defibrillator) procedure  loop recorder        Allergies  fish (Unknown)  iodine (Anaphylaxis)  No Known Drug Allergies    Intolerances  labetalol (Other)  carvedilol 6.25 milliGRAM(s) Oral every 12 hours    SOCIAL HISTORY:  FAMILY HISTORY:  Family history of MI (myocardial infarction) (Child)  Father ( at 75)  Mother ( at 87)  Son (  at 35)    Family history of hypertension (Child, Sibling)  Daughters, Sisters  Family history of hypercholesterolemia (Child, Sibling)  Family history of brain tumor (Sibling)      Vital Signs Last 24 Hrs  T(C): 36.7 (2023 09:00), Max: 36.8 (2023 01:12)  T(F): 98 (2023 09:00), Max: 98.3 (2023 01:12)  HR: 93 (2023 09:00) (65 - 93)  BP: 166/88 (2023 09:00) (163/76 - 232/112)  BP(mean): 114 (2023 07:24) (101 - 121)  RR: 16 (2023 09:00) (16 - 18)  SpO2: 97% (2023 09:00) (97% - 100%)    Parameters below as of 2023 09:00  Patient On (Oxygen Delivery Method): room air        PHYSICAL EXAM:      LABS:                          11.3   8.75  )-----------( 311      ( 2023 00:57 )             36.1         142  |  106  |  15  ----------------------------<  142<H>  3.6   |  25  |  0.95    Ca    9.8      2023 00:57    TPro  7.1  /  Alb  4.2  /  TBili  0.8  /  DBili  x   /  AST  18  /  ALT  12  /  AlkPhos  94            RADIOLOGY & ADDITIONAL STUDIES:  < from: CT Angio Neck w/ IV Cont (23 @ 08:53) >    IMPRESSION:  NO EVIDENCE OF AN ACUTE INTRACRANIAL HEMORRHAGE, MIDLINE SHIFT, OR   HYDROCEPHALUS. MILD ATROPHY WITH MILD PERIVENTRICULAR WHITE MATTER   ISCHEMIC CHANGES  NO HEMODYNAMIC SIGNIFICANT NARROWING WITHIN THE NECK. Prior LEFT CAROTID   ENDARTERECTOMY  NO PROXIMAL LARGE VESSEL OCCLUSION INTRACRANIALLY. 4 MM ANEURYSM DISTAL   CAVERNOUS PORTION LEFT INTERNAL CAROTID ARTERY POINTING MEDIALLY.    < end of copied text >         HPI:  76yo Female, PMHx of HTN, HLD, CVA, CAD c/b MI - s/p stents x2, Hx of CEA with vascular sx , p/w vertigo presents with intermittent sub-sternal chest heaviness/pressure, nonradiating that started around 3pm and is intermittent since then lasting seconds at a time today a/w left hand tingling. Pt then checked her BP at home and it was 200/100. Later went upstairs and developed central headache from back to front 6/10 throbbing a/w nausea and seconds of blurry vision b/l. Pt has been coughing regularly for many months now but reports increased dry cough this week. Pt was seen in 2022 for similar symptoms and had a cardiac cath. Denies fevers, chills, sob, abd pain, dysuria, v/c/d.     PAST MEDICAL & SURGICAL HISTORY:  Dyslipidemia  HTN (hypertension), benign  CAD (coronary artery disease)  History of MI (myocardial infarction)  H/O heart artery stent  Vertigo  Migraines  Developed at 10 years of age  Last episode 2 years ago  Borderline diabetes mellitus  Controlled with diet  Legally blind in left eye, [place] defined in [place]  20/400 left eye  Sciatica  HTN (hypertension)  HLD (hyperlipidemia)  Cerebrovascular accident (CVA)  H/O myocardial ischemia  CAD (coronary artery disease)  stent x2  No Past Surgical History  History of tonsillectomy  History of tubal ligation  35 years ago, no complications as per patient.  S/P LASIK surgery  Right eye, no complications as per patient.  History of coronary artery stent placement  3 stents (Last in )  Same admission as past myocardial infarction  S/P ICD (internal cardiac defibrillator) procedure  loop recorder        Allergies  fish (Unknown)  iodine (Anaphylaxis)  No Known Drug Allergies    Intolerances  labetalol (Other)  carvedilol 6.25 milliGRAM(s) Oral every 12 hours    SOCIAL HISTORY:  FAMILY HISTORY:  Family history of MI (myocardial infarction) (Child)  Father ( at 75)  Mother ( at 87)  Son (  at 35)    Family history of hypertension (Child, Sibling)  Daughters, Sisters  Family history of hypercholesterolemia (Child, Sibling)  Family history of brain tumor (Sibling)      Vital Signs Last 24 Hrs  T(C): 36.7 (2023 09:00), Max: 36.8 (2023 01:12)  T(F): 98 (2023 09:00), Max: 98.3 (2023 01:12)  HR: 93 (2023 09:00) (65 - 93)  BP: 166/88 (2023 09:00) (163/76 - 232/112)  BP(mean): 114 (2023 07:24) (101 - 121)  RR: 16 (:00) (16 - 18)  SpO2: 97% (2023 09:00) (97% - 100%)    Parameters below as of 2023 09:00  Patient On (Oxygen Delivery Method): room air        PHYSICAL EXAM:  AOx3, appropriate, follows commands  PERRL, EOMI, face symmetrical   FRAGOSO x 4 with good strength  No pronator drift   Sensation intact to light touch      LABS:                          11.3   8.75  )-----------( 311      ( 2023 00:57 )             36.1         142  |  106  |  15  ----------------------------<  142<H>  3.6   |  25  |  0.95    Ca    9.8      2023 00:57    TPro  7.1  /  Alb  4.2  /  TBili  0.8  /  DBili  x   /  AST  18  /  ALT  12  /  AlkPhos  94            RADIOLOGY & ADDITIONAL STUDIES:  < from: CT Angio Neck w/ IV Cont (23 @ 08:53) >    IMPRESSION:  NO EVIDENCE OF AN ACUTE INTRACRANIAL HEMORRHAGE, MIDLINE SHIFT, OR   HYDROCEPHALUS. MILD ATROPHY WITH MILD PERIVENTRICULAR WHITE MATTER   ISCHEMIC CHANGES  NO HEMODYNAMIC SIGNIFICANT NARROWING WITHIN THE NECK. Prior LEFT CAROTID   ENDARTERECTOMY  NO PROXIMAL LARGE VESSEL OCCLUSION INTRACRANIALLY. 4 MM ANEURYSM DISTAL   CAVERNOUS PORTION LEFT INTERNAL CAROTID ARTERY POINTING MEDIALLY.    < end of copied text >

## 2023-01-30 NOTE — ED PROVIDER NOTE - PHYSICAL EXAMINATION
GENERAL: well appearing in no acute distress, non-toxic appearing  HEAD: normocephalic, atraumatic  HEENT: normal conjunctiva, oral mucosa moist, uvula midline, no tonsilar exudates, neck supple, no JVD  CARDIAC: regular rate and rhythm, normal S1S2, no appreciable murmurs, 2+ pulses in UE b/l  PULM: normal breath sounds, clear to ascultation bilaterally, no rales, rhonchi, wheezing  GI: abdomen nondistended, soft, nontender, no guarding, rebound tenderness  : no CVA tenderness b/l, no suprapubic tenderness  NEURO: no gross focal motor or sensory deficits, CN2-12 intact, normal speech, PERRL, EOMI, normal gait, AAOx3  MSK: +ttp to sternal chest wall, no peripheral edema, no calf tenderness b/l  SKIN: well-perfused, extremities warm, no visible rashes  PSYCH: appropriate mood and affect

## 2023-01-30 NOTE — H&P ADULT - PROBLEM SELECTOR PLAN 3
S/P LP and SAH Ruled out.   < from: CT Angio Neck w/ IV Cont (01.30.23 @ 08:53) >    IMPRESSION:  NO EVIDENCE OF AN ACUTE INTRACRANIAL HEMORRHAGE, MIDLINE SHIFT, OR   HYDROCEPHALUS. MILD ATROPHY WITH MILD PERIVENTRICULAR WHITE MATTER   ISCHEMIC CHANGES  NO HEMODYNAMIC SIGNIFICANT NARROWING WITHIN THE NECK. Prior LEFT CAROTID   ENDARTERECTOMY  NO PROXIMAL LARGE VESSEL OCCLUSION INTRACRANIALLY. 4 MM ANEURYSM DISTAL   CAVERNOUS PORTION LEFT INTERNAL CAROTID ARTERY POINTING MEDIALLY.    < end of copied text >

## 2023-01-30 NOTE — ED ADULT NURSE REASSESSMENT NOTE - NS ED NURSE REASSESS COMMENT FT1
Patient received from Arnold SULLIVAN. Patient resting in bed and offered no complaints. Patient denies any pain. No distress noted. Vitals signs stable. Nursing care continues

## 2023-01-30 NOTE — ED PROVIDER NOTE - NS ED ROS FT
REVIEW OF SYSTEMS:     CONSTITUTIONAL: No fever, weight loss, + fatigue  EYES: No eye pain, +visual disturbances, or discharge  ENMT:  No difficulty hearing, tinnitus, vertigo; No sinus or throat pain  NECK: No pain or stiffness  RESPIRATORY: + cough, no wheezing, chills or hemoptysis; No shortness of breath  CARDIOVASCULAR: + chest pain, no palpitations, +dizziness, no leg swelling  GASTROINTESTINAL: No abdominal or epigastric pain. + nausea, no vomiting, or hematemesis; No diarrhea or constipation. No melena or hematochezia.  GENITOURINARY: No dysuria, frequency, hematuria, or incontinence  NEUROLOGICAL: + headaches, no memory loss, loss of strength, numbness, or tremors  SKIN: No itching, burning, rashes, or lesions

## 2023-01-30 NOTE — ED ADULT NURSE REASSESSMENT NOTE - NSIMPLEMENTINTERV_GEN_ALL_ED
Implemented All Universal Safety Interventions:  [place] to call system. Call bell, personal items and telephone within reach. Instruct patient to call for assistance. Room bathroom lighting operational. Non-slip footwear when patient is off stretcher. Physically safe environment: no spills, clutter or unnecessary equipment. Stretcher in lowest position, wheels locked, appropriate side rails in place.
Implemented All Universal Safety Interventions:  [place] to call system. Call bell, personal items and telephone within reach. Instruct patient to call for assistance. Room bathroom lighting operational. Non-slip footwear when patient is off stretcher. Physically safe environment: no spills, clutter or unnecessary equipment. Stretcher in lowest position, wheels locked, appropriate side rails in place.

## 2023-01-30 NOTE — H&P ADULT - ASSESSMENT
77F w PMHx HTN, HLD, CVA, CAD cb MI s/p 2 stents, L CEA  who presents with headache and chest pain. Patient reports onset of headache substernal chest pain yesterday afternoon, with subsequent development of headache. Reports headache was on the top of her head, transient in nature. Also reports blurred vision, although states this is chronic. Denies nausea, vomiting, fevers, chills, SOB, lightheadedness, dizziness, recent head strike or loss of consciousness.

## 2023-01-30 NOTE — ED PROVIDER NOTE - CLINICAL SUMMARY MEDICAL DECISION MAKING FREE TEXT BOX
77yF hx CAD, CVA, stents, vertigo presents with chest heaviness, headache with blurry vision. On exam hypertensive to 226/90s, mentating well, no obvious FNDs, +chest wall ttp. DDx includes acs vs costochondritis vs stroke. Will check basics, trop, cta head and neck with ct head noncon, reglan for headache. Dispo pending results. 77yF hx CAD, CVA, stents, vertigo presents with chest heaviness, headache with blurry vision. On exam hypertensive to 226/90s, mentating well, no obvious FNDs, +chest wall ttp. DDx includes acs vs costochondritis vs stroke. Will check basics, trop, cta head and neck with ct head noncon, reglan for headache. Dispo pending results.    ===================================  update (Ryne Jenkins MD; attending emergency medicine and medical toxicology) 5623 1/30/2023    lumbar puncture successfully performed although suspect mildly traumatic tap.  Tube 1 cell count 920 524 RBC count 150 indicating a downward trend.  Literature suggest that if traumatic tap this is suspected a tube for RBC count less than 500 has negative predictive value of 100% for SAH.  This in the setting of lack of xanthochromia suggest that subarachnoid hemorrhage is very unlikely.  Case was discussed with Dr. Mejia who recommended admission for further cardiac work-up.  Patient and family member informed of these findings plan for admission The patient's condition was not amenable to outpatient treatment due either the lack of feasibility of outpatient care coordination, possibility for further decompensation with adverse outcome if discharge, or treatments and diagnostic  modalities only available during an inpatient hospitalization.

## 2023-01-30 NOTE — PATIENT PROFILE ADULT - DO YOU NEED ADDITIONAL SERVICES TO MANAGE ANY OF THESE MEDICAL CONDITIONS AT HOME?
[address] [facility] ED  eMERGENCY dEPARTMENT eNCOUnter      Pt Name: Jonathan Naranjo  MRN: [id number]  [place] 1979  Date of evaluation: 8/6/2021  Provider: Adam Chowdhury MD    CHIEF COMPLAINT       Chief Complaint   Patient presents with    Back Pain    Fall         HISTORY OF PRESENT ILLNESS   (Location/Symptom, Timing/Onset,Context/Setting, Quality, Duration, Modifying Factors, Severity)  Note limiting factors. Jonathan Naranjo is a 40 y.o. female who presents to the emergency department for evaluation of left-sided rib pain/flank pain after a fall in her kitchen. Patient fell approximately 5 hours ago on the tile floor in her kitchen hitting her back and left hip. Now she is complaining of pain in the left lower rib area of her back. Pain is worse with movement. Pain is moderate to severe currently. No related abdominal pain. The hip pain seems to be better that is no significant pain with range of motion there. He denies numbness or paresthesias. No head injury. No neck pain. HPI    NursingNotes were reviewed. REVIEW OF SYSTEMS    (2-9 systems for level 4, 10 or more for level 5)     Review of Systems   Constitutional: Negative for chills and diaphoresis. HENT: Negative for congestion, ear pain, mouth sores and sore throat. Eyes: Negative for photophobia and discharge. Respiratory: Negative for cough, chest tightness, shortness of breath and wheezing. Cardiovascular: Negative for chest pain and palpitations. Gastrointestinal: Positive for nausea. Negative for abdominal distention, abdominal pain, diarrhea and vomiting. Endocrine: Negative for cold intolerance. Genitourinary: Negative for difficulty urinating. Musculoskeletal: Positive for back pain. Negative for arthralgias, gait problem, myalgias and neck pain. Skin: Negative for pallor and rash. Allergic/Immunologic: Negative for immunocompromised state. Neurological: Negative for dizziness and syncope. Hematological: Negative for adenopathy. Psychiatric/Behavioral: Negative for agitation, hallucinations, self-injury and sleep disturbance. The patient is not nervous/anxious. All other systems reviewed and are negative. Except as noted above the remainder of the review of systems was reviewed and negative. PAST MEDICAL HISTORY     Past Medical History:   Diagnosis Date    Seizures ([facility] [address]          SURGICALHISTORY     History reviewed. No pertinent surgical history. CURRENT MEDICATIONS       Previous Medications    LABETALOL (NORMODYNE) 200 MG TABLET    Take 1 tablet by mouth 2 times daily       ALLERGIES     Patient has no known allergies. FAMILY HISTORY     History reviewed. No pertinent family history. SOCIAL HISTORY       Social History     Socioeconomic History    Marital status: Single     Spouse name: None    Number of children: None    Years of education: None    Highest education level: None   Occupational History    None   Tobacco Use    Smoking status: Current Some Day Smoker     Packs/day: 0.50     Types: Cigarettes    Smokeless tobacco: Current User   Substance and Sexual Activity    Alcohol use: No    Drug use: No    Sexual activity: Yes     Partners: Male   Other Topics Concern    None   Social History Narrative    None     Social Determinants of Health     Financial Resource Strain:     Difficulty of Paying Living Expenses:    Food Insecurity:     Worried About Running Out of Food in the Last Year:     Ran Out of Food in the Last Year:    Transportation Needs:     Lack of Transportation (Medical):      Lack of Transportation (Non-Medical):    Physical Activity:     Days of Exercise per Week:     Minutes of Exercise per Session:    Stress:     Feeling of Stress :    Social Connections:     Frequency of Communication with Friends and Family:     Frequency of Social Gatherings with Friends and Family:     Attends [employer] Services:     Active Member of Clubs or Organizations:     Attends Club or Organization Meetings:     Marital Status:    Intimate Partner Violence:     Fear of Current or Ex-Partner:     Emotionally Abused:     Physically Abused:     Sexually Abused:        SCREENINGS      @FLOW([id number])@      PHYSICAL EXAM    (up to 7 for level 4, 8 or more for level 5)     ED Triage Vitals   BP Temp Temp Source Pulse Resp SpO2 Height Weight   08/06/21 0131 08/06/21 0131 08/06/21 0131 08/06/21 0131 08/06/21 0131 08/06/21 0131 08/06/21 0135 08/06/21 0135   (!) 156/109 98.6 °F (37 °C) Oral 70 18 100 % 5' 3\" (1.6 m) 150 lb (68 kg)       Physical Exam  Vitals and nursing note reviewed. Constitutional:       Appearance: She is well-developed. HENT:      Head: Normocephalic. Nose: Nose normal.      Mouth/Throat:      Mouth: Mucous membranes are moist.   Eyes:      Conjunctiva/sclera: Conjunctivae normal.      Pupils: Pupils are equal, round, and reactive to light. Cardiovascular:      Rate and Rhythm: Normal rate and regular rhythm. Heart sounds: Normal heart sounds. Pulmonary:      Effort: Pulmonary effort is normal.      Breath sounds: Normal breath sounds. Abdominal:      General: Bowel sounds are normal.      Palpations: Abdomen is soft. Tenderness: There is no abdominal tenderness. There is no guarding. Musculoskeletal:         General: Normal range of motion. Arms:       Cervical back: Normal range of motion and neck supple. Skin:     General: Skin is warm and dry. Capillary Refill: Capillary refill takes less than 2 seconds. Neurological:      Mental Status: She is alert and oriented to person, place, and time.    Psychiatric:         Mood and Affect: Mood normal.         DIAGNOSTIC RESULTS     EKG: All EKG's are interpreted by the Emergency Department Physician who either signs or Co-signsthis chart in the absence of a cardiologist.        RADIOLOGY:   M[place] such as CT, Ultrasound and MRI are read by the radiologist. Plain radiographic images are visualized and preliminarily interpreted by the emergency physician with the below findings:        Interpretation per the Radiologist below, if available at the time ofthis note:    XR RIBS LEFT INCLUDE CHEST (MIN 3 VIEWS)    (Results Pending)         ED BEDSIDE ULTRASOUND:   Performed by ED Physician - none    LABS:  Labs Reviewed - No data to display    All other labs were within normal range or not returned as of this dictation. EMERGENCY DEPARTMENT COURSE and DIFFERENTIAL DIAGNOSIS/MDM:   Vitals:    Vitals:    08/06/21 0131 08/06/21 0135   BP: (!) 156/109    Pulse: 70    Resp: 18    Temp: 98.6 °F (37 °C)    TempSrc: Oral    SpO2: 100%    Weight:  150 lb (68 kg)   Height:  5' 3\" (1.6 m)          MDM on recheck patient is feeling better. X-ray is negative. Patient is discharged home improved. CONSULTS:  None    PROCEDURES:  Unless otherwise noted below, none     Procedures    FINAL IMPRESSION      1. Rib contusion, left, initial encounter          DISPOSITION/PLAN   DISPOSITION        PATIENT REFERRED TO:  No follow-up provider specified.     DISCHARGE MEDICATIONS:  New Prescriptions    No medications on file          (Please note that portions of this note were completed with a voice recognition program.  Efforts were made to edit the dictations but occasionally words are mis-transcribed.)    Bianca Modi MD (electronically signed)  Attending Emergency Physician          Bianca Modi MD  08/06/21 [id number]       Bianca Modi MD  08/06/21 [id number] no

## 2023-01-30 NOTE — ED ADULT NURSE REASSESSMENT NOTE - NS ED NURSE REASSESS COMMENT FT1
received report form night RN. received pt in room 7, A+OX4 forgetful at times, ambulatory at baseline. VSS, denies chest pain and SOB, RR even and unlabored. no neuro deficits noted. pt was premedicated for CT, CT performed, pt tolerated well no s/s of reaction noted.

## 2023-01-30 NOTE — ED PROVIDER NOTE - OBJECTIVE STATEMENT
78yo Female, PMHx of HTN, HLD, CVA, CAD c/b MI - s/p stents x2, vertigo presents with intermittent sub-sternal chest heaviness/pressure, nonradiating that started around 3pm and is intermittent since then lasting seconds at a time today a/w left hand tingling. Pt then checked her BP at home and it was 200/100. Later went upstairs and developed central headache from back to front 6/10 throbbing a/w nausea and seconds of blurry vision b/l. Pt has been coughing regularly for many months now but reports increased dry cough this week. Pt was seen in Nov 2022 for similar symptoms and had a cardiac cath. Denies fevers, chills, sob, abd pain, dysuria, v/c/d. Endorses cough, lightheadedness, cp, nausea.    Cardiologist - Dr. Mejia, [facility]

## 2023-01-30 NOTE — ED PROVIDER NOTE - PROGRESS NOTE DETAILS
Julius Cardoso MD PGY-1: Pt reassessed. Headache is completely gone according to pt along with blurry vision and chest pressure/pain. Repeat blood pressure shows 162/77. Awaiting cta head.neck ct head Julius Cardoso MD PGY-1: Pt reassessed, still feels well no headache or chest pain since. Pretreating for iodine allergy for scans. Julius Cardoso MD PGY-1: Pt reassessed, SBP now 202 and pt feeling dizzy. Will give home dose of carvedilol 6.25mg and amlodipine 5mg. Pt states she takes normally at 6am. Chance, PGY-3  Patient signed out to me. 78yo Female, PMHx of HTN, HLD, CVA, CAD c/b MI - s/p stents x2, vertigo presented with chest heaviness/pressure, headache/blurry vision. Trops stable from 10 to 9. Hypertensive with SBPs in 200s, now 189/84 after clonidine patch/carvedilol/amlodipine. Pending CTA head/neck due to c/f for CVA; required premedication steroids/benadryl for contrast allergy. If no acute abnormalities, plan to call Dr. Mejia (patient's cardiologist) due to plan for stress test this coming month Chance, PGY-3  4mm ICA aneurysm on CTA neck. Neurosurgery consulted and will see patient Chance, PGY-3  Neurosurgery reports no acute intervention. LP performed in ER due to Chance, PGY-3  Neurosurgery reports no acute intervention. LP performed in ER due to low suspicion for subarachnoid given CT head performed after headache onset >6 hours. Tube 4 shows RBCs 150 (tube 1 925); SAH ruled out. Vascular surgery consulted due to history of left ICA CEA; pending recs. Spoke with patient's cardiologist Dr. Mejia who reports to admit for cardiac w/u/stress test. Spoke with Dr. Ruiz (cardiologist) as per Dr. Mejia's request. Admitted to Dr. Jolly

## 2023-01-30 NOTE — CONSULT NOTE ADULT - ASSESSMENT
78yo Female, PMHx of HTN, HLD, CVA, CAD c/b MI - s/p stents x2, Hx of CEA with vascular sx 2020, p/w vertigo, CP and HA. CTA showing 4mm Distal L ICA aneurysm.

## 2023-01-30 NOTE — ED ADULT NURSE NOTE - OBJECTIVE STATEMENT
Pt arrives to ED rm 7 A&Ox4 and ambulatory c/o worsening headache and HTN. PMHx HTN and CVA. Pt states she stopped taking her anticoagulation a couple weeks ago and she was transitioned from her PO BP medication to a patch. This morning when pt checked BP it was higher than normal, but continued throughout her day. As the day went on the pt developed a headache and intermittent chest discomfort. Pt did not take any medication for the pain. Respirations are even and unlabored. Pt is hypertensive at this time. Pt denies nausea, vomiting and diarrhea at this time. 20 G placed to LAC, labs drawn and sent. Medicated as per MD order

## 2023-01-30 NOTE — H&P ADULT - HISTORY OF PRESENT ILLNESS
78yo Female, PMHx of HTN, HLD, CVA, CAD c/b MI - s/p stents x2, vertigo presents with intermittent sub-sternal chest heaviness/pressure, nonradiating that started around 3pm and is intermittent since then lasting seconds at a time today a/w left hand tingling. Pt then checked her BP at home and it was 200/100. Later went upstairs and developed central headache from back to front 6/10 throbbing a/w nausea and seconds of blurry vision b/l. Pt has been coughing regularly for many months now but reports increased dry cough this week. Pt was seen in Nov 2022 for similar symptoms and had a cardiac cath. Denies fevers, chills, sob, abd pain, dysuria, v/c/d. Endorses cough, lightheadedness, cp, nausea.

## 2023-01-30 NOTE — ED ADULT NURSE REASSESSMENT NOTE - NS ED NURSE REASSESS COMMENT FT1
Report received from LAURIE Damon. Patient A&Ox4 and ambulatory at baseline. Respirations even and unlabored, no signs/symptoms of acute distress. Patient able to speak in complete sentences; patient denies dyspnea, shortness of breath, and chest pain. Patient denies pain and offers no complaints at this time. Patient resting in stretcher status post lumbar puncture, patient to lay flat for 1 hour per MD Gregory. Patient verbalizes understanding of instructions with family at bedside. Safety measures in place and call bell within reach. Report received from LAURIE Damon. Patient A&Ox4 and ambulatory at baseline. Respirations even and unlabored, no signs/symptoms of acute distress. Patient able to speak in complete sentences; patient denies dyspnea, shortness of breath, and chest pain; sinus tachycardia on tele. monitor. Patient denies pain and offers no complaints at this time. Patient resting in stretcher status post lumbar puncture, patient to lay flat for 1 hour per MD Gregory. Patient verbalizes understanding of instructions with family at bedside. Safety measures in place and call bell within reach. Report received from LAURIE Damon. Patient A&Ox4 with bouts of forgetfulness and ambulatory at baseline. Respirations even and unlabored, no signs/symptoms of acute distress. Patient able to speak in complete sentences; patient denies dyspnea, shortness of breath, and chest pain; sinus tachycardia on tele. monitor. Patient denies pain and offers no complaints at this time. Patient resting in stretcher status post lumbar puncture, patient to lay flat for 1 hour per MD Gregory. Patient verbalizes understanding of instructions with family at bedside. Safety measures in place and call bell within reach.

## 2023-01-31 LAB
ANION GAP SERPL CALC-SCNC: 13 MMOL/L — SIGNIFICANT CHANGE UP (ref 7–14)
BUN SERPL-MCNC: 23 MG/DL — SIGNIFICANT CHANGE UP (ref 7–23)
CALCIUM SERPL-MCNC: 9.6 MG/DL — SIGNIFICANT CHANGE UP (ref 8.4–10.5)
CHLORIDE SERPL-SCNC: 104 MMOL/L — SIGNIFICANT CHANGE UP (ref 98–107)
CO2 SERPL-SCNC: 22 MMOL/L — SIGNIFICANT CHANGE UP (ref 22–31)
CREAT SERPL-MCNC: 1.07 MG/DL — SIGNIFICANT CHANGE UP (ref 0.5–1.3)
EGFR: 54 ML/MIN/1.73M2 — LOW
GLUCOSE SERPL-MCNC: 133 MG/DL — HIGH (ref 70–99)
HCT VFR BLD CALC: 33 % — LOW (ref 34.5–45)
HGB BLD-MCNC: 10.5 G/DL — LOW (ref 11.5–15.5)
MAGNESIUM SERPL-MCNC: 1.9 MG/DL — SIGNIFICANT CHANGE UP (ref 1.6–2.6)
MCHC RBC-ENTMCNC: 26.5 PG — LOW (ref 27–34)
MCHC RBC-ENTMCNC: 31.8 GM/DL — LOW (ref 32–36)
MCV RBC AUTO: 83.3 FL — SIGNIFICANT CHANGE UP (ref 80–100)
NRBC # BLD: 0 /100 WBCS — SIGNIFICANT CHANGE UP (ref 0–0)
NRBC # FLD: 0 K/UL — SIGNIFICANT CHANGE UP (ref 0–0)
PHOSPHATE SERPL-MCNC: 3.4 MG/DL — SIGNIFICANT CHANGE UP (ref 2.5–4.5)
PLATELET # BLD AUTO: 304 K/UL — SIGNIFICANT CHANGE UP (ref 150–400)
POTASSIUM SERPL-MCNC: 3.6 MMOL/L — SIGNIFICANT CHANGE UP (ref 3.5–5.3)
POTASSIUM SERPL-SCNC: 3.6 MMOL/L — SIGNIFICANT CHANGE UP (ref 3.5–5.3)
RBC # BLD: 3.96 M/UL — SIGNIFICANT CHANGE UP (ref 3.8–5.2)
RBC # FLD: 13 % — SIGNIFICANT CHANGE UP (ref 10.3–14.5)
SODIUM SERPL-SCNC: 139 MMOL/L — SIGNIFICANT CHANGE UP (ref 135–145)
WBC # BLD: 12.25 K/UL — HIGH (ref 3.8–10.5)
WBC # FLD AUTO: 12.25 K/UL — HIGH (ref 3.8–10.5)

## 2023-01-31 PROCEDURE — 78452 HT MUSCLE IMAGE SPECT MULT: CPT | Mod: 26

## 2023-01-31 PROCEDURE — 93016 CV STRESS TEST SUPVJ ONLY: CPT | Mod: GC

## 2023-01-31 PROCEDURE — 93018 CV STRESS TEST I&R ONLY: CPT | Mod: GC

## 2023-01-31 RX ORDER — CARVEDILOL PHOSPHATE 80 MG/1
6.25 CAPSULE, EXTENDED RELEASE ORAL ONCE
Refills: 0 | Status: COMPLETED | OUTPATIENT
Start: 2023-01-31 | End: 2023-01-31

## 2023-01-31 RX ORDER — HYDRALAZINE HCL 50 MG
10 TABLET ORAL ONCE
Refills: 0 | Status: COMPLETED | OUTPATIENT
Start: 2023-01-31 | End: 2023-01-31

## 2023-01-31 RX ORDER — LISINOPRIL 2.5 MG/1
40 TABLET ORAL DAILY
Refills: 0 | Status: DISCONTINUED | OUTPATIENT
Start: 2023-01-31 | End: 2023-02-01

## 2023-01-31 RX ORDER — HYDRALAZINE HCL 50 MG
5 TABLET ORAL ONCE
Refills: 0 | Status: DISCONTINUED | OUTPATIENT
Start: 2023-01-31 | End: 2023-01-31

## 2023-01-31 RX ORDER — CARVEDILOL PHOSPHATE 80 MG/1
12.5 CAPSULE, EXTENDED RELEASE ORAL EVERY 12 HOURS
Refills: 0 | Status: DISCONTINUED | OUTPATIENT
Start: 2023-01-31 | End: 2023-02-01

## 2023-01-31 RX ADMIN — Medication 81 MILLIGRAM(S): at 13:01

## 2023-01-31 RX ADMIN — ATORVASTATIN CALCIUM 20 MILLIGRAM(S): 80 TABLET, FILM COATED ORAL at 21:45

## 2023-01-31 RX ADMIN — LISINOPRIL 20 MILLIGRAM(S): 2.5 TABLET ORAL at 06:04

## 2023-01-31 RX ADMIN — CARVEDILOL PHOSPHATE 12.5 MILLIGRAM(S): 80 CAPSULE, EXTENDED RELEASE ORAL at 19:36

## 2023-01-31 RX ADMIN — CARVEDILOL PHOSPHATE 6.25 MILLIGRAM(S): 80 CAPSULE, EXTENDED RELEASE ORAL at 06:04

## 2023-01-31 RX ADMIN — Medication 1 PATCH: at 08:13

## 2023-01-31 RX ADMIN — TICAGRELOR 60 MILLIGRAM(S): 90 TABLET ORAL at 07:16

## 2023-01-31 RX ADMIN — CARVEDILOL PHOSPHATE 6.25 MILLIGRAM(S): 80 CAPSULE, EXTENDED RELEASE ORAL at 13:00

## 2023-01-31 RX ADMIN — Medication 10 MILLIGRAM(S): at 13:34

## 2023-01-31 RX ADMIN — TICAGRELOR 60 MILLIGRAM(S): 90 TABLET ORAL at 21:44

## 2023-01-31 NOTE — CHART NOTE - NSCHARTNOTEFT_GEN_A_CORE
Medicine  Np note    Called from Stress test unit , pt BP at 11 ; 30 AM - 190/100, hr- 80, Stress test postponed until better BP control  Dr. Rik VAZQUEZ Attending notified  Hydralazine 10 mg IV ordered, carvedilol increased dosage to 12.5 mg BID, additional tabled carvedilol 6.25 mg given to pt, patient reported having slight shaking hands after receiving Hydralazine, symptom resolved, patient had stress test done, showing Normal study; no evidence for myocardial infarction or ischemia.    Angela Kaur NP-C  Department of Medicine- [facility]  In House Pager #[phone]

## 2023-01-31 NOTE — CONSULT NOTE ADULT - SUBJECTIVE AND OBJECTIVE BOX
Cardiovascular Disease Initial Evaluation  Date of Service: 23 @ 08:41     CHIEF COMPLAINT: Chest pain    HISTORY OF PRESENT ILLNESS:    This is a 77 year old woman with CAD s/p PCI to mLAD in 2022, HTN, HLD, and prior CVA who presented to [facility] on 2023 with intermittent sub-sternal chest heaviness/pressure, nonradiating that started around 3pm and is intermittent since then lasting seconds at a time, a/w left hand tingling. Pt then checked her BP at home and it was 200/100. Later she went upstairs and developed central headache from back to front 6/10 throbbing a/w nausea and seconds of blurry vision b/l. Pt has been coughing regularly for many months now but reports increased dry cough this week.   Currently her BP and chest pain are much improved.     Allergies  fish (Unknown)  iodine (Anaphylaxis)  No Known Drug Allergies      	    MEDICATIONS:  aspirin enteric coated 81 milliGRAM(s) Oral daily  carvedilol 6.25 milliGRAM(s) Oral every 12 hours  cloNIDine Patch 0.1 mG/24Hr(s) 1 patch Transdermal every 7 days  lisinopril 20 milliGRAM(s) Oral daily  ticagrelor 60 milliGRAM(s) Oral every 12 hours            atorvastatin 20 milliGRAM(s) Oral at bedtime        PAST MEDICAL & SURGICAL HISTORY:  Dyslipidemia      HTN (hypertension), benign      CAD (coronary artery disease)      History of MI (myocardial infarction)      H/O heart artery stent      Vertigo      Migraines  Developed at 10 years of age  Last episode 2 years ago      Borderline diabetes mellitus  Controlled with diet      Legally blind in left eye, as defined in [place]  20/400 left eye      Sciatica      HTN (hypertension)      HLD (hyperlipidemia)      Cerebrovascular accident (CVA)      H/O myocardial ischemia      CAD (coronary artery disease)  stent x2      No Past Surgical History      History of tonsillectomy      History of tubal ligation  35 years ago, no complications as per patient.      S/P LASIK surgery  Right eye, no complications as per patient.      History of coronary artery stent placement  3 stents (Last in )  Same admission as past myocardial infarction      S/P ICD (internal cardiac defibrillator) procedure  loop recorder          FAMILY HISTORY:  Family history of MI (myocardial infarction) (Child)  Father ( at 75)  Mother ( at 87)  Son (  at 35)    Family history of hypertension (Child, Sibling)  Daughters, Sisters    Family history of hypercholesterolemia (Child, Sibling)    Family history of brain tumor (Sibling)        SOCIAL HISTORY:    The patient is a nonsmoker       REVIEW OF SYSTEMS:  See HPI, otherwise complete 14 point review of systems negative    PHYSICAL EXAM:  T(C): 36.7 (23 @ 06:00), Max: 36.9 (23 @ 22:00)  HR: 97 (23 @ 06:00) (92 - 106)  BP: 154/79 (23 @ 06:00) (143/72 - 166/88)  RR: 18 (23 @ 06:00) (16 - 20)  SpO2: 100% (23 @ 06:00) (96% - 100%)  Wt(kg): --  I&O's Summary      Appearance: No Acute Distress; resting comfortably  HEENT:  Normal oral mucosa, PERRL, EOMI	  Cardiovascular: Normal S1 S2, No JVD, No murmurs/rubs/gallops  Respiratory: Normal respiratory effort; Lungs clear to auscultation bilaterally  Gastrointestinal:  Soft, Non-tender, + BS	  Skin: No rashes, No ecchymoses, No cyanosis	  Neurologic: Non-focal; no weakness  Extremities: No clubbing, cyanosis or edema  Vascular: Peripheral pulses palpable 2+ bilaterally  Psychiatry: A & O x 3, Mood & affect appropriate    Laboratory Data:	 	    CBC Full  -  ( 2023 05:25 )  WBC Count : 12.25 K/uL  Hemoglobin : 10.5 g/dL  Hematocrit : 33.0 %  Platelet Count - Automated : 304 K/uL  Mean Cell Volume : 83.3 fL  Mean Cell Hemoglobin : 26.5 pg  Mean Cell Hemoglobin Concentration : 31.8 gm/dL  Auto Neutrophil # : x  Auto Lymphocyte # : x  Auto Monocyte # : x  Auto Eosinophil # : x  Auto Basophil # : x  Auto Neutrophil % : x  Auto Lymphocyte % : x  Auto Monocyte % : x  Auto Eosinophil % : x  Auto Basophil % : x        139  |  104  |  23  ----------------------------<  133<H>  3.6   |  22  |  1.07      142  |  106  |  15  ----------------------------<  142<H>  3.6   |  25  |  0.95    Ca    9.6      2023 05:25  Ca    9.8      2023 00:57  Phos  3.4       Mg     1.90         TPro  7.1  /  Alb  4.2  /  TBili  0.8  /  DBili  x   /  AST  18  /  ALT  12  /  AlkPhos  94        Interpretation of Telemetry: Sinus; no ectopy    ECG:  	Sinus; nonspecific t-wave changes    Assessment: 77 year old woman with CAD s/p PCI to mLAD in 2022, HTN, HLD, and prior CVA presents with angina and HTN urgency.     Plan of Care:    #Angina-  Likely due to HTN urgency.  Unclear why patient developed sudden onset elevated BP as she denies dietary indiscretion or medication nonadherence.   EKG is nonischemic and patient has ruled out for ACS.  Plan for stress test today, as ordered by the primary team.    #CAD-  S/P PCI in 2022.  Continue DAPT and statin.    No cardiac objection to discharge planning is stress test is negative for inducible ischemia.    #ACP (advance care planning)-  Advanced care planning was discussed with the patient.   EKG and lab findings were discussed in detail and all questions were answered.  She agrees to NST.  30 additional minutes spent addressing advance care plans.      72 minutes spent on total encounter; more than 50% of the visit was spent counseling and/or coordinating care by the attending physician.   	  Merrick Ramirez MD [facility]  Cardiovascular Diseases  [phone]

## 2023-01-31 NOTE — PROGRESS NOTE ADULT - SUBJECTIVE AND OBJECTIVE BOX
Date of Service  : 01-31-23    INTERVAL HPI/OVERNIGHT EVENTS: I feel fine.   Vital Signs Last 24 Hrs  T(C): 36.7 (31 Jan 2023 06:00), Max: 36.9 (30 Jan 2023 22:00)  T(F): 98.1 (31 Jan 2023 06:00), Max: 98.4 (30 Jan 2023 22:00)  HR: 97 (31 Jan 2023 06:00) (92 - 106)  BP: 154/79 (31 Jan 2023 06:00) (143/72 - 163/94)  BP(mean): 117 (30 Jan 2023 16:32) (117 - 117)  RR: 18 (31 Jan 2023 06:00) (18 - 20)  SpO2: 100% (31 Jan 2023 06:00) (98% - 100%)    Parameters below as of 31 Jan 2023 06:00  Patient On (Oxygen Delivery Method): room air      I&O's Summary    MEDICATIONS  (STANDING):  aspirin enteric coated 81 milliGRAM(s) Oral daily  atorvastatin 20 milliGRAM(s) Oral at bedtime  carvedilol 6.25 milliGRAM(s) Oral once  carvedilol 12.5 milliGRAM(s) Oral every 12 hours  cloNIDine Patch 0.1 mG/24Hr(s) 1 patch Transdermal every 7 days  hydrALAZINE Injectable 10 milliGRAM(s) IV Push once  lisinopril 40 milliGRAM(s) Oral daily  ticagrelor 60 milliGRAM(s) Oral every 12 hours    MEDICATIONS  (PRN):    LABS:                        10.5   12.25 )-----------( 304      ( 31 Jan 2023 05:25 )             33.0     01-31    139  |  104  |  23  ----------------------------<  133<H>  3.6   |  22  |  1.07    Ca    9.6      31 Jan 2023 05:25  Phos  3.4     01-31  Mg     1.90     01-31    TPro  7.1  /  Alb  4.2  /  TBili  0.8  /  DBili  x   /  AST  18  /  ALT  12  /  AlkPhos  94  01-30    PT/INR - ( 30 Jan 2023 10:15 )   PT: 12.6 sec;   INR: 1.09 ratio         PTT - ( 30 Jan 2023 10:15 )  PTT:35.0 sec    CAPILLARY BLOOD GLUCOSE              REVIEW OF SYSTEMS:  CONSTITUTIONAL: No fever, weight loss, or fatigue  EYES: No eye pain, visual disturbances, or discharge  ENMT:  No difficulty hearing, tinnitus, vertigo; No sinus or throat pain  NECK: No pain or stiffness  RESPIRATORY: No cough, wheezing, chills or hemoptysis; No shortness of breath  CARDIOVASCULAR: No chest pain, palpitations, dizziness, or leg swelling  GASTROINTESTINAL: No abdominal or epigastric pain. No nausea, vomiting, or hematemesis; No diarrhea or constipation. No melena or hematochezia.  GENITOURINARY: No dysuria, frequency, hematuria, or incontinence  NEUROLOGICAL: No headaches, memory loss, loss of strength, numbness, or tremors      Consultant(s) Notes Reviewed:  [x ] YES  [ ] NO    PHYSICAL EXAM:  GENERAL: NAD, well-groomed, well-developed ,not in any distress ,  HEAD:  Atraumatic, Normocephalic  EYES: EOMI, PERRLA, conjunctiva and sclera clear  ENMT: No tonsillar erythema, exudates, or enlargement; Moist mucous membranes, Good dentition, No lesions  NECK: Supple, No JVD, Normal thyroid  NERVOUS SYSTEM:  Alert & Oriented X3, No focal deficit   CHEST/LUNG: Good air entry bilateral with no  rales, rhonchi, wheezing, or rubs  HEART: Regular rate and rhythm; No murmurs, rubs, or gallops  ABDOMEN: Soft, Nontender, Nondistended; Bowel sounds present  EXTREMITIES:  2+ Peripheral Pulses, No clubbing, cyanosis, or edema    Care Discussed with Consultants/Other Providers [ x] YES  [ ] NO

## 2023-02-01 ENCOUNTER — TRANSCRIPTION ENCOUNTER (OUTPATIENT)
Age: 78
End: 2023-02-01

## 2023-02-01 VITALS
SYSTOLIC BLOOD PRESSURE: 147 MMHG | RESPIRATION RATE: 18 BRPM | OXYGEN SATURATION: 100 % | DIASTOLIC BLOOD PRESSURE: 74 MMHG | HEART RATE: 72 BPM | TEMPERATURE: 98 F

## 2023-02-01 LAB
ANION GAP SERPL CALC-SCNC: 12 MMOL/L — SIGNIFICANT CHANGE UP (ref 7–14)
BUN SERPL-MCNC: 22 MG/DL — SIGNIFICANT CHANGE UP (ref 7–23)
CALCIUM SERPL-MCNC: 9.3 MG/DL — SIGNIFICANT CHANGE UP (ref 8.4–10.5)
CHLORIDE SERPL-SCNC: 104 MMOL/L — SIGNIFICANT CHANGE UP (ref 98–107)
CO2 SERPL-SCNC: 22 MMOL/L — SIGNIFICANT CHANGE UP (ref 22–31)
CREAT SERPL-MCNC: 1.04 MG/DL — SIGNIFICANT CHANGE UP (ref 0.5–1.3)
EGFR: 56 ML/MIN/1.73M2 — LOW
GLUCOSE SERPL-MCNC: 120 MG/DL — HIGH (ref 70–99)
HCT VFR BLD CALC: 35.3 % — SIGNIFICANT CHANGE UP (ref 34.5–45)
HGB BLD-MCNC: 11.2 G/DL — LOW (ref 11.5–15.5)
MAGNESIUM SERPL-MCNC: 1.9 MG/DL — SIGNIFICANT CHANGE UP (ref 1.6–2.6)
MCHC RBC-ENTMCNC: 26.5 PG — LOW (ref 27–34)
MCHC RBC-ENTMCNC: 31.7 GM/DL — LOW (ref 32–36)
MCV RBC AUTO: 83.5 FL — SIGNIFICANT CHANGE UP (ref 80–100)
NRBC # BLD: 0 /100 WBCS — SIGNIFICANT CHANGE UP (ref 0–0)
NRBC # FLD: 0 K/UL — SIGNIFICANT CHANGE UP (ref 0–0)
PHOSPHATE SERPL-MCNC: 3.1 MG/DL — SIGNIFICANT CHANGE UP (ref 2.5–4.5)
PLATELET # BLD AUTO: 298 K/UL — SIGNIFICANT CHANGE UP (ref 150–400)
POTASSIUM SERPL-MCNC: 3.5 MMOL/L — SIGNIFICANT CHANGE UP (ref 3.5–5.3)
POTASSIUM SERPL-SCNC: 3.5 MMOL/L — SIGNIFICANT CHANGE UP (ref 3.5–5.3)
RBC # BLD: 4.23 M/UL — SIGNIFICANT CHANGE UP (ref 3.8–5.2)
RBC # FLD: 13.4 % — SIGNIFICANT CHANGE UP (ref 10.3–14.5)
SODIUM SERPL-SCNC: 138 MMOL/L — SIGNIFICANT CHANGE UP (ref 135–145)
WBC # BLD: 10.55 K/UL — HIGH (ref 3.8–10.5)
WBC # FLD AUTO: 10.55 K/UL — HIGH (ref 3.8–10.5)

## 2023-02-01 RX ORDER — TICAGRELOR 90 MG/1
1 TABLET ORAL
Qty: 60 | Refills: 0
Start: 2023-02-01 | End: 2023-03-02

## 2023-02-01 RX ORDER — LISINOPRIL 2.5 MG/1
20 TABLET ORAL DAILY
Refills: 0 | Status: DISCONTINUED | OUTPATIENT
Start: 2023-02-02 | End: 2023-02-01

## 2023-02-01 RX ORDER — CARVEDILOL PHOSPHATE 80 MG/1
1 CAPSULE, EXTENDED RELEASE ORAL
Qty: 60 | Refills: 0
Start: 2023-02-01 | End: 2023-03-02

## 2023-02-01 RX ORDER — HYDRALAZINE HCL 50 MG
2.5 TABLET ORAL ONCE
Refills: 0 | Status: DISCONTINUED | OUTPATIENT
Start: 2023-02-01 | End: 2023-02-01

## 2023-02-01 RX ORDER — CARVEDILOL PHOSPHATE 80 MG/1
0 CAPSULE, EXTENDED RELEASE ORAL
Qty: 0 | Refills: 1 | DISCHARGE

## 2023-02-01 RX ORDER — ATORVASTATIN CALCIUM 80 MG/1
1 TABLET, FILM COATED ORAL
Qty: 0 | Refills: 0 | DISCHARGE
Start: 2023-02-01

## 2023-02-01 RX ADMIN — CARVEDILOL PHOSPHATE 12.5 MILLIGRAM(S): 80 CAPSULE, EXTENDED RELEASE ORAL at 05:24

## 2023-02-01 RX ADMIN — Medication 81 MILLIGRAM(S): at 14:36

## 2023-02-01 RX ADMIN — LISINOPRIL 40 MILLIGRAM(S): 2.5 TABLET ORAL at 00:47

## 2023-02-01 RX ADMIN — TICAGRELOR 60 MILLIGRAM(S): 90 TABLET ORAL at 05:23

## 2023-02-01 NOTE — DISCHARGE NOTE NURSING/CASE MANAGEMENT/SOCIAL WORK - NSSCNAMETXT_GEN_ALL_CORE
[facility] [phone] Nurse to visit on the day following discharge. Other appropriate services to be arranged thereafter. Please contact the home care agency at the above phone number if you have not heard from them by approximately 12 noon on the day after your hospital discharge.

## 2023-02-01 NOTE — DISCHARGE NOTE PROVIDER - CARE PROVIDERS DIRECT ADDRESSES
[email or web address]

## 2023-02-01 NOTE — PROGRESS NOTE ADULT - ASSESSMENT
77F w PMHx HTN, HLD, CVA, CAD cb MI s/p 2 stents, L CEA  who presents with headache and chest pain. Patient reports onset of headache substernal chest pain yesterday afternoon, with subsequent development of headache. Reports headache was on the top of her head, transient in nature. Also reports blurred vision, although states this is chronic. Denies nausea, vomiting, fevers, chills, SOB, lightheadedness, dizziness, recent head strike or loss of consciousness.      Problem/Plan - 1:  ·  Problem: Hypertensive emergency.   ·  Plan: Clonidine patch restarted . Increased  Lisinopril and Coreg as BP readings high.      Problem/Plan - 2:  ·  Problem: Chest pain.   ·  Plan: R/O ACS . Will get pharmacological stress test as walking flares her sciatica.  Awaiting NST .      Problem/Plan - 3:  ·  Problem: Headache syndrome.   ·  Plan: S/P LP and SAH Ruled out.   < from: CT Angio Neck w/ IV Cont (01.30.23 @ 08:53) >    IMPRESSION:  NO EVIDENCE OF AN ACUTE INTRACRANIAL HEMORRHAGE, MIDLINE SHIFT, OR   HYDROCEPHALUS. MILD ATROPHY WITH MILD PERIVENTRICULAR WHITE MATTER   ISCHEMIC CHANGES  NO HEMODYNAMIC SIGNIFICANT NARROWING WITHIN THE NECK. Prior LEFT CAROTID   ENDARTERECTOMY  NO PROXIMAL LARGE VESSEL OCCLUSION INTRACRANIALLY. 4 MM ANEURYSM DISTAL   CAVERNOUS PORTION LEFT INTERNAL CAROTID ARTERY POINTING MEDIALLY.    < end of copied text >.     Problem/Plan - 4:  ·  Problem: CAD in native artery.   ·  Plan: S/P Stents . on DAPT, BB and Statin . Awaiting ST.     Problem/Plan - 5:  ·  Problem: Internal carotid aneurysm.   ·  Plan: Nsx and Vascular help appreciated. No intervention.     Problem/Plan - 6:  ·  Problem: HLD (hyperlipidemia).   ·  Plan: Statin.      
77F w PMHx HTN, HLD, CVA, CAD cb MI s/p 2 stents, L CEA  who presents with headache and chest pain. Patient reports onset of headache substernal chest pain yesterday afternoon, with subsequent development of headache. Reports headache was on the top of her head, transient in nature. Also reports blurred vision, although states this is chronic. Denies nausea, vomiting, fevers, chills, SOB, lightheadedness, dizziness, recent head strike or loss of consciousness.      Problem/Plan - 1:  ·  Problem: Hypertensive emergency.   ·  Plan: Clonidine patch restarted .   BP better     Problem/Plan - 2:  ·  Problem: Chest pain.   ·  Plan: R/O ACS . Will get pharmacological stress test as walking flares her sciatica  stress negative  cleared by cardiology for discharge      Problem/Plan - 3:  ·  Problem: Headache syndrome.   ·  Plan: S/P LP and SAH Ruled out.   < from: CT Angio Neck w/ IV Cont (01.30.23 @ 08:53) >    IMPRESSION:  NO EVIDENCE OF AN ACUTE INTRACRANIAL HEMORRHAGE, MIDLINE SHIFT, OR   HYDROCEPHALUS. MILD ATROPHY WITH MILD PERIVENTRICULAR WHITE MATTER   ISCHEMIC CHANGES  NO HEMODYNAMIC SIGNIFICANT NARROWING WITHIN THE NECK. Prior LEFT CAROTID   ENDARTERECTOMY  NO PROXIMAL LARGE VESSEL OCCLUSION INTRACRANIALLY. 4 MM ANEURYSM DISTAL   CAVERNOUS PORTION LEFT INTERNAL CAROTID ARTERY POINTING MEDIALLY.    < end of copied text >.     Problem/Plan - 4:  ·  Problem: CAD in native artery.   ·  Plan: S/P Stents . on DAPT, BB and Statin . Awaiting ST.     Problem/Plan - 5:  ·  Problem: Internal carotid aneurysm.   ·  Plan: Nsx and Vascular help appreciated. No intervention.     Problem/Plan - 6:  ·  Problem: HLD (hyperlipidemia).   ·  Plan: Statin.

## 2023-02-01 NOTE — PROGRESS NOTE ADULT - SUBJECTIVE AND OBJECTIVE BOX
Cardiovascular Disease Progress Note  Date of Service: 02-01-23 @ 06:59    Overnight events: No acute events overnight.    Patient denies chest pain or SOB.   Otherwise review of systems negative    Objective Findings:  T(C): 36.9 (02-01-23 @ 05:20), Max: 37.1 (01-31-23 @ 21:33)  HR: 72 (02-01-23 @ 05:20) (72 - 98)  BP: 147/74 (02-01-23 @ 05:20) (147/74 - 190/87)  RR: 18 (02-01-23 @ 05:20) (17 - 18)  SpO2: 100% (02-01-23 @ 05:20) (97% - 100%)  Wt(kg): --  Daily Height in cm: 147.32 (31 Jan 2023 11:14)    Daily       Physical Exam:  Gen: NAD; Patient resting comfortably  HEENT: EOMI, Normocephalic/ atraumatic  CV: RRR, normal S1 + S2, no m/r/g  Lungs:  Normal respiratory effort; clear to auscultation bilaterally  Abd: soft, non-tender; bowel sounds present  Ext: No edema; warm and well perfused    Telemetry: Sinus; no ectopy    Laboratory Data:                        10.5   12.25 )-----------( 304      ( 31 Jan 2023 05:25 )             33.0     01-31    139  |  104  |  23  ----------------------------<  133<H>  3.6   |  22  |  1.07    Ca    9.6      31 Jan 2023 05:25  Phos  3.4     01-31  Mg     1.90     01-31      PT/INR - ( 30 Jan 2023 10:15 )   PT: 12.6 sec;   INR: 1.09 ratio         PTT - ( 30 Jan 2023 10:15 )  PTT:35.0 sec          Inpatient Medications:  MEDICATIONS  (STANDING):  aspirin enteric coated 81 milliGRAM(s) Oral daily  atorvastatin 20 milliGRAM(s) Oral at bedtime  carvedilol 12.5 milliGRAM(s) Oral every 12 hours  cloNIDine Patch 0.1 mG/24Hr(s) 1 patch Transdermal every 7 days  lisinopril 40 milliGRAM(s) Oral daily  ticagrelor 60 milliGRAM(s) Oral every 12 hours      Assessment: 77 year old woman with CAD s/p PCI to mLAD in 11/2022, HTN, HLD, and prior CVA presents with angina and HTN urgency.     Plan of Care:    #Angina-  Likely due to HTN urgency.  BP is now better controlled after up titrating Coreg and Lisinopril.   Nuclear stress test is negative for inducible ischemia.   No further inpatient cardiac work up is warranted at this time.     #CAD-  S/P PCI in 11/2022.  Stress test results as above.   Continue DAPT and statin.    No cardiac objection to discharge planning.  Outpatient cardiac f/u with Dr. Jorge Mejia.         Over 35 minutes spent on total encounter; more than 50% of the visit was spent counseling and/or coordinating care by the attending physician.      Merrick Ramirez MD [facility]  Cardiovascular Disease  [phone] Cardiovascular Disease Progress Note  Date of Service: 02-01-23 @ 06:59    Overnight events: No acute events overnight.    Patient denies chest pain or SOB.   Otherwise review of systems negative    Objective Findings:  T(C): 36.9 (02-01-23 @ 05:20), Max: 37.1 (01-31-23 @ 21:33)  HR: 72 (02-01-23 @ 05:20) (72 - 98)  BP: 147/74 (02-01-23 @ 05:20) (147/74 - 190/87)  RR: 18 (02-01-23 @ 05:20) (17 - 18)  SpO2: 100% (02-01-23 @ 05:20) (97% - 100%)  Wt(kg): --  Daily Height in cm: 147.32 (31 Jan 2023 11:14)    Daily       Physical Exam:  Gen: NAD; Patient resting comfortably  HEENT: EOMI, Normocephalic/ atraumatic  CV: RRR, normal S1 + S2, no m/r/g  Lungs:  Normal respiratory effort; clear to auscultation bilaterally  Abd: soft, non-tender; bowel sounds present  Ext: No edema; warm and well perfused    Telemetry: Sinus; no ectopy    Laboratory Data:                        10.5   12.25 )-----------( 304      ( 31 Jan 2023 05:25 )             33.0     01-31    139  |  104  |  23  ----------------------------<  133<H>  3.6   |  22  |  1.07    Ca    9.6      31 Jan 2023 05:25  Phos  3.4     01-31  Mg     1.90     01-31      PT/INR - ( 30 Jan 2023 10:15 )   PT: 12.6 sec;   INR: 1.09 ratio         PTT - ( 30 Jan 2023 10:15 )  PTT:35.0 sec          Inpatient Medications:  MEDICATIONS  (STANDING):  aspirin enteric coated 81 milliGRAM(s) Oral daily  atorvastatin 20 milliGRAM(s) Oral at bedtime  carvedilol 12.5 milliGRAM(s) Oral every 12 hours  cloNIDine Patch 0.1 mG/24Hr(s) 1 patch Transdermal every 7 days  lisinopril 40 milliGRAM(s) Oral daily  ticagrelor 60 milliGRAM(s) Oral every 12 hours      Assessment: 77 year old woman with CAD s/p PCI to mLAD in 11/2022, HTN, HLD, and prior CVA presents with angina and HTN urgency.     Plan of Care:    #Angina-  Likely due to HTN urgency.  BP is now better controlled after up titrating Coreg.  I will reduce lisinopril back to the home dose of 20 mg daily, as it was recently increased in the outpatient setting.    Nuclear stress test is negative for inducible ischemia.   No further inpatient cardiac work up is warranted at this time.     #CAD-  S/P PCI in 11/2022.  Stress test results as above.   Continue DAPT and statin.    No cardiac objection to discharge planning.  Care discussed at length with patient and her daughter Dena (works as a  in the ED).   Outpatient cardiac f/u with Dr. Jorge Mejia.         Over 35 minutes spent on total encounter; more than 50% of the visit was spent counseling and/or coordinating care by the attending physician.      Merrick Ramirez MD [facility]  Cardiovascular Disease  [phone]

## 2023-02-01 NOTE — PHYSICAL THERAPY INITIAL EVALUATION ADULT - ADDITIONAL COMMENTS
Pt. reports she lives in a house with steps to negotiate.    Pt. was left seated at edge of bed post PT Evaluation, no apparent distress, all lines intact, call bell within reach.

## 2023-02-01 NOTE — DISCHARGE NOTE PROVIDER - NSDCCPCAREPLAN_GEN_ALL_CORE_FT
PRINCIPAL DISCHARGE DIAGNOSIS  Diagnosis: Chest pain  Assessment and Plan of Treatment: You came into the hospital with chest pain likely associated with your high blood pressure. Your medications were adjusted. Cardiology saw you and performed a nuclear stress test which was normal and showed no heart damage. Please follow up with cardiology and your pcp in 1-2 weeks for further care.      SECONDARY DISCHARGE DIAGNOSES  Diagnosis: Internal carotid aneurysm  Assessment and Plan of Treatment: Neurosurgery and vascular surgery saw you for an aneurym in your brain that was found incidentally on imaging. With your history of a left carotid endarterectomy, they wanted to make sure there was nothing to be done inpatient. Per both of these consultants, they're not concerned and want you to follow up outpatient with them for further care.

## 2023-02-01 NOTE — DISCHARGE NOTE PROVIDER - NSDCMRMEDTOKEN_GEN_ALL_CORE_FT
Aspirin Enteric Coated 81 mg oral delayed release tablet: 1 tab(s) orally once a day  atorvastatin 20 mg oral tablet: 1 tab(s) orally once a day (at bedtime)  carvedilol 12.5 mg oral tablet: 1 tab(s) orally every 12 hours  CLONIDINE 0.1 MG/DAY PATCH: APPLY 1 PATCH EVERY WEEK BY TRANSDERMAL ROUTE FOR 30 DAYS.  lisinopril 20 mg oral tablet: 1 tab(s) orally once a day  meclizine 25 mg oral tablet: 1 tab(s) orally 3 times a day, As Needed vertigo  ticagrelor 60 mg oral tablet: 1 tab(s) orally 2 times a day

## 2023-02-01 NOTE — DISCHARGE NOTE PROVIDER - PROVIDER TOKENS
PROVIDER:[TOKEN:[[id number]],FOLLOWUP:[2 weeks]],PROVIDER:[TOKEN:[[id number]],FOLLOWUP:[2 weeks]],PROVIDER:[TOKEN:[[id number]],FOLLOWUP:[2 weeks]],PROVIDER:[TOKEN:[[id number]]]

## 2023-02-01 NOTE — DISCHARGE NOTE NURSING/CASE MANAGEMENT/SOCIAL WORK - PATIENT PORTAL LINK FT
You can access the FollowMyHealth Patient Portal offered by [facility] by registering at the following website: [email or web address]. By joining [employer]’s FollowMyHealth portal, you will also be able to view your health information using other applications (apps) compatible with our system.

## 2023-02-01 NOTE — PROGRESS NOTE ADULT - NSPROGADDITIONALINFOA_GEN_ALL_CORE
discharge home  discussed with patient in detail, expresses understanding of treatment plans.  discussed with covering ACP

## 2023-02-01 NOTE — DISCHARGE NOTE NURSING/CASE MANAGEMENT/SOCIAL WORK - CASE MANAGER'S NAME
Speech Language Pathology Evaluation  Bedside Swallow    Patient Name:  Cece Figueroa   MRN:  [id number]  Admitting Diagnosis: Dehydration    Recommendations:                 General Recommendations:  Dysphagia therapy  Diet recommendations:  Mechanical soft, Thin   Aspiration Precautions: Alternating bites/sips, Assistance with meals, Check for pocketing/oral residue, HOB to 90 degrees, Meds crushed in puree, Monitor for s/s of aspiration and Standard aspiration precautions   General Precautions: Standard,    Communication strategies:  provide increased time to answer    History:     History reviewed. No pertinent past medical history.    Past Surgical History:   Procedure Laterality Date    HYSTERECTOMY      INTRAMEDULLARY RODDING OF FEMUR Right 3/30/2020    Procedure: INSERTION, INTRAMEDULLARY ARMANDO, FEMUR-HIP;  Surgeon: Armen Norwood MD;  Location: [facility];  Service: Orthopedics;  Laterality: Right;  [place]       Social History: Patient lives with daughter.     Prior Intubation HX:  N/A    Modified Barium Swallow: N/A    Chest X-Rays: Most recent chest x-ray on 5/16/20 compared to 3/30/20    Findings indicate: The lungs are symmetrically expanded bilaterally with coarse interstitial attenuation bilaterally, nonspecific, could reflect sequela of chronic change or interstitial edema..  No convincing large focal consolidation allowing for patient positioning.  There is no pneumothorax.  The osseous structures are remarkable for degenerative changes noting osteopenia..    Prior diet: Unknown    Subjective     Pt responded to environmental stimuli. She became awake and alert. Pt was cooperative and answered ST questions.     Pain/Comfort:  · Pain Rating 1: 0/10    Objective:     Oral Musculature Evaluation  · Oral Musculature: WFL  · Dentition: edentulous  · Secretion Management: adequate  · Mucosal Quality: adequate  · Mandibular Strength and Mobility: WFL  · Oral Labial Strength and Mobility:  WFL  · Lingual Strength and Mobility: WFL  · Buccal Strength and Mobility: WFL  · Volitional Swallow: Delayed   · Voice Prior to PO Intake: Clear    Bedside Swallow Eval:   Consistencies Assessed:  · Thin liquids approx x4 oz  · Puree x4 tsp   · Soft solids x3     Oral Phase:   · WFL  · Prolonged mastication of soft solids  · Lingual residue of soft solids  · Slow oral transit time of soft solids     Pharyngeal Phase:   · decreased hyolaryngeal excursion to palpation  · delayed swallow initation  · no overt clinical signs/symptoms of aspiration    Compensatory Strategies  · None    Treatment: ST will follow for diet tolerance. Silent aspiration cannot be r/o at bedside.     Assessment:     Cece Figueroa is a 89 y.o. female with a medical diagnosis of Dehydration. She presents with mild oropharyngeal dysphagia c/b impaired rotary chew, delayed A-P transport, and decreased hyolaryngeal elevation/excursion.     Goals:   Multidisciplinary Problems     SLP Goals        Problem: SLP Goal    Goal Priority Disciplines Outcome   SLP Goal     SLP Ongoing, Progressing   Description:  1. Pt will tolerate mech soft and thin liquid without overt s/s of aspiration.                     Plan:     · Patient to be seen:  3 x/week   · Plan of Care expires:     · Plan of Care reviewed with:    patient   · SLP Follow-Up:  Yes       Discharge recommendations:  (TBD)   Barriers to Discharge:  None    Time Tracking:     SLP Treatment Date:   05/20/20  Speech Start Time:  0910  Speech Stop Time:  0930     Speech Total Time (min):  20 min    Billable Minutes: Eval Swallow and Oral Function 18min and Seld Care/Home Management Training 7min    Shakila Medley CF-SLP  05/20/2020            Sahil Esparza RN Case manager

## 2023-02-01 NOTE — DISCHARGE NOTE PROVIDER - HOSPITAL COURSE
77F w PMHx HTN, HLD, CVA, CAD cb MI s/p 2 stents, L CEA  who presents with headache and chest pain. Patient reports onset of headache substernal chest pain yesterday afternoon, with subsequent development of headache. Reports headache was on the top of her head, transient in nature. Also reports blurred vision, although states this is chronic. Denies nausea, vomiting, fevers, chills, SOB, lightheadedness, dizziness, recent head strike or loss of consciousness.     #Angina-  Likely due to HTN urgency.  BP is now better controlled after up titrating Coreg.  I will reduce lisinopril back to the home dose of 20 mg daily, as it was recently increased in the outpatient setting.    Nuclear stress test is negative for inducible ischemia.   No further inpatient cardiac work up is warranted at this time.   - cardiology f/u outpt  - pcp f/u in 1-2 weeks    #CAD-  S/P PCI in 11/2022.  Stress test results as above.   Continue DAPT and statin.    +Intracranial aneurysm found incidently, not a known surgical sequelae of L CEA (carotid endarterectomy) and therefore unrelated to her surgery  - Vascular Sx/NSx - No acute surgical intervention, outpt f/u with both    Dispo: Home with Home HHA, no PT needs    Patient seen and evaluated, no acute somatic complaints. Reviewed discharge medications with patient; All new medications requiring new prescriptions were sent to the pharmacy of patient's choice. Reviewed need for prescription for previous home medications and new prescriptions sent if requested. Medically cleared/stable for discharge as per Dr. Bolivar on 2/1/2023 with close outpatient follow up within 1-2 weeks of discharge. Patient understands and agrees with plan of care.

## 2023-02-01 NOTE — DISCHARGE NOTE PROVIDER - CARE PROVIDER_API CALL
Zakia Mcallister)  Neurosurgery  [address] [facility], [address]  [place] [address]  Phone: [phone]  Fax: [phone]  Follow Up Time: 2 weeks    Jorge Mejia  CARDIOVASCULAR DISEASE  [address] [place] [address]  Phone: [phone]  Fax: [phone]  Follow Up Time: 2 weeks    KAREN MIXON  Internal Medicine    Koby LEWIS,    Phone: ()-  Fax: ()-  Follow Up Time: 2 weeks    Khanh Bolton)  Vascular Surgery  [address] [facility], [address]  [place] [address]  Phone: [phone]  Fax: [phone]  Follow Up Time:

## 2023-02-01 NOTE — PROGRESS NOTE ADULT - REASON FOR ADMISSION
Chest pain , High BP and headache

## 2023-02-02 LAB
CULTURE RESULTS: SIGNIFICANT CHANGE UP
SPECIMEN SOURCE: SIGNIFICANT CHANGE UP

## 2023-02-07 ENCOUNTER — APPOINTMENT (OUTPATIENT)
Dept: VASCULAR SURGERY | Facility: CLINIC | Age: 78
End: 2023-02-07
Payer: MEDICARE

## 2023-02-07 VITALS — DIASTOLIC BLOOD PRESSURE: 89 MMHG | SYSTOLIC BLOOD PRESSURE: 194 MMHG

## 2023-02-07 PROCEDURE — 99214 OFFICE O/P EST MOD 30 MIN: CPT

## 2023-02-07 NOTE — ASSESSMENT
[FreeTextEntry1] : 76 yo female with history of htn, hld, carotid stenosis s/p left cea presents for follow up.\par \par In the hospital patient underwent a CTA of the carotid arteries which show a patent endarterectomy site.  There is a small distal aneurysm.  At this time I do not believe this aneurysm has any association with a carotid endarterectomy.  Patient already has an appointment scheduled with neurosurgery.\par Patient to continue Plavix and aspirin, and pravastatin.  Patient will follow up in 1 year with a repeat carotid duplex study.

## 2023-02-07 NOTE — HISTORY OF PRESENT ILLNESS
[FreeTextEntry1] : 78 yo female with history of htn, hld, carotid stenosis s/p left cea presents for follow up.  pt denies any episodes of visual changes, weakness or other stroke like symptoms.  The patient was recently admitted to [facility] with a headache and substernal chest pain.  She was found to be in a hypertension emergency.  As part of her work-up she underwent a CT scan which showed a patent endarterectomy site with a 4 mm distal carotid aneurysm.  She now presents for follow-up

## 2023-02-08 ENCOUNTER — EMERGENCY (EMERGENCY)
Facility: HOSPITAL | Age: 78
LOS: 1 days | Discharge: HOME | End: 2023-02-08
Attending: STUDENT IN AN ORGANIZED HEALTH CARE EDUCATION/TRAINING PROGRAM | Admitting: EMERGENCY MEDICINE
Payer: MEDICARE

## 2023-02-08 VITALS
DIASTOLIC BLOOD PRESSURE: 68 MMHG | TEMPERATURE: 98 F | SYSTOLIC BLOOD PRESSURE: 204 MMHG | RESPIRATION RATE: 18 BRPM | HEIGHT: 58 IN | HEART RATE: 63 BPM | OXYGEN SATURATION: 98 %

## 2023-02-08 VITALS — HEART RATE: 66 BPM | SYSTOLIC BLOOD PRESSURE: 185 MMHG | DIASTOLIC BLOOD PRESSURE: 68 MMHG

## 2023-02-08 DIAGNOSIS — Z95.810 PRESENCE OF AUTOMATIC (IMPLANTABLE) CARDIAC DEFIBRILLATOR: Chronic | ICD-10-CM

## 2023-02-08 DIAGNOSIS — Z98.89 OTHER SPECIFIED POSTPROCEDURAL STATES: Chronic | ICD-10-CM

## 2023-02-08 DIAGNOSIS — Z95.5 PRESENCE OF CORONARY ANGIOPLASTY IMPLANT AND GRAFT: Chronic | ICD-10-CM

## 2023-02-08 DIAGNOSIS — Z98.51 TUBAL LIGATION STATUS: Chronic | ICD-10-CM

## 2023-02-08 LAB
ALBUMIN SERPL ELPH-MCNC: 3.7 G/DL — SIGNIFICANT CHANGE UP (ref 3.3–5)
ALP SERPL-CCNC: 86 U/L — SIGNIFICANT CHANGE UP (ref 40–120)
ALT FLD-CCNC: 11 U/L — SIGNIFICANT CHANGE UP (ref 4–33)
ANION GAP SERPL CALC-SCNC: 11 MMOL/L — SIGNIFICANT CHANGE UP (ref 7–14)
AST SERPL-CCNC: 12 U/L — SIGNIFICANT CHANGE UP (ref 4–32)
BASOPHILS # BLD AUTO: 0.04 K/UL — SIGNIFICANT CHANGE UP (ref 0–0.2)
BASOPHILS NFR BLD AUTO: 0.6 % — SIGNIFICANT CHANGE UP (ref 0–2)
BILIRUB SERPL-MCNC: 0.6 MG/DL — SIGNIFICANT CHANGE UP (ref 0.2–1.2)
BUN SERPL-MCNC: 14 MG/DL — SIGNIFICANT CHANGE UP (ref 7–23)
CALCIUM SERPL-MCNC: 9.3 MG/DL — SIGNIFICANT CHANGE UP (ref 8.4–10.5)
CHLORIDE SERPL-SCNC: 104 MMOL/L — SIGNIFICANT CHANGE UP (ref 98–107)
CO2 SERPL-SCNC: 25 MMOL/L — SIGNIFICANT CHANGE UP (ref 22–31)
CREAT SERPL-MCNC: 0.97 MG/DL — SIGNIFICANT CHANGE UP (ref 0.5–1.3)
EGFR: 61 ML/MIN/1.73M2 — SIGNIFICANT CHANGE UP
EOSINOPHIL # BLD AUTO: 0.13 K/UL — SIGNIFICANT CHANGE UP (ref 0–0.5)
EOSINOPHIL NFR BLD AUTO: 2 % — SIGNIFICANT CHANGE UP (ref 0–6)
FLUAV AG NPH QL: SIGNIFICANT CHANGE UP
FLUBV AG NPH QL: SIGNIFICANT CHANGE UP
GLUCOSE SERPL-MCNC: 209 MG/DL — HIGH (ref 70–99)
HCT VFR BLD CALC: 31 % — LOW (ref 34.5–45)
HGB BLD-MCNC: 9.9 G/DL — LOW (ref 11.5–15.5)
IANC: 4.27 K/UL — SIGNIFICANT CHANGE UP (ref 1.8–7.4)
IMM GRANULOCYTES NFR BLD AUTO: 0.3 % — SIGNIFICANT CHANGE UP (ref 0–0.9)
LYMPHOCYTES # BLD AUTO: 1.66 K/UL — SIGNIFICANT CHANGE UP (ref 1–3.3)
LYMPHOCYTES # BLD AUTO: 25.1 % — SIGNIFICANT CHANGE UP (ref 13–44)
MCHC RBC-ENTMCNC: 27 PG — SIGNIFICANT CHANGE UP (ref 27–34)
MCHC RBC-ENTMCNC: 31.9 GM/DL — LOW (ref 32–36)
MCV RBC AUTO: 84.5 FL — SIGNIFICANT CHANGE UP (ref 80–100)
MONOCYTES # BLD AUTO: 0.49 K/UL — SIGNIFICANT CHANGE UP (ref 0–0.9)
MONOCYTES NFR BLD AUTO: 7.4 % — SIGNIFICANT CHANGE UP (ref 2–14)
NEUTROPHILS # BLD AUTO: 4.27 K/UL — SIGNIFICANT CHANGE UP (ref 1.8–7.4)
NEUTROPHILS NFR BLD AUTO: 64.6 % — SIGNIFICANT CHANGE UP (ref 43–77)
NRBC # BLD: 0 /100 WBCS — SIGNIFICANT CHANGE UP (ref 0–0)
NRBC # FLD: 0 K/UL — SIGNIFICANT CHANGE UP (ref 0–0)
NT-PROBNP SERPL-SCNC: 540 PG/ML — HIGH
PLATELET # BLD AUTO: 260 K/UL — SIGNIFICANT CHANGE UP (ref 150–400)
POTASSIUM SERPL-MCNC: 3.9 MMOL/L — SIGNIFICANT CHANGE UP (ref 3.5–5.3)
POTASSIUM SERPL-SCNC: 3.9 MMOL/L — SIGNIFICANT CHANGE UP (ref 3.5–5.3)
PROT SERPL-MCNC: 6.4 G/DL — SIGNIFICANT CHANGE UP (ref 6–8.3)
RBC # BLD: 3.67 M/UL — LOW (ref 3.8–5.2)
RBC # FLD: 12.9 % — SIGNIFICANT CHANGE UP (ref 10.3–14.5)
RSV RNA NPH QL NAA+NON-PROBE: SIGNIFICANT CHANGE UP
SARS-COV-2 RNA SPEC QL NAA+PROBE: SIGNIFICANT CHANGE UP
SODIUM SERPL-SCNC: 140 MMOL/L — SIGNIFICANT CHANGE UP (ref 135–145)
TROPONIN T, HIGH SENSITIVITY RESULT: 10 NG/L — SIGNIFICANT CHANGE UP
WBC # BLD: 6.61 K/UL — SIGNIFICANT CHANGE UP (ref 3.8–10.5)
WBC # FLD AUTO: 6.61 K/UL — SIGNIFICANT CHANGE UP (ref 3.8–10.5)

## 2023-02-08 PROCEDURE — 70450 CT HEAD/BRAIN W/O DYE: CPT | Mod: 26,MA

## 2023-02-08 PROCEDURE — 99285 EMERGENCY DEPT VISIT HI MDM: CPT

## 2023-02-08 PROCEDURE — 71045 X-RAY EXAM CHEST 1 VIEW: CPT | Mod: 26

## 2023-02-08 RX ORDER — HYDRALAZINE HCL 50 MG
25 TABLET ORAL ONCE
Refills: 0 | Status: COMPLETED | OUTPATIENT
Start: 2023-02-08 | End: 2023-02-08

## 2023-02-08 RX ORDER — HYDRALAZINE HCL 50 MG
1 TABLET ORAL
Qty: 30 | Refills: 0
Start: 2023-02-08 | End: 2023-03-09

## 2023-02-08 RX ADMIN — Medication 25 MILLIGRAM(S): at 18:34

## 2023-02-08 NOTE — ED PROVIDER NOTE - NSFOLLOWUPINSTRUCTIONS_ED_ALL_ED_FT
Start hydralazine 25mg once a day.  Follow up with Dr. Ramirez as soon as possible.   Advance activity as tolerated.  Continue all previously prescribed medications as directed unless otherwise instructed.  Follow up with your primary care physician in 48-72 hours- bring copies of your results.  Return to the ER for worsening or persistent symptoms, and/or ANY NEW OR CONCERNING SYMPTOMS. If you have issues obtaining follow up, please call: [phone] to obtain a doctor or specialist who takes your insurance in your area.  You may call [phone] to make an appointment with the internal medicine clinic. Start hydralazine 25mg three times a day.  Follow up with Dr. Ramirez as soon as possible.   Advance activity as tolerated.  Continue all previously prescribed medications as directed unless otherwise instructed.  Follow up with your primary care physician in 48-72 hours- bring copies of your results.  Return to the ER for worsening or persistent symptoms, and/or ANY NEW OR CONCERNING SYMPTOMS. If you have issues obtaining follow up, please call: [phone] to obtain a doctor or specialist who takes your insurance in your area.  You may call [phone] to make an appointment with the internal medicine clinic.

## 2023-02-08 NOTE — ED ADULT TRIAGE NOTE - CHIEF COMPLAINT QUOTE
p/t c/o of chest pain and high b/p readings for past few days, p/t discharged from hospital few days ago treated for same issue

## 2023-02-08 NOTE — ED ADULT NURSE NOTE - OBJECTIVE STATEMENT
p/t c/o of chest pain and high b/p readings for past few days, p/t discharged from hospital few days ago treated for same issue.  Pt deneis SOB or dizziness - Pt states she took all the blood pressure meds this morning as prescribed by MD . Hx of cardiac stents

## 2023-02-08 NOTE — ED PROVIDER NOTE - WET READ LAUNCH FT
Amira Humphreys is a 53 year old female with   Chief Complaint   Patient presents with   • Sinus Problem   • Office Visit       HPI:  Patient is here to be evaluated for her nose and sinuses.  She is referred by Tory Griffin who has been treating her and did get some follow-up imaging.  The patient reports that her symptoms began last April with a lot of facial pressure and more right-sided headaches and pressure.  Things got a little better and then in August she had a COVID infection and her symptoms definitely worsened.  She has been on multiple courses of antibiotics and even some oral prednisone.  She see some mild improvement while on the medicine but symptoms return.  She reports a lot of disequilibrium particularly with head movements that she has never had before this sinus problem.  She has definitely noticed decreased sense of smell with her COVID infection and has not improved.  She was started on fluticasone nasal spray several weeks ago she feels that this has helped a little but still not complete resolution.  She was sent for CT which did show significant findings and is here to discuss those findings and potential treatment options.  She has had previous allergy testing with some positive findings.  She is unaware of any nasal trauma.  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  She is here for evaluation of these worsening and persistent symptoms for almost a year now.    I have reviewed the patient's medications and allergies, past medical and surgical, history, updating these as appropriate.      ROS:  General:  Patient has some trouble with daytime fatigue   No Fever or chills   Significant right-sided headaches      BLEEDING HISTORY:  None reported    ANESTHESIA HISTORY:  No known adverse events    I reviewed the CT with the patient she does show anterior septal deviation to the left side and severe bone spurring on the right side.  There is also severe turbinate hypertrophy of the  inferior turbinate on both sides.  She shows occlusion of the ostiomeatal complex on both sides with early maxillary sinus inflammation on both sides and anterior ethmoid disease.  Her frontal sinuses are clear as well as the sphenoid sinuses.    PHYSICAL EXAM:    VITALS:   There were no vitals taken for this visit.    GENERAL:   Appearance:  Patient is a pleasant 53 year old female.   Status:  In no acute distress.  Communication:  Speech is fluid and normal.  Voice is normal.  Affect:  Affect is normal.  Orientation:  she is oriented x3.    HEAD/FACE:  Head is normal.  Lesions and scars are not present.  Sinuses are tender over the frontal  TMJ exam shows normal range of motion  Skin:  Normal skin color, normal skin turgor.    CRANIAL NERVES:   Cranial nerves grossly intact.  Extraocular movements are normal.  No spontaneous nystagmus.  Facial movement is normal and symmetric  Olfaction     NECK:  Mobility:  Neck is normal  Palpation:  No palpable adenopathies  Trachea:  Trachea is midline  Thyroid:  Thyroid gland is normal palpation  Salivary Glands:  Symmetric and normal palpation    EARS:   Right  Auricle:  Normal  External canal:  No cerumen  Tympanic membrane:  Normal  Mobility:  TM is mobile by pneumatic otoscopy.      Left  Auricle:  Normal  External canal:  No cerumen  Tympanic membrane:  Normal   Mobility:  TM is mobile by pneumatic otoscopy.      NOSE:  External:  Normal  Septum:  Patient shows anterior septal deviation to the left side with impaction of the cartilaginous septum.  The right side shows severe septal deviation of the bony septum and a spur with impaction against the lateral nasal wall.    Turbinates:  Secondary turbinate hypertrophy seen on both sides    ORAL CAVITY:  Lips:  Lips are normal  Teeth:  Dentition is normal  Gingivae:  Gingivae are normal  Tongue:  Anterior tongue is normal  Floor of Mouth:  Floor of mouth is normal  Palate:  Palate is normal  Mucosa:  Mucosa is  normal    OROPHARYNX:  Mucosa:  Mucosa is normal  BOT:  Tongue base is normal  Tonsils:  Tonsils are noncontributory           IMPRESSION:   Deviated nasal septum  Bilateral inferior turbinate hypertrophy  Chronic maxillary and ethmoid sinusitis     PLAN:   I discussed the findings with the patient and she has been treated aggressively for almost a year now and has now CT evidence of chronic sinusitis along with these persistent symptoms that have failed aggressive medical management.  I discussed options of continue the fluticasone which I think will be unlikely to reverse her symptoms without any improvement on prednisone.  I do feel she would greatly benefit from septoplasty, bilateral inferior turbinate Coblation reduction and endoscopic sinus surgery with bilateral maxillary antrostomies and anterior ethmoidectomy creating functional endoscopic sinus surgery.I discussed the alternatives, risks and benefits and potential complications including but not limited to:  Nasal septal perforation, the need for septal splints postoperatively, risk of bleeding and infection, the potential for change in the external nose and potential for palpable abnormalities to the dorsum of the nose, risk of crusting of the turbinates and need for debridement postoperatively, the need for saline irrigations postoperatively, risk of injury to the orbit with potential vision loss, risk of injury to the skull base with potential spinal fluid leak, need for revision surgery in the future, and risks associated with a general anesthetic. The patient understands and wishes to proceed.             There are no Wet Read(s) to document.

## 2023-02-08 NOTE — ED PROVIDER NOTE - NS ED ROS FT
ROS:  GENERAL: No fever, no chills  HEENT: no vision changes, no trouble swallowing, no trouble speaking  CARDIAC: + chest pain  PULMONARY: no cough, no shortness of breath  GI: no abdominal pain, no nausea, no vomiting, no diarrhea, no constipation  : No dysuria, no frequency, no change in appearance or odor of urine  SKIN: no rashes  NEURO: no headache, no weakness, no dizziness  MSK: No joint pain  All other systems reviewed as negative. As per HPI

## 2023-02-08 NOTE — ED PROVIDER NOTE - ATTENDING APP SHARED VISIT CONTRIBUTION OF CARE
I (Gregory) agree with above, I performed a history and physical. Counseled kwame medical staff, physician assistant, and/or medical student on medical decision making as documented. Medical decisions and treatment interventions were made in real time during the patient encounter. Additionally and/or with the following exceptions: Patient is 77-year-old female past medical history of hypertension, CVA, CAD who is presenting to the emergency department with uncontrolled blood pressure.  She was recently admitted to the hospital for hypertensive urgency.  Of note she has 2 clonidine patches on and her blood pressure was still in the 180s.  She has dizziness which she describes as lightheadedness but no vertigo, no headache no chest pain.  Patient was neurologically intact, well-appearing.  She was signed out to oncoming attending pending basic labs including CBC, CMP, troponin to rule out ACS.  Repeat CT head was ordered given history of known aneurysms to rule out bleed.  Plan was to coordinate care with her private cardiologist if work-up was negative and start a new antihypertensive agent such as hydralazine.

## 2023-02-08 NOTE — ED PROVIDER NOTE - CLINICAL SUMMARY MEDICAL DECISION MAKING FREE TEXT BOX
78 yo F here for uncontrolled bp 76 yo F here for uncontrolled bp. exam unremarkable. pt took lisinopril 20 today but bp still uncontrolled. bp < 200/100 here in the ED. pt was hospitalized a week ago. will r/o ACS and continue bp control.      update: labs unremarkable. cxr negative. pt feels fine with no complaints. gave hydralazine 25mg here for bp control. 78 yo F here for uncontrolled bp. exam unremarkable. pt took lisinopril 20 today but bp still uncontrolled. bp < 200/100 here in the ED. pt was hospitalized a week ago. will r/o ACS and continue bp control.      update: labs unremarkable. cxr negative. pt feels fine with no complaints. gave hydralazine 25mg here for bp control.      ===================================  attending mdm (Ryne Jenkins MD; attending emergency medicine and medical toxicology)  Patient is 77-year-old female past medical history of hypertension, CVA, CAD who is presenting to the emergency department with uncontrolled blood pressure.  She was recently admitted to the hospital for hypertensive urgency.  Of note she has 2 clonidine patches on and her blood pressure was still in the 180s.  She has dizziness which she describes as lightheadedness but no vertigo, no headache no chest pain.  Patient was neurologically intact, well-appearing.  She was signed out to oncoming attending pending basic labs including CBC, CMP, troponin to rule out ACS.  Repeat CT head was ordered given history of known aneurysms to rule out bleed.  Plan was to coordinate care with her private cardiologist if work-up was negative and start a new antihypertensive agent such as hydralazine.

## 2023-02-08 NOTE — ED ADULT NURSE NOTE - IS THE PATIENT ABLE TO BE SCREENED?
EXAM DESCRIPTION: 



XR TIBIA FIBULA 2 VIEWS



COMPLETED DATE/TME:  10/23/2019 05:59



CLINICAL HISTORY: 



70 years, Female, pain



COMPARISON:

None.



NUMBER OF VIEWS:

Two



TECHNIQUE:

Two views of the left tibia-fibula



LIMITATIONS:

None.



FINDINGS:



No acute fracture or dislocation. No large soft tissue swelling.

No radiopaque foreign body.



IMPRESSION:



No acute fracture or dislocation

 



copyright 2011 [employer]- All Rights Reserved Yes

## 2023-02-08 NOTE — ED PROVIDER NOTE - OBJECTIVE STATEMENT
76 yo F with HTN, HLD, CVA, CAD c/b MI - s/p stents x2, vertigo, returns to the ED for recurrent uncontrolled bp and feeling dizzy this morning. pt was 76 yo F with HTN, HLD, CVA, CAD c/b MI - s/p stents x2, vertigo, returns to the ED for recurrent uncontrolled bp and feeling dizzy this morning. pt was discharged from the hospital for the same complaint 1 week ago. pt followed up with her PCP who increased lisinopril to 20. denies chest pain at this time. denies sob, fever, NVD.

## 2023-02-08 NOTE — ED PROVIDER NOTE - PROGRESS NOTE DETAILS
Cal: Discussed case with Dr. Ramirez, agrees with initiating hydralazine 25mg PO TID.  BP lower than from triage with goal of 25% from triage BP.  Pt agreeable.  Discussed case with daughter Dena who agrees.  Additional hydralazine will be sent for 1 month supply.

## 2023-02-08 NOTE — ED PROVIDER NOTE - PATIENT PORTAL LINK FT
You can access the FollowMyHealth Patient Portal offered by [facility] by registering at the following website: [email or web address]. By joining [facility]’s FollowMyHealth portal, you will also be able to view your health information using other applications (apps) compatible with our system.

## 2023-02-09 NOTE — ED ADULT TRIAGE NOTE - HAVE YOU HAD COVID IN THE LAST 60 DAYS?
Patient called back inquiring when she might receive a call. She stated she has to go to work and didn't want to go to bed tonight without another monitor.   She wanted a call back [phone] No

## 2023-02-10 ENCOUNTER — EMERGENCY (EMERGENCY)
Facility: HOSPITAL | Age: 78
LOS: 1 days | Discharge: HOME | End: 2023-02-10
Attending: EMERGENCY MEDICINE | Admitting: EMERGENCY MEDICINE
Payer: MEDICARE

## 2023-02-10 ENCOUNTER — APPOINTMENT (OUTPATIENT)
Dept: NEUROSURGERY | Facility: CLINIC | Age: 78
End: 2023-02-10

## 2023-02-10 VITALS
OXYGEN SATURATION: 98 % | DIASTOLIC BLOOD PRESSURE: 86 MMHG | RESPIRATION RATE: 15 BRPM | HEIGHT: 58 IN | SYSTOLIC BLOOD PRESSURE: 168 MMHG | TEMPERATURE: 98 F | HEART RATE: 78 BPM

## 2023-02-10 DIAGNOSIS — Z98.51 TUBAL LIGATION STATUS: Chronic | ICD-10-CM

## 2023-02-10 DIAGNOSIS — Z95.5 PRESENCE OF CORONARY ANGIOPLASTY IMPLANT AND GRAFT: Chronic | ICD-10-CM

## 2023-02-10 DIAGNOSIS — Z95.810 PRESENCE OF AUTOMATIC (IMPLANTABLE) CARDIAC DEFIBRILLATOR: Chronic | ICD-10-CM

## 2023-02-10 DIAGNOSIS — Z98.89 OTHER SPECIFIED POSTPROCEDURAL STATES: Chronic | ICD-10-CM

## 2023-02-10 LAB
ALBUMIN SERPL ELPH-MCNC: 3.7 G/DL — SIGNIFICANT CHANGE UP (ref 3.3–5)
ALP SERPL-CCNC: 86 U/L — SIGNIFICANT CHANGE UP (ref 40–120)
ALT FLD-CCNC: 12 U/L — SIGNIFICANT CHANGE UP (ref 4–33)
ANION GAP SERPL CALC-SCNC: 10 MMOL/L — SIGNIFICANT CHANGE UP (ref 7–14)
AST SERPL-CCNC: 14 U/L — SIGNIFICANT CHANGE UP (ref 4–32)
BASOPHILS # BLD AUTO: 0.05 K/UL — SIGNIFICANT CHANGE UP (ref 0–0.2)
BASOPHILS NFR BLD AUTO: 0.6 % — SIGNIFICANT CHANGE UP (ref 0–2)
BILIRUB SERPL-MCNC: 1 MG/DL — SIGNIFICANT CHANGE UP (ref 0.2–1.2)
BUN SERPL-MCNC: 15 MG/DL — SIGNIFICANT CHANGE UP (ref 7–23)
CALCIUM SERPL-MCNC: 9.3 MG/DL — SIGNIFICANT CHANGE UP (ref 8.4–10.5)
CHLORIDE SERPL-SCNC: 105 MMOL/L — SIGNIFICANT CHANGE UP (ref 98–107)
CO2 SERPL-SCNC: 25 MMOL/L — SIGNIFICANT CHANGE UP (ref 22–31)
CREAT SERPL-MCNC: 1.07 MG/DL — SIGNIFICANT CHANGE UP (ref 0.5–1.3)
EGFR: 54 ML/MIN/1.73M2 — LOW
EOSINOPHIL # BLD AUTO: 0.09 K/UL — SIGNIFICANT CHANGE UP (ref 0–0.5)
EOSINOPHIL NFR BLD AUTO: 1.1 % — SIGNIFICANT CHANGE UP (ref 0–6)
FLUAV AG NPH QL: SIGNIFICANT CHANGE UP
FLUBV AG NPH QL: SIGNIFICANT CHANGE UP
GLUCOSE SERPL-MCNC: 132 MG/DL — HIGH (ref 70–99)
HCT VFR BLD CALC: 33 % — LOW (ref 34.5–45)
HGB BLD-MCNC: 10.4 G/DL — LOW (ref 11.5–15.5)
IANC: 6.28 K/UL — SIGNIFICANT CHANGE UP (ref 1.8–7.4)
IMM GRANULOCYTES NFR BLD AUTO: 0.4 % — SIGNIFICANT CHANGE UP (ref 0–0.9)
LYMPHOCYTES # BLD AUTO: 1.6 K/UL — SIGNIFICANT CHANGE UP (ref 1–3.3)
LYMPHOCYTES # BLD AUTO: 19 % — SIGNIFICANT CHANGE UP (ref 13–44)
MCHC RBC-ENTMCNC: 26.6 PG — LOW (ref 27–34)
MCHC RBC-ENTMCNC: 31.5 GM/DL — LOW (ref 32–36)
MCV RBC AUTO: 84.4 FL — SIGNIFICANT CHANGE UP (ref 80–100)
MONOCYTES # BLD AUTO: 0.36 K/UL — SIGNIFICANT CHANGE UP (ref 0–0.9)
MONOCYTES NFR BLD AUTO: 4.3 % — SIGNIFICANT CHANGE UP (ref 2–14)
NEUTROPHILS # BLD AUTO: 6.28 K/UL — SIGNIFICANT CHANGE UP (ref 1.8–7.4)
NEUTROPHILS NFR BLD AUTO: 74.6 % — SIGNIFICANT CHANGE UP (ref 43–77)
NRBC # BLD: 0 /100 WBCS — SIGNIFICANT CHANGE UP (ref 0–0)
NRBC # FLD: 0 K/UL — SIGNIFICANT CHANGE UP (ref 0–0)
PLATELET # BLD AUTO: 256 K/UL — SIGNIFICANT CHANGE UP (ref 150–400)
POTASSIUM SERPL-MCNC: 4 MMOL/L — SIGNIFICANT CHANGE UP (ref 3.5–5.3)
POTASSIUM SERPL-SCNC: 4 MMOL/L — SIGNIFICANT CHANGE UP (ref 3.5–5.3)
PROT SERPL-MCNC: 6.6 G/DL — SIGNIFICANT CHANGE UP (ref 6–8.3)
RBC # BLD: 3.91 M/UL — SIGNIFICANT CHANGE UP (ref 3.8–5.2)
RBC # FLD: 13.1 % — SIGNIFICANT CHANGE UP (ref 10.3–14.5)
RSV RNA NPH QL NAA+NON-PROBE: SIGNIFICANT CHANGE UP
SARS-COV-2 RNA SPEC QL NAA+PROBE: SIGNIFICANT CHANGE UP
SODIUM SERPL-SCNC: 140 MMOL/L — SIGNIFICANT CHANGE UP (ref 135–145)
TROPONIN T, HIGH SENSITIVITY RESULT: 10 NG/L — SIGNIFICANT CHANGE UP
TROPONIN T, HIGH SENSITIVITY RESULT: 18 NG/L — SIGNIFICANT CHANGE UP
TROPONIN T, HIGH SENSITIVITY RESULT: 25 NG/L — SIGNIFICANT CHANGE UP
WBC # BLD: 8.41 K/UL — SIGNIFICANT CHANGE UP (ref 3.8–10.5)
WBC # FLD AUTO: 8.41 K/UL — SIGNIFICANT CHANGE UP (ref 3.8–10.5)

## 2023-02-10 PROCEDURE — 99223 1ST HOSP IP/OBS HIGH 75: CPT

## 2023-02-10 PROCEDURE — 70450 CT HEAD/BRAIN W/O DYE: CPT | Mod: 26,MA

## 2023-02-10 PROCEDURE — 71046 X-RAY EXAM CHEST 2 VIEWS: CPT | Mod: 26

## 2023-02-10 RX ORDER — CARVEDILOL PHOSPHATE 80 MG/1
12.5 CAPSULE, EXTENDED RELEASE ORAL EVERY 12 HOURS
Refills: 0 | Status: DISCONTINUED | OUTPATIENT
Start: 2023-02-10 | End: 2023-02-13

## 2023-02-10 RX ORDER — LISINOPRIL 2.5 MG/1
20 TABLET ORAL DAILY
Refills: 0 | Status: DISCONTINUED | OUTPATIENT
Start: 2023-02-10 | End: 2023-02-13

## 2023-02-10 RX ORDER — ASPIRIN/CALCIUM CARB/MAGNESIUM 324 MG
81 TABLET ORAL DAILY
Refills: 0 | Status: DISCONTINUED | OUTPATIENT
Start: 2023-02-10 | End: 2023-02-13

## 2023-02-10 RX ORDER — HYDRALAZINE HCL 50 MG
25 TABLET ORAL DAILY
Refills: 0 | Status: DISCONTINUED | OUTPATIENT
Start: 2023-02-10 | End: 2023-02-11

## 2023-02-10 RX ORDER — TICAGRELOR 90 MG/1
60 TABLET ORAL
Refills: 0 | Status: DISCONTINUED | OUTPATIENT
Start: 2023-02-10 | End: 2023-02-13

## 2023-02-10 RX ORDER — ATORVASTATIN CALCIUM 80 MG/1
20 TABLET, FILM COATED ORAL AT BEDTIME
Refills: 0 | Status: DISCONTINUED | OUTPATIENT
Start: 2023-02-10 | End: 2023-02-13

## 2023-02-10 RX ADMIN — CARVEDILOL PHOSPHATE 12.5 MILLIGRAM(S): 80 CAPSULE, EXTENDED RELEASE ORAL at 21:42

## 2023-02-10 RX ADMIN — ATORVASTATIN CALCIUM 20 MILLIGRAM(S): 80 TABLET, FILM COATED ORAL at 21:43

## 2023-02-10 NOTE — ED CDU PROVIDER INITIAL DAY NOTE - PROGRESS NOTE DETAILS
Pt follows with Dr Mejia (cards) who is on vacation currently, Dr Ramirez made aware of pts CDU stay today regarding episode of hypotension. Agreed with removal of 1 clonidine patch and to continue all other BP meds. Will continue to follow as outpt.

## 2023-02-10 NOTE — ED PROVIDER NOTE - NSICDXPASTSURGICALHX_GEN_ALL_CORE_FT
PAST SURGICAL HISTORY:  History of coronary artery stent placement 3 stents (Last in 2012)  Same admission as past myocardial infarction    History of tonsillectomy     History of tubal ligation 35 years ago, no complications as per patient.    No Past Surgical History     S/P ICD (internal cardiac defibrillator) procedure loop recorder    S/P LASIK surgery Right eye, no complications as per patient.     Complex Repair Preamble Text (Leave Blank If You Do Not Want): Extensive wide undermining was performed.

## 2023-02-10 NOTE — ED ADULT NURSE NOTE - OBJECTIVE STATEMENT
Pt presents to ED brought in by EMS for dizziness. Pt states she recently had changes to her BP meds and has been having intermittent dizziness since Tuesday, but dizziness was worse this AM with associated nausea. Denies HA, blurry vision, SOB, palpitations, or other complaints. Pt has 2 med patches for BP on each shoulder, does not recall the names of her BP meds, but states she is compliant and takes them as ordered. no focal deficits noted. Pt arrived with 20G to the right wrist placed by EMS. Flushes well and gives blood return. NSR on cardiac monitor. Friend at bedside. call bell within reach. Education provided to pt on how to and when to use call bell for assistance. Will continue to monitor.

## 2023-02-10 NOTE — ED PROVIDER NOTE - CLINICAL SUMMARY MEDICAL DECISION MAKING FREE TEXT BOX
77-year-old female with a history of HTN, HLD, CVA, CAD, MI s/p stents x2 presents with an episode of dizziness, SOB, sweatiness at home.  Patient's episode of the symptoms is now resolved.  Concern for medication-induced peaks and troughs in patient's blood pressure.  There have been several medication changes in the last 2 weeks that could be contributing to fluctuations in patient's blood pressure inducing symptoms.  Notably patient is 4 feet 11 inches and 100 pounds.  Patient is on hydralazine 3 times daily, lisinopril, carvedilol, clonidine for blood pressure management.  If labs are negative dispo to home with cardiology follow-up to simplify medication regimen.  Labs, EKG, troponin, TSH/T4, CT head, CXR ordered.

## 2023-02-10 NOTE — ED CDU PROVIDER INITIAL DAY NOTE - OBJECTIVE STATEMENT
78 y/o female with PMHx of HTN, HLD, CVA, CAD, and ACS s/p stents X 2 who presented to the ED for dizziness today. Pt states she got up and after taking her medications and showering having dizziness. Pt checked her BP and it was 80/50. Pt denies any headache, neck pain, back pain, fever, chills, nausea, vomiting, SOB, chest pain, or abd pain. Pt arrives saying dizziness has improved and BP noted now to be 176/72. Pt notes recent medication change after recent admission that included having Hydralazine 20mg 3 times per day and notes while in the hospital and starting new medication her SBP did drop to 130s. Pt sent to [facility] for BP management/monitoring.

## 2023-02-10 NOTE — ED ADULT TRIAGE NOTE - CHIEF COMPLAINT QUOTE
alert oriented c/o dizziness and SOB  took lisinopril and hydralazine  has 2 catapres patches on bp was 80/50 at home then 150/73 on EMS arrival  now 194/95 in triage normal Blood Pressure is 200/100 PMHx HTN PE takes brilinta vertigo  stents CVA

## 2023-02-10 NOTE — ED PROVIDER NOTE - PROGRESS NOTE DETAILS
Troponin 18>25.  Repeat @ 3hours.   Patient feeling significantly improved from earlier this morning. Discussed patient's medication at length with family.  Plan for making small changes to blood pressure medication with close PMD follow-up.  If 3hr trop stable, d/c to home. Dr. Feng: Offered OBS for continued monitoring of BP which pt agreed to.

## 2023-02-10 NOTE — ED PROVIDER NOTE - OBJECTIVE STATEMENT
77-year-old female with a history of HTN, HLD, CVA, CAD C/V MI s/p stent x2 presents with an episode of dizziness and sweating earlier this morning.  Patient reports she has had the symptoms intermittently over the last month or so.  Patient was evaluated in the emergency department 2 days ago for similar symptoms.  At that time hydralazine was added to her medication regimen 20 mg 3 times daily.  This morning patient took all of her medications including the hydralazine.  She got up to go make her son a cup of coffee and felt dizzy and sweaty and presented to the emergency department.  At that time patient's blood pressure was measured to be 80/50.  Upon arrival to the emergency department pressure is now 176/72.  Otherwise patient has no headache, chest pain, shortness of breath, N/V/D/abdominal pain, dysuria, peripheral edema, fever/chills.    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

## 2023-02-10 NOTE — ED CDU PROVIDER INITIAL DAY NOTE - CLINICAL SUMMARY MEDICAL DECISION MAKING FREE TEXT BOX
76 y/o female with PMHx of HTN, HLD, CVA, CAD, and ACS s/p stents X 2 who presented to the ED for dizziness today.   Concern for Medication related/ACS/Hypotension  Sent to OBS for BP monitoring/management

## 2023-02-10 NOTE — ED PROVIDER NOTE - PHYSICAL EXAMINATION
GENERAL: well appearing in no acute distress, non-toxic appearing  HEAD: normocephalic, atraumatic  HEENT: normal conjunctiva, oral mucosa moist, uvula midline  CARDIAC: regular rate and rhythm, normal S1S2, no appreciable murmurs  PULM: normal breath sounds, clear to ascultation bilaterally, no rales, rhonchi, wheezing  GI: abdomen nondistended, soft, nontender, no guarding, rebound tenderness  : no CVA tenderness b/l  NEURO: moving all 4 extremities, no focal deficits, normal speech, AAOx3   MSK: no peripheral edema, no calf tenderness b/l  SKIN: well-perfused, extremities warm  PSYCH: appropriate mood and affect

## 2023-02-10 NOTE — ED PROVIDER NOTE - ATTENDING CONTRIBUTION TO CARE
Pt was seen and evaluated by me. Pt is a 76 y/o female with PMHx of HTN, HLD, CVA, CAD, and ACS s/p stents X 2 who presented to the ED for dizziness today. Pt states she got up and after taking her medications and showering having dizziness. Pt checked her BP and it was 80/50. Pt denies any headache, neck pain, back pain, fever, chills, nausea, vomiting, SOB, chest pain, or abd pain. Pt arrives saying dizziness has improved and BP noted now to be 176/72. Pt notes recent medication change after recent admission that included having Hydralazine 20mg 3 times per day and notes while in the hospital and starting new medication her SBP did drop to 130s.   VITALS: Vitals have been reviewed.  GEN APPEARANCE: Alert and cooperative, non-toxic appearing and in NAD  HEAD: Atraumatic, normocephalic.   EYES: PERRL, EOMI. No nystagmus.   EARS: Gross hearing intact.   NOSE: No nasal discharge.   THROAT: MMM. Oral cavity and pharynx normal. Uvula midline. No swelling. No exudate.    NECK: Supple, no lymphadenopathy  CV: RRR, S1S2, no c/r/m/g. No cyanosis or pallor. Extremities warm, well perfused. Cap refill <2 seconds. No bruits.   LUNGS: CTAB. No wheezing. No rales. No rhonchi. No diminished breath sounds.   ABDOMEN: Soft, NTND. No guarding or rebound.   MSK/EXT: Spine appears normal, no spine point tenderness. 5/5 b/l UE and LE.   NEURO: Alert, follows commands. Speech normal. Sensation and motor normal x4 extremities.   SKIN: Normal color for race, warm, dry and intact. No evidence of rash.  76 y/o female with PMHx of HTN, HLD, CVA, CAD, and ACS s/p stents X 2 who presented to the ED for dizziness today  Concern for Medication related/ACS/Hypotension  Labs, CXR, CT

## 2023-02-10 NOTE — ED CDU PROVIDER INITIAL DAY NOTE - ATTENDING APP SHARED VISIT CONTRIBUTION OF CARE
Pt was seen and evaluated by me. Pt is a 78 y/o female with PMHx of HTN, HLD, CVA, CAD, and ACS s/p stents X 2 who presented to the ED for dizziness today. Pt states she got up and after taking her medications and showering having dizziness. Pt checked her BP and it was 80/50. Pt denies any headache, neck pain, back pain, fever, chills, nausea, vomiting, SOB, chest pain, or abd pain. Pt arrives saying dizziness has improved and BP noted now to be 176/72. Pt notes recent medication change after recent admission that included having Hydralazine 20mg 3 times per day and notes while in the hospital and starting new medication her SBP did drop to 130s.   VITALS: Vitals have been reviewed.  GEN APPEARANCE: Alert and cooperative, non-toxic appearing and in NAD  HEAD: Atraumatic, normocephalic.   EYES: PERRL, EOMI. No nystagmus.   EARS: Gross hearing intact.   NOSE: No nasal discharge.   THROAT: MMM. Oral cavity and pharynx normal. Uvula midline. No swelling. No exudate.    NECK: Supple, no lymphadenopathy  CV: RRR, S1S2, no c/r/m/g. No cyanosis or pallor. Extremities warm, well perfused. Cap refill <2 seconds. No bruits.   LUNGS: CTAB. No wheezing. No rales. No rhonchi. No diminished breath sounds.   ABDOMEN: Soft, NTND. No guarding or rebound.   MSK/EXT: Spine appears normal, no spine point tenderness. 5/5 b/l UE and LE.   NEURO: Alert, follows commands. Speech normal. Sensation and motor normal x4 extremities.   SKIN: Normal color for race, warm, dry and intact. No evidence of rash.  78 y/o female with PMHx of HTN, HLD, CVA, CAD, and ACS s/p stents X 2 who presented to the ED for dizziness today  Concern for Medication related/ACS/Hypotension  Sent to OBS for BP monitoring/management

## 2023-02-10 NOTE — ED PROVIDER NOTE - MUSCULOSKELETAL, MLM
Spine appears normal, range of motion is not limited, no muscle or joint tenderness. 5/5 b/l UE and LE.

## 2023-02-11 VITALS — HEART RATE: 78 BPM | SYSTOLIC BLOOD PRESSURE: 161 MMHG | DIASTOLIC BLOOD PRESSURE: 74 MMHG | RESPIRATION RATE: 18 BRPM

## 2023-02-11 PROCEDURE — 99238 HOSP IP/OBS DSCHRG MGMT 30/<: CPT

## 2023-02-11 RX ADMIN — CARVEDILOL PHOSPHATE 12.5 MILLIGRAM(S): 80 CAPSULE, EXTENDED RELEASE ORAL at 11:09

## 2023-02-11 RX ADMIN — Medication 25 MILLIGRAM(S): at 06:10

## 2023-02-11 RX ADMIN — TICAGRELOR 60 MILLIGRAM(S): 90 TABLET ORAL at 06:39

## 2023-02-11 RX ADMIN — LISINOPRIL 20 MILLIGRAM(S): 2.5 TABLET ORAL at 06:10

## 2023-02-11 NOTE — ED CDU PROVIDER DISPOSITION NOTE - NSFOLLOWUPINSTRUCTIONS_ED_ALL_ED_FT
Follow up with your cardiologist and primary care doctor within 1 week  Take Carvedilol 12,5 mg (1 tablet) twice a day  Take Lisinopril 20mg daily  Apply Clonidine patch as previously prescribed to you  Return to the ER with any worsening or concerning symptoms, dizziness, weakness, chest pain, shortness of breath, feeling faint or any other concerns. Follow up with your cardiologist and primary care doctor within 1 week  Take Carvedilol 12.5 mg (1 tablet) twice a day  Take Lisinopril 20mg daily  Apply Clonidine patch as previously prescribed to you  Return to the ER with any worsening or concerning symptoms, dizziness, weakness, chest pain, shortness of breath, feeling faint or any other concerns.

## 2023-02-11 NOTE — ED CDU PROVIDER SUBSEQUENT DAY NOTE - CLINICAL SUMMARY MEDICAL DECISION MAKING FREE TEXT BOX
78 y/o female with PMHx of HTN, HLD, CVA, CAD, and ACS s/p stents X 2 who presented to the ED for dizziness today.   Concern for Medication related/ACS/Hypotension  Sent to OBS for BP monitoring/management  Pt is resting comfortably at this time, no acute distress.

## 2023-02-11 NOTE — ED CDU PROVIDER DISPOSITION NOTE - CLINICAL COURSE
78 y/o female with PMHx of HTN, HLD, CVA, CAD, and ACS s/p stents X 2 who presented to the ED for dizziness. Pt states she got up and after taking her medications and showering having dizziness. Pt checked her BP and it was 80/50. Pt denies any headache, neck pain, back pain, fever, chills, nausea, vomiting, SOB, chest pain, or abd pain. Pt arrives saying dizziness has improved. Pt notes recent medication change after recent admission that included having Hydralazine 20mg 3 times per day and notes while in the hospital and starting new medication her SBP did drop to 130s. Pt sent to OBS for BP management/monitoring. Pt has been stable. This morning pt was seen by cardiologist , recommends to stop Hydralazine, continue lisinopril, carvedilol and clonidine patch as previously prescribed and to follow up with her cardiologist as an outpt. At time of discharge pt is well appearing, vitals stable, normotensive, reports she is feeling well. Pt will return with any worsening or concerning symptoms.

## 2023-02-11 NOTE — ED CDU PROVIDER DISPOSITION NOTE - ATTENDING CONTRIBUTION TO CARE
Patient well-appearing no acute distress.  Will follow cardiologist recommendations and no hydralazine.  Of note she did get her morning dose before it was canceled.  Okay for discharge.

## 2023-02-11 NOTE — ED CDU PROVIDER SUBSEQUENT DAY NOTE - NSICDXPASTMEDICALHX_GEN_ALL_CORE_FT
DATE OF VISIT: 5/13/2020    REASON FOR VISIT: Followup for extensive stage small cell lung cancer     HISTORY OF PRESENT ILLNESS: The patient is a very pleasant 68 y.o. female with past medical history significant for extensive stage small cell lung cancer diagnosed December 2, 2019.  She was started on palliative treatment with carboplatin and etoposide and Tecentriq December 15, 2019. Tecentriq maintenance started 03/11/2020. The patient is here today for follow-up visit with treatment cycle #8.    SUBJECTIVE: The patient is here today by herself.  She is tolerating immunotherapy well. She denied fever chills night sweats. She has noted a nodule under her right neck. This has been there about a month. It gets bigger and smaller. It is always palpable. It has went away.     PAST MEDICAL HISTORY/SOCIAL HISTORY/FAMILY HISTORY: Reviewed by me and unchanged from my documentation done on 05/13/20.    Review of Systems   Constitutional: Negative for activity change, appetite change, chills, fatigue, fever and unexpected weight change.   HENT: Negative for hearing loss, mouth sores, nosebleeds, sore throat and trouble swallowing.    Eyes: Negative for visual disturbance.   Respiratory: Negative for cough, chest tightness, shortness of breath and wheezing.    Cardiovascular: Negative for chest pain, palpitations and leg swelling.   Gastrointestinal: Negative for abdominal distention, abdominal pain, blood in stool, constipation, diarrhea, nausea, rectal pain and vomiting.   Endocrine: Negative for cold intolerance and heat intolerance.   Genitourinary: Negative for difficulty urinating, dysuria, frequency and urgency.   Musculoskeletal: Negative for arthralgias, back pain, gait problem, joint swelling and myalgias.   Skin: Negative for rash.   Neurological: Negative for dizziness, tremors, syncope, weakness, light-headedness, numbness and headaches.   Hematological: Negative for adenopathy. Does not bruise/bleed easily.    Psychiatric/Behavioral: Negative for confusion, sleep disturbance and suicidal ideas. The patient is not nervous/anxious.          Current Outpatient Medications:   •  acetaminophen (TYLENOL) 500 MG tablet, Take 500 mg by mouth Every 6 (Six) Hours As Needed for Mild Pain ., Disp: , Rfl:   •  amLODIPine (NORVASC) 5 MG tablet, Take 5 mg by mouth Daily., Disp: , Rfl:   •  baclofen (LIORESAL) 10 MG tablet, 1 TABLET BY MOUTH THREE TIMES A DAY START WITH ONE PILL NIGHTLY FOR A WEEK, THEN INCREASE TO TWICE A DAY FOR A WEEK, THEN THREE TIMES A DAY, Disp: 270 tablet, Rfl: 1  •  Calcium Carbonate-Vitamin D (CALCIUM-CARB 600 + D) 600-125 MG-UNIT tablet, Take 500 mg by mouth 2 (Two) Times a Day., Disp: 60 each, Rfl: 5  •  diclofenac (VOLTAREN) 75 MG EC tablet, Take 75 mg by mouth Daily As Needed., Disp: , Rfl:   •  gabapentin (NEURONTIN) 300 MG capsule, Take 1 capsule by mouth Daily With Breakfast & Lunch AND 2 capsules Every Night., Disp: 360 capsule, Rfl: 1  •  HYDROcodone-acetaminophen (NORCO) 7.5-325 MG per tablet, Take 1 tablet by mouth Every 4 (Four) Hours As Needed for Moderate Pain, Disp: 10 tablet, Rfl: 0  •  levothyroxine (SYNTHROID, LEVOTHROID) 125 MCG tablet, Take 1 tablet by mouth Daily., Disp: 90 tablet, Rfl: 3  •  lidocaine (LIDODERM) 5 %, Place 1 patch on the skin as directed by provider Daily. Remove & Discard patch within 12 hours or as directed by MD, Disp: 90 each, Rfl: 3  •  Lidocaine Viscous HCl (XYLOCAINE) 2 % solution, , Disp: , Rfl:   •  lidocaine-prilocaine (EMLA) 2.5-2.5 % cream, Apply  topically to the appropriate area as directed As Needed (45-60 minutes prior to port access.  Cover with saran/plastic wrap.)., Disp: 30 g, Rfl: 3  •  magic mouthwash oral suspension, Swish and spit (or swallow) 1-2 Teaspoonfuls (5-10ml) 4 times a day as needed, Disp: 240 mL, Rfl: 3  •  omeprazole (priLOSEC) 40 MG capsule, Take 1 capsule by mouth Daily., Disp: 90 capsule, Rfl: 2  •  ondansetron (ZOFRAN) 8 MG tablet,  "Take 1 tablet by mouth 3 (Three) Times a Day As Needed for Nausea or Vomiting., Disp: 30 tablet, Rfl: 5  •  pravastatin (PRAVACHOL) 40 MG tablet, Take 1 tablet by mouth Daily., Disp: 90 tablet, Rfl: 3  •  tiotropium bromide-olodaterol (STIOLTO RESPIMAT) 2.5-2.5 MCG/ACT aerosol solution inhaler, Inhale 2 puffs Daily., Disp: 1 inhaler, Rfl: 5  •  traMADol (ULTRAM) 50 MG tablet, Take 1 tablet by mouth 3 (Three) Times a Day., Disp: 90 tablet, Rfl: 1    PHYSICAL EXAMINATION:   Resp 16   Ht 157.5 cm (62.01\")   Wt 61.6 kg (135 lb 11.2 oz)   BMI 24.81 kg/m²    ECOG Performance Status: 1 - Symptomatic but completely ambulatory  General Appearance:  alert, cooperative, no apparent distress, appears stated age and normal weight   Neurologic/Psychiatric: A&O x 3, gait steady, appropriate affect, strength 5/5 in all muscle groups   HEENT:  Normocephalic, without obvious abnormality, mucous membranes moist   Neck: Supple, symmetrical, trachea midline, no adenopathy;  No thyromegaly, masses, or tenderness   Lungs:   Clear to auscultation bilaterally; respirations regular, even, and unlabored bilaterally   Heart:  Regular rate and rhythm, no murmurs appreciated   Abdomen:   Soft, non-tender, non-distended and no organomegaly   Lymph nodes: No cervical, supraclavicular, inguinal or axillary adenopathy noted.  Submandibular nodule noted approximately 2cm   Extremities: Normal, atraumatic; no clubbing, cyanosis, or edema    Skin: No rashes, ulcers, or suspicious lesions noted     No visits with results within 2 Week(s) from this visit.   Latest known visit with results is:   Infusion on 04/22/2020   Component Date Value Ref Range Status   • Glucose 04/22/2020 112* 65 - 99 mg/dL Final   • BUN 04/22/2020 12  8 - 23 mg/dL Final   • Creatinine 04/22/2020 0.73  0.57 - 1.00 mg/dL Final   • Sodium 04/22/2020 130* 136 - 145 mmol/L Final   • Potassium 04/22/2020 4.2  3.5 - 5.2 mmol/L Final   • Chloride 04/22/2020 94* 98 - 107 mmol/L Final "   • CO2 04/22/2020 25.1  22.0 - 29.0 mmol/L Final   • Calcium 04/22/2020 9.2  8.6 - 10.5 mg/dL Final   • Total Protein 04/22/2020 7.7  6.0 - 8.5 g/dL Final   • Albumin 04/22/2020 3.70  3.50 - 5.20 g/dL Final   • ALT (SGPT) 04/22/2020 9  1 - 33 U/L Final   • AST (SGOT) 04/22/2020 18  1 - 32 U/L Final   • Alkaline Phosphatase 04/22/2020 92  39 - 117 U/L Final   • Total Bilirubin 04/22/2020 0.2  0.2 - 1.2 mg/dL Final   • eGFR Non African Amer 04/22/2020 80  >60 mL/min/1.73 Final   • Globulin 04/22/2020 4.0  gm/dL Final   • A/G Ratio 04/22/2020 0.9  g/dL Final   • BUN/Creatinine Ratio 04/22/2020 16.4  7.0 - 25.0 Final   • Anion Gap 04/22/2020 10.9  5.0 - 15.0 mmol/L Final   • TSH 04/22/2020 0.395  0.270 - 4.200 uIU/mL Final    TSH results may be falsely decreased if patient taking Biotin.   • T3, Free 04/22/2020 2.93  2.00 - 4.40 pg/mL Final   • Free T4 04/22/2020 1.60  0.93 - 1.70 ng/dL Final   • WBC 04/22/2020 8.76  3.40 - 10.80 10*3/mm3 Final   • RBC 04/22/2020 3.31* 3.77 - 5.28 10*6/mm3 Final   • Hemoglobin 04/22/2020 10.5* 12.0 - 15.9 g/dL Final   • Hematocrit 04/22/2020 33.0* 34.0 - 46.6 % Final   • MCV 04/22/2020 99.7* 79.0 - 97.0 fL Final   • MCH 04/22/2020 31.7  26.6 - 33.0 pg Final   • MCHC 04/22/2020 31.8  31.5 - 35.7 g/dL Final   • RDW 04/22/2020 14.8  12.3 - 15.4 % Final   • RDW-SD 04/22/2020 54.8* 37.0 - 54.0 fl Final   • MPV 04/22/2020 8.9  6.0 - 12.0 fL Final   • Platelets 04/22/2020 231  140 - 450 10*3/mm3 Final   • Neutrophil % 04/22/2020 62.9  42.7 - 76.0 % Final   • Lymphocyte % 04/22/2020 21.0  19.6 - 45.3 % Final   • Monocyte % 04/22/2020 14.0* 5.0 - 12.0 % Final   • Eosinophil % 04/22/2020 1.4  0.3 - 6.2 % Final   • Basophil % 04/22/2020 0.5  0.0 - 1.5 % Final   • Immature Grans % 04/22/2020 0.2  0.0 - 0.5 % Final   • Neutrophils, Absolute 04/22/2020 5.51  1.70 - 7.00 10*3/mm3 Final   • Lymphocytes, Absolute 04/22/2020 1.84  0.70 - 3.10 10*3/mm3 Final   • Monocytes, Absolute 04/22/2020 1.23* 0.10  - 0.90 10*3/mm3 Final   • Eosinophils, Absolute 04/22/2020 0.12  0.00 - 0.40 10*3/mm3 Final   • Basophils, Absolute 04/22/2020 0.04  0.00 - 0.20 10*3/mm3 Final   • Immature Grans, Absolute 04/22/2020 0.02  0.00 - 0.05 10*3/mm3 Final   • nRBC 04/22/2020 0.0  0.0 - 0.2 /100 WBC Final          ASSESSMENT: The patient is a very pleasant 68 y.o. female  with right upper lobe extensive stage small cell lung cancer     PROBLEM LIST:   1.  Right upper lobe extensive stage small cell lung cancer, [id number] stage IVb:  A.  Presented with right lower ribs pain  B.  CT chest revealed 3.5 cm right upper lobe lung mass, right adrenal nodule, mediastinal adenopathy, and left cervical lymphadenopathy.  C.  Status post left cervical lymph node biopsy done by Dr. Owen December 2, 2019  D.  Pathology consistent with small cell carcinoma  E.  Started palliative treatment with carboplatin and etoposide and Tecentriq December 15, 2019, status post 7 cycles  2.  COPD  3.  Hypertension  4.  Hypercholesterolemia  5.  Hypothyroid  6. GERD      PLAN:  1.  I will proceed with treatment as scheduled today maintenace Tecentriq cycle #8.  2.  The patient follow-up with us in 3 weeks for cycle #9.  3.  We will do 3 months follow-up study which will be due May 2020. I will order scans prior to return.   4.  I will continue to monitor the patient blood work including blood counts kidney function liver function electrolytes function.  5.  I will continue the patient on Zofran as needed for chemotherapy-induced nausea.  6. We reviewed the potential side effects of this regimen including fatigue, vomiting and nausea, hair loss, nephropathy, neuropathy, hearing loss, myelosuppression, and risk of infusion reaction.  7. We reviewed potential side effects of immunotherapy including but not limited to immune mediated reactions with thyroiditis, pneumonitis, hepatitis, colitis, rash, and electrolytes abnormalities, fatigue, multiorgan failure, and possibly  death.  8.  I will continue to monitor patient blood pressure will consider adjusting her antihypertensive agents if needed while she is on active cancer treatment.  9.  We will continue Synthroid daily for hypothyroid.  Patient is at risk for thyroiditis while she is on immunotherapy.  10. We will continue omeprazole for GERD.    Kathe Higuera, APRN  5/13/2020   PAST MEDICAL HISTORY:  Borderline diabetes mellitus Controlled with diet    CAD (coronary artery disease)     CAD (coronary artery disease) stent x2    Cerebrovascular accident (CVA)     Dyslipidemia     H/O heart artery stent     H/O myocardial ischemia     History of MI (myocardial infarction)     HLD (hyperlipidemia)     HTN (hypertension)     HTN (hypertension), benign     Legally blind in left eye, as defined in [place] 20/400 left eye    Migraines Developed at 10 years of age  Last episode 2 years ago    Sciatica     Vertigo

## 2023-02-11 NOTE — ED CDU PROVIDER SUBSEQUENT DAY NOTE - PROGRESS NOTE DETAILS
Pt follows with Dr Mejia (cards) who is on vacation currently, Dr Ramirez made aware of pts CDU stay today regarding episode of hypotension. Agreed with removal of 1 clonidine patch and to continue all other BP meds. Will continue to follow as outpt. Pt seen by , recommending pt to take Carvedilol 12.5mg BID, lisinopril 20mg daily and 1 clonidine patch, No hydralazine. Pt reports she is feeling well this morning, BP normotensive. Discussed discharge with pt and daughter Dena. Pt is to keep a record of her BP at home for follow up with her cardiologist. She will return to the ER with any worsening or concerning symptoms.

## 2023-02-11 NOTE — ED CDU PROVIDER SUBSEQUENT DAY NOTE - HISTORY
76 y/o female with PMHx of HTN, HLD, CVA, CAD, and ACS s/p stents X 2 who presented to the ED for dizziness today. Pt states she got up and after taking her medications and showering having dizziness. Pt checked her BP and it was 80/50. Pt denies any headache, neck pain, back pain, fever, chills, nausea, vomiting, SOB, chest pain, or abd pain. Pt arrives saying dizziness has improved and BP noted now to be 176/72. Pt notes recent medication change after recent admission that included having Hydralazine 20mg 3 times per day and notes while in the hospital and starting new medication her SBP did drop to 130s. Pt sent to OBS for BP management/monitoring. Pt has been stable while in CDU, currently stable, pt was hypertensive in the evening on 2/10/23, since improved. 76 y/o female with PMHx of HTN, HLD, CVA, CAD, and ACS s/p stents X 2 who presented to the ED for dizziness. Pt states she got up and after taking her medications and showering having dizziness. Pt checked her BP and it was 80/50. Pt denies any headache, neck pain, back pain, fever, chills, nausea, vomiting, SOB, chest pain, or abd pain. Pt arrives saying dizziness has improved. Pt notes recent medication change after recent admission that included having Hydralazine 20mg 3 times per day and notes while in the hospital and starting new medication her SBP did drop to 130s. Pt sent to OBS for BP management/monitoring. Pt has been stable while in CDU, currently stable, pt was hypertensive in the evening on 2/10/23, since improved.

## 2023-02-11 NOTE — ED CDU PROVIDER SUBSEQUENT DAY NOTE - ATTENDING APP SHARED VISIT CONTRIBUTION OF CARE
Patient alert and oriented well-appearing in no acute distress HEENT normal.  Heart sounds S1-S2 no murmurs gallops or rubs.  Lungs clear.  Abdomen soft nontender extremities without edema.  Neuro gait normal motor 5/5.

## 2023-02-11 NOTE — ED CDU PROVIDER DISPOSITION NOTE - PATIENT PORTAL LINK FT
You can access the FollowMyHealth Patient Portal offered by [facility] by registering at the following website: [email or web address]. By joining [employer]’s FollowMyHealth portal, you will also be able to view your health information using other applications (apps) compatible with our system.

## 2023-02-11 NOTE — CONSULT NOTE ADULT - SUBJECTIVE AND OBJECTIVE BOX
Cardiovascular Disease Initial Evaluation  Date of Service: 23 @ 08:46    CHIEF COMPLAINT: Dizziness    HISTORY OF PRESENT ILLNESS:    This is a 77 year old woman with CAD s/p PCI to mLAD in 2022, HTN, HLD, and prior CVA who initially presented to [facility] on 2023 with intermittent sub-sternal chest heaviness/pressure.   Ms. Carrasco was noted to have HTN urgency and BP meds were adjusted.  BP was still high in the outpatient setting so her PMD advised that she wear two clonidine patched.  She returned to [facility] with dizziness and hypotension.       Allergies    fish (Unknown)  iodine (Anaphylaxis)  No Known Drug Allergies    Intolerances    labetalol (Other)  	    MEDICATIONS:  aspirin  chewable 81 milliGRAM(s) Oral daily  carvedilol 12.5 milliGRAM(s) Oral every 12 hours  lisinopril 20 milliGRAM(s) Oral daily  ticagrelor 60 milliGRAM(s) Oral two times a day            atorvastatin 20 milliGRAM(s) Oral at bedtime        PAST MEDICAL & SURGICAL HISTORY:  Dyslipidemia      HTN (hypertension), benign      CAD (coronary artery disease)      History of MI (myocardial infarction)      H/O heart artery stent      Vertigo      Migraines  Developed at 10 years of age  Last episode 2 years ago      Borderline diabetes mellitus  Controlled with diet      Legally blind in left eye, as defined in [place]  20/400 left eye      Sciatica      HTN (hypertension)      HLD (hyperlipidemia)      Cerebrovascular accident (CVA)      H/O myocardial ischemia      CAD (coronary artery disease)  stent x2      No Past Surgical History      History of tonsillectomy      History of tubal ligation  35 years ago, no complications as per patient.      S/P LASIK surgery  Right eye, no complications as per patient.      History of coronary artery stent placement  3 stents (Last in )  Same admission as past myocardial infarction      S/P ICD (internal cardiac defibrillator) procedure  loop recorder          FAMILY HISTORY:  Family history of MI (myocardial infarction) (Child)  Father ( at 75)  Mother ( at 87)  Son (  at 35)    Family history of hypertension (Child, Sibling)  Daughters, Sisters    Family history of hypercholesterolemia (Child, Sibling)    Family history of brain tumor (Sibling)        SOCIAL HISTORY:    The patient is a nonsmoker       REVIEW OF SYSTEMS:  See HPI, otherwise complete 14 point review of systems negative      PHYSICAL EXAM:  T(C): 36.6 (23 @ 06:11), Max: 36.7 (02-10-23 @ 16:18)  HR: 65 (23 @ 06:11) (64 - 73)  BP: 142/61 (23 @ 06:11) (142/61 - 180/79)  RR: 16 (23 @ 06:11) (15 - 16)  SpO2: 100% (23 @ 06:11) (97% - 100%)  Wt(kg): --  I&O's Summary      Appearance: No Acute Distress; resting comfortably  HEENT:  Normal oral mucosa, PERRL, EOMI	  Cardiovascular: Normal S1 S2, No JVD, No murmurs/rubs/gallops  Respiratory: Normal respiratory effort; Lungs clear to auscultation bilaterally  Gastrointestinal:  Soft, Non-tender, + BS	  Skin: No rashes, No ecchymoses, No cyanosis	  Neurologic: Non-focal; no weakness  Extremities: No clubbing, cyanosis or edema  Vascular: Peripheral pulses palpable 2+ bilaterally  Psychiatry: A & O x 3, Mood & affect appropriate    Laboratory Data:	 	    CBC Full  -  ( 10 Feb 2023 08:44 )  WBC Count : 8.41 K/uL  Hemoglobin : 10.4 g/dL  Hematocrit : 33.0 %  Platelet Count - Automated : 256 K/uL  Mean Cell Volume : 84.4 fL  Mean Cell Hemoglobin : 26.6 pg  Mean Cell Hemoglobin Concentration : 31.5 gm/dL  Auto Neutrophil # : 6.28 K/uL  Auto Lymphocyte # : 1.60 K/uL  Auto Monocyte # : 0.36 K/uL  Auto Eosinophil # : 0.09 K/uL  Auto Basophil # : 0.05 K/uL  Auto Neutrophil % : 74.6 %  Auto Lymphocyte % : 19.0 %  Auto Monocyte % : 4.3 %  Auto Eosinophil % : 1.1 %  Auto Basophil % : 0.6 %    02-10    140  |  105  |  15  ----------------------------<  132<H>  4.0   |  25  |  1.07    Ca    9.3      10 Feb 2023 08:44    TPro  6.6  /  Alb  3.7  /  TBili  1.0  /  DBili  x   /  AST  14  /  ALT  12  /  AlkPhos  86  02-10      Interpretation of Telemetry: Sinus; no ectopy	    ECG:  	Normal sinus rhythm    Assessment: 77 year old woman with CAD s/p PCI to mLAD in 2022, HTN, HLD, and prior CVA presents with hypotension.     Plan of Care:    #Dizziness-   Due to iatrogenic hypotension.   BP is now better after removing the second clonidine patch.   Continue oral meds as ordered.  No hydralazine.   Recent nuclear stress test is negative for inducible ischemia.   No further inpatient cardiac work up is warranted at this time.     #CAD-  S/P PCI in 2022.  Stress test results as above.   Continue DAPT and statin.      No cardiac objection to discharge planning.  Outpatient cardiac f/u with Dr. Jorge Mejia.         52 minutes spent on total encounter; more than 50% of the visit was spent counseling and/or coordinating care by the attending physician.   	  Merrick Ramirez MD [facility]  Cardiovascular Diseases  [phone]

## 2023-02-14 LAB
METANEPHRINE, PL: 26.5 PG/ML — SIGNIFICANT CHANGE UP (ref 0–88)
NORMETANEPHRINE, PL: 59.5 PG/ML — SIGNIFICANT CHANGE UP (ref 0–285.2)

## 2023-02-22 LAB
CREATININE, RNDM UR: 34.6 MG/DL — SIGNIFICANT CHANGE UP
METANEPHS 24H UR-MCNC: 0.8 — SIGNIFICANT CHANGE UP (ref 0–1)
METANEPHS 24H UR-MCNC: 65 UG/L — SIGNIFICANT CHANGE UP
NORMETANEPHRINE.: 188 UG/L — SIGNIFICANT CHANGE UP

## 2023-03-18 LAB
CULTURE RESULTS: SIGNIFICANT CHANGE UP
SPECIMEN SOURCE: SIGNIFICANT CHANGE UP

## 2023-03-23 ENCOUNTER — NON-APPOINTMENT (OUTPATIENT)
Age: 78
End: 2023-03-23

## 2023-03-23 PROBLEM — Z78.9 CURRENT NON-SMOKER: Status: ACTIVE | Noted: 2017-05-25

## 2023-03-24 ENCOUNTER — APPOINTMENT (OUTPATIENT)
Dept: NEUROSURGERY | Facility: CLINIC | Age: 78
End: 2023-03-24
Payer: MEDICARE

## 2023-03-24 VITALS
OXYGEN SATURATION: 99 % | BODY MASS INDEX: 21.17 KG/M2 | WEIGHT: 105 LBS | HEART RATE: 70 BPM | TEMPERATURE: 98.1 F | DIASTOLIC BLOOD PRESSURE: 75 MMHG | HEIGHT: 59 IN | SYSTOLIC BLOOD PRESSURE: 194 MMHG

## 2023-03-24 DIAGNOSIS — I67.1 CEREBRAL ANEURYSM, NONRUPTURED: ICD-10-CM

## 2023-03-24 DIAGNOSIS — Z78.9 OTHER SPECIFIED HEALTH STATUS: ICD-10-CM

## 2023-03-24 PROCEDURE — 99215 OFFICE O/P EST HI 40 MIN: CPT

## 2023-03-24 RX ORDER — ASPIRIN 81 MG/1
81 TABLET, CHEWABLE ORAL
Refills: 0 | Status: ACTIVE | COMMUNITY
Start: 2023-03-24

## 2023-03-29 PROBLEM — I67.1 UNRUPTURED CEREBRAL ANEURYSM: Status: ACTIVE | Noted: 2023-03-29

## 2023-03-29 NOTE — PHYSICAL EXAM
[General Appearance - Alert] : alert [General Appearance - Well Nourished] : well nourished [General Appearance - Well-Appearing] : healthy appearing [Oriented To Time, Place, And Person] : oriented to person, place, and time [Person] : oriented to person [Place] : oriented to place [Time] : oriented to time [Cranial Nerves Optic (II)] : visual acuity intact bilaterally,  pupils equal round and reactive to light [Cranial Nerves Oculomotor (III)] : extraocular motion intact [Cranial Nerves Trigeminal (V)] : facial sensation intact symmetrically [Cranial Nerves Facial (VII)] : face symmetrical [Cranial Nerves Vestibulocochlear (VIII)] : hearing was intact bilaterally [Cranial Nerves Glossopharyngeal (IX)] : tongue and palate midline [Cranial Nerves Accessory (XI - Cranial And Spinal)] : head turning and shoulder shrug symmetric [Cranial Nerves Hypoglossal (XII)] : there was no tongue deviation with protrusion [Motor Tone] : muscle tone was normal in all four extremities [Motor Strength] : muscle strength was normal in all four extremities [Involuntary Movements] : no involuntary movements were seen [Sensation Tactile Decrease] : light touch was intact [Sclera] : the sclera and conjunctiva were normal [Outer Ear] : the ears and nose were normal in appearance [Neck Appearance] : the appearance of the neck was normal [Heart Rate And Rhythm] : heart rate was normal and rhythm regular [Abnormal Walk] : normal gait [Skin Color & Pigmentation] : normal skin color and pigmentation

## 2023-04-03 NOTE — REASON FOR VISIT
[Follow-Up: _____] : a [unfilled] follow-up visit [FreeTextEntry1] : Hospital Course: \par Discharge Date	01-Feb-2023 \par Admission Date	30-Jan-2023 15:03 \par Reason for Admission	Chest pain , High BP and headache \par Hospital Course	 \par 77F w PMHx HTN, HLD, CVA, CAD cb MI s/p 2 stents, L CEA  who presents with headache and chest pain. Patient reports onset of headache substernal chest pain yesterday afternoon, with subsequent development of headache. Reports headache was on the top of her head, transient in nature. Also reports blurred vision, although states this is chronic. Denies nausea, vomiting, fevers, chills, SOB, lightheadedness, dizziness, recent head strike or loss of consciousness. \par \par Pt presents today for a follow up visit stating that she is feeling well.  No complaints offered.\par

## 2023-04-03 NOTE — ASSESSMENT
[FreeTextEntry1] : IMPRESSION:\par 77F w PMHx HTN, HLD, CVA, CAD cb MI s/p 2 stents, L CEA  who presents with headache and chest pain. Patient reports onset of headache substernal chest pain yesterday afternoon, with subsequent development of headache. Reports headache was on the top of her head, transient in nature. Also reports blurred vision, although states this is chronic. Denies nausea, vomiting, fevers, chills, SOB, lightheadedness, dizziness, recent head strike or loss of consciousness. \par \par FINDINGS: CTA BRAIN 1/30/23\par HEMORRHAGE: No evidence of intracranial hemorrhage.\par BRAIN PARENCHYMA: Mild atrophy. Mild periventricular white matter ischemic changes. No mass or mass effect.\par VENTRICLES / SHIFT: No hydrocephalus. No midline shift.\par EXTRA-AXIAL / BASAL CISTERNS: No extra-axial mass. Basal cisterns preserved. Atherosclerotic calcifications of the cavernous internal carotid arteries.\par CALVARIUM AND EXTRACRANIAL SOFT TISSUES: No depressed calvarial fracture.\par SINUSES, ORBITS, MASTOIDS: The visualized paranasal sinuses and mastoid air cells are well aerated.\par ----------------------------------------\par \par CTA TECHNIQUE:\par CT angiography of the neck and brain was performed during the dynamic administration of intravenous contrast. MIP reconstructions were performed and reviewed. Multiplanar reformations were obtained.\par Contrast administered: 90 cc Omipaque 350. Contrast discarded: 10 cc.\par \par CTA FINDINGS:\par NECK\par RIGHT CAROTID CIRCULATION:\par Evaluation of the right carotid circulation demonstrates no hemodynamic significant narrowing utilizing a distal reference. Mild to moderate plaque carotid bulb/bifurcation with less than 50% narrowing utilizing a distal reference\par LEFT CAROTID CIRCULATION:\par Evaluation of the left carotid circulation demonstrates no hemodynamic significant narrowing utilizing a distal reference. Prior left carotid endarterectomy\par VERTEBRAL ARTERIES:\par Evaluation of the vertebral arteries reveals no evidence of a vertebral artery occlusion or dissection. Direct origin of a small left vertebral artery from the aortic arch.\par \par BRAIN\par ANTERIOR CIRCULATION:\par Distal internal carotid arteries are patent. Atherosclerotic calcification of the cavernous segments without significant narrowing. 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 A right posterior inferior cerebellar artery is seen. Distal right vertebral artery is dominant\par Basilar artery is patent. Proximal superior cerebellar arteries are patent\par Posterior cerebral arteries are patent. Right posterior cerebral artery arises from the anterior circulation a normal anatomic variant. A large left posterior communicating artery is present\par \par IMPRESSION:\par NO EVIDENCE OF AN ACUTE INTRACRANIAL HEMORRHAGE, MIDLINE SHIFT, OR HYDROCEPHALUS. MILD ATROPHY WITH MILD PERIVENTRICULAR WHITE MATTER ISCHEMIC CHANGES\par NO HEMODYNAMIC SIGNIFICANT NARROWING WITHIN THE NECK. Prior LEFT CAROTID ENDARTERECTOMY\par NO PROXIMAL LARGE VESSEL OCCLUSION INTRACRANIALLY. 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 No additional imaging needed as aneurysm is in the distal cavernous segment of the LICA.\par 2. F/U with Vascular surgeon and PCP as scheduled.\par

## 2023-06-13 NOTE — HISTORY OF PRESENT ILLNESS
Ok for letter for the next month      [FreeTextEntry1] : 74-year-old female with a long history of carotid stenosis presents to the office with a history of dizziness and syncope.  Patient said she has had numerous episodes over the past several years.  She recently was at [facility] and underwent a work-up at that time.  She has had conflicting evidence regarding a left vertebral artery stenosis versus occlusion.  She is here for follow-up.

## 2023-08-03 NOTE — BRIEF OPERATIVE NOTE - DISPOSITION
PACU Bexarotene Pregnancy And Lactation Text: This medication is Pregnancy Category X and should not be given to women who are pregnant or may become pregnant. This medication should not be used if you are breast feeding.

## 2023-08-31 ENCOUNTER — APPOINTMENT (OUTPATIENT)
Age: 78
End: 2023-08-31
Payer: COMMERCIAL

## 2023-08-31 ENCOUNTER — APPOINTMENT (OUTPATIENT)
Age: 78
End: 2023-08-31

## 2023-08-31 PROCEDURE — D2392: CPT

## 2024-02-02 NOTE — PATIENT PROFILE ADULT - FALL HARM RISK - UNIVERSAL INTERVENTIONS
2024    To Whom it May Concern:    This is to certify that hubert Rueda (: 1989) is a patient under my care and requesting academic accommodations.    Current Diagnoses:  Autism  (primary encounter diagnosis)  Attention deficit hyperactivity disorder, inattentive type  DAVEY (generalized anxiety disorder)  Major depressive disorder, recurrent episode, moderate (CMD)     History of the psychiatric condition(s): Longstanding.      Current Treatment Plan:  Medication management.   Individual therapy.     Clinical Summary:  Functional limitations to learning posed by the psychiatric condition(s): difficulty meeting deadlines, difficulty prioritizing and managing deadlines, difficulty managing emotions, difficulty completing projects/assignments.      Specific recommendations for accommodations that are related to and supported by assessment results: extended time for projects/assignments, additional 1:1 help when needed, ability to leave room when needed.       How the effects of the psychiatric condition(s) are mediated by the recommended accommodations: accommodations with support patients progress in class and provide support for success.     These accommodations will allow this individual to reach their highest executive functioning and academic potential.        Thank you,    Sindhu Maurer, RANULFOP, APNP  [address] [place] [address]  O:[phone]  F:[phone]   Bed in lowest position, wheels locked, appropriate side rails in place/Call bell, personal items and telephone in reach/Instruct patient to call for assistance before getting out of bed or chair/Non-slip footwear when patient is out of bed/[place] to call system/Physically safe environment - no spills, clutter or unnecessary equipment/Purposeful Proactive Rounding/Room/bathroom lighting operational, light cord in reach

## 2024-03-07 ENCOUNTER — APPOINTMENT (OUTPATIENT)
Dept: VASCULAR SURGERY | Facility: CLINIC | Age: 79
End: 2024-03-07
Payer: MEDICARE

## 2024-03-07 DIAGNOSIS — I65.29 OCCLUSION AND STENOSIS OF UNSPECIFIED CAROTID ARTERY: ICD-10-CM

## 2024-03-07 PROCEDURE — 93880 EXTRACRANIAL BILAT STUDY: CPT

## 2024-03-07 PROCEDURE — 99214 OFFICE O/P EST MOD 30 MIN: CPT

## 2024-03-08 NOTE — ASSESSMENT
[Arterial/Venous Disease] : arterial/venous disease [Medication Management] : medication management [Carotid Endarterectomy] : carotid endarterectomy [FreeTextEntry1] : 78-year-old female with carotid stenosis status post left carotid endarterectomy.  In the office today, patient with duplex which demonstrates a widely patent left carotid endarterectomy.  Moderate stenosis noted in the right ICA unchanged from previous study.  Patient to continue conservative management with aspirin and pravastatin.  Follow-up 6 months with repeat duplex.

## 2024-03-08 NOTE — HISTORY OF PRESENT ILLNESS
[FreeTextEntry1] : Patient is a 78 year old female with history of hypertension, hyperlipidemia, and carotid stenosis s/p left carotid endarterectomy who presents to the office today for follow up.  Patient reports several recent hospital admissions due to hypertensive crises.  Denies sudden vision loss, facial droop, or unilateral weakness.

## 2024-03-08 NOTE — PHYSICAL EXAM
[Normal Breath Sounds] : Normal breath sounds [Normal Rate and Rhythm] : normal rate and rhythm [2+] : left 2+ [No Rash or Lesion] : No rash or lesion [Alert] : alert [Calm] : calm [JVD] : no jugular venous distention  [Varicose Veins Of Lower Extremities] : not present [Ankle Swelling (On Exam)] : not present [Skin Ulcer] : no ulcer [] : not present [de-identified] : appears well  [de-identified] : No facial asymmetry

## 2024-03-22 NOTE — DISCHARGE NOTE PROVIDER - NSDCFUSCHEDAPPT_GEN_ALL_CORE_FT

## 2024-06-07 NOTE — DISCHARGE NOTE NURSING/CASE MANAGEMENT/SOCIAL WORK - NSDCPEPTSTROKESIGNS_GEN_ALL_CORE
Sudden numbness or weakness of the face, arm, or leg, especially on one side of the body. Confusion, trouble speaking or understanding. Trouble seeing in one or both eyes. Trouble walking, dizziness, loss of balance or coordination. Severe headache.
Calm

## 2024-09-10 NOTE — PATIENT PROFILE ADULT - WILL THE PATIENT ACCEPT THE PFIZER COVID-19 VACCINE IF ELIGIBLE AND IT IS AVAILABLE?
Rx Refill Request Telephone Encounter    Name:  Alireza Singh  :  [id number]  Medication Name:  meclizine (Antivert) 25 mg tablet             Specific Pharmacy location:    [address] Phone: [phone]   Fax: [phone]        Date of last appointment:  2024  Date of next appointment:  2024            
Not applicable

## 2024-09-12 ENCOUNTER — APPOINTMENT (OUTPATIENT)
Dept: VASCULAR SURGERY | Facility: CLINIC | Age: 79
End: 2024-09-12

## 2024-10-20 ENCOUNTER — EMERGENCY (EMERGENCY)
Facility: HOSPITAL | Age: 79
LOS: 1 days | Discharge: HOME | End: 2024-10-20
Attending: EMERGENCY MEDICINE | Admitting: EMERGENCY MEDICINE
Payer: MEDICARE

## 2024-10-20 VITALS
HEART RATE: 72 BPM | WEIGHT: 100.09 LBS | DIASTOLIC BLOOD PRESSURE: 85 MMHG | RESPIRATION RATE: 15 BRPM | OXYGEN SATURATION: 99 % | SYSTOLIC BLOOD PRESSURE: 185 MMHG | TEMPERATURE: 98 F

## 2024-10-20 DIAGNOSIS — Z95.5 PRESENCE OF CORONARY ANGIOPLASTY IMPLANT AND GRAFT: Chronic | ICD-10-CM

## 2024-10-20 DIAGNOSIS — Z98.89 OTHER SPECIFIED POSTPROCEDURAL STATES: Chronic | ICD-10-CM

## 2024-10-20 DIAGNOSIS — Z98.51 TUBAL LIGATION STATUS: Chronic | ICD-10-CM

## 2024-10-20 DIAGNOSIS — Z95.810 PRESENCE OF AUTOMATIC (IMPLANTABLE) CARDIAC DEFIBRILLATOR: Chronic | ICD-10-CM

## 2024-10-20 LAB
APPEARANCE UR: CLEAR — SIGNIFICANT CHANGE UP
BASOPHILS # BLD AUTO: 0.04 K/UL — SIGNIFICANT CHANGE UP (ref 0–0.2)
BASOPHILS NFR BLD AUTO: 0.5 % — SIGNIFICANT CHANGE UP (ref 0–2)
BILIRUB UR-MCNC: NEGATIVE — SIGNIFICANT CHANGE UP
COLOR SPEC: YELLOW — SIGNIFICANT CHANGE UP
DIFF PNL FLD: NEGATIVE — SIGNIFICANT CHANGE UP
EOSINOPHIL # BLD AUTO: 0.02 K/UL — SIGNIFICANT CHANGE UP (ref 0–0.5)
EOSINOPHIL NFR BLD AUTO: 0.2 % — SIGNIFICANT CHANGE UP (ref 0–6)
GLUCOSE UR QL: NEGATIVE MG/DL — SIGNIFICANT CHANGE UP
HCT VFR BLD CALC: 34.4 % — LOW (ref 34.5–45)
HGB BLD-MCNC: 11.1 G/DL — LOW (ref 11.5–15.5)
IANC: 6.65 K/UL — SIGNIFICANT CHANGE UP (ref 1.8–7.4)
IMM GRANULOCYTES NFR BLD AUTO: 0.3 % — SIGNIFICANT CHANGE UP (ref 0–0.9)
KETONES UR-MCNC: NEGATIVE MG/DL — SIGNIFICANT CHANGE UP
LEUKOCYTE ESTERASE UR-ACNC: NEGATIVE — SIGNIFICANT CHANGE UP
LYMPHOCYTES # BLD AUTO: 1.27 K/UL — SIGNIFICANT CHANGE UP (ref 1–3.3)
LYMPHOCYTES # BLD AUTO: 14.7 % — SIGNIFICANT CHANGE UP (ref 13–44)
MCHC RBC-ENTMCNC: 27.2 PG — SIGNIFICANT CHANGE UP (ref 27–34)
MCHC RBC-ENTMCNC: 32.3 GM/DL — SIGNIFICANT CHANGE UP (ref 32–36)
MCV RBC AUTO: 84.3 FL — SIGNIFICANT CHANGE UP (ref 80–100)
MONOCYTES # BLD AUTO: 0.63 K/UL — SIGNIFICANT CHANGE UP (ref 0–0.9)
MONOCYTES NFR BLD AUTO: 7.3 % — SIGNIFICANT CHANGE UP (ref 2–14)
NEUTROPHILS # BLD AUTO: 6.65 K/UL — SIGNIFICANT CHANGE UP (ref 1.8–7.4)
NEUTROPHILS NFR BLD AUTO: 77 % — SIGNIFICANT CHANGE UP (ref 43–77)
NITRITE UR-MCNC: NEGATIVE — SIGNIFICANT CHANGE UP
NRBC # BLD: 0 /100 WBCS — SIGNIFICANT CHANGE UP (ref 0–0)
NRBC # FLD: 0 K/UL — SIGNIFICANT CHANGE UP (ref 0–0)
PH UR: 8 — SIGNIFICANT CHANGE UP (ref 5–8)
PLATELET # BLD AUTO: 228 K/UL — SIGNIFICANT CHANGE UP (ref 150–400)
PROT UR-MCNC: NEGATIVE MG/DL — SIGNIFICANT CHANGE UP
RBC # BLD: 4.08 M/UL — SIGNIFICANT CHANGE UP (ref 3.8–5.2)
RBC # FLD: 13.1 % — SIGNIFICANT CHANGE UP (ref 10.3–14.5)
SP GR SPEC: 1.01 — SIGNIFICANT CHANGE UP (ref 1–1.03)
UROBILINOGEN FLD QL: 0.2 MG/DL — SIGNIFICANT CHANGE UP (ref 0.2–1)
WBC # BLD: 8.64 K/UL — SIGNIFICANT CHANGE UP (ref 3.8–10.5)
WBC # FLD AUTO: 8.64 K/UL — SIGNIFICANT CHANGE UP (ref 3.8–10.5)

## 2024-10-20 PROCEDURE — 99284 EMERGENCY DEPT VISIT MOD MDM: CPT

## 2024-10-20 PROCEDURE — 93010 ELECTROCARDIOGRAM REPORT: CPT

## 2024-10-20 RX ORDER — DIAZEPAM 10 MG/1
2 TABLET ORAL ONCE
Refills: 0 | Status: DISCONTINUED | OUTPATIENT
Start: 2024-10-20 | End: 2024-10-20

## 2024-10-20 RX ORDER — SODIUM CHLORIDE 0.9 % (FLUSH) 0.9 %
1000 SYRINGE (ML) INJECTION ONCE
Refills: 0 | Status: COMPLETED | OUTPATIENT
Start: 2024-10-20 | End: 2024-10-20

## 2024-10-20 RX ORDER — GABAPENTIN 800 MG/1
200 TABLET, FILM COATED ORAL ONCE
Refills: 0 | Status: COMPLETED | OUTPATIENT
Start: 2024-10-20 | End: 2024-10-20

## 2024-10-20 RX ADMIN — Medication 1000 MILLILITER(S): at 23:25

## 2024-10-20 RX ADMIN — GABAPENTIN 200 MILLIGRAM(S): 800 TABLET, FILM COATED ORAL at 23:25

## 2024-10-20 RX ADMIN — DIAZEPAM 2 MILLIGRAM(S): 10 TABLET ORAL at 23:25

## 2024-10-20 NOTE — ED ADULT TRIAGE NOTE - CHIEF COMPLAINT QUOTE
C/O dizziness and vomiting. No complaints of chest pain, headache, nausea, dizziness, vomiting  SOB, fever, chills verbalized. Phx stents x2. vertigo C/O dizziness and vomiting. No complaints of chest pain, headache, nausea, SOB, fever, chills verbalized. Phx stents x2. vertigo

## 2024-10-20 NOTE — ED PROVIDER NOTE - NSFOLLOWUPINSTRUCTIONS_ED_ALL_ED_FT
You were seen in the emergency department for vertigo.  You are found to test positive for COVID.  Please follow the CDC recommendations for care and isolation.    It is important that you stay well-hydrated and eat as you normally would.    Return to the emergency department if you have difficulty breathing, chest pain, severe headaches, difficulty walking, other concerning symptoms.

## 2024-10-20 NOTE — ED PROVIDER NOTE - ATTENDING CONTRIBUTION TO CARE
Karo Riggs MD attending physician patient with longstanding history of ago and sciatica being followed up as an outpatient for the same.  Comes in for worsening of her vertigo.  She had a sudden onset in the setting of having recently had a URI for the last week or so.  She also still noticed that she has some dysuria which is new.     Patient took 2 meclizine's today.  In the past she is coming for dizziness that was secondary to having an extra clonidine patch on however her blood pressure is not hypotensive.      She said she did feel some ear congestion yesterday but now is without congestion    Vital signs here include blood pressure 185/85 respiratory rate is 15 heart rate 72 afebrile O2 sat is 99  Pt alert and can phonate well  h at/nc TMs bilaterally seen some cerumen but they can be seen no signs of obvious infection or redness of the canal  perrl, conj clear, sclera anicteric, positive nystagmus when looking to the left horizontal  Throat dry mucous membranes  neck supple  abd soft no r/g/t  ext no edema no deformities  neueo awake, lucid moves all extremities with strength patient with excellent finger-nose both left and right hands  psych normal affect    EKG sinus rhythm rate 72 no ischemic changes my read and interpretation    Patient seems to have classic worsening of her nystagmus in the setting of a recent URI.  Patient also with pain from her sciatica did not take her evening gabapentin dose.  She did take 2 meclizine earlier which sounds to improved somewhat her symptoms but is still very uncomfortable.  She also recently had a URI and appears mildly dehydrated we will give her fluid and Valium as well as check lab work    I performed a history and physical exam of the patient and discussed their management with the resident and /or advanced care provider. I personally made/approved the management plan and take responsibility for the patient management. I reviewed the resident and /or ACP's note and agree with the documented findings and plan of care. My medical decison making and observations are found above.

## 2024-10-20 NOTE — ED PROVIDER NOTE - PHYSICAL EXAMINATION
General: alert, oriented to person, time, place  Psych: mood appropriate  Head: normocephalic; atraumatic  Eyes: PERRLA, EOMI, conjunctivae clear bilaterally, sclerae anicteric  ENT: TM intact bilaterally  Cardio: RRR, no m/r/g, pulses 2+ b/l  Resp: CATB, no w/r/r  GI: soft/nondistended/nontender  Neuro: normal sensation, moving all four extremities equally  Skin: No evidence of rash or bruising  MSK: normal movement of all extremities  Lymph/Vasc: no LE edema

## 2024-10-20 NOTE — ED PROVIDER NOTE - OBJECTIVE STATEMENT
79-year-old female with past medical history of vertigo presents to the emergency department due to an acute exacerbation of her vertigo in the setting of multiple days of cough.  She further states that she has felt some discomfort with urination recently however no burning, fevers or chills.  She is on meclizine for vertigo, took 2 doses prior to arrival which did not alleviate her symptoms.  She denies any vision changes.  She has no recent trauma.

## 2024-10-20 NOTE — ED PROVIDER NOTE - PROGRESS NOTE DETAILS
Mitul Thapa MD: Patient lab work nonactionable apart from a positive COVID test.  Findings were communicated to patient, she is feeling better, will give 1 more dose of meclizine.  She is pending pickup by her daughter. Mitul Thapa MD: Patient's daughter arrived, case was discussed, she understands the care plan.  She is here for work, so she will find someone who can take her mother home.

## 2024-10-20 NOTE — ED ADULT NURSE NOTE - NSFALLRISKINTERV_ED_ALL_ED

## 2024-10-20 NOTE — ED PROVIDER NOTE - PATIENT PORTAL LINK FT
You can access the FollowMyHealth Patient Portal offered by [facility] by registering at the following website: [email or web address]. By joining [employer]’s FollowMyHealth portal, you will also be able to view your health information using other applications (apps) compatible with our system.

## 2024-10-20 NOTE — ED ADULT NURSE NOTE - CHIEF COMPLAINT QUOTE
C/O dizziness and vomiting. No complaints of chest pain, headache, nausea, SOB, fever, chills verbalized. Phx stents x2. vertigo

## 2024-10-20 NOTE — ED PROVIDER NOTE - CLINICAL SUMMARY MEDICAL DECISION MAKING FREE TEXT BOX
Karo Riggs MD attending physician patient with longstanding history of ago and sciatica being followed up as an outpatient for the same.  Comes in for worsening of her vertigo.  She had a sudden onset in the setting of having recently had a URI for the last week or so.  She also still noticed that she has some dysuria which is new.     Patient took 2 meclizine's today.  In the past she is coming for dizziness that was secondary to having an extra clonidine patch on however her blood pressure is not hypotensive.      She said she did feel some ear congestion yesterday but now is without congestion    Vital signs here include blood pressure 185/85 respiratory rate is 15 heart rate 72 afebrile O2 sat is 99  Pt alert and can phonate well  h at/nc TMs bilaterally seen some cerumen but they can be seen no signs of obvious infection or redness of the canal  perrl, conj clear, sclera anicteric, positive nystagmus when looking to the left horizontal  Throat dry mucous membranes  neck supple  abd soft no r/g/t  ext no edema no deformities  neueo awake, lucid moves all extremities with strength patient with excellent finger-nose both left and right hands  psych normal affect    EKG sinus rhythm rate 72 no ischemic changes my read and interpretation    Patient seems to have classic worsening of her nystagmus in the setting of a recent URI.  Patient also with pain from her sciatica did not take her evening gabapentin dose.  She did take 2 meclizine earlier which sounds to improved somewhat her symptoms but is still very uncomfortable.  She also recently had a URI and appears mildly dehydrated we will give her fluid and Valium as well as check lab work

## 2024-10-21 VITALS
RESPIRATION RATE: 20 BRPM | HEART RATE: 74 BPM | OXYGEN SATURATION: 100 % | TEMPERATURE: 99 F | DIASTOLIC BLOOD PRESSURE: 75 MMHG | SYSTOLIC BLOOD PRESSURE: 156 MMHG

## 2024-10-21 LAB
ALBUMIN SERPL ELPH-MCNC: 4.2 G/DL — SIGNIFICANT CHANGE UP (ref 3.3–5)
ALP SERPL-CCNC: 70 U/L — SIGNIFICANT CHANGE UP (ref 40–120)
ALT FLD-CCNC: 14 U/L — SIGNIFICANT CHANGE UP (ref 4–33)
ANION GAP SERPL CALC-SCNC: 13 MMOL/L — SIGNIFICANT CHANGE UP (ref 7–14)
AST SERPL-CCNC: 22 U/L — SIGNIFICANT CHANGE UP (ref 4–32)
BILIRUB SERPL-MCNC: 0.7 MG/DL — SIGNIFICANT CHANGE UP (ref 0.2–1.2)
BUN SERPL-MCNC: 17 MG/DL — SIGNIFICANT CHANGE UP (ref 7–23)
CALCIUM SERPL-MCNC: 9.5 MG/DL — SIGNIFICANT CHANGE UP (ref 8.4–10.5)
CHLORIDE SERPL-SCNC: 95 MMOL/L — LOW (ref 98–107)
CO2 SERPL-SCNC: 26 MMOL/L — SIGNIFICANT CHANGE UP (ref 22–31)
CREAT SERPL-MCNC: 0.96 MG/DL — SIGNIFICANT CHANGE UP (ref 0.5–1.3)
EGFR: 61 ML/MIN/1.73M2 — SIGNIFICANT CHANGE UP
FLUAV AG NPH QL: SIGNIFICANT CHANGE UP
FLUBV AG NPH QL: SIGNIFICANT CHANGE UP
GLUCOSE SERPL-MCNC: 170 MG/DL — HIGH (ref 70–99)
POTASSIUM SERPL-MCNC: 4.3 MMOL/L — SIGNIFICANT CHANGE UP (ref 3.5–5.3)
POTASSIUM SERPL-SCNC: 4.3 MMOL/L — SIGNIFICANT CHANGE UP (ref 3.5–5.3)
PROT SERPL-MCNC: 7.3 G/DL — SIGNIFICANT CHANGE UP (ref 6–8.3)
RSV RNA NPH QL NAA+NON-PROBE: SIGNIFICANT CHANGE UP
SARS-COV-2 RNA SPEC QL NAA+PROBE: DETECTED
SODIUM SERPL-SCNC: 134 MMOL/L — LOW (ref 135–145)

## 2024-10-21 PROCEDURE — 71046 X-RAY EXAM CHEST 2 VIEWS: CPT | Mod: 26

## 2024-10-21 RX ORDER — MECLIZINE HYDROCLORIDE 25 MG/1
25 TABLET ORAL ONCE
Refills: 0 | Status: COMPLETED | OUTPATIENT
Start: 2024-10-21 | End: 2024-10-21

## 2024-10-21 RX ADMIN — MECLIZINE HYDROCLORIDE 25 MILLIGRAM(S): 25 TABLET ORAL at 03:56

## 2024-10-21 NOTE — ED ADULT NURSE REASSESSMENT NOTE - NS ED NURSE REASSESS COMMENT FT1
Received patient from previous RN, A&O X 4, documentation as noted. Patient denies any pain or medical complaints at this time. Patient able to speak in clear sentences, respirations equal and unlabored. Patient pending transport home. Patient in no acute distress at this time, will continue to monitor. VSS as noted in flowsheet.

## 2024-10-21 NOTE — ED ADULT NURSE REASSESSMENT NOTE - NS ED NURSE REASSESS COMMENT FT1
Received patient from previous RN, A&O X4 , documentation as noted. Patient complains of upper back pain rating 8 out of 10 currently, MD Gunn made aware. Patient able to speak in clear sentences, respirations equal and unlabored. Patient pending reassessment. Patient in no acute distress at this time, family member at bedside, will continue to monitor. VSS as noted in flowsheet. Received patient from previous RN, A&O X4 , documentation as noted. Patient denies any pain or medical complaints at this time. Patient able to speak in clear sentences, respirations equal and unlabored. Patient pending transport home. Patient in no acute distress at this time, will continue to monitor. VSS as noted in flowsheet.

## 2024-11-18 NOTE — ED PROVIDER NOTE - FAMILY HISTORY
Encounter for cosmetic surgery
Child  Still living? Yes, Estimated age: Age Unknown  Family history of MI (myocardial infarction), Age at diagnosis: Age Unknown  Family history of hypertension, Age at diagnosis: Age Unknown  Family history of hypercholesterolemia, Age at diagnosis: Age Unknown     Sibling  Still living? No  Family history of hypertension, Age at diagnosis: Age Unknown  Family history of hypercholesterolemia, Age at diagnosis: Age Unknown  Family history of brain tumor, Age at diagnosis: Age Unknown

## 2025-01-05 NOTE — ED PROVIDER NOTE - PSH
History of coronary artery stent placement  3 stents (Last in 2012)  Same admission as past myocardial infarction  History of tonsillectomy    History of tubal ligation  35 years ago, no complications as per patient.  S/P ICD (internal cardiac defibrillator) procedure    S/P LASIK surgery  Right eye, no complications as per patient. No indicators present

## 2025-01-29 ENCOUNTER — OUTPATIENT (OUTPATIENT)
Dept: OUTPATIENT SERVICES | Facility: HOSPITAL | Age: 80
LOS: 1 days | End: 2025-01-29
Payer: MEDICARE

## 2025-01-29 ENCOUNTER — APPOINTMENT (OUTPATIENT)
Dept: CT IMAGING | Facility: IMAGING CENTER | Age: 80
End: 2025-01-29
Payer: MEDICARE

## 2025-01-29 DIAGNOSIS — Z98.51 TUBAL LIGATION STATUS: Chronic | ICD-10-CM

## 2025-01-29 DIAGNOSIS — Z98.89 OTHER SPECIFIED POSTPROCEDURAL STATES: Chronic | ICD-10-CM

## 2025-01-29 DIAGNOSIS — Z95.5 PRESENCE OF CORONARY ANGIOPLASTY IMPLANT AND GRAFT: Chronic | ICD-10-CM

## 2025-01-29 DIAGNOSIS — R51.9 HEADACHE, UNSPECIFIED: ICD-10-CM

## 2025-01-29 DIAGNOSIS — Z95.810 PRESENCE OF AUTOMATIC (IMPLANTABLE) CARDIAC DEFIBRILLATOR: Chronic | ICD-10-CM

## 2025-01-29 PROCEDURE — 70450 CT HEAD/BRAIN W/O DYE: CPT

## 2025-01-29 PROCEDURE — 70450 CT HEAD/BRAIN W/O DYE: CPT | Mod: 26

## 2025-02-20 NOTE — PHYSICAL THERAPY INITIAL EVALUATION ADULT - GENERAL OBSERVATIONS, REHAB EVAL
In an effort to ensure that our patients [employer], a Team Member has reviewed your chart and identified an opportunity to provide the best care possible. An attempt was made to discuss or schedule due or overdue Preventive or Chronic Condition care.Care Gaps identified: Wellness Visits.    The Outcome was Contact was made, a Medicare Wellness Visit was scheduled.   Type of Appointment needed:  n/a (appt scheduled)   Patient received semi-supine in bed in NAD with (+) L wound drain, (+) tele. Patient received semi-supine in bed in NAD with (+) L wound drain, (+) tele

## 2025-03-12 ENCOUNTER — APPOINTMENT (OUTPATIENT)
Dept: NEUROLOGY | Facility: CLINIC | Age: 80
End: 2025-03-12
Payer: MEDICARE

## 2025-03-12 VITALS
DIASTOLIC BLOOD PRESSURE: 80 MMHG | WEIGHT: 100 LBS | HEART RATE: 70 BPM | HEIGHT: 59 IN | BODY MASS INDEX: 20.16 KG/M2 | SYSTOLIC BLOOD PRESSURE: 211 MMHG

## 2025-03-12 DIAGNOSIS — R41.3 OTHER AMNESIA: ICD-10-CM

## 2025-03-12 DIAGNOSIS — E11.9 TYPE 2 DIABETES MELLITUS W/OUT COMPLICATIONS: ICD-10-CM

## 2025-03-12 DIAGNOSIS — E78.5 HYPERLIPIDEMIA, UNSPECIFIED: ICD-10-CM

## 2025-03-12 DIAGNOSIS — I10 ESSENTIAL (PRIMARY) HYPERTENSION: ICD-10-CM

## 2025-03-12 PROCEDURE — 99205 OFFICE O/P NEW HI 60 MIN: CPT

## 2025-03-12 PROCEDURE — G2211 COMPLEX E/M VISIT ADD ON: CPT

## 2025-03-12 RX ORDER — ATORVASTATIN CALCIUM 40 MG/1
40 TABLET, FILM COATED ORAL DAILY
Refills: 0 | Status: ACTIVE | COMMUNITY
Start: 2025-03-12

## 2025-03-12 RX ORDER — CLOPIDOGREL BISULFATE 75 MG/1
75 TABLET, FILM COATED ORAL
Qty: 30 | Refills: 1 | Status: ACTIVE | COMMUNITY
Start: 2025-03-12

## 2025-03-12 RX ORDER — CLONIDINE 0.2 MG/24H
0.2 PATCH, EXTENDED RELEASE TRANSDERMAL
Refills: 0 | Status: ACTIVE | COMMUNITY
Start: 2025-03-12

## 2025-03-12 RX ORDER — CARVEDILOL 12.5 MG/1
12.5 TABLET, FILM COATED ORAL TWICE DAILY
Refills: 0 | Status: ACTIVE | COMMUNITY
Start: 2025-03-12

## 2025-03-12 RX ORDER — METFORMIN HYDROCHLORIDE 500 MG/1
500 TABLET, COATED ORAL DAILY
Refills: 0 | Status: ACTIVE | COMMUNITY
Start: 2025-03-12

## 2025-03-24 NOTE — PHYSICAL THERAPY INITIAL EVALUATION ADULT - STANDING BALANCE: STATIC
Detail Level: Simple Instructions: This plan will send the code FBSE to the PM system.  DO NOT or CHANGE the price. Price (Do Not Change): 0.00 good balance

## 2025-03-31 NOTE — ED ADULT NURSE NOTE - LOCATION
"MRI c-spine shows multilevel degenerative change, including some disc bulging. There is also some facet arthropathy (\"spine arthritis\"). Additionally, there is some foraminal stenosis (narrowing around where nerves exit the spine) particularly bilaterally C3-C4, and right C5-C6.  Some left side stenosis also at C5-C6 could contribute to left UE symptoms.  See report for specifics.  Current status? Still getting positional shooting pain sensation to UE?  Would advise PT next based on last discussion and MRI findings. If pt agrees, please place referral, and then monitor 1 month to start.  Other considerations could include imaging guided injection, referral.  I would be happy to have a visit with the patient (in person, by video, or by phone) to discuss further if that would be helpful.  Thanks.  Robert Bullock DO, [facility]    " Allergy and Immunology  Follow up Note:    Chief Complaint   Patient presents with    Office Visit    Allergies     Using nasal sprays     Rash     On left arm and left abdomen        HPI:  Stephen Núñez is a 61 year old male with no pertinent past medical history presenting for follow up of his chronic allergic and vasomotor rhinitis and to discuss a rash. Patient last seen 09/05/24.     Rash:  Red patchy, recurs 3-4 times a year, on stomach and upper arm. Does not apply any moisturizer or ointments. Takes a few weeks to resolve. Little itchy, mostly dry and flaky. Denies any association of rash with exposure to lotions, soaps, laundry detergents, or other products. No relation of rash with exposure to new medications or animals.     Rhinitis:  Azelastine helping but has a bitter aftertaste. Does not last very long. Has not needed Flonase or Singulair. Patient denies any sinus infections requiring antibiotics in the interim from their last appointment.     Current regimen:  -- azelastine 1 spray per nostril BID    Medications tried:  -- fexofenadine  -- Singulair  -- Flonase  -- Claritin    Allergy testing:  -- Percutaneous skin testing 09/05/24: positive to trees, Perfecto grass, 1 mold, dog epithelia, and cat hair     Prior history:  Symptoms present for 1- years.  Symptoms are present year round.  History of GERD: None.   Allergic triggers for symptoms: patient unsure.  Non-allergic triggers for symptoms: patient unsure.  Vasomotor triggers for symptoms: patient unsure.  Episodes of sinusitis: None.  Prior sinus surgery: None.  History of pneumonia: None.  History of otitis media: No recurrent infections.    Environmental exposures:  Age of home: 5 years.   Type of home: house  Type of heating/cooling system in home: forced air and central air  Pets: Yes, dog  Pets allowed in bedroom: Yes  Smoking exposure: No   Uxlz-ms-vlbo carpet: Yes  Mold/mildew damage: No    Use of humidifier: No   Use of de-humidifier:  Yes  Use of feather/down pillows, blankets, or jackets: Yes   Use of dust mite covers: No  Cockroaches or mice exposure: No      Patient denies any history of food allergy, eczema, asthma, medication allergy, recurrent urticaria or angioedema, latex allergy, or stinging insect allergy.    ALLERGIES:  No Known Allergies     Current Outpatient Medications   Medication Sig    rosuvastatin (CRESTOR) 10 MG tablet Take 1 tablet by mouth at bedtime.    azelastine (ASTELIN) 0.1 % nasal spray Spray 1-2 sprays in each nostril 2 times daily as needed for Rhinitis (runny nose or post nasal drip). Use in each nostril as directed    omeprazole (PrilOSEC) 20 MG capsule Take 20 mg by mouth in the morning and 20 mg in the evening.    loratadine (CLARITIN) 10 MG tablet Take 10 mg by mouth daily.    fluticasone (FLONASE) 50 MCG/ACT nasal spray Spray 1 spray in each nostril daily. (Patient not taking: Reported on 3/31/2025)    Multiple Vitamin (MULTIVITAMIN ADULT PO) Take 1 tablet by mouth daily.    VITAMIN D, CHOLECALCIFEROL, PO Take 1 tablet by mouth daily.    montelukast (SINGULAIR) 10 MG tablet TAKE 1 TABLET BY MOUTH DAILY. COURTESY REFILL. APPT IS DUE     No current facility-administered medications for this visit.     Facility-Administered Medications Ordered in Other Visits   Medication    sodium chloride 0.9 % injector flush 100 mL          ROS:  Constitutional Symptoms: No fevers  EENT: No eye erythema, itching, or drainage, no drainage from ears, no nasal congestion, rhinorrhea, no sore throat or post nasal drip  Respiratory: No shortness of breath, cough, or wheeze   Integumentary: see HPI    PHYSICAL EXAM:  Vitals: Visit Vitals  /72 (BP Location: RUE - Right upper extremity, Patient Position: Sitting, Cuff Size: Regular)   Pulse 71   Resp 18   SpO2 96%      Constitutional: vitals as above, no obvious deformities  Eyes: no conjunctival injection, no swelling or erythema of eyelids, no allergic shiners  Ears: tympanic  membranes reflective to light bilaterally, no erythema, no bulging  Nose: inferior nasal turbinates pink, non-edematous bilaterally, no transverse nasal crease, no polyps   Oropharynx: oral mucous membranes moist, no cobblestoning, no edema  Respiratory: No respiratory distress, good aeration bilaterally, no prolonged expiratory phase, no wheezes  Cardiovascular: Regular rate and rhythm, no edema  Skin: warm, erythematous oval shaped patch with slight scaly skin on middle of abdomen, and left upper arm   Psychiatric: appropriate judgement, mood, and affect    ASSESSMENT/PLAN:  Stephen Núñez is a 61 year old male with no pertinent past medical history presenting for follow up of his chronic allergic and vasomotor rhinitis and to discuss a rash. Patient last seen 09/05/24.     Chronic allergic rhinitis  (primary encounter diagnosis), Vasomotor rhinitis  Both chronic, well controlled.   Plan:  -- can try changing azelastine to olopatadine nasal spray as prescribed to reduce occurrence of aftertaste  -- Flonase as needed  -- avoidance measures as per AVS  -- nasal saline spray and/or irrigation as needed prior to nasal medications  -- nasal spray instructions as per AVS    Dermatitis  Recurrent, not yet controlled. Possibly atopic. Lower suspicion of contact dermatitis.  Plan:  -- topical hydrocortisone 1% BID x 1-2 weeks as prescribed  -- use Cetaphil or CeraVe moisturizer 1-2 times a day  -- dry/sensitive skin care as per AVS    Return in 1 year or sooner as needed.    Henrry Lua MD  Allergy, Asthma, and Immunology       left lower extremity

## 2025-04-13 ENCOUNTER — INPATIENT (INPATIENT)
Facility: HOSPITAL | Age: 80
LOS: 2 days | Discharge: HOME | DRG: 312 | End: 2025-04-16
Attending: INTERNAL MEDICINE | Admitting: INTERNAL MEDICINE
Payer: MEDICARE

## 2025-04-13 VITALS
WEIGHT: 100.09 LBS | HEIGHT: 59 IN | TEMPERATURE: 98 F | SYSTOLIC BLOOD PRESSURE: 158 MMHG | RESPIRATION RATE: 20 BRPM | OXYGEN SATURATION: 97 % | DIASTOLIC BLOOD PRESSURE: 66 MMHG | HEART RATE: 78 BPM

## 2025-04-13 DIAGNOSIS — Z98.51 TUBAL LIGATION STATUS: Chronic | ICD-10-CM

## 2025-04-13 DIAGNOSIS — Z95.5 PRESENCE OF CORONARY ANGIOPLASTY IMPLANT AND GRAFT: Chronic | ICD-10-CM

## 2025-04-13 DIAGNOSIS — Z95.810 PRESENCE OF AUTOMATIC (IMPLANTABLE) CARDIAC DEFIBRILLATOR: Chronic | ICD-10-CM

## 2025-04-13 DIAGNOSIS — Z98.89 OTHER SPECIFIED POSTPROCEDURAL STATES: Chronic | ICD-10-CM

## 2025-04-13 DIAGNOSIS — R55 SYNCOPE AND COLLAPSE: ICD-10-CM

## 2025-04-13 LAB
ALBUMIN SERPL ELPH-MCNC: 3.9 G/DL — SIGNIFICANT CHANGE UP (ref 3.3–5)
ALP SERPL-CCNC: 72 U/L — SIGNIFICANT CHANGE UP (ref 40–120)
ALT FLD-CCNC: 12 U/L — SIGNIFICANT CHANGE UP (ref 10–45)
ANION GAP SERPL CALC-SCNC: 16 MMOL/L — SIGNIFICANT CHANGE UP (ref 5–17)
APTT BLD: 26.8 SEC — SIGNIFICANT CHANGE UP (ref 24.5–35.6)
AST SERPL-CCNC: 19 U/L — SIGNIFICANT CHANGE UP (ref 10–40)
BASOPHILS # BLD AUTO: 0.03 K/UL — SIGNIFICANT CHANGE UP (ref 0–0.2)
BASOPHILS NFR BLD AUTO: 0.3 % — SIGNIFICANT CHANGE UP (ref 0–2)
BILIRUB SERPL-MCNC: 1.4 MG/DL — HIGH (ref 0.2–1.2)
BUN SERPL-MCNC: 20 MG/DL — SIGNIFICANT CHANGE UP (ref 7–23)
CALCIUM SERPL-MCNC: 9.5 MG/DL — SIGNIFICANT CHANGE UP (ref 8.4–10.5)
CHLORIDE SERPL-SCNC: 101 MMOL/L — SIGNIFICANT CHANGE UP (ref 96–108)
CO2 SERPL-SCNC: 20 MMOL/L — LOW (ref 22–31)
CREAT SERPL-MCNC: 0.98 MG/DL — SIGNIFICANT CHANGE UP (ref 0.5–1.3)
EGFR: 59 ML/MIN/1.73M2 — LOW
EGFR: 59 ML/MIN/1.73M2 — LOW
EOSINOPHIL # BLD AUTO: 0.03 K/UL — SIGNIFICANT CHANGE UP (ref 0–0.5)
EOSINOPHIL NFR BLD AUTO: 0.3 % — SIGNIFICANT CHANGE UP (ref 0–6)
FLUAV AG NPH QL: SIGNIFICANT CHANGE UP
FLUBV AG NPH QL: SIGNIFICANT CHANGE UP
GAS PNL BLDV: SIGNIFICANT CHANGE UP
GLUCOSE BLDC GLUCOMTR-MCNC: 164 MG/DL — HIGH (ref 70–99)
GLUCOSE SERPL-MCNC: 129 MG/DL — HIGH (ref 70–99)
HCT VFR BLD CALC: 37.1 % — SIGNIFICANT CHANGE UP (ref 34.5–45)
HGB BLD-MCNC: 11.8 G/DL — SIGNIFICANT CHANGE UP (ref 11.5–15.5)
IMM GRANULOCYTES NFR BLD AUTO: 0.5 % — SIGNIFICANT CHANGE UP (ref 0–0.9)
INR BLD: 1.15 RATIO — SIGNIFICANT CHANGE UP (ref 0.85–1.16)
LYMPHOCYTES # BLD AUTO: 1.12 K/UL — SIGNIFICANT CHANGE UP (ref 1–3.3)
LYMPHOCYTES # BLD AUTO: 10.3 % — LOW (ref 13–44)
MCHC RBC-ENTMCNC: 27.4 PG — SIGNIFICANT CHANGE UP (ref 27–34)
MCHC RBC-ENTMCNC: 31.8 G/DL — LOW (ref 32–36)
MCV RBC AUTO: 86.1 FL — SIGNIFICANT CHANGE UP (ref 80–100)
MONOCYTES # BLD AUTO: 0.8 K/UL — SIGNIFICANT CHANGE UP (ref 0–0.9)
MONOCYTES NFR BLD AUTO: 7.4 % — SIGNIFICANT CHANGE UP (ref 2–14)
NEUTROPHILS # BLD AUTO: 8.85 K/UL — HIGH (ref 1.8–7.4)
NEUTROPHILS NFR BLD AUTO: 81.2 % — HIGH (ref 43–77)
NRBC BLD AUTO-RTO: 0 /100 WBCS — SIGNIFICANT CHANGE UP (ref 0–0)
PLATELET # BLD AUTO: 179 K/UL — SIGNIFICANT CHANGE UP (ref 150–400)
POTASSIUM SERPL-MCNC: 4 MMOL/L — SIGNIFICANT CHANGE UP (ref 3.5–5.3)
POTASSIUM SERPL-SCNC: 4 MMOL/L — SIGNIFICANT CHANGE UP (ref 3.5–5.3)
PROT SERPL-MCNC: 7 G/DL — SIGNIFICANT CHANGE UP (ref 6–8.3)
PROTHROM AB SERPL-ACNC: 13.1 SEC — SIGNIFICANT CHANGE UP (ref 9.9–13.4)
RBC # BLD: 4.31 M/UL — SIGNIFICANT CHANGE UP (ref 3.8–5.2)
RBC # FLD: 13.4 % — SIGNIFICANT CHANGE UP (ref 10.3–14.5)
RSV RNA NPH QL NAA+NON-PROBE: SIGNIFICANT CHANGE UP
SARS-COV-2 RNA SPEC QL NAA+PROBE: DETECTED
SODIUM SERPL-SCNC: 137 MMOL/L — SIGNIFICANT CHANGE UP (ref 135–145)
SOURCE RESPIRATORY: SIGNIFICANT CHANGE UP
TROPONIN T, HIGH SENSITIVITY RESULT: 11 NG/L — SIGNIFICANT CHANGE UP (ref 0–51)
WBC # BLD: 10.88 K/UL — HIGH (ref 3.8–10.5)
WBC # FLD AUTO: 10.88 K/UL — HIGH (ref 3.8–10.5)

## 2025-04-13 PROCEDURE — 73060 X-RAY EXAM OF HUMERUS: CPT | Mod: 26,RT

## 2025-04-13 PROCEDURE — 73080 X-RAY EXAM OF ELBOW: CPT | Mod: 26,RT

## 2025-04-13 PROCEDURE — 71045 X-RAY EXAM CHEST 1 VIEW: CPT | Mod: 26

## 2025-04-13 PROCEDURE — 93010 ELECTROCARDIOGRAM REPORT: CPT

## 2025-04-13 PROCEDURE — 99285 EMERGENCY DEPT VISIT HI MDM: CPT

## 2025-04-13 PROCEDURE — 72170 X-RAY EXAM OF PELVIS: CPT | Mod: 26

## 2025-04-13 PROCEDURE — 72125 CT NECK SPINE W/O DYE: CPT | Mod: 26

## 2025-04-13 PROCEDURE — 70450 CT HEAD/BRAIN W/O DYE: CPT | Mod: 26

## 2025-04-13 PROCEDURE — 99053 MED SERV 10PM-8AM 24 HR FAC: CPT

## 2025-04-13 PROCEDURE — 73030 X-RAY EXAM OF SHOULDER: CPT | Mod: 26,RT

## 2025-04-13 RX ORDER — ENOXAPARIN SODIUM 100 MG/ML
40 INJECTION SUBCUTANEOUS EVERY 24 HOURS
Refills: 0 | Status: DISCONTINUED | OUTPATIENT
Start: 2025-04-13 | End: 2025-04-16

## 2025-04-13 RX ORDER — REMDESIVIR 5 MG/ML
INJECTION INTRAVENOUS
Refills: 0 | Status: DISCONTINUED | OUTPATIENT
Start: 2025-04-13 | End: 2025-04-16

## 2025-04-13 RX ORDER — CARVEDILOL 3.12 MG/1
1 TABLET, FILM COATED ORAL
Refills: 0 | DISCHARGE

## 2025-04-13 RX ORDER — CLOPIDOGREL BISULFATE 75 MG/1
75 TABLET, FILM COATED ORAL DAILY
Refills: 0 | Status: DISCONTINUED | OUTPATIENT
Start: 2025-04-13 | End: 2025-04-16

## 2025-04-13 RX ORDER — DEXTROSE 50 % IN WATER 50 %
12.5 SYRINGE (ML) INTRAVENOUS ONCE
Refills: 0 | Status: DISCONTINUED | OUTPATIENT
Start: 2025-04-13 | End: 2025-04-16

## 2025-04-13 RX ORDER — ATORVASTATIN CALCIUM 80 MG/1
40 TABLET, FILM COATED ORAL AT BEDTIME
Refills: 0 | Status: DISCONTINUED | OUTPATIENT
Start: 2025-04-13 | End: 2025-04-16

## 2025-04-13 RX ORDER — CARVEDILOL 3.12 MG/1
12.5 TABLET, FILM COATED ORAL EVERY 12 HOURS
Refills: 0 | Status: DISCONTINUED | OUTPATIENT
Start: 2025-04-13 | End: 2025-04-16

## 2025-04-13 RX ORDER — DEXTROSE 50 % IN WATER 50 %
25 SYRINGE (ML) INTRAVENOUS ONCE
Refills: 0 | Status: DISCONTINUED | OUTPATIENT
Start: 2025-04-13 | End: 2025-04-16

## 2025-04-13 RX ORDER — METFORMIN HYDROCHLORIDE 850 MG/1
1 TABLET ORAL
Refills: 0 | DISCHARGE

## 2025-04-13 RX ORDER — INSULIN LISPRO 100 U/ML
INJECTION, SOLUTION INTRAVENOUS; SUBCUTANEOUS
Refills: 0 | Status: DISCONTINUED | OUTPATIENT
Start: 2025-04-13 | End: 2025-04-16

## 2025-04-13 RX ORDER — DEXTROSE 50 % IN WATER 50 %
15 SYRINGE (ML) INTRAVENOUS ONCE
Refills: 0 | Status: DISCONTINUED | OUTPATIENT
Start: 2025-04-13 | End: 2025-04-16

## 2025-04-13 RX ORDER — AMLODIPINE BESYLATE 10 MG/1
5 TABLET ORAL DAILY
Refills: 0 | Status: DISCONTINUED | OUTPATIENT
Start: 2025-04-13 | End: 2025-04-16

## 2025-04-13 RX ORDER — LISINOPRIL 5 MG/1
30 TABLET ORAL DAILY
Refills: 0 | Status: DISCONTINUED | OUTPATIENT
Start: 2025-04-13 | End: 2025-04-16

## 2025-04-13 RX ORDER — REMDESIVIR 5 MG/ML
200 INJECTION INTRAVENOUS EVERY 24 HOURS
Refills: 0 | Status: COMPLETED | OUTPATIENT
Start: 2025-04-13 | End: 2025-04-13

## 2025-04-13 RX ORDER — REMDESIVIR 5 MG/ML
100 INJECTION INTRAVENOUS EVERY 24 HOURS
Refills: 0 | Status: DISCONTINUED | OUTPATIENT
Start: 2025-04-14 | End: 2025-04-16

## 2025-04-13 RX ORDER — ASPIRIN 325 MG
81 TABLET ORAL DAILY
Refills: 0 | Status: DISCONTINUED | OUTPATIENT
Start: 2025-04-13 | End: 2025-04-16

## 2025-04-13 RX ORDER — ACETAMINOPHEN 500 MG/5ML
675 LIQUID (ML) ORAL ONCE
Refills: 0 | Status: DISCONTINUED | OUTPATIENT
Start: 2025-04-13 | End: 2025-04-13

## 2025-04-13 RX ORDER — ATORVASTATIN CALCIUM 80 MG/1
1 TABLET, FILM COATED ORAL
Refills: 0 | DISCHARGE

## 2025-04-13 RX ORDER — SODIUM CHLORIDE 9 G/1000ML
1000 INJECTION, SOLUTION INTRAVENOUS
Refills: 0 | Status: DISCONTINUED | OUTPATIENT
Start: 2025-04-13 | End: 2025-04-16

## 2025-04-13 RX ORDER — CLOPIDOGREL BISULFATE 75 MG/1
1 TABLET, FILM COATED ORAL
Refills: 0 | DISCHARGE

## 2025-04-13 RX ORDER — ACETAMINOPHEN 500 MG/5ML
675 LIQUID (ML) ORAL ONCE
Refills: 0 | Status: COMPLETED | OUTPATIENT
Start: 2025-04-13 | End: 2025-04-13

## 2025-04-13 RX ORDER — GLUCAGON 3 MG/1
1 POWDER NASAL ONCE
Refills: 0 | Status: DISCONTINUED | OUTPATIENT
Start: 2025-04-13 | End: 2025-04-16

## 2025-04-13 RX ORDER — ASPIRIN 325 MG
1 TABLET ORAL
Refills: 0 | DISCHARGE

## 2025-04-13 RX ADMIN — ENOXAPARIN SODIUM 40 MILLIGRAM(S): 100 INJECTION SUBCUTANEOUS at 22:05

## 2025-04-13 RX ADMIN — REMDESIVIR 200 MILLIGRAM(S): 5 INJECTION INTRAVENOUS at 22:24

## 2025-04-13 RX ADMIN — ATORVASTATIN CALCIUM 40 MILLIGRAM(S): 80 TABLET, FILM COATED ORAL at 22:10

## 2025-04-13 RX ADMIN — Medication 270 MILLIGRAM(S): at 15:02

## 2025-04-13 RX ADMIN — Medication 1000 MILLILITER(S): at 08:13

## 2025-04-13 RX ADMIN — INSULIN LISPRO 1: 100 INJECTION, SOLUTION INTRAVENOUS; SUBCUTANEOUS at 18:18

## 2025-04-13 RX ADMIN — CARVEDILOL 12.5 MILLIGRAM(S): 3.12 TABLET, FILM COATED ORAL at 18:18

## 2025-04-13 RX ADMIN — LISINOPRIL 30 MILLIGRAM(S): 5 TABLET ORAL at 23:38

## 2025-04-13 RX ADMIN — AMLODIPINE BESYLATE 5 MILLIGRAM(S): 10 TABLET ORAL at 22:37

## 2025-04-13 NOTE — ED ADULT NURSE NOTE - NSFALLRISKINTERV_ED_ALL_ED
Assistance OOB with selected safe patient handling equipment if applicable/Communicate fall risk and risk factors to all staff, patient, and family/Orthostatic vital signs/Provide visual cue: yellow wristband, yellow gown, etc/Reinforce activity limits and safety measures with patient and family/Call bell, personal items and telephone in reach/Instruct patient to call for assistance before getting out of bed/chair/stretcher/Non-slip footwear applied when patient is off stretcher/[place] to call system/Physically safe environment - no spills, clutter or unnecessary equipment/Purposeful Proactive Rounding/Room/bathroom lighting operational, light cord in reach

## 2025-04-13 NOTE — ED ADULT TRIAGE NOTE - CHIEF COMPLAINT QUOTE
syncope  felt dizzy and lightheaded in the shower and fell onto R side   c/o R shoulder pain, on Plavix  received 1g of Ofirmev by EMS

## 2025-04-13 NOTE — ED PROVIDER NOTE - CLINICAL SUMMARY MEDICAL DECISION MAKING FREE TEXT BOX
Adult female history as above prior history of syncope without definitive etiology revealed  presents with syncope.  Patient had prodrome of URI symptoms past few days today had syncope in the shower, without tongue biting incontinence.  Plan eval for traumatic injury x-ray shoulder, CT head max face and neck, eval for etiology, EKG Trop, telemetry monitoring, eval for infectious causes, chest x-ray, RVP, UA.  Reassess.  All Gasca MD, Facep

## 2025-04-13 NOTE — H&P ADULT - HISTORY OF PRESENT ILLNESS
80 yo F with a PMH of CAD s/p PCI to mLAD in 11/2022 on DAPT, HTN, HLD, and prior CVA no residual deficits, L endarterectomy, intracranial aneurysm, recurrent syncope p/w syncopal episode this morning. Pt was just getting out of shower when she had syncopal episode. +head trauma. C/o right shoulder pain and right upper arm pain. Placed in sling by EMS. No extremity weakness/paresthesias. Denies nausea, vomiting, fever, chills, chest pain, abd pain, headache, dizziness, changes in speech/va, weakness. Of note, pt has been c/o mild non prod cough over the last few days. No sick contacts or recent travel. Pt reports hx of syncope but without known etiology. Pt received 1g Tylenol in the field around 630AM.

## 2025-04-13 NOTE — ED PROVIDER NOTE - PHYSICAL EXAMINATION
CONSTITUTIONAL: In no apparent distress.  ENT: Airway patent, moist mucous membranes.   EYES: Pupils equal, round and reactive to light. EOMI. Conjunctiva normal appearing.   NECK: No midline c spine TTP. FROM of neck but reports pain upon turning right. Cervical collar placed.   CARDIAC: Normal rate, regular rhythm.  Heart sounds S1, S2.    RESPIRATORY: Breath sounds clear and equal bilaterally.   GASTROINTESTINAL: Abdomen soft, non-tender, not distended.  MUSCULOSKELETAL: R arm in sling placed by EMS. No right shoulder/humerus/elbow TTP. Pulse and sensation intact. No hip TTP bilaterally. Spine appears normal. No midline TTP.   NEUROLOGICAL: Alert and oriented x3, no focal deficits, no motor or sensory deficits. 5/5 muscle strength throughout.  SKIN: Skin normal color, warm, dry and intact.   PSYCHIATRIC: Normal mood and affect.

## 2025-04-13 NOTE — ED ADULT NURSE REASSESSMENT NOTE - NS ED NURSE REASSESS COMMENT FT1
Pt handoff received from Yann SULLIVAN. Pt AxOx4, in position of comfort. VSS. Pt pending admission bed. Cardiac monitor NSR, pt SpO2 99% room air. Pt has prima fit attached for urine. Stretcher locked and in lowest position, appropriate side rails up. Pt instructed to notify RN if assistance is needed.

## 2025-04-13 NOTE — ED PROVIDER NOTE - ED STEMI HIDDEN
hide Quality 431: Preventive Care And Screening: Unhealthy Alcohol Use - Screening: Patient not identified as an unhealthy alcohol user when screened for unhealthy alcohol use using a systematic screening method Quality 47: Advance Care Plan: Advance Care Planning discussed and documented in the medical record; patient did not wish or was not able to name a surrogate decision maker or provide an advance care plan. Quality 226: Preventive Care And Screening: Tobacco Use: Screening And Cessation Intervention: Patient screened for tobacco use and is an ex/non-smoker Quality 155 (Denominator): Falls Plan Of Care: Plan of Care not Documented, Reason not Otherwise Specified Quality 110: Preventive Care And Screening: Influenza Immunization: Influenza Immunization Ordered or Recommended, but not Administered due to system reason Quality 154 Part B: Falls: Risk Screening (Should Be Reported With Measure 155.): Patient screened for future fall risk; documentation of no falls in the past year or only one fall without injury in the past year Detail Level: Detailed Quality 111:Pneumonia Vaccination Status For Older Adults: Pneumococcal vaccine (PPSV23) was not administered on or after patient’s 60th birthday and before the end of the measurement period, reason not otherwise specified Quality 154 Part A: Falls: Risk Assessment (Should Be Reported With Measure 155.): Falls risk assessment completed and documented in the past 12 months.

## 2025-04-13 NOTE — ED PROVIDER NOTE - ATTENDING CONTRIBUTION TO CARE
[facility]  79-year-old female past med history lumbosacral spinal stenosis lumbar radiculopathy, H TN HLD, CAD status post PCI,CVA,s/p Left CEA DMT2 on metformin vertigo comes ER via EMS with complaints of syncope this a.m.  Patient states she has felt unwell for the past few days with tactile fever, cough, without shortness of breath.  Today in the shower had episode of syncope struck her right shoulde, lip.  EMS transported patient to ER [facility] at that time 120 normal vitals.  Patient main complaint currently is right shoulder pain.  Patient endorses history of multiple episodes of previous syncope.  She is uncertain as to the etiology of this last was about a year ago.  Patient states prior to that she had had these events much more frequently.  Physical exam elderly female awake alert GCS 15 speech fluent  HEENT normocephalic atraumatic exception of abrasion left lower lip, c-collar in place, no posterior tenderness, pain with left rotation(collar left in place).  Chest clear A&P.  No use of  muscles no respiratory stress.  CV no rubs gallops murmur.  Abdomen soft positive bowel sounds.  MSK pelvis stable, full range of motion bilateral hips, tender palpation right shoulder distal neurovascular intact.  Neuro GCS 15 speech fluent moves all extremities.  All Gasca MD, Facep

## 2025-04-13 NOTE — H&P ADULT - ASSESSMENT
The patient is a 79y Female was brought to the ER because of  syncopal episode.    Syncope:    Tele  Orthostasis  TTE  CTH no acute changes  Cardio eval called    Covid +:    IV RDV  Pulse ox 97% on RA  No need for IV Decadron    HTN:    Cw home meds    DM II:    FSSS

## 2025-04-13 NOTE — ED ADULT NURSE NOTE - OBJECTIVE STATEMENT
79y F PMH CVA, vertigo, MI, HTN, DM2 bibems from home c/o syncope. Pt reports she was getting out of the shower when she had a syncopal episode. +head strike. +lip abrasion. Pt c/o R shoulder pain currently, shoulder placed in sling by EMS. Pt reports hx of syncope . Pt arrived with 20g LAC placed by EMS, 1G tylenol given at 0630 prior to ED arrival .EKG done. CCM in place (NSR). VS documented. MD Gasca at bedside. Comfort and safety maintained.

## 2025-04-13 NOTE — ED PROVIDER NOTE - OBJECTIVE STATEMENT
80 yo F with a PMH of CAD s/p PCI to mLAD in 11/2022 on DAPT, HTN, HLD, and prior CVA no residual deficits, L endarterectomy, intracranial aneurysm, recurrent syncope p/w syncopal episode this morning. Pt was just getting out of shower when she had syncopal episode. +head trauma. C/o right shoulder pain and right upper arm pain. Placed in sling by EMS. No extremity weakness/paresthesias. Denies nausea, vomiting, fever, chills, chest pain, abd pain, headache, dizziness, changes in speech/va, weakness. Of note, pt has been c/o mild non prod cough over the last few days. No sick contacts or recent travel. Pt reports hx of syncope but without known etiology. 78 yo F with a PMH of CAD s/p PCI to mLAD in 11/2022 on DAPT, HTN, HLD, and prior CVA no residual deficits, L endarterectomy, intracranial aneurysm, recurrent syncope p/w syncopal episode this morning. Pt was just getting out of shower when she had syncopal episode. +head trauma. C/o right shoulder pain and right upper arm pain. Placed in sling by EMS. No extremity weakness/paresthesias. Denies nausea, vomiting, fever, chills, chest pain, abd pain, headache, dizziness, changes in speech/va, weakness. Of note, pt has been c/o mild non prod cough over the last few days. No sick contacts or recent travel. Pt reports hx of syncope but without known etiology. Pt received 1g Tylenol in the field around 630AM.

## 2025-04-14 ENCOUNTER — APPOINTMENT (OUTPATIENT)
Dept: NEUROLOGY | Facility: CLINIC | Age: 80
End: 2025-04-14

## 2025-04-14 ENCOUNTER — RESULT REVIEW (OUTPATIENT)
Age: 80
End: 2025-04-14

## 2025-04-14 LAB
A1C WITH ESTIMATED AVERAGE GLUCOSE RESULT: 6.6 % — HIGH (ref 4–5.6)
ALBUMIN SERPL ELPH-MCNC: 3.8 G/DL — SIGNIFICANT CHANGE UP (ref 3.3–5)
ALBUMIN SERPL ELPH-MCNC: 3.9 G/DL — SIGNIFICANT CHANGE UP (ref 3.3–5)
ALP SERPL-CCNC: 69 U/L — SIGNIFICANT CHANGE UP (ref 40–120)
ALP SERPL-CCNC: 71 U/L — SIGNIFICANT CHANGE UP (ref 40–120)
ALT FLD-CCNC: 14 U/L — SIGNIFICANT CHANGE UP (ref 10–45)
ALT FLD-CCNC: 15 U/L — SIGNIFICANT CHANGE UP (ref 10–45)
APPEARANCE UR: CLEAR — SIGNIFICANT CHANGE UP
AST SERPL-CCNC: 23 U/L — SIGNIFICANT CHANGE UP (ref 10–40)
AST SERPL-CCNC: 25 U/L — SIGNIFICANT CHANGE UP (ref 10–40)
BACTERIA # UR AUTO: NEGATIVE /HPF — SIGNIFICANT CHANGE UP
BILIRUB DIRECT SERPL-MCNC: 0.2 MG/DL — SIGNIFICANT CHANGE UP (ref 0–0.3)
BILIRUB DIRECT SERPL-MCNC: 0.3 MG/DL — SIGNIFICANT CHANGE UP (ref 0–0.3)
BILIRUB INDIRECT FLD-MCNC: 0.6 MG/DL — SIGNIFICANT CHANGE UP (ref 0.2–1)
BILIRUB INDIRECT FLD-MCNC: 0.7 MG/DL — SIGNIFICANT CHANGE UP (ref 0.2–1)
BILIRUB SERPL-MCNC: 0.8 MG/DL — SIGNIFICANT CHANGE UP (ref 0.2–1.2)
BILIRUB SERPL-MCNC: 1 MG/DL — SIGNIFICANT CHANGE UP (ref 0.2–1.2)
BILIRUB UR-MCNC: NEGATIVE — SIGNIFICANT CHANGE UP
CAST: 0 /LPF — SIGNIFICANT CHANGE UP (ref 0–4)
COLOR SPEC: YELLOW — SIGNIFICANT CHANGE UP
CREAT SERPL-MCNC: 0.79 MG/DL — SIGNIFICANT CHANGE UP (ref 0.5–1.3)
CREAT SERPL-MCNC: 0.81 MG/DL — SIGNIFICANT CHANGE UP (ref 0.5–1.3)
DIFF PNL FLD: ABNORMAL
EGFR: 74 ML/MIN/1.73M2 — SIGNIFICANT CHANGE UP
EGFR: 74 ML/MIN/1.73M2 — SIGNIFICANT CHANGE UP
EGFR: 77 ML/MIN/1.73M2 — SIGNIFICANT CHANGE UP
EGFR: 77 ML/MIN/1.73M2 — SIGNIFICANT CHANGE UP
ESTIMATED AVERAGE GLUCOSE: 143 MG/DL — HIGH (ref 68–114)
GLUCOSE BLDC GLUCOMTR-MCNC: 106 MG/DL — HIGH (ref 70–99)
GLUCOSE BLDC GLUCOMTR-MCNC: 171 MG/DL — HIGH (ref 70–99)
GLUCOSE BLDC GLUCOMTR-MCNC: 97 MG/DL — SIGNIFICANT CHANGE UP (ref 70–99)
GLUCOSE BLDC GLUCOMTR-MCNC: 97 MG/DL — SIGNIFICANT CHANGE UP (ref 70–99)
GLUCOSE UR QL: NEGATIVE MG/DL — SIGNIFICANT CHANGE UP
KETONES UR-MCNC: ABNORMAL MG/DL
LEUKOCYTE ESTERASE UR-ACNC: NEGATIVE — SIGNIFICANT CHANGE UP
NITRITE UR-MCNC: NEGATIVE — SIGNIFICANT CHANGE UP
PH UR: 7 — SIGNIFICANT CHANGE UP (ref 5–8)
PROT SERPL-MCNC: 6.9 G/DL — SIGNIFICANT CHANGE UP (ref 6–8.3)
PROT SERPL-MCNC: 7.3 G/DL — SIGNIFICANT CHANGE UP (ref 6–8.3)
PROT UR-MCNC: NEGATIVE MG/DL — SIGNIFICANT CHANGE UP
RBC CASTS # UR COMP ASSIST: 14 /HPF — HIGH (ref 0–4)
SP GR SPEC: 1.01 — SIGNIFICANT CHANGE UP (ref 1–1.03)
SQUAMOUS # UR AUTO: 0 /HPF — SIGNIFICANT CHANGE UP (ref 0–5)
UROBILINOGEN FLD QL: 1 MG/DL — SIGNIFICANT CHANGE UP (ref 0.2–1)
WBC UR QL: 1 /HPF — SIGNIFICANT CHANGE UP (ref 0–5)

## 2025-04-14 PROCEDURE — 93325 DOPPLER ECHO COLOR FLOW MAPG: CPT | Mod: 26

## 2025-04-14 PROCEDURE — 93308 TTE F-UP OR LMTD: CPT | Mod: 26

## 2025-04-14 RX ADMIN — CARVEDILOL 12.5 MILLIGRAM(S): 3.12 TABLET, FILM COATED ORAL at 05:27

## 2025-04-14 RX ADMIN — CLOPIDOGREL BISULFATE 75 MILLIGRAM(S): 75 TABLET, FILM COATED ORAL at 11:57

## 2025-04-14 RX ADMIN — AMLODIPINE BESYLATE 5 MILLIGRAM(S): 10 TABLET ORAL at 05:27

## 2025-04-14 RX ADMIN — Medication 81 MILLIGRAM(S): at 11:57

## 2025-04-14 RX ADMIN — ENOXAPARIN SODIUM 40 MILLIGRAM(S): 100 INJECTION SUBCUTANEOUS at 21:24

## 2025-04-14 RX ADMIN — CARVEDILOL 12.5 MILLIGRAM(S): 3.12 TABLET, FILM COATED ORAL at 18:10

## 2025-04-14 RX ADMIN — Medication 1 PATCH: at 21:54

## 2025-04-14 RX ADMIN — LISINOPRIL 30 MILLIGRAM(S): 5 TABLET ORAL at 05:27

## 2025-04-14 RX ADMIN — ATORVASTATIN CALCIUM 40 MILLIGRAM(S): 80 TABLET, FILM COATED ORAL at 21:25

## 2025-04-14 RX ADMIN — Medication 1 PATCH: at 11:56

## 2025-04-14 RX ADMIN — REMDESIVIR 200 MILLIGRAM(S): 5 INJECTION INTRAVENOUS at 18:09

## 2025-04-14 NOTE — PATIENT PROFILE ADULT - FALL HARM RISK - UNIVERSAL INTERVENTIONS
Bed in lowest position, wheels locked, appropriate side rails in place/Call bell, personal items and telephone in reach/Instruct patient to call for assistance before getting out of bed or chair/Non-slip footwear when patient is out of bed/[place] to call system/Physically safe environment - no spills, clutter or unnecessary equipment/Purposeful Proactive Rounding/Room/bathroom lighting operational, light cord in reach

## 2025-04-14 NOTE — PROGRESS NOTE ADULT - SUBJECTIVE AND OBJECTIVE BOX
Patient is a 79y old  Female who presents with a chief complaint of The patient is a 79y Female was brought to the ER because of  syncopal episode. (2025 15:34)      SUBJECTIVE / OVERNIGHT EVENTS:    Events noted.  CONSTITUTIONAL: No fever,  or fatigue  RESPIRATORY: No cough, wheezing,  No shortness of breath  CARDIOVASCULAR: No chest pain, palpitations, dizziness, or leg swelling  GASTROINTESTINAL: No abdominal or epigastric pain. No nausea, vomiting.  NEUROLOGICAL: No headache    MEDICATIONS  (STANDING):  amLODIPine   Tablet 5 milliGRAM(s) Oral daily  aspirin enteric coated 81 milliGRAM(s) Oral daily  atorvastatin 40 milliGRAM(s) Oral at bedtime  carvedilol 12.5 milliGRAM(s) Oral every 12 hours  cloNIDine Patch 0.2 mG/24Hr(s) 1 patch Transdermal every 7 days  clopidogrel Tablet 75 milliGRAM(s) Oral daily  dextrose 5%. 1000 milliLiter(s) (50 mL/Hr) IV Continuous <Continuous>  dextrose 5%. 1000 milliLiter(s) (100 mL/Hr) IV Continuous <Continuous>  dextrose 50% Injectable 25 Gram(s) IV Push once  dextrose 50% Injectable 12.5 Gram(s) IV Push once  dextrose 50% Injectable 25 Gram(s) IV Push once  enoxaparin Injectable 40 milliGRAM(s) SubCutaneous every 24 hours  glucagon  Injectable 1 milliGRAM(s) IntraMuscular once  insulin lispro (ADMELOG) corrective regimen sliding scale   SubCutaneous three times a day before meals  lisinopril 30 milliGRAM(s) Oral daily  remdesivir  IVPB   IV Intermittent   remdesivir  IVPB 100 milliGRAM(s) IV Intermittent every 24 hours    MEDICATIONS  (PRN):  dextrose Oral Gel 15 Gram(s) Oral once PRN Blood Glucose LESS THAN 70 milliGRAM(s)/deciliter        CAPILLARY BLOOD GLUCOSE      POCT Blood Glucose.: 171 mg/dL (2025 21:24)  POCT Blood Glucose.: 106 mg/dL (2025 16:34)  POCT Blood Glucose.: 97 mg/dL (2025 11:42)  POCT Blood Glucose.: 97 mg/dL (2025 08:05)    I&O's Summary    2025 07:01  -  2025 07:00  --------------------------------------------------------  IN: 0 mL / OUT: 497 mL / NET: -497 mL    2025 07:01  -  2025 23:34  --------------------------------------------------------  IN: 240 mL / OUT: 0 mL / NET: 240 mL        T(C): 36.7 (25 @ 21:30), Max: 37.3 (25 @ 04:05)  HR: 64 (25 @ 21:30) (64 - 77)  BP: 162/71 (25 @ 21:30) (162/71 - 171/79)  RR: 18 (25 @ 21:30) (18 - 18)  SpO2: 99% (25 @ 21:30) (98% - 99%)    PHYSICAL EXAM:  GENERAL: NAD  NECK: Supple, No JVD  CHEST/LUNG: Clear to auscultation bilaterally; No wheezing.  HEART: Regular rate and rhythm; No murmurs, rubs, or gallops  ABDOMEN: Soft, Nontender, Nondistended; Bowel sounds present  EXTREMITIES:   No edema  NEUROLOGY: AAO       LABS:                        11.8   10.88 )-----------( 179      ( 2025 08:16 )             37.1     14    x   |  x   |  x   ----------------------------<  x   x    |  x   |  0.79    Ca    9.5      2025 08:16    TPro  6.9  /  Alb  3.8  /  TBili  0.8  /  DBili  0.2  /  AST  25  /  ALT  15  /  AlkPhos  69  04-14    PT/INR - ( 2025 08:16 )   PT: 13.1 sec;   INR: 1.15 ratio         PTT - ( 2025 08:16 )  PTT:26.8 sec      Urinalysis Basic - ( 2025 06:11 )    Color: Yellow / Appearance: Clear / S.012 / pH: x  Gluc: x / Ketone: Trace mg/dL  / Bili: Negative / Urobili: 1.0 mg/dL   Blood: x / Protein: Negative mg/dL / Nitrite: Negative   Leuk Esterase: Negative / RBC: 14 /HPF / WBC 1 /HPF   Sq Epi: x / Non Sq Epi: 0 /HPF / Bacteria: Negative /HPF      CAPILLARY BLOOD GLUCOSE      POCT Blood Glucose.: 171 mg/dL (2025 21:24)  POCT Blood Glucose.: 106 mg/dL (2025 16:34)  POCT Blood Glucose.: 97 mg/dL (2025 11:42)  POCT Blood Glucose.: 97 mg/dL (2025 08:05)        RADIOLOGY & ADDITIONAL TESTS:    Imaging Personally Reviewed:    Consultant(s) Notes Reviewed:      Care Discussed with Consultants/Other Providers:    Chris Celeste MD, CMD, FACP    [id number] [place] [address]  Office Tel: [phone]

## 2025-04-14 NOTE — PATIENT PROFILE ADULT - FUNCTIONAL ASSESSMENT - BASIC MOBILITY 6.
4-calculated by average/Not able to assess (calculate score using [facility] averaging method)

## 2025-04-15 LAB
ALBUMIN SERPL ELPH-MCNC: 3.5 G/DL — SIGNIFICANT CHANGE UP (ref 3.3–5)
ALP SERPL-CCNC: 60 U/L — SIGNIFICANT CHANGE UP (ref 40–120)
ALT FLD-CCNC: 15 U/L — SIGNIFICANT CHANGE UP (ref 10–45)
ANION GAP SERPL CALC-SCNC: 12 MMOL/L — SIGNIFICANT CHANGE UP (ref 5–17)
AST SERPL-CCNC: 28 U/L — SIGNIFICANT CHANGE UP (ref 10–40)
BILIRUB SERPL-MCNC: 0.5 MG/DL — SIGNIFICANT CHANGE UP (ref 0.2–1.2)
BUN SERPL-MCNC: 19 MG/DL — SIGNIFICANT CHANGE UP (ref 7–23)
CALCIUM SERPL-MCNC: 9.1 MG/DL — SIGNIFICANT CHANGE UP (ref 8.4–10.5)
CHLORIDE SERPL-SCNC: 103 MMOL/L — SIGNIFICANT CHANGE UP (ref 96–108)
CO2 SERPL-SCNC: 23 MMOL/L — SIGNIFICANT CHANGE UP (ref 22–31)
CREAT SERPL-MCNC: 0.93 MG/DL — SIGNIFICANT CHANGE UP (ref 0.5–1.3)
EGFR: 63 ML/MIN/1.73M2 — SIGNIFICANT CHANGE UP
EGFR: 63 ML/MIN/1.73M2 — SIGNIFICANT CHANGE UP
GLUCOSE BLDC GLUCOMTR-MCNC: 100 MG/DL — HIGH (ref 70–99)
GLUCOSE BLDC GLUCOMTR-MCNC: 105 MG/DL — HIGH (ref 70–99)
GLUCOSE BLDC GLUCOMTR-MCNC: 113 MG/DL — HIGH (ref 70–99)
GLUCOSE BLDC GLUCOMTR-MCNC: 119 MG/DL — HIGH (ref 70–99)
GLUCOSE SERPL-MCNC: 96 MG/DL — SIGNIFICANT CHANGE UP (ref 70–99)
HCT VFR BLD CALC: 36.1 % — SIGNIFICANT CHANGE UP (ref 34.5–45)
HGB BLD-MCNC: 11.5 G/DL — SIGNIFICANT CHANGE UP (ref 11.5–15.5)
MCHC RBC-ENTMCNC: 26.9 PG — LOW (ref 27–34)
MCHC RBC-ENTMCNC: 31.9 G/DL — LOW (ref 32–36)
MCV RBC AUTO: 84.3 FL — SIGNIFICANT CHANGE UP (ref 80–100)
NRBC BLD AUTO-RTO: 0 /100 WBCS — SIGNIFICANT CHANGE UP (ref 0–0)
PLATELET # BLD AUTO: 222 K/UL — SIGNIFICANT CHANGE UP (ref 150–400)
POTASSIUM SERPL-MCNC: 3.4 MMOL/L — LOW (ref 3.5–5.3)
POTASSIUM SERPL-SCNC: 3.4 MMOL/L — LOW (ref 3.5–5.3)
PROT SERPL-MCNC: 6.3 G/DL — SIGNIFICANT CHANGE UP (ref 6–8.3)
RBC # BLD: 4.28 M/UL — SIGNIFICANT CHANGE UP (ref 3.8–5.2)
RBC # FLD: 13.2 % — SIGNIFICANT CHANGE UP (ref 10.3–14.5)
SODIUM SERPL-SCNC: 138 MMOL/L — SIGNIFICANT CHANGE UP (ref 135–145)
WBC # BLD: 5.92 K/UL — SIGNIFICANT CHANGE UP (ref 3.8–10.5)
WBC # FLD AUTO: 5.92 K/UL — SIGNIFICANT CHANGE UP (ref 3.8–10.5)

## 2025-04-15 RX ORDER — MELATONIN 5 MG
5 TABLET ORAL AT BEDTIME
Refills: 0 | Status: DISCONTINUED | OUTPATIENT
Start: 2025-04-15 | End: 2025-04-16

## 2025-04-15 RX ADMIN — Medication 5 MILLIGRAM(S): at 21:35

## 2025-04-15 RX ADMIN — Medication 1 PATCH: at 07:59

## 2025-04-15 RX ADMIN — CARVEDILOL 12.5 MILLIGRAM(S): 3.12 TABLET, FILM COATED ORAL at 17:27

## 2025-04-15 RX ADMIN — Medication 81 MILLIGRAM(S): at 11:40

## 2025-04-15 RX ADMIN — CLOPIDOGREL BISULFATE 75 MILLIGRAM(S): 75 TABLET, FILM COATED ORAL at 11:36

## 2025-04-15 RX ADMIN — LISINOPRIL 30 MILLIGRAM(S): 5 TABLET ORAL at 05:15

## 2025-04-15 RX ADMIN — ATORVASTATIN CALCIUM 40 MILLIGRAM(S): 80 TABLET, FILM COATED ORAL at 21:10

## 2025-04-15 RX ADMIN — Medication 40 MILLIEQUIVALENT(S): at 11:36

## 2025-04-15 RX ADMIN — REMDESIVIR 200 MILLIGRAM(S): 5 INJECTION INTRAVENOUS at 17:37

## 2025-04-15 RX ADMIN — AMLODIPINE BESYLATE 5 MILLIGRAM(S): 10 TABLET ORAL at 05:15

## 2025-04-15 RX ADMIN — CARVEDILOL 12.5 MILLIGRAM(S): 3.12 TABLET, FILM COATED ORAL at 05:15

## 2025-04-15 RX ADMIN — ENOXAPARIN SODIUM 40 MILLIGRAM(S): 100 INJECTION SUBCUTANEOUS at 21:10

## 2025-04-15 NOTE — PROGRESS NOTE ADULT - SUBJECTIVE AND OBJECTIVE BOX
Patient is a 79y old  Female who presents with a chief complaint of The patient is a 79y Female was brought to the ER because of  syncopal episode. (14 Apr 2025 14:33)      SUBJECTIVE / OVERNIGHT EVENTS: ptn has no c/o, lungs are clear, asking to go home.     MEDICATIONS  (STANDING):  amLODIPine   Tablet 5 milliGRAM(s) Oral daily  aspirin enteric coated 81 milliGRAM(s) Oral daily  atorvastatin 40 milliGRAM(s) Oral at bedtime  carvedilol 12.5 milliGRAM(s) Oral every 12 hours  cloNIDine Patch 0.2 mG/24Hr(s) 1 patch Transdermal every 7 days  clopidogrel Tablet 75 milliGRAM(s) Oral daily  dextrose 5%. 1000 milliLiter(s) (50 mL/Hr) IV Continuous <Continuous>  dextrose 5%. 1000 milliLiter(s) (100 mL/Hr) IV Continuous <Continuous>  dextrose 50% Injectable 25 Gram(s) IV Push once  dextrose 50% Injectable 12.5 Gram(s) IV Push once  dextrose 50% Injectable 25 Gram(s) IV Push once  enoxaparin Injectable 40 milliGRAM(s) SubCutaneous every 24 hours  glucagon  Injectable 1 milliGRAM(s) IntraMuscular once  insulin lispro (ADMELOG) corrective regimen sliding scale   SubCutaneous three times a day before meals  lisinopril 30 milliGRAM(s) Oral daily  remdesivir  IVPB 100 milliGRAM(s) IV Intermittent every 24 hours  remdesivir  IVPB   IV Intermittent     MEDICATIONS  (PRN):  dextrose Oral Gel 15 Gram(s) Oral once PRN Blood Glucose LESS THAN 70 milliGRAM(s)/deciliter      Vital Signs Last 24 Hrs  T(F): 98.3 (04-15-25 @ 12:08), Max: 98.4 (04-15-25 @ 05:18)  HR: 74 (04-15-25 @ 12:08) (64 - 74)  BP: 182/87 (04-15-25 @ 12:08) (160/79 - 182/87)  RR: 18 (04-15-25 @ 12:08) (18 - 18)  SpO2: 97% (04-15-25 @ 12:08) (97% - 100%)  Telemetry:   CAPILLARY BLOOD GLUCOSE      POCT Blood Glucose.: 119 mg/dL (15 Apr 2025 17:09)  POCT Blood Glucose.: 105 mg/dL (15 Apr 2025 11:21)  POCT Blood Glucose.: 100 mg/dL (15 Apr 2025 07:58)  POCT Blood Glucose.: 171 mg/dL (14 Apr 2025 21:24)    I&O's Summary    14 Apr 2025 07:01  -  15 Apr 2025 07:00  --------------------------------------------------------  IN: 240 mL / OUT: 100 mL / NET: 140 mL    15 Apr 2025 07:01  -  15 Apr 2025 18:39  --------------------------------------------------------  IN: 360 mL / OUT: 0 mL / NET: 360 mL        PHYSICAL EXAM:  GENERAL: NAD, well-developed  HEAD:  Atraumatic, Normocephalic  EYES: EOMI, PERRLA, conjunctiva and sclera clear  NECK: Supple, No JVD  CHEST/LUNG: Clear to auscultation bilaterally; No wheeze  HEART: Regular rate and rhythm; No murmurs, rubs, or gallops  ABDOMEN: Soft, Nontender, Nondistended; Bowel sounds present  EXTREMITIES:  2+ Peripheral Pulses, No clubbing, cyanosis, or edema  PSYCH: AAOx3  NEUROLOGY: non-focal  SKIN: No rashes or lesions    LABS:                        11.5   5.92  )-----------( 222      ( 15 Apr 2025 05:59 )             36.1     04-15    138  |  103  |  19  ----------------------------<  96  3.4[L]   |  23  |  0.93    Ca    9.1      15 Apr 2025 05:58    TPro  6.3  /  Alb  3.5  /  TBili  0.5  /  DBili  x   /  AST  28  /  ALT  15  /  AlkPhos  60  04-15          Urinalysis Basic - ( 15 Apr 2025 05:58 )    Color: x / Appearance: x / SG: x / pH: x  Gluc: 96 mg/dL / Ketone: x  / Bili: x / Urobili: x   Blood: x / Protein: x / Nitrite: x   Leuk Esterase: x / RBC: x / WBC x   Sq Epi: x / Non Sq Epi: x / Bacteria: x        RADIOLOGY & ADDITIONAL TESTS:    Imaging Personally Reviewed:    Consultant(s) Notes Reviewed:      Care Discussed with Consultants/Other Providers:

## 2025-04-16 ENCOUNTER — TRANSCRIPTION ENCOUNTER (OUTPATIENT)
Age: 80
End: 2025-04-16

## 2025-04-16 VITALS — OXYGEN SATURATION: 98 % | TEMPERATURE: 98 F | RESPIRATION RATE: 18 BRPM

## 2025-04-16 LAB
ALBUMIN SERPL ELPH-MCNC: 3.5 G/DL — SIGNIFICANT CHANGE UP (ref 3.3–5)
ALBUMIN SERPL ELPH-MCNC: 3.5 G/DL — SIGNIFICANT CHANGE UP (ref 3.3–5)
ALP SERPL-CCNC: 57 U/L — SIGNIFICANT CHANGE UP (ref 40–120)
ALP SERPL-CCNC: 58 U/L — SIGNIFICANT CHANGE UP (ref 40–120)
ALT FLD-CCNC: 16 U/L — SIGNIFICANT CHANGE UP (ref 10–45)
ALT FLD-CCNC: 17 U/L — SIGNIFICANT CHANGE UP (ref 10–45)
ANION GAP SERPL CALC-SCNC: 13 MMOL/L — SIGNIFICANT CHANGE UP (ref 5–17)
AST SERPL-CCNC: 27 U/L — SIGNIFICANT CHANGE UP (ref 10–40)
AST SERPL-CCNC: 28 U/L — SIGNIFICANT CHANGE UP (ref 10–40)
BILIRUB DIRECT SERPL-MCNC: 0.1 MG/DL — SIGNIFICANT CHANGE UP (ref 0–0.3)
BILIRUB INDIRECT FLD-MCNC: 0.3 MG/DL — SIGNIFICANT CHANGE UP (ref 0.2–1)
BILIRUB SERPL-MCNC: 0.4 MG/DL — SIGNIFICANT CHANGE UP (ref 0.2–1.2)
BILIRUB SERPL-MCNC: 0.4 MG/DL — SIGNIFICANT CHANGE UP (ref 0.2–1.2)
BUN SERPL-MCNC: 13 MG/DL — SIGNIFICANT CHANGE UP (ref 7–23)
CALCIUM SERPL-MCNC: 9.2 MG/DL — SIGNIFICANT CHANGE UP (ref 8.4–10.5)
CHLORIDE SERPL-SCNC: 106 MMOL/L — SIGNIFICANT CHANGE UP (ref 96–108)
CO2 SERPL-SCNC: 22 MMOL/L — SIGNIFICANT CHANGE UP (ref 22–31)
CREAT SERPL-MCNC: 0.96 MG/DL — SIGNIFICANT CHANGE UP (ref 0.5–1.3)
CREAT SERPL-MCNC: 0.97 MG/DL — SIGNIFICANT CHANGE UP (ref 0.5–1.3)
EGFR: 60 ML/MIN/1.73M2 — SIGNIFICANT CHANGE UP
EGFR: 60 ML/MIN/1.73M2 — SIGNIFICANT CHANGE UP
EGFR: 61 ML/MIN/1.73M2 — SIGNIFICANT CHANGE UP
EGFR: 61 ML/MIN/1.73M2 — SIGNIFICANT CHANGE UP
GLUCOSE BLDC GLUCOMTR-MCNC: 111 MG/DL — HIGH (ref 70–99)
GLUCOSE SERPL-MCNC: 91 MG/DL — SIGNIFICANT CHANGE UP (ref 70–99)
POTASSIUM SERPL-MCNC: 3.7 MMOL/L — SIGNIFICANT CHANGE UP (ref 3.5–5.3)
POTASSIUM SERPL-SCNC: 3.7 MMOL/L — SIGNIFICANT CHANGE UP (ref 3.5–5.3)
PROT SERPL-MCNC: 6.4 G/DL — SIGNIFICANT CHANGE UP (ref 6–8.3)
PROT SERPL-MCNC: 6.5 G/DL — SIGNIFICANT CHANGE UP (ref 6–8.3)
SODIUM SERPL-SCNC: 141 MMOL/L — SIGNIFICANT CHANGE UP (ref 135–145)

## 2025-04-16 PROCEDURE — 85014 HEMATOCRIT: CPT

## 2025-04-16 PROCEDURE — 83036 HEMOGLOBIN GLYCOSYLATED A1C: CPT

## 2025-04-16 PROCEDURE — 83605 ASSAY OF LACTIC ACID: CPT

## 2025-04-16 PROCEDURE — 36415 COLL VENOUS BLD VENIPUNCTURE: CPT

## 2025-04-16 PROCEDURE — 87637 SARSCOV2&INF A&B&RSV AMP PRB: CPT

## 2025-04-16 PROCEDURE — 85027 COMPLETE CBC AUTOMATED: CPT

## 2025-04-16 PROCEDURE — 80053 COMPREHEN METABOLIC PANEL: CPT

## 2025-04-16 PROCEDURE — 99285 EMERGENCY DEPT VISIT HI MDM: CPT | Mod: 25

## 2025-04-16 PROCEDURE — 85025 COMPLETE CBC W/AUTO DIFF WBC: CPT

## 2025-04-16 PROCEDURE — 73080 X-RAY EXAM OF ELBOW: CPT

## 2025-04-16 PROCEDURE — 80076 HEPATIC FUNCTION PANEL: CPT

## 2025-04-16 PROCEDURE — 85730 THROMBOPLASTIN TIME PARTIAL: CPT

## 2025-04-16 PROCEDURE — 93005 ELECTROCARDIOGRAM TRACING: CPT

## 2025-04-16 PROCEDURE — 71045 X-RAY EXAM CHEST 1 VIEW: CPT

## 2025-04-16 PROCEDURE — 72125 CT NECK SPINE W/O DYE: CPT | Mod: MC

## 2025-04-16 PROCEDURE — 82330 ASSAY OF CALCIUM: CPT

## 2025-04-16 PROCEDURE — 85018 HEMOGLOBIN: CPT

## 2025-04-16 PROCEDURE — 72170 X-RAY EXAM OF PELVIS: CPT

## 2025-04-16 PROCEDURE — 73060 X-RAY EXAM OF HUMERUS: CPT

## 2025-04-16 PROCEDURE — 96374 THER/PROPH/DIAG INJ IV PUSH: CPT

## 2025-04-16 PROCEDURE — 93308 TTE F-UP OR LMTD: CPT

## 2025-04-16 PROCEDURE — 0241U: CPT

## 2025-04-16 PROCEDURE — 70450 CT HEAD/BRAIN W/O DYE: CPT | Mod: MC

## 2025-04-16 PROCEDURE — 82565 ASSAY OF CREATININE: CPT

## 2025-04-16 PROCEDURE — 82803 BLOOD GASES ANY COMBINATION: CPT

## 2025-04-16 PROCEDURE — 82962 GLUCOSE BLOOD TEST: CPT

## 2025-04-16 PROCEDURE — 82947 ASSAY GLUCOSE BLOOD QUANT: CPT

## 2025-04-16 PROCEDURE — 96375 TX/PRO/DX INJ NEW DRUG ADDON: CPT

## 2025-04-16 PROCEDURE — 81001 URINALYSIS AUTO W/SCOPE: CPT

## 2025-04-16 PROCEDURE — 82435 ASSAY OF BLOOD CHLORIDE: CPT

## 2025-04-16 PROCEDURE — 84295 ASSAY OF SERUM SODIUM: CPT

## 2025-04-16 PROCEDURE — 84132 ASSAY OF SERUM POTASSIUM: CPT

## 2025-04-16 PROCEDURE — 84484 ASSAY OF TROPONIN QUANT: CPT

## 2025-04-16 PROCEDURE — 73030 X-RAY EXAM OF SHOULDER: CPT

## 2025-04-16 PROCEDURE — 93325 DOPPLER ECHO COLOR FLOW MAPG: CPT

## 2025-04-16 PROCEDURE — 85610 PROTHROMBIN TIME: CPT

## 2025-04-16 RX ORDER — LISINOPRIL 5 MG/1
1 TABLET ORAL
Qty: 30 | Refills: 0
Start: 2025-04-16 | End: 2025-05-15

## 2025-04-16 RX ORDER — LISINOPRIL 5 MG/1
1 TABLET ORAL
Refills: 0 | DISCHARGE

## 2025-04-16 RX ORDER — AMLODIPINE BESYLATE 10 MG/1
1 TABLET ORAL
Qty: 30 | Refills: 0
Start: 2025-04-16 | End: 2025-05-15

## 2025-04-16 RX ORDER — AMLODIPINE BESYLATE 10 MG/1
10 TABLET ORAL DAILY
Refills: 0 | Status: DISCONTINUED | OUTPATIENT
Start: 2025-04-17 | End: 2025-04-16

## 2025-04-16 RX ORDER — AMLODIPINE BESYLATE 10 MG/1
5 TABLET ORAL ONCE
Refills: 0 | Status: COMPLETED | OUTPATIENT
Start: 2025-04-16 | End: 2025-04-16

## 2025-04-16 RX ADMIN — AMLODIPINE BESYLATE 5 MILLIGRAM(S): 10 TABLET ORAL at 13:12

## 2025-04-16 RX ADMIN — CLOPIDOGREL BISULFATE 75 MILLIGRAM(S): 75 TABLET, FILM COATED ORAL at 11:16

## 2025-04-16 RX ADMIN — CARVEDILOL 12.5 MILLIGRAM(S): 3.12 TABLET, FILM COATED ORAL at 17:07

## 2025-04-16 RX ADMIN — CARVEDILOL 12.5 MILLIGRAM(S): 3.12 TABLET, FILM COATED ORAL at 05:04

## 2025-04-16 RX ADMIN — Medication 81 MILLIGRAM(S): at 11:16

## 2025-04-16 RX ADMIN — LISINOPRIL 30 MILLIGRAM(S): 5 TABLET ORAL at 05:05

## 2025-04-16 RX ADMIN — AMLODIPINE BESYLATE 5 MILLIGRAM(S): 10 TABLET ORAL at 05:04

## 2025-04-16 RX ADMIN — Medication 1 PATCH: at 08:08

## 2025-04-16 NOTE — PHARMACOTHERAPY INTERVENTION NOTE - COMMENTS
Reviewed the patient’s discharge medications and reconciled them with inpatient and home medication regimens to ensure accuracy and continuity of care. No changes made to patient's home medications this admission:    Discharge Medications:  amLODIPine 10 mg oral tablet: 1 tab(s) orally once a day  aspirin 81 mg oral tablet: 1 tab(s) orally once a day  atorvastatin 40 mg oral tablet: 1 tab(s) orally once a day  carvedilol 12.5 mg oral tablet: 1 tab(s) orally 2 times a day  cloNIDine 0.2 mg/24 hr transdermal film, extended release: 1 patch transdermally once a week  clopidogrel 75 mg oral tablet: 1 tab(s) orally once a day  lisinopril 40 mg oral tablet: 1 tab(s) orally once a day  metFORMIN 500 mg oral tablet: 1 tab(s) orally once a day    Antonio Chin, PharmD, BCPS  Clinical Pharmacy Specialist  Available on [facility]  Cell: [phone]

## 2025-04-16 NOTE — PROGRESS NOTE ADULT - ASSESSMENT
The patient is a 79y Female was brought to the ER because of  syncopal episode.    Syncope:    Tele  Orthostasis resolved  TTE reviewed  CTH no acute changes  Cardio consult pending    Covid +:    s/p IV RDV for 3 days  Pulse ox 97% on RA  No need for IV Decadron    HTN:    uncontrolled  BP improved but still high, cont norvasc 10, raise  lisinopril to 40 mg. , cont coreg 12.5 mg bid, cont clonidine patch 0.2 mg.    DM II:    FSSS
The patient is a 79y Female was brought to the ER because of  syncopal episode.    Syncope:    Tele  Orthostasis  TTE reviewed  CTH no acute changes  Cardio consult pending    Covid +:    s/p IV RDV for 3 days  Pulse ox 97% on RA  No need for IV Decadron    HTN:    Cw home meds    DM II:    FSSS
The patient is a 79y Female was brought to the ER because of  syncopal episode.    Syncope:    Tele  Orthostasis  TTE reviewed  CTH no acute changes  Cardio eval called    Covid +:    IV RDV for 3 days  Pulse ox 97% on RA  No need for IV Decadron    HTN:    Cw home meds    DM II:    FSSS

## 2025-04-16 NOTE — DISCHARGE NOTE PROVIDER - CARE PROVIDER_API CALL
Jorge Mejia  Cardiovascular Disease  [address] [place] [address]  Phone: [phone]  Fax: [phone]  Follow Up Time: 1 week   Jorge Mejia  Cardiovascular Disease  [address] [place] [address]  Phone: [phone]  Fax: [phone]  Follow Up Time: 1-3 days

## 2025-04-16 NOTE — CONSULT NOTE ADULT - TIME BILLING
- Review of records, telemetry, vital signs and daily labs.   - General and cardiovascular physical examination.  - Generation of cardiovascular treatment plan and completion of note .  - Coordination of care.      Patient was seen and examined by me on 04/16/2025,interim events noted,labs and radiology studies reviewed.  Vinicius Dotson MD,FACC.  [address] [place], [place] [id number].  [phone]  Availabe to call or text on [employer].

## 2025-04-16 NOTE — DISCHARGE NOTE PROVIDER - NSDCMRMEDTOKEN_GEN_ALL_CORE_FT
aspirin 81 mg oral tablet: 1 tab(s) orally once a day  atorvastatin 40 mg oral tablet: 1 tab(s) orally once a day  carvedilol 12.5 mg oral tablet: 1 tab(s) orally 2 times a day  cloNIDine 0.2 mg/24 hr transdermal film, extended release: 1 patch transdermally once a week  clopidogrel 75 mg oral tablet: 1 tab(s) orally once a day  felodipine 5 mg oral tablet, extended release: 1 tab(s) orally once a day  lisinopril 30 mg oral tablet: 1 tab(s) orally once a day  metFORMIN 500 mg oral tablet: 1 tab(s) orally once a day   amLODIPine 10 mg oral tablet: 1 tab(s) orally once a day  aspirin 81 mg oral tablet: 1 tab(s) orally once a day  atorvastatin 40 mg oral tablet: 1 tab(s) orally once a day  carvedilol 12.5 mg oral tablet: 1 tab(s) orally 2 times a day  cloNIDine 0.2 mg/24 hr transdermal film, extended release: 1 patch transdermally once a week  clopidogrel 75 mg oral tablet: 1 tab(s) orally once a day  lisinopril 30 mg oral tablet: 1 tab(s) orally once a day  metFORMIN 500 mg oral tablet: 1 tab(s) orally once a day   amLODIPine 10 mg oral tablet: 1 tab(s) orally once a day  aspirin 81 mg oral tablet: 1 tab(s) orally once a day  atorvastatin 40 mg oral tablet: 1 tab(s) orally once a day  carvedilol 12.5 mg oral tablet: 1 tab(s) orally 2 times a day  cloNIDine 0.2 mg/24 hr transdermal film, extended release: 1 patch transdermally once a week  clopidogrel 75 mg oral tablet: 1 tab(s) orally once a day  lisinopril 40 mg oral tablet: 1 tab(s) orally once a day  metFORMIN 500 mg oral tablet: 1 tab(s) orally once a day

## 2025-04-16 NOTE — DISCHARGE NOTE PROVIDER - NSFOLLOWUPCLINICS_GEN_ALL_ED_FT
[facility] - Primary Care  Primary Care  [address] [facility] [place] [address]  Phone: [phone]  Fax:

## 2025-04-16 NOTE — DISCHARGE NOTE PROVIDER - NSDCHHHOMEBOUND_GEN_ALL_CORE
Fall risk
ct shows no FX will DC with immobile and crutch FU ortho I have discussed the discharge plan with the patient. The patient agrees with the plan, as discussed.  The patient understands Emergency Department diagnosis is a preliminary diagnosis often based on limited information and that the patient must adhere to the follow-up plan as discussed.  The patient understands that if the symptoms worsen or if prescribed medications do not have the desired/planned effect that the patient may return to the Emergency Department at any time for further evaluation and treatment.

## 2025-04-16 NOTE — DISCHARGE NOTE PROVIDER - PROVIDER TOKENS
PROVIDER:[TOKEN:[[id number]],FOLLOWUP:[1 week]] PROVIDER:[TOKEN:[[id number]],FOLLOWUP:[1-3 days]]

## 2025-04-16 NOTE — CONSULT NOTE ADULT - ASSESSMENT
80 yo F with a PMH of CAD s/p PCI to mLAD in 11/2022 on DAPT, HTN, HLD, and prior CVA no residual deficits, L endarterectomy, intracranial aneurysm, recurrent syncope p/w syncopal episode this morning. Pt was just getting out of shower when she had syncopal episode. +head trauma.   Noted to be Covid+ now on Remdesivir      # SYNCOPE  Presents with episode transient sudden LOC non exertional coming out of shower  No arrhythmias noted  TTE Normal LV Systolic function without obstructive valvular disease  No evidence for ACS   Likely vasovagal/orthostatic in setting of viral illness  Check Orthostatics otherwise no further cardiac testing advised    # CAD  Hx PCI-2022  On DAPT -Hx prior CVA  Chronic Stable CAD    # HTN  On Amlodipine,Clonidine TTS,Lisinopril and Carvedilol  BP: 160/75 (16 Apr 2025 04:20) (160/75 - 182/87)  Has supine systolic HTN  Would increase Amlodipine to 10 mg daily

## 2025-04-16 NOTE — DISCHARGE NOTE PROVIDER - NSDCFUADDAPPT_GEN_ALL_CORE_FT
APPTS ARE READY TO BE MADE: [X] YES    Best Family or Patient Contact (if needed):    Additional Information about above appointments (if needed):    1:   2:   3:     Other comments or requests:    APPTS ARE READY TO BE MADE: [X] YES    Best Family or Patient Contact (if needed):    Additional Information about above appointments (if needed):    1: Cardiology 1-3 days  2: Primary care 1-3 days  3:     Other comments or requests:    APPTS ARE READY TO BE MADE: [X] YES    Best Family or Patient Contact (if needed):    Additional Information about above appointments (if needed):    1: Cardiology 1-3 days  2: Primary care 1-3 days  3:     Other comments or requests:   Patient informed us they already have secured a follow up appointment which is not visible on [employer]. PCP WED 4/23/2025  Patient informed us they already have secured a follow up appointment which is not visible on [employer]. CARDIO DR. VOGEL FRI 4/18/2025

## 2025-04-16 NOTE — CONSULT NOTE ADULT - SUBJECTIVE AND OBJECTIVE BOX
MR:- [id number] :  NAME:-HORTENCIA NAQVI:-    DATE OF SERVICE:25 @ 06:12    Patient was seen,examined and evaluated  by Vinicius Dotson MD [id number] @ 06:12 .  ER evaluation, Labs and Hospital course was reviewed,      Covering Dr Bauer      CHIEF COMPLAINT:Syncope    HPI:HPI:  80 yo F with a PMH of CAD s/p PCI to mLAD in 2022 on DAPT, HTN, HLD, and prior CVA no residual deficits, L endarterectomy, intracranial aneurysm, recurrent syncope p/w syncopal episode this morning. Pt was just getting out of shower when she had syncopal episode. +head trauma. C/o right shoulder pain and right upper arm pain. Placed in sling by EMS. No extremity weakness/paresthesias. Denies nausea, vomiting, fever, chills, chest pain, abd pain, headache, dizziness, changes in speech/va, weakness. Of note, pt has been c/o mild non prod cough over the last few days. No sick contacts or recent travel. Pt reports hx of syncope but without known etiology. Pt received 1g Tylenol in the field around 630AM.   (2025 15:34)  Was found to be COVID+ on Remdesivir no arrhythmias noted          CARDIAC HISTORY:  [x ] CAD [x [PCI [ ] CABG [ ] Prior Cath  [ ] Atrial Fibrillation  Devices[ ] PPM [ ] ICD [ ]ILR  Heart Failure [ ] HFrEF [ ] HFpEF    PAST MEDICAL & SURGICAL HISTORY:  Dyslipidemia      HTN (hypertension), benign      CAD (coronary artery disease)      History of MI (myocardial infarction)      H/O heart artery stent      Vertigo      Migraines  Developed at 10 years of age  Last episode 2 years ago      Borderline diabetes mellitus  Controlled with diet      Legally blind in left eye, as defined in [place]  20/400 left eye      Sciatica      HTN (hypertension)      HLD (hyperlipidemia)      Cerebrovascular accident (CVA)      H/O myocardial ischemia      CAD (coronary artery disease)  stent x2      No Past Surgical History      History of tonsillectomy      History of tubal ligation  35 years ago, no complications as per patient.      S/P LASIK surgery  Right eye, no complications as per patient.      History of coronary artery stent placement  3 stents (Last in )  Same admission as past myocardial infarction      S/P ICD (internal cardiac defibrillator) procedure  loop recorder          MEDICATIONS  (STANDING):  amLODIPine   Tablet 5 milliGRAM(s) Oral daily  aspirin enteric coated 81 milliGRAM(s) Oral daily  atorvastatin 40 milliGRAM(s) Oral at bedtime  carvedilol 12.5 milliGRAM(s) Oral every 12 hours  cloNIDine Patch 0.2 mG/24Hr(s) 1 patch Transdermal every 7 days  clopidogrel Tablet 75 milliGRAM(s) Oral daily  dextrose 5%. 1000 milliLiter(s) (50 mL/Hr) IV Continuous <Continuous>  dextrose 5%. 1000 milliLiter(s) (100 mL/Hr) IV Continuous <Continuous>  dextrose 50% Injectable 25 Gram(s) IV Push once  dextrose 50% Injectable 12.5 Gram(s) IV Push once  dextrose 50% Injectable 25 Gram(s) IV Push once  enoxaparin Injectable 40 milliGRAM(s) SubCutaneous every 24 hours  glucagon  Injectable 1 milliGRAM(s) IntraMuscular once  insulin lispro (ADMELOG) corrective regimen sliding scale   SubCutaneous three times a day before meals  lisinopril 30 milliGRAM(s) Oral daily  melatonin 5 milliGRAM(s) Oral at bedtime  remdesivir  IVPB   IV Intermittent   remdesivir  IVPB 100 milliGRAM(s) IV Intermittent every 24 hours    MEDICATIONS  (PRN):  dextrose Oral Gel 15 Gram(s) Oral once PRN Blood Glucose LESS THAN 70 milliGRAM(s)/deciliter      FAMILY HISTORY:  Family history of MI (myocardial infarction) (Child)  Father ( at 75)  Mother ( at 87)  Son (  at 35)    Family history of hypertension (Child, Sibling)  Daughters, Sisters    Family history of hypercholesterolemia (Child, Sibling)    Family history of brain tumor (Sibling)      No family history of premature coronary artery disease or sudden cardiac death    SOCIAL HISTORY:  Smoking-[ ] Active  [ ] Former [x ] Non Smoker  Alcohol-[x ] Denies [ ] Social [ ] Daily  Ilicit Drug use-[ x] Denies [ ] Active user    REVIEW OF SYSTEMS:  Constitutional: [ ] fever, [ ]weight loss, [x ]fatigue   Activity [ ] Bedbound,[x ] Ambulates [x ] Unassisted[ ] Cane/Walker [ ] Assistence.  Effort tolerance:[ ] Excellent [ ] Good [x ] Fair [ ] Poor [ ]  Eyes: [ ] visual changes  Respiratory: [ ]shortness of breath;  [ ] cough, [ ]wheezing, [ ]chills, [ ]hemoptysis  Cardiovascular: [ ] chest pain, [ ]palpitations, [ ]dizziness,  [ ]leg swelling[ ]orthopnea [ ]PND  Gastrointestinal: [ ] abdominal pain, [ ]nausea, [ ]vomiting,  [ ]diarrhea,[ ]constipation  Genitourinary: [ ] dysuria, [ ] hematuria  Neurologic: [ ] headaches [ ] tremors[ ] weakness  Skin: [ ] itching, [ ]burning, [ ] rashes  Endocrine: [ ] heat or cold intolerance  Musculoskeletal: [ ] joint pain or swelling; [ ] muscle, back, or extremity pain  Psychiatric: [ ] depression, [ ]anxiety, [ ]mood swings, or [ ]difficulty sleeping  Hematologic: [ ] easy bruising, [ ] bleeding gums       [ x] All others negative	  [ ] Unable to obtain    Vital Signs Last 24 Hrs  T(C): 36.9 (2025 04:20), Max: 36.9 (15 Apr 2025 19:57)  T(F): 98.5 (2025 04:20), Max: 98.5 (15 Apr 2025 19:57)  HR: 66 (2025 04:20) (66 - 74)  BP: 160/75 (2025 04:20) (160/75 - 182/87)  BP(mean): 106 (15 Apr 2025 19:57) (106 - 106)  RR: 18 (2025 04:20) (18 - 18)  SpO2: 98% (2025 04:20) (97% - 98%)    Parameters below as of 2025 04:20  Patient On (Oxygen Delivery Method): room air      I&O's Summary    2025 07:01  -  15 Apr 2025 07:00  --------------------------------------------------------  IN: 240 mL / OUT: 100 mL / NET: 140 mL    15 Apr 2025 07:01  -  2025 06:12  --------------------------------------------------------  IN: 360 mL / OUT: 650 mL / NET: -290 mL        PHYSICAL EXAM:  General: No acute distress BMI-28  HEENT: EOMI, PERRL[ ] Icteric  Neck: Supple, [ ] JVD  Lungs: Equal air entry bilaterally; [ ] Rales [ ] Rhonchi [ ] Wheezing  Heart: Regular rate and rhythm;[x ] Murmurs-  2 /6 [x ] Systolic [ ] Diastolic [ ] Radiation,No rubs, or gallops  Abdomen: Nontender, bowel sounds present  Extremities: No clubbing, cyanosis, [ ] edema[ ] Calf tenderness  Nervous system:  Alert & Oriented X3, no focal deficits  Psychiatric: Normal affect  Skin: No rashes or lesions      LABS:  04-15    138  |  103  |  19  ----------------------------<  96  3.4[L]   |  23  |  0.93    Ca    9.1      15 Apr 2025 05:58    TPro  6.3  /  Alb  3.5  /  TBili  0.5  /  DBili  x   /  AST  28  /  ALT  15  /  AlkPhos  60  04-15    Creatinine Trend: 0.93<--, 0.79<--, 0.81<--, 0.98<--                        11.5   5.92  )-----------( 222      ( 15 Apr 2025 05:59 )             36.1       TTE W or WO Ultrasound Enhancing Agent (25 @ 14:18) >  CONCLUSIONS:      1. Left ventricular systolic function is normal with an ejection fraction of 64 % by Khan's method of disks.   2. Normal right ventricular systolic function.   3. No pericardial effusion seen.   4. No prior echocardiogram is available for comparison.   5. There is normal LV mass and concentric remodeling.      Xray Chest 1 View AP/PA (25 @ 10:07) >  COMPARISON: Chest x-ray 10/21/2024.    FINDINGS:  Loop recorder device. Coronary stents.  The heart size is normal.  The lungs are clear.  There is no pneumothorax or pleural effusion.  There are no acute osseous abnormalities.    IMPRESSION:  Clear lungs.         12 Lead ECG (25 @ 07:53) >  NORMAL SINUS RHYTHM  NORMAL INTERVALS  NORMAL ECG

## 2025-04-16 NOTE — DISCHARGE NOTE PROVIDER - HOSPITAL COURSE
HPI:  78 yo F with a PMH of CAD s/p PCI to mLAD in 11/2022 on DAPT, HTN, HLD, and prior CVA no residual deficits, L endarterectomy, intracranial aneurysm, recurrent syncope p/w syncopal episode this morning. Pt was just getting out of shower when she had syncopal episode. +head trauma. C/o right shoulder pain and right upper arm pain. Placed in sling by EMS. No extremity weakness/paresthesias. Denies nausea, vomiting, fever, chills, chest pain, abd pain, headache, dizziness, changes in speech/va, weakness. Of note, pt has been c/o mild non prod cough over the last few days. No sick contacts or recent travel. Pt reports hx of syncope but without known etiology. Pt received 1g Tylenol in the field around 630AM.   (13 Apr 2025 15:34)    Hospital Course:  78 yo F with a PMH of CAD s/p PCI to mLAD in 11/2022 on DAPT, HTN, HLD, and prior CVA no residual deficits, L endarterectomy, intracranial aneurysm, recurrent syncope p/w syncopal episode this morning. Pt was just getting out of shower when she had syncopal episode. +head trauma. C/o right shoulder pain and right upper arm pain. Placed in sling by EMS. No extremity weakness/paresthesias. . Of note, pt has been c/o mild non prod cough over the last few days. No sick contacts or recent travel. Pt reports hx of syncope but without known etiology.     Dx	Syncope - CTH no acute bleed; UA neg, Echo: EF 64%, orthostatics negative             HTN: amlodipine increased to 10mg daily              RT Shoulder pain-  No acute Fx or dislocation sholuder/pelvis              COVID + s/p Remdesivir, on RA    Advanced Directives:   [X] Full code  [ ] DNR  [ ] Hospice    Discharge Diagnoses:  (Admit Diagnosis) Syncope and collapse       HPI:  78 yo F with a PMH of CAD s/p PCI to mLAD in 11/2022 on DAPT, HTN, HLD, and prior CVA no residual deficits, L endarterectomy, intracranial aneurysm, recurrent syncope p/w syncopal episode this morning. Pt was just getting out of shower when she had syncopal episode. +head trauma. C/o right shoulder pain and right upper arm pain. Placed in sling by EMS. No extremity weakness/paresthesias. Denies nausea, vomiting, fever, chills, chest pain, abd pain, headache, dizziness, changes in speech/va, weakness. Of note, pt has been c/o mild non prod cough over the last few days. No sick contacts or recent travel. Pt reports hx of syncope but without known etiology. Pt received 1g Tylenol in the field around 630AM.   (13 Apr 2025 15:34)    Hospital Course:  78 yo F with a PMH of CAD s/p PCI to mLAD in 11/2022 on DAPT, HTN, HLD, and prior CVA no residual deficits, L endarterectomy, intracranial aneurysm, recurrent syncope p/w syncopal episode this morning. Pt was just getting out of shower when she had syncopal episode. +head trauma. C/o right shoulder pain and right upper arm pain. Placed in sling by EMS. No extremity weakness/paresthesias. . Of note, pt has been c/o mild non prod cough over the last few days. No sick contacts or recent travel. Pt reports hx of syncope but without known etiology.     Dx	Syncope - CTH no acute bleed; UA neg, Echo: EF 64%, orthostatics negative             HTN: amlodipine increased to 10mg daily.  Lisinopril increased to 40mg daily              RT Shoulder pain-  No acute Fx or dislocation sholuder/pelvis              COVID + s/p Remdesivir, on RA    Advanced Directives:   [X] Full code  [ ] DNR  [ ] Hospice    Discharge Diagnoses:  (Admit Diagnosis) Syncope and collapse

## 2025-04-16 NOTE — PROGRESS NOTE ADULT - SUBJECTIVE AND OBJECTIVE BOX
Patient is a 79y old  Female who presents with a chief complaint of The patient is a 79y Female was brought to the ER because of  syncopal episode. (16 Apr 2025 14:06)      SUBJECTIVE / OVERNIGHT EVENTS: ptn w no new c/o. BP improved but still high, cont norvasc 10, raise  lisinopril to 40 mg. , cont coreg 12.5 mg bid, cont clonidine patch 0.2 mg. completed treatment for covid . not hypoxic.     MEDICATIONS  (STANDING):  aspirin enteric coated 81 milliGRAM(s) Oral daily  atorvastatin 40 milliGRAM(s) Oral at bedtime  carvedilol 12.5 milliGRAM(s) Oral every 12 hours  cloNIDine Patch 0.2 mG/24Hr(s) 1 patch Transdermal every 7 days  clopidogrel Tablet 75 milliGRAM(s) Oral daily  dextrose 5%. 1000 milliLiter(s) (50 mL/Hr) IV Continuous <Continuous>  dextrose 5%. 1000 milliLiter(s) (100 mL/Hr) IV Continuous <Continuous>  dextrose 50% Injectable 25 Gram(s) IV Push once  dextrose 50% Injectable 12.5 Gram(s) IV Push once  dextrose 50% Injectable 25 Gram(s) IV Push once  enoxaparin Injectable 40 milliGRAM(s) SubCutaneous every 24 hours  glucagon  Injectable 1 milliGRAM(s) IntraMuscular once  insulin lispro (ADMELOG) corrective regimen sliding scale   SubCutaneous three times a day before meals  lisinopril 30 milliGRAM(s) Oral daily  melatonin 5 milliGRAM(s) Oral at bedtime  remdesivir  IVPB 100 milliGRAM(s) IV Intermittent every 24 hours  remdesivir  IVPB   IV Intermittent     MEDICATIONS  (PRN):  dextrose Oral Gel 15 Gram(s) Oral once PRN Blood Glucose LESS THAN 70 milliGRAM(s)/deciliter      Vital Signs Last 24 Hrs  T(F): 97.9 (04-16-25 @ 11:06), Max: 98.5 (04-15-25 @ 19:57)  HR: 66 (04-16-25 @ 04:20) (66 - 69)  BP: 160/75 (04-16-25 @ 04:20) (160/75 - 164/77)  RR: 18 (04-16-25 @ 11:06) (18 - 18)  SpO2: 98% (04-16-25 @ 11:06) (97% - 98%)  Telemetry:   CAPILLARY BLOOD GLUCOSE      POCT Blood Glucose.: 111 mg/dL (16 Apr 2025 07:47)  POCT Blood Glucose.: 113 mg/dL (15 Apr 2025 21:42)  POCT Blood Glucose.: 119 mg/dL (15 Apr 2025 17:09)    I&O's Summary    15 Apr 2025 07:01  -  16 Apr 2025 07:00  --------------------------------------------------------  IN: 360 mL / OUT: 650 mL / NET: -290 mL    16 Apr 2025 07:01  -  16 Apr 2025 16:46  --------------------------------------------------------  IN: 240 mL / OUT: 0 mL / NET: 240 mL        PHYSICAL EXAM:  GENERAL: NAD, well-developed  HEAD:  Atraumatic, Normocephalic  EYES: EOMI, PERRLA, conjunctiva and sclera clear  NECK: Supple, No JVD  CHEST/LUNG: Clear to auscultation bilaterally; No wheeze  HEART: Regular rate and rhythm; No murmurs, rubs, or gallops  ABDOMEN: Soft, Nontender, Nondistended; Bowel sounds present  EXTREMITIES:  2+ Peripheral Pulses, No clubbing, cyanosis, or edema  PSYCH: AAOx3  NEUROLOGY: non-focal  SKIN: No rashes or lesions    LABS:                        11.5   5.92  )-----------( 222      ( 15 Apr 2025 05:59 )             36.1     04-16    141  |  106  |  13  ----------------------------<  91  3.7   |  22  |  0.96    Ca    9.2      16 Apr 2025 06:12    TPro  6.4  /  Alb  3.5  /  TBili  0.4  /  DBili  0.1  /  AST  28  /  ALT  17  /  AlkPhos  57  04-16          Urinalysis Basic - ( 16 Apr 2025 06:12 )    Color: x / Appearance: x / SG: x / pH: x  Gluc: 91 mg/dL / Ketone: x  / Bili: x / Urobili: x   Blood: x / Protein: x / Nitrite: x   Leuk Esterase: x / RBC: x / WBC x   Sq Epi: x / Non Sq Epi: x / Bacteria: x        RADIOLOGY & ADDITIONAL TESTS:    Imaging Personally Reviewed:    Consultant(s) Notes Reviewed:      Care Discussed with Consultants/Other Providers:

## 2025-04-16 NOTE — DISCHARGE NOTE PROVIDER - NSDCCPCAREPLAN_GEN_ALL_CORE_FT
PRINCIPAL DISCHARGE DIAGNOSIS  Diagnosis: Syncope  Assessment and Plan of Treatment: Orthostatics negative and echocardiogram unremarkable.      SECONDARY DISCHARGE DIAGNOSES  Diagnosis: COVID  Assessment and Plan of Treatment: You were given remdesivir in the hospital.    Diagnosis: Hypertension  Assessment and Plan of Treatment: Amlodipine 10mg daily.     PRINCIPAL DISCHARGE DIAGNOSIS  Diagnosis: Syncope  Assessment and Plan of Treatment: Orthostatics negative and echocardiogram unremarkable.      SECONDARY DISCHARGE DIAGNOSES  Diagnosis: COVID  Assessment and Plan of Treatment: You were given remdesivir in the hospital.    Diagnosis: Hypertension  Assessment and Plan of Treatment: Amlodipine 10mg daily.  Please follow up with cardiology in 1-3 days for blood pressure management.     PRINCIPAL DISCHARGE DIAGNOSIS  Diagnosis: Syncope  Assessment and Plan of Treatment: Orthostatics negative and echocardiogram unremarkable.      SECONDARY DISCHARGE DIAGNOSES  Diagnosis: COVID  Assessment and Plan of Treatment: You were given remdesivir in the hospital.    Diagnosis: Hypertension  Assessment and Plan of Treatment: Amlodipine increased to 10mg daily and lisinopril increased to 40mg daily.  Please follow up with cardiology in 1-3 days for blood pressure management.

## 2025-04-17 ENCOUNTER — EMERGENCY (EMERGENCY)
Facility: HOSPITAL | Age: 80
LOS: 1 days | End: 2025-04-17
Attending: EMERGENCY MEDICINE | Admitting: EMERGENCY MEDICINE
Payer: MEDICARE

## 2025-04-17 VITALS
HEART RATE: 76 BPM | RESPIRATION RATE: 18 BRPM | DIASTOLIC BLOOD PRESSURE: 73 MMHG | OXYGEN SATURATION: 100 % | TEMPERATURE: 98 F | SYSTOLIC BLOOD PRESSURE: 151 MMHG

## 2025-04-17 VITALS
OXYGEN SATURATION: 99 % | HEART RATE: 74 BPM | SYSTOLIC BLOOD PRESSURE: 204 MMHG | HEIGHT: 59 IN | TEMPERATURE: 98 F | RESPIRATION RATE: 16 BRPM | DIASTOLIC BLOOD PRESSURE: 80 MMHG

## 2025-04-17 DIAGNOSIS — Z95.5 PRESENCE OF CORONARY ANGIOPLASTY IMPLANT AND GRAFT: Chronic | ICD-10-CM

## 2025-04-17 DIAGNOSIS — Z98.51 TUBAL LIGATION STATUS: Chronic | ICD-10-CM

## 2025-04-17 DIAGNOSIS — Z95.810 PRESENCE OF AUTOMATIC (IMPLANTABLE) CARDIAC DEFIBRILLATOR: Chronic | ICD-10-CM

## 2025-04-17 DIAGNOSIS — Z98.89 OTHER SPECIFIED POSTPROCEDURAL STATES: Chronic | ICD-10-CM

## 2025-04-17 PROCEDURE — 99284 EMERGENCY DEPT VISIT MOD MDM: CPT

## 2025-04-17 PROCEDURE — 93010 ELECTROCARDIOGRAM REPORT: CPT

## 2025-04-17 RX ORDER — ATORVASTATIN CALCIUM 80 MG/1
40 TABLET, FILM COATED ORAL AT BEDTIME
Refills: 0 | Status: DISCONTINUED | OUTPATIENT
Start: 2025-04-17 | End: 2025-04-21

## 2025-04-17 RX ORDER — CARVEDILOL 3.12 MG/1
12.5 TABLET, FILM COATED ORAL ONCE
Refills: 0 | Status: COMPLETED | OUTPATIENT
Start: 2025-04-17 | End: 2025-04-17

## 2025-04-17 RX ADMIN — ATORVASTATIN CALCIUM 40 MILLIGRAM(S): 80 TABLET, FILM COATED ORAL at 21:42

## 2025-04-17 RX ADMIN — CARVEDILOL 12.5 MILLIGRAM(S): 3.12 TABLET, FILM COATED ORAL at 20:20

## 2025-04-17 NOTE — ED PROVIDER NOTE - PATIENT PORTAL LINK FT
You can access the FollowMyHealth Patient Portal offered by [facility] by registering at the following website: [email or web address]. By joining [employer]’s FollowMyHealth portal, you will also be able to view your health information using other applications (apps) compatible with our system.

## 2025-04-17 NOTE — ED ADULT TRIAGE NOTE - CHIEF COMPLAINT QUOTE
Pt told to come in by home health nurse for hypertension, systolics in triage were in the 200s. Denies CP, SOB, HA, dizziness, blurred vision. Pt took her Amlodipine 10 mg, lisinopril 40 mg, carvedilol 12.5 mg and currently has clonidine patch on R shoulder. History of HTN, DM2.

## 2025-04-17 NOTE — ED PROVIDER NOTE - NSFOLLOWUPINSTRUCTIONS_ED_ALL_ED_FT
Continue your current prescribed medications    Please follow up with your appointment with your physician tomorrow.

## 2025-04-17 NOTE — ED PROVIDER NOTE - CLINICAL SUMMARY MEDICAL DECISION MAKING FREE TEXT BOX
79-year-old female with past medical history of hypertension currently on norvasc 10, raise  lisinopril to 40 mg. , cont coreg 12.5 mg bid, cont clonidine patch 0.2 mg, CAD's on DAPT, hypertension, hyperlipidemia, prior CVA with no residual deficits presenting to emergency department for asymptomatic hypertension. On arrival to the emergency department blood pressure is 183/78, heart rate 73 bpm, nonfebrile, saturating 99% on room air.  On physical examination patient is alert and oriented x 3, pupils equal round reactive to light, CN II to XII intact, 2+ radial pulses bilaterally, lungs clear to auscultation bilaterally without wheezing rales or rhonchi, no cardiac murmurs or rubs, abdomen soft nontender nondistended.  Differential clues not limited to hypertensive urgency, less likely pheochromocytoma, less likely endorgan damage, as patient is comfortable appearing will order for nighttime medications and and observe.  Due to recent discharge, will refrain from drawing labs at this time. 79-year-old female with past medical history of hypertension currently on norvasc 10, raise  lisinopril to 40 mg. , cont coreg 12.5 mg bid, cont clonidine patch 0.2 mg, CAD's on DAPT, hypertension, hyperlipidemia, prior CVA with no residual deficits presenting to emergency department for asymptomatic hypertension. On arrival to the emergency department blood pressure is 183/78, heart rate 73 bpm, nonfebrile, saturating 99% on room air.  On physical examination patient is alert and oriented x 3, pupils equal round reactive to light, CN II to XII intact, 2+ radial pulses bilaterally, lungs clear to auscultation bilaterally without wheezing rales or rhonchi, no cardiac murmurs or rubs, abdomen soft nontender nondistended.  Differential clues not limited to hypertensive urgency, less likely pheochromocytoma, less likely endorgan damage, as patient is comfortable appearing will order for nighttime medications and and observe.  Due to recent discharge, will refrain from drawing labs at this time.    Relevant EMR reviewed

## 2025-04-17 NOTE — ED PROVIDER NOTE - IV ALTEPLASE DOOR HIDDEN
Operative Note     Patient: Dakotah Dixon  :   1982  Medical Record Number: [id number]     Pre-operative Diagnosis:   Cervical Insufficiency    Post-operative Diagnosis:   Same    Procedure:   Ramirez cerclage         Surgeon:   Raquel    EBL: see anesthesia record  Complications: None     Description:  After appropriate consent was obtained, the patient was taken to the OR. Spinal anesthesia was placed per the anesthesia service. The patient was then placed in the dorsolithotomy position and prepped and draped in usual fashion. The bladder was emptied with in and out catheterization. Following this, retractors were placed in the vagina and the cervix was grasped with ring forceps at the 12 o'clock and 6 o'clock position. Working in a counter clockwise fashion, Ramirez cerclage was placed using Ethibond suture. A large defect was noted in the right aspect of the cervix and the anterior portion was reapproximated to the posterior portion with the stitch. The knot was tied at 12 o'clock. The vagina was swabbed with a sponge stick. Instruments were then removed from the vagina. All sponge, needle and instrument counts were correct. The patient was then placed in a supine position and transferred to the recovery room in good condition. show

## 2025-04-17 NOTE — ED ADULT NURSE REASSESSMENT NOTE - NS ED NURSE REASSESS COMMENT FT1
Received report from Day RN: Pt appears to be resting comfortably, A&Ox4, ambulatory, NAD, respirations are even and unlabored. VS noted. Medicated as per orders. Denies chest pain, SOB, dizziness, lightheadedness, HA, blurry vision. Safety precautions implemented as per protocol, awaiting further MD orders, plan of care ongoing.

## 2025-04-17 NOTE — ED PROVIDER NOTE - PROGRESS NOTE DETAILS
Alejandro: /73. She remains asymptomatic To be d/c with daughter to home and follow up with her scheduled physician appointment tomorrow.

## 2025-04-17 NOTE — ED PROVIDER NOTE - PHYSICAL EXAMINATION
Physical Exam:  Gen: NAD, AOx3, non-toxic appearing, able to ambulate without assistance  Head: NCAT  HEENT: EOMI, PEERLA, normal conjunctiva, tongue midline, oral mucosa moist  Lung: CTAB, no respiratory distress, no wheezes/rhonchi/rales B/L, speaking in full sentences  CV: RRR, no murmurs, rubs or gallops  Abd: soft, NT, ND, no guarding, no rigidity, no rebound tenderness, no CVA tenderness   MSK: no visible deformities, ROM normal in UE/LE, no back pain  Neuro: No focal sensory or motor deficits  Skin: Warm, well perfused, no rash, no leg swelling  Psych: normal affect, calm Physical Exam:  Gen: NAD, AOx3, non-toxic appearing, able to ambulate without assistance  Head: NCAT  HEENT: EOMI, PEERLA, normal conjunctiva, tongue midline, oral mucosa moist  Lung: CTAB, no respiratory distress, no wheezes/rhonchi/rales B/L, speaking in full sentences  CV: RRR, no murmurs, rubs or gallops  Abd: soft, NT, ND, no guarding, no rigidity, no rebound tenderness, no CVA tenderness   MSK: no visible deformities, ROM normal in UE/LE, no back pain  Neuro: No focal sensory or motor deficits  Skin: Warm, well perfused, no rash, no leg swelling  Psych: normal affect, calm    Attending/Alejandro: NAD; PERRL/EOMI, non-icterus, supple, no FRANCES, no JVD, RRR, CTAB; Abd-soft, NT/ND, no HSM; no LE edema, A&Ox3, nonfocal; Skin-warm/dry  BP: 183/84

## 2025-04-17 NOTE — ED ADULT NURSE NOTE - OBJECTIVE STATEMENT
Patient is awake and alert, her daughter is at her bedside. Patient sent in by visioning RN for high blood pressure. Patient denies any symptoms.

## 2025-04-17 NOTE — ED PROVIDER NOTE - ED STEMI HIDDEN
Pt to ED from streets escorted by police after being found banging on the doors of strangers home. Pt is intoxicated, states \"I drank to much.\" Police state he is not in custody, they just brought him to ED since patient asked them to.   
hide

## 2025-04-17 NOTE — ED PROVIDER NOTE - OBJECTIVE STATEMENT
Attending/Alejandro: 78 yo F h/o CAD s/p PCI to mLAD in 11/2022 on DAPT, HTN, HLD, and prior CVA no residual deficits, L endarterectomy, intracranial aneurysm, discharged yesterday after being admitted for syncope now p/w 79-year-old female with past medical history of hypertension currently on norvasc 10, raise  lisinopril to 40 mg. , cont coreg 12.5 mg bid, cont clonidine patch 0.2 mg, CAD's on DAPT, hypertension, hyperlipidemia, prior CVA with no residual deficits presenting to emergency department for asymptomatic hypertension.  Of note patient was recently discharged from [facility] for syncopal workup, at that time was found to have COVID.  Patient had TTE at that time.  Patient denies headache, visual changes, nausea, vomiting, chest pain, shortness of breath.    Attending/Alejandro: 78 yo F h/o CAD s/p PCI to mLAD in 11/2022 on DAPT, HTN, HLD, and prior CVA no residual deficits, L endarterectomy, intracranial aneurysm, discharged yesterday after being admitted for syncope now p/w 79-year-old female with past medical history of hypertension currently on norvasc 10, raise  lisinopril to 40 mg. , cont coreg 12.5 mg bid, cont clonidine patch 0.2 mg, CAD's on DAPT, hypertension, hyperlipidemia, prior CVA with no residual deficits presenting to emergency department for asymptomatic hypertension.  Of note patient was recently discharged from [facility] for syncopal workup, at that time was found to have COVID.  Patient had TTE at that time.  Patient denies headache, visual changes, nausea, vomiting, chest pain, shortness of breath.    Attending/Alejandro: 78 yo F h/o CAD s/p PCI to mLAD in 11/2022 on DAPT, HTN, HLD, and prior CVA no residual deficits, L endarterectomy, intracranial aneurysm, discharged yesterday after being admitted for syncope now p/w elevated blood pressure, asymptomatic. Patient reports being complaint with her OP medications,.

## 2025-04-17 NOTE — ED ADULT NURSE NOTE - NSFALLHARMRISKINTERV_ED_ALL_ED

## 2025-04-20 ENCOUNTER — EMERGENCY (EMERGENCY)
Facility: HOSPITAL | Age: 80
LOS: 1 days | End: 2025-04-20
Attending: EMERGENCY MEDICINE | Admitting: EMERGENCY MEDICINE
Payer: MEDICARE

## 2025-04-20 VITALS
HEART RATE: 65 BPM | RESPIRATION RATE: 18 BRPM | OXYGEN SATURATION: 96 % | SYSTOLIC BLOOD PRESSURE: 182 MMHG | TEMPERATURE: 97 F | HEIGHT: 59 IN | DIASTOLIC BLOOD PRESSURE: 92 MMHG

## 2025-04-20 VITALS
SYSTOLIC BLOOD PRESSURE: 119 MMHG | RESPIRATION RATE: 17 BRPM | HEART RATE: 69 BPM | OXYGEN SATURATION: 100 % | DIASTOLIC BLOOD PRESSURE: 72 MMHG | TEMPERATURE: 98 F

## 2025-04-20 DIAGNOSIS — Z98.89 OTHER SPECIFIED POSTPROCEDURAL STATES: Chronic | ICD-10-CM

## 2025-04-20 DIAGNOSIS — Z98.51 TUBAL LIGATION STATUS: Chronic | ICD-10-CM

## 2025-04-20 DIAGNOSIS — Z95.5 PRESENCE OF CORONARY ANGIOPLASTY IMPLANT AND GRAFT: Chronic | ICD-10-CM

## 2025-04-20 DIAGNOSIS — Z95.810 PRESENCE OF AUTOMATIC (IMPLANTABLE) CARDIAC DEFIBRILLATOR: Chronic | ICD-10-CM

## 2025-04-20 LAB
ALBUMIN SERPL ELPH-MCNC: 3.9 G/DL — SIGNIFICANT CHANGE UP (ref 3.3–5)
ALP SERPL-CCNC: 73 U/L — SIGNIFICANT CHANGE UP (ref 40–120)
ALT FLD-CCNC: 14 U/L — SIGNIFICANT CHANGE UP (ref 4–33)
ANION GAP SERPL CALC-SCNC: 12 MMOL/L — SIGNIFICANT CHANGE UP (ref 7–14)
APPEARANCE UR: CLEAR — SIGNIFICANT CHANGE UP
APPEARANCE UR: CLEAR — SIGNIFICANT CHANGE UP
AST SERPL-CCNC: 25 U/L — SIGNIFICANT CHANGE UP (ref 4–32)
BACTERIA # UR AUTO: ABNORMAL /HPF
BASOPHILS # BLD AUTO: 0.04 K/UL — SIGNIFICANT CHANGE UP (ref 0–0.2)
BASOPHILS NFR BLD AUTO: 0.4 % — SIGNIFICANT CHANGE UP (ref 0–2)
BILIRUB SERPL-MCNC: 0.7 MG/DL — SIGNIFICANT CHANGE UP (ref 0.2–1.2)
BILIRUB UR-MCNC: NEGATIVE — SIGNIFICANT CHANGE UP
BILIRUB UR-MCNC: NEGATIVE — SIGNIFICANT CHANGE UP
BUN SERPL-MCNC: 17 MG/DL — SIGNIFICANT CHANGE UP (ref 7–23)
CALCIUM SERPL-MCNC: 9.8 MG/DL — SIGNIFICANT CHANGE UP (ref 8.4–10.5)
CAST: 0 /LPF — SIGNIFICANT CHANGE UP (ref 0–4)
CHLORIDE SERPL-SCNC: 101 MMOL/L — SIGNIFICANT CHANGE UP (ref 98–107)
CO2 SERPL-SCNC: 24 MMOL/L — SIGNIFICANT CHANGE UP (ref 22–31)
COLOR SPEC: YELLOW — SIGNIFICANT CHANGE UP
COLOR SPEC: YELLOW — SIGNIFICANT CHANGE UP
CREAT SERPL-MCNC: 0.94 MG/DL — SIGNIFICANT CHANGE UP (ref 0.5–1.3)
DIFF PNL FLD: NEGATIVE — SIGNIFICANT CHANGE UP
DIFF PNL FLD: NEGATIVE — SIGNIFICANT CHANGE UP
EGFR: 62 ML/MIN/1.73M2 — SIGNIFICANT CHANGE UP
EGFR: 62 ML/MIN/1.73M2 — SIGNIFICANT CHANGE UP
EOSINOPHIL # BLD AUTO: 0.1 K/UL — SIGNIFICANT CHANGE UP (ref 0–0.5)
EOSINOPHIL NFR BLD AUTO: 0.9 % — SIGNIFICANT CHANGE UP (ref 0–6)
GAS PNL BLDV: SIGNIFICANT CHANGE UP
GLUCOSE SERPL-MCNC: 153 MG/DL — HIGH (ref 70–99)
GLUCOSE UR QL: NEGATIVE MG/DL — SIGNIFICANT CHANGE UP
GLUCOSE UR QL: NEGATIVE MG/DL — SIGNIFICANT CHANGE UP
HCT VFR BLD CALC: 36.4 % — SIGNIFICANT CHANGE UP (ref 34.5–45)
HGB BLD-MCNC: 11.9 G/DL — SIGNIFICANT CHANGE UP (ref 11.5–15.5)
IANC: 7.91 K/UL — HIGH (ref 1.8–7.4)
IMM GRANULOCYTES NFR BLD AUTO: 0.4 % — SIGNIFICANT CHANGE UP (ref 0–0.9)
KETONES UR-MCNC: NEGATIVE MG/DL — SIGNIFICANT CHANGE UP
KETONES UR-MCNC: NEGATIVE MG/DL — SIGNIFICANT CHANGE UP
LEUKOCYTE ESTERASE UR-ACNC: ABNORMAL
LEUKOCYTE ESTERASE UR-ACNC: ABNORMAL
LYMPHOCYTES # BLD AUTO: 2.32 K/UL — SIGNIFICANT CHANGE UP (ref 1–3.3)
LYMPHOCYTES # BLD AUTO: 21.4 % — SIGNIFICANT CHANGE UP (ref 13–44)
MAGNESIUM SERPL-MCNC: 1.9 MG/DL — SIGNIFICANT CHANGE UP (ref 1.6–2.6)
MCHC RBC-ENTMCNC: 27 PG — SIGNIFICANT CHANGE UP (ref 27–34)
MCHC RBC-ENTMCNC: 32.7 G/DL — SIGNIFICANT CHANGE UP (ref 32–36)
MCV RBC AUTO: 82.5 FL — SIGNIFICANT CHANGE UP (ref 80–100)
MONOCYTES # BLD AUTO: 0.41 K/UL — SIGNIFICANT CHANGE UP (ref 0–0.9)
MONOCYTES NFR BLD AUTO: 3.8 % — SIGNIFICANT CHANGE UP (ref 2–14)
NEUTROPHILS # BLD AUTO: 7.91 K/UL — HIGH (ref 1.8–7.4)
NEUTROPHILS NFR BLD AUTO: 73.1 % — SIGNIFICANT CHANGE UP (ref 43–77)
NITRITE UR-MCNC: NEGATIVE — SIGNIFICANT CHANGE UP
NITRITE UR-MCNC: NEGATIVE — SIGNIFICANT CHANGE UP
NRBC # BLD AUTO: 0 K/UL — SIGNIFICANT CHANGE UP (ref 0–0)
NRBC # FLD: 0 K/UL — SIGNIFICANT CHANGE UP (ref 0–0)
NRBC BLD AUTO-RTO: 0 /100 WBCS — SIGNIFICANT CHANGE UP (ref 0–0)
PH UR: 7.5 — SIGNIFICANT CHANGE UP (ref 5–8)
PH UR: 7.5 — SIGNIFICANT CHANGE UP (ref 5–8)
PLATELET # BLD AUTO: 302 K/UL — SIGNIFICANT CHANGE UP (ref 150–400)
POTASSIUM SERPL-MCNC: 5.2 MMOL/L — SIGNIFICANT CHANGE UP (ref 3.5–5.3)
POTASSIUM SERPL-SCNC: 5.2 MMOL/L — SIGNIFICANT CHANGE UP (ref 3.5–5.3)
PROT SERPL-MCNC: 7.1 G/DL — SIGNIFICANT CHANGE UP (ref 6–8.3)
PROT UR-MCNC: NEGATIVE MG/DL — SIGNIFICANT CHANGE UP
PROT UR-MCNC: NEGATIVE MG/DL — SIGNIFICANT CHANGE UP
RBC # BLD: 4.41 M/UL — SIGNIFICANT CHANGE UP (ref 3.8–5.2)
RBC # FLD: 12.9 % — SIGNIFICANT CHANGE UP (ref 10.3–14.5)
RBC CASTS # UR COMP ASSIST: 1 /HPF — SIGNIFICANT CHANGE UP (ref 0–4)
REVIEW: SIGNIFICANT CHANGE UP
SODIUM SERPL-SCNC: 137 MMOL/L — SIGNIFICANT CHANGE UP (ref 135–145)
SP GR SPEC: 1.01 — SIGNIFICANT CHANGE UP (ref 1–1.03)
SP GR SPEC: 1.01 — SIGNIFICANT CHANGE UP (ref 1–1.03)
SQUAMOUS # UR AUTO: 6 /HPF — HIGH (ref 0–5)
TROPONIN T, HIGH SENSITIVITY RESULT: 9 NG/L — SIGNIFICANT CHANGE UP
UROBILINOGEN FLD QL: 1 MG/DL — SIGNIFICANT CHANGE UP (ref 0.2–1)
UROBILINOGEN FLD QL: 1 MG/DL — SIGNIFICANT CHANGE UP (ref 0.2–1)
WBC # BLD: 10.82 K/UL — HIGH (ref 3.8–10.5)
WBC # FLD AUTO: 10.82 K/UL — HIGH (ref 3.8–10.5)
WBC UR QL: 2 /HPF — SIGNIFICANT CHANGE UP (ref 0–5)

## 2025-04-20 PROCEDURE — 93010 ELECTROCARDIOGRAM REPORT: CPT

## 2025-04-20 PROCEDURE — 71045 X-RAY EXAM CHEST 1 VIEW: CPT | Mod: 26

## 2025-04-20 PROCEDURE — 99285 EMERGENCY DEPT VISIT HI MDM: CPT

## 2025-04-20 RX ORDER — MECLIZINE HCL 12.5 MG
25 TABLET ORAL ONCE
Refills: 0 | Status: COMPLETED | OUTPATIENT
Start: 2025-04-20 | End: 2025-04-20

## 2025-04-20 RX ORDER — ACETAMINOPHEN 500 MG/5ML
1000 LIQUID (ML) ORAL ONCE
Refills: 0 | Status: COMPLETED | OUTPATIENT
Start: 2025-04-20 | End: 2025-04-20

## 2025-04-20 RX ADMIN — Medication 400 MILLIGRAM(S): at 11:40

## 2025-04-20 RX ADMIN — Medication 1000 MILLILITER(S): at 14:02

## 2025-04-20 RX ADMIN — Medication 500 MILLILITER(S): at 11:40

## 2025-04-20 RX ADMIN — Medication 25 MILLIGRAM(S): at 11:40

## 2025-04-20 NOTE — ED ADULT NURSE NOTE - OBJECTIVE STATEMENT
Presents to ED for episode of syncope and acute confusion. Daughter at bedside. History of CAD, CVA, HTN, MI. Patient is A&Ox2, to person, place. Per daughter, patient was sitting at [employer] during mass when she had syncopal episode lasting 1-2 minutes. After patient aroused, she was confused per daughter. No fall. Denies CP, SOB, N/V, fevers. No focal neuro deficits. Respirations even, unlabored on room air. No pallor, no cyanosis. On cardiac monitor showing NSR in 70s bpm. Presents to ED for episode of syncope and acute confusion. Daughter at bedside. History of CAD, CVA, HTN, MI. Patient is A&Ox2, to person, place. Per daughter, patient was sitting at [employer] during mass when she had syncopal episode lasting 1-2 minutes. After patient aroused, she was confused per daughter. No fall. Denies CP, SOB, N/V, fevers. No focal neuro deficits. Respirations even, unlabored on room air. No pallor, no cyanosis. On cardiac monitor showing NSR in 70s bpm. 20G IV in placed by EMS in the left AC.

## 2025-04-20 NOTE — ED PROVIDER NOTE - ATTENDING WITH...
"                  MRN:[id number]                      After Visit Summary   1/6/2017    Nancy Pinto    MRN: [id number]           Thank you!     Thank you for choosing [facility] for your care. Our goal is always to provide you with excellent care.        Patient Information     Date Of Birth          1998        About your hospital stay     You were admitted on:  January 6, 2017 You last received care in the:  [facility]    You were discharged on:  January 10, 2017       Who to Call     For medical emergencies, please call 911.  For non-urgent questions about your medical care, please call your primary care provider or clinic, [phone]          Attending Provider     Provider    Laxmi Rosales MD       Primary Care Provider Office Phone # Fax #    Joycejamie Nabor Lopez [phone]       [facility] [place] [address] [place] [address]        Follow-up Appointments     Follow Up and recommended labs and tests       Liver enzymes (ALT and AST) were slightly elevated on admission.  Please follow-up with your primary care provider to recheck liver enzymes to ensure they have normalized.  Due to history of iron deficiency anemia, you should also have your iron studies rechecked if this has not been done recently.                  Further instructions from your care team       Behavioral Discharge Planning and Instructions    Summary:Nancy Pinto is a 18 year old female with a past psychiatric history of depression, anxiety, ADHD who presents with s/p suicide attempt/gesture--took \"some pills.\"  States for the past week has \"not been doing well\"--worsening depression and anxiety though feels depression is more predomininant.  Feels prior to starting oral extended release form of methylphenidate had been doing better with current meds and daytrana. Though had gotten benefit from the prozac and abilify rates improvement at 3/5 with 5 being best or where would like " to have sxs be at.  Though had not worked with therapist for a while, plan was to restart therapy as had found it helpful.    Main Diagnosis: Principal Diagnosis: MDD, severe, recurrent, without psychotic features     Major Treatments, Procedures and Findings:  As part of unit milieu the pt has opportunity to engage in groups and individual and family therapies to support the above diagnoses.    Symptoms to Report: If you note the following: feeling more aggressive, increased confusion, losing more sleep, mood getting worse or thoughts of suicide please call your outpatient provider, outpatient treatment team or resources below. You can also call 911 or proceed to an emergency room. If you are concerned that your teen is continuing to use substances please report this to your outpatient treatment team.    Lifestyle Adjustment:   1. Abstain from using any mood altering substance   2. Avoid friends or people who are known drug users   3. Your environment should be healthy and free of substance use/abuse   4. Follow your home engagement/ Stage 1 Contract and recommendations of your treatment team.  5. Consider Sober Home environment.  6. Attend regular AA/ Alanon meetings. For local venues please call [phone]      FOLLOW-UP APPOINTMENTS & RECOMMENDATIONS    1.  Referral and Recommendations: Individual therapy, psychiatric Care and Medium Intensity outpatient treatment through [facility] in [place].        Intake: Referral for Medium Intensity program made. They will contact you to set up intake.      2. Therapist:  Jessie through [facility] ([place])-- therapist.  Pt does have an appointment set up for 1/13 at 10:00 am.     Individual and Family therapy is highly recommended for a successful recovery.            3. Psychiatrist: Dr. TAMI Lopez, [facility]   [phone]          Primary care provider: Dr. Peterson- [facility]       Your child may or  may not be receiving psychiatric services at their next treatment location; therefore, please schedule a medication follow up for 2-4 weeks post discharge to ensure your child does not run out of medications. Please arrange this with your Primary Care Provider if your child does not already have a psychiatrist or there is a lengthy wait to be seen by a psychiatrist.      4. AA/NA meetings for patient and Alanon meetings for family.  Call [employer] for times and venues at [phone].            5. Additional  Comments:    _______ I have all medications locked up and patient has no access to them, I agree to supervise medication administration.  _______ I have all Firearms securely locked or removed from the home.  The patient has no access to firearms or weapons.          Resources:   1. 24hr Crisis Intervention: 495.487.1777 or 824-936-3060 (TTY: 929.871.1531).    2. National [place] on Mental Illness 513-565-0426 or 432-402-2087.  3. [place] Association for Children's Mental Health: 889.471.4246.  4. Alcoholics, Alanon, Narcotics Anonymous a 750-316-9921  5. Suicide Awareness Voices of Education (SAVE) 0- 062-286-SAVE (4254)  6. National Suicide Prevention Line (www.mentalhealthmn.org): 449-832-ENNZ (8978)  7. Mental Health Consumer/Survivor Network of [place]: 942.191.8631 or 875-651-8477  8. Mental Health Association of [place]: 133.186.6936 or 625-461-2040  9. Mobile Crises: The crisis teams, made up of mental health professionals, can travel to the individual s location and assess the situation. They help the individual through the crisis by providing stabilization services, intervention services, crisis prevention planning, referral to other professionals (including in some areas rapid access to psychiatrists) and follow-up service  -- [facility] Mobile Crisis: (430) 240-1223    General Medication Instructions:   See your medication sheet(s) for instructions.   Take all medicines as directed.  Make no changes unless  "your doctor suggests them.   Go to all your doctor visits.  Be sure to have all your required lab tests. This way, your medicines can be refilled on time.  Do not use any drugs not prescribed by your doctor.  Avoid alcohol.                                     ..                         Patient or Representative                                                                                        Date And Time    Pending Results     Date and Time Order Name Status Description    1/7/2017 1848 Throat Culture Aerobic Bacterial Preliminary             Admission Information        Provider Department Dept Phone    1/6/2017 Laxmi Rosales MD  6ae [phone]      Your Vitals Were     Blood Pressure Pulse Temperature    117/71 mmHg 74 97.6  F (36.4  C) (Oral)    Respirations Height Weight    16 1.651 m (5' 5\") 84.641 kg (186 lb 9.6 oz)    BMI (Body Mass Index) Last Period       31.05 kg/m2 12/30/2016       Pro[employer] Information     [employer] lets you send messages to your doctor, view your test results, renew your prescriptions, schedule appointments and more. To sign up, go to [email or web address], contact your [facility] clinic or call [phone] during business hours.            Care EveryWhere ID     This is your Care EveryWhere ID. This could be used by other organizations to access your [facility] medical records  [id number]           Review of your medicines      CONTINUE these medicines which may have CHANGED, or have new prescriptions. If we are uncertain of the size of tablets/capsules you have at home, strength may be listed as something that might have changed.        Dose / Directions    ARIPiprazole 10 MG tablet   Commonly known as:  ABILIFY   This may have changed:    - medication strength  - how much to take   Used for:  Depressive disorder        Dose:  10 mg   Take 1 tablet (10 mg) by mouth daily   Quantity:  30 tablet   Refills:  0       FLUoxetine 10 MG capsule   Commonly known as:  PROzac   This may " have changed:    - medication strength  - how much to take  - when to take this   Used for:  Depressive disorder        Dose:  30 mg   Take 3 capsules (30 mg) by mouth daily   Quantity:  90 capsule   Refills:  0         CONTINUE these medicines which have NOT CHANGED        Dose / Directions    IBUPROFEN PO        Dose:  600 mg   Take 600 mg by mouth at onset of headache for moderate pain For migraines   Refills:  0         STOP taking     methylphenidate 30 MG/9HR Patch   Commonly known as:  DAYTRANA           METHYLPHENIDATE HCL PO                Where to get your medicines      These medications were sent to [facility] [place], [place] - [address]  [address], [facility] [address]     Phone:  [phone]    - FLUoxetine 10 MG capsule      Some of these will need a paper prescription and others can be bought over the counter. Ask your nurse if you have questions.     You don't need a prescription for these medications    - ARIPiprazole 10 MG tablet             Protect others around you: Learn how to safely use, store and throw away your medicines at www.disposemymeds.org.             Medication List: This is a list of all your medications and when to take them. Check marks below indicate your daily home schedule. Keep this list as a reference.      Medications           Morning Afternoon Evening Bedtime As Needed    ARIPiprazole 10 MG tablet   Commonly known as:  ABILIFY   Take 1 tablet (10 mg) by mouth daily   Last time this was given:  10 mg on 1/9/2017  8:32 PM                                   FLUoxetine 10 MG capsule   Commonly known as:  PROzac   Take 3 capsules (30 mg) by mouth daily   Last time this was given:  30 mg on 1/10/2017  8:57 AM                                   IBUPROFEN PO   Take 600 mg by mouth at onset of headache for moderate pain For migraines   Last time this was given:  600 mg on 1/6/2017  9:38 PM                                      Resident

## 2025-04-20 NOTE — ED PROVIDER NOTE - PROGRESS NOTE DETAILS
Lynette Lassiter DO (PGY-1): labs nonactionable, patient is able to ambulate without assistance, denies dizziness weakness. Has cardiology appointment scheduled for next week. will discharge with close outpatient follow up

## 2025-04-20 NOTE — ED ADULT NURSE NOTE - CHIEF COMPLAINT QUOTE
found passed out in [employer] with episode of confusion f/s in field [employer]  had recent covid last week not a code [facility] as per dr Casanova

## 2025-04-20 NOTE — ED PROVIDER NOTE - PHYSICAL EXAMINATION
GENERAL: Awake, alert, NAD  HEENT: NC/AT, moist mucous membranes, PERRL, EOMI  LUNGS: CTAB, no wheezes or crackles   CARDIAC: RRR, no m/r/g  ABDOMEN: Soft, non tender, non distended, no rebound, no guarding  BACK: No midline spinal tenderness, no CVA tenderness  EXT: No edema, no calf tenderness, 2+ DP pulses bilaterally, no deformities.  NEURO: CN2-12 grossly intact, A&Ox4, MS +5/5 in UE and LE BL, gross sensation intact in UE and LE BL, negative pronator drift  SKIN: Warm and dry. No rash.  PSYCH: Normal affect.

## 2025-04-20 NOTE — ED PROVIDER NOTE - PATIENT PORTAL LINK FT
You can access the FollowMyHealth Patient Portal offered by [facility] by registering at the following website: [email or web address]. By joining [employer]’s FollowMyHealth portal, you will also be able to view your health information using other applications (apps) compatible with our system.

## 2025-04-20 NOTE — ED ADULT NURSE NOTE - NSFALLRISKINTERV_ED_ALL_ED
Assistance OOB with selected safe patient handling equipment if applicable/Assistance with ambulation/Communicate fall risk and risk factors to all staff, patient, and family/Monitor gait and stability/Provide visual cue: yellow wristband, yellow gown, etc/Reinforce activity limits and safety measures with patient and family/Call bell, personal items and telephone in reach/Instruct patient to call for assistance before getting out of bed/chair/stretcher/Non-slip footwear applied when patient is off stretcher/[place] to call system/Physically safe environment - no spills, clutter or unnecessary equipment/Purposeful Proactive Rounding/Room/bathroom lighting operational, light cord in reach

## 2025-04-20 NOTE — ED PROVIDER NOTE - CLINICAL SUMMARY MEDICAL DECISION MAKING FREE TEXT BOX
79-year-old female presenting after  witnessed syncopal episode without head strike.  She is on DAPT for history of CAD.  She endorses feeling dizzy and weak prior to syncopal episode.  Denies any chest pain, shortness of breath, abdominal pain, headache, vision changes.  On exam patient  appears fatigued however is alert and oriented x 4.  Lungs clear to auscultation bilaterally, heart regular rate and rhythm, EKG normal sinus rhythm, abdomen soft nontender.  Neuro exam cranial nerves II through XII intact, pupils equal round and reactive to light.  She was recently admitted earlier this month for COVID.  Had a TTE at that time with an EF of 64%, no acute findings.  Saw her cardiologist earlier this week and has a follow-up appointment next Saturday for stress test.  CT head earlier this month with no acute intracranial hemorrhage or other findings.  Differential includes but is not limited to cardiac syncope, vasovagal, electrolyte abnormalities, dehydration, infection.  Will obtain labs, chest x-ray, urine.  No head strike, no neurodeficits, no need for CT head at this time  Will treat with meclizine and Ofirmev and fluids and reevaluate for dispo.

## 2025-04-20 NOTE — ED ADULT TRIAGE NOTE - CHIEF COMPLAINT QUOTE
found passed out in [employer] with episode of confusion f/s in field [employer]  had recent covid last week not a code [facility] as per dr Casanova

## 2025-04-20 NOTE — ED PROVIDER NOTE - OBJECTIVE STATEMENT
79-year-old female with past medical history of hypertension currently on norvasc 10, raise  lisinopril to 40 mg. , coreg 12.5 mg bid, clonidine patch 0.2 mg, CAD on DAPT, hypertension, hyperlipidemia, prior CVA with no residual deficits presenting to emergency department after syncopal episode.  Patient was at [employer] this morning around 930 when she endorses feeling dizzy she stood up and walked to a cooler area where she sat down.  Daughter reports finding her drooling and nonreactive to sternal rub for about 2 minutes.  She denies any falls or head strike.  Patient does not remember the events however she does remember feeling dizzy at [employer] beforehand.  She denies any chest pain, shortness of breath, headache, vision changes, abdominal pain.  Endorses history of vertigo and multiple episodes of syncope.

## 2025-04-21 LAB
CULTURE RESULTS: SIGNIFICANT CHANGE UP
SPECIMEN SOURCE: SIGNIFICANT CHANGE UP

## 2025-04-29 NOTE — H&P PST ADULT - NECK DETAILS
No previous uterine incision/[place] Pregnancy/Less than or equal to 4 previous vaginal births/No known bleeding disorder/No history of postpartum hemorrhage/No other PPH risks indicated supple

## 2025-04-29 NOTE — H&P ADULT - PROBLEM/PLAN-4
HISTORY OF PRESENT ILLNESS:  This is a 66 year old right-hand-dominant woman who is coming in today with complaints of low back pain that is recently started up.  The patient would like to have a repeat radiofrequency ablation.  The patient is a recipient of radiofrequency ablation from 10/24/2020 for which a targeted bilateral L1-2 and L2-3 levels.    The patient was doing better after the radiofrequency ablation.  When she receives radiofrequency ablation, she is able to bend and lift and walk better.  She is also doing yoga.  She is able to also do all these things when the radiofrequency is helping her.    She is now able to bend forward.  She is ambulating better.  She is able to ambulate without a cane or walker.    Valsalva maneuver does  increase the pain in the lower back.  She is sleeping better.  There is no fevers chills or night sweats or shortness of breath.   She has regular bowel movements.   There has not been significant weight changes.  She denies new numbness or weakness of the lower extremities.  No sleep disturbances noted.  She has constant pain these day  She denies the use of an assistive device for ambulation.    The pain score is 9 out of 10 for the  the lower back.  She is allergic to ibuprofen so she cannot take it.  She does take the Tylenol.  She is taking Norco 10 mg strength from her primary care doctor up to 1-2 pills/day.    Interventional pain procedures:  L5-S1 fusion with Dr. Newman at [facility] March 8, 2018.        PHYSICAL THERAPY:  Yoga is three times per week for the duration of  30 - 45 minutes.  She is walking 1/2 mile per day.   She tries to remain active during the day with household activities.   She also worked on doing the stairs which has 13 steps at least three times per day.       The patient had gone to physical therapy for a month month after the surgery.  She no longer seeing Dr. Atkinson another pain doctor by [place].  When she was offered  spinal cord stimulator, she did not want to contemplate that as of yet.      IMAGING:  EMG performed on December 1, 2021 by Dr. Ina Oneill.  There is a bilateral chronic C5 and C7 radiculopathies without acute or active denervation changes.  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    It should be noted that I received the records from Dr. Guillen's office.  On May 22, 2019 CT scan of the lumbar spine showed grade 2 subluxation 10 mm with pars defect.  Severe degenerative disc is noted.  Patient has severe foraminal stenosis at L5-S1.  At L4-5 there is a disc osteophyte condition with mild stenosis.  At L3-4 there is a bulging disc.    The EMG of the left lower extremity on July 10, 2019 with Dr. Devine was normal.    cervical spine MRI on September 14, 2022 at [facility].  There is straightening of normal cervical lordosis.  The cord is slightly flattened at C3-4 and C5-6 levels.  At C3-4 there is a central broad-based disc bulge moderately narrowing the spinal canal and indents the ventral cord.  At C4-5 shallow broad-based disc bulge moderately narrows the spinal canal and abuts the ventral cord.  Right foraminal narrowing is seen.  At C5-6 eccentric right broad-based disc bulge results in moderate to severe spinal stenosis flattening the spinal cord.  Severely narrowed right neural foramen is noted.  At C6/7 minimal peripheral disc bulge uncovertebral hypertrophy and bilateral mildly narrowed neural foramina is noted.  At C7-T1 mild facet hypertrophy is noted.    Patient had lumbar spine x-ray May 22, 2019 which showed evidence of fusion at L5-S1 and grade 2 spondylolisthesis.  On the same day CT scan of the lumbar spine showed grade 2 subluxation 10 mm with pars defect.  Severe degenerative disc is noted.  Patient has severe foraminal stenosis at L5-S1.  At L4-5 there is a disc osteophyte condition with mild stenosis.  At L3-4 there is a bulging disc.    MRI of the  lumbar spine November 27, 2018 shows anterolisthesis at L5-S1 and degenerative disc condition with postsurgical changes at L5-S1.    November 27, 2018 left hip MRI was normal.      ALLERGIES:   Allergen Reactions    Atorvastatin Calcium SWELLING    Cyclobenzaprine SWELLING    Cyclobenzaprine Hcl SWELLING    Nsaids HIVES and SWELLING    Statins Other (See Comments) and SWELLING     Knee/joint pain    Knee/joint pain   Knee/joint pain    Adhesive   (Environmental) HIVES     hives    Contrast Media SWELLING    Ibuprofen HIVES and SWELLING    Lyrica SWELLING    Mastisol Adhesive HIVES     band aid  ok with tegaderm and other tapes         Current Outpatient Medications   Medication Sig Dispense Refill    venlafaxine (EFFEXOR) 100 MG tablet Take 1 tablet by mouth in the morning and 1 tablet at noon and 1 tablet in the evening. 270 tablet 1    levothyroxine 75 MCG tablet Take 1 tablet by mouth daily. 90 tablet 1    gemfibrozil (LOPID) 600 MG tablet Take 1 tablet by mouth in the morning and 1 tablet in the evening. 180 tablet 1    HYDROcodone-acetaminophen (NORCO)  MG per tablet Take 1 tablet by mouth 2 times daily as needed for Pain. 60 tablet 0    ALPRAZolam (XANAX) 0.5 MG tablet Take 1 tablet by mouth nightly as needed for Sleep. 30 tablet 3    gabapentin (NEURONTIN) 300 MG capsule Take 2 capsules by mouth in the morning and in the evening 360 capsule 0    ondansetron (ZOFRAN ODT) 4 MG disintegrating tablet Place 1 tablet onto the tongue daily as needed for Nausea. 10 tablet 0    nortriptyline (PAMELOR) 10 MG capsule TAKE ONE CAPSULE BY MOUTH EVERY NIGHT AT BEDTIME 90 capsule 1    omeprazole (PriLOSEC) 40 MG capsule Take 1 capsule by mouth daily (before breakfast). 90 capsule 1    tiZANidine (ZANAFLEX) 4 MG tablet TAKE ONE TABLET BY MOUTH AT BEDTIME 90 tablet 1    ZINC SULFATE PO       cyanocobalamin (Vitamin B-12) 500 MCG tablet Take 500 mcg by mouth daily.      ferrous sulfate 325 (65 FE) MG tablet Take 325 mg  by mouth daily (with breakfast).      Aspirin 81 MG Cap Take 81 mg by mouth daily.      Ascorbic Acid (vitamin C) 1000 MG tablet Take 1,000 mg by mouth daily.      BIOTIN PO Take by mouth daily.      VITAMIN D, CHOLECALCIFEROL, PO Take by mouth daily.      KRILL OIL PO       Probiotic Product (PROBIOTIC BLEND PO) Take 1 capsule by mouth.      DOCUSATE SODIUM PO       nortriptyline (PAMELOR) 25 MG capsule Take 25 mg by mouth nightly.        No current facility-administered medications for this encounter.     PAST MEDICAL HISTORY: Anxiety, benzodiazepine dependence, post laminectomy syndrome, migraines, depression, complex regional pain syndrome affecting the bilateral upper extremities, hypothyroidism.    PAST SURGICAL HISTORY: L5-S1 Fusion, Right hand surgery,  Plates and screws in the hand, trigger finger release on the right, SYL-BSO,  section, splenectomy, and salivary gland resection.    FAMILY HISTORY:  Mother with coronary artery disease.  Sister with multiple sclerosis.    REVIEW OF SYSTEMS:   Anxiety, benzodiazepine dependence, post laminectomy syndrome, migraines, depression, complex regional pain syndrome affecting the bilateral upper extremities, hypothyroidism.  History of opiate dependence, history of suicide attempt.  She has gained approximately 6 pounds since her last visit with me.  The patient wears dentures.  She denies symptoms pertaining to cardiovascular, respiratory, genitourinary, skin, neurological, hematologic, and immunological conditions.    SOCIAL HISTORY:  She used to work at [employer], has not worked there for 4 months.  The patient is in the process of getting .  She has 2 children.  Does not smoke.  Denies drinking.    PHYSICAL EXAMINATION:    Visit Vitals  /71 (BP Location: LUE - Left upper extremity, Patient Position: Sitting)   Pulse 88   Temp 97.5 °F (36.4 °C) (Temporal)   Resp 16   Ht 5' 1\" (1.549 m)   Wt 57.6 kg (127 lb)   SpO2 95%   BMI 24.00 kg/m²        GENERAL:  This is a well-developed, well-nourished woman, who is not in acute distress.     RESPIRATORY:  Lungs are clear.     CARDIAC:  Heart sounds regular.     ABDOMEN:  Soft, nontender, with normoactive bowel sounds.     NEUROLOGIC:  Cranial nerves II through XII intact.  She is alert and oriented x3.     NECK:  Supple without any lymphadenopathy or thyromegaly.     MUSCULOSKELETAL: The patient is able to walk without a cane.  She has well-healed bilateral incision where she had a fusion it is approximately 5 cm long.     Cervical range of motion shows increased pain with extension maneuver.  Lateral bending motion was managed well but caused pain.  Right lateral rotation increase the pain.  Left side was not as bad.  Bilateral shoulder abduction is 180 degrees.    Deep tendon reflexes are not obtainable in the upper extremity except for the triceps with 1 out of 4.  Sensorimotor examination shows diffusely diminished sensation without specific dermatomal distribution.  Nonspecific  strength weakness is also noted.    She is able to tiptoe and heel walk.  Heel walk was more uncomfortable for her.  She is showing excellent range of motion lumbar spine.  The flexion maneuver increases the pain more than the extension maneuver.  Right lateral bending motion increases the pain more.  Bilateral rotation maneuvers noted.    The paravertebral tenderness is present around the surgical scar.  Sciatic notch tenderness is absent.    Facet loading tenderness is present on the bilaterally.  Today I do not appreciate significant trochanteric bursa tenderness.    The SLR is negative in sitting position.   Ken's maneuver is negative.  Mauricio sign is negative.  I do not appreciate significant sensorimotor deficit of the lower extremities.   Peripheral edema is absent.  Pulses are palpable.     Evidence of previous surgical site is noted.    IMPRESSION:  This is a patient with a history of splenectomy, salivary  gland action, anxiety, benzodiazepine dependence, post laminectomy syndrome, migraines, depression, complex regional pain syndrome affecting the bilateral upper extremities coming in today with lower back pain relapse in the past week.    The patient is a recipient of bilateral L1-2 and L2-3 radiofrequency ablation from 10/24/2020 for with pain relief that lasted 6 months.    She reports better than 50% increased and improved activity level with the radiofrequency ablation.  With the radiofrequency ablation her pain relief is better than 80%.  The pain score goes from 9 out of 10 down to 2 out of 10.    She gets her hydrocodone prescription from her primary care doctor.    I am going to encourage her to continue her exercise level including yoga.    She was asked to come in for bilateral L1-2 and L2-3 radiofrequency ablation under fluoroscopic guidance.      cc: MD Faviola whatley MD  3:16 PM  4/29/2025        .   DISPLAY PLAN FREE TEXT

## 2025-05-12 ENCOUNTER — APPOINTMENT (OUTPATIENT)
Dept: FAMILY MEDICINE | Facility: CLINIC | Age: 80
End: 2025-05-12

## 2025-07-18 NOTE — ED PROVIDER NOTE - BIRTH SEX
Patient arrived via stretcher by medical transport for ordered esophagram.    Patient was frustrated, stating he already had this test last week. Chart review demonstrates no documentation that the patient recently had an esophagram. This was communicated to the patient.    Patient clearly stated that he did not want to have this exam done. Therefore, no esophagram was attempted.    Of note, patient reported that he is unable to stand, which would significantly reduce the quality of the study which requires a standing position against the fluoroscopy table while swallowing barium.       Bonilla Xavier DO  7/18/2025, 1:37 PM  Interventional Radiology  [phone] ([id number])     Female